# Patient Record
Sex: FEMALE | Race: OTHER | Employment: UNEMPLOYED | URBAN - METROPOLITAN AREA
[De-identification: names, ages, dates, MRNs, and addresses within clinical notes are randomized per-mention and may not be internally consistent; named-entity substitution may affect disease eponyms.]

---

## 2017-12-08 ENCOUNTER — APPOINTMENT (OUTPATIENT)
Dept: GENERAL RADIOLOGY | Age: 62
DRG: 872 | End: 2017-12-08
Attending: PHYSICIAN ASSISTANT
Payer: COMMERCIAL

## 2017-12-08 ENCOUNTER — HOSPITAL ENCOUNTER (INPATIENT)
Age: 62
LOS: 4 days | Discharge: NURSING, REHAB OR LONG-TERM CARE | DRG: 872 | End: 2017-12-12
Attending: EMERGENCY MEDICINE | Admitting: INTERNAL MEDICINE
Payer: COMMERCIAL

## 2017-12-08 ENCOUNTER — APPOINTMENT (OUTPATIENT)
Dept: CT IMAGING | Age: 62
DRG: 872 | End: 2017-12-08
Attending: EMERGENCY MEDICINE
Payer: COMMERCIAL

## 2017-12-08 DIAGNOSIS — A41.9 SEVERE SEPSIS (HCC): Primary | ICD-10-CM

## 2017-12-08 DIAGNOSIS — N39.0 URINARY TRACT INFECTION WITHOUT HEMATURIA, SITE UNSPECIFIED: ICD-10-CM

## 2017-12-08 DIAGNOSIS — R65.20 SEVERE SEPSIS (HCC): Primary | ICD-10-CM

## 2017-12-08 DIAGNOSIS — R50.9 FEVER, UNSPECIFIED FEVER CAUSE: ICD-10-CM

## 2017-12-08 LAB
ALBUMIN SERPL-MCNC: 3.6 G/DL (ref 3.4–5)
ALBUMIN/GLOB SERPL: 0.9 {RATIO} (ref 0.8–1.7)
ALP SERPL-CCNC: 149 U/L (ref 45–117)
ALT SERPL-CCNC: 54 U/L (ref 13–56)
ANION GAP SERPL CALC-SCNC: 10 MMOL/L (ref 3–18)
APPEARANCE UR: ABNORMAL
AST SERPL-CCNC: 46 U/L (ref 15–37)
BACTERIA URNS QL MICRO: ABNORMAL /HPF
BASOPHILS # BLD: 0 K/UL (ref 0–0.1)
BASOPHILS NFR BLD: 0 % (ref 0–2)
BILIRUB SERPL-MCNC: 0.3 MG/DL (ref 0.2–1)
BILIRUB UR QL: NEGATIVE
BUN SERPL-MCNC: 26 MG/DL (ref 7–18)
BUN/CREAT SERPL: 23 (ref 12–20)
CALCIUM SERPL-MCNC: 8.6 MG/DL (ref 8.5–10.1)
CHLORIDE SERPL-SCNC: 100 MMOL/L (ref 100–108)
CO2 SERPL-SCNC: 25 MMOL/L (ref 21–32)
COLOR UR: YELLOW
CREAT SERPL-MCNC: 1.13 MG/DL (ref 0.6–1.3)
DIFFERENTIAL METHOD BLD: ABNORMAL
EOSINOPHIL # BLD: 0 K/UL (ref 0–0.4)
EOSINOPHIL NFR BLD: 0 % (ref 0–5)
EPITH CASTS URNS QL MICRO: ABNORMAL /LPF (ref 0–5)
ERYTHROCYTE [DISTWIDTH] IN BLOOD BY AUTOMATED COUNT: 14.3 % (ref 11.6–14.5)
FLUAV AG NPH QL IA: NEGATIVE
FLUBV AG NOSE QL IA: NEGATIVE
GLOBULIN SER CALC-MCNC: 4.2 G/DL (ref 2–4)
GLUCOSE BLD STRIP.AUTO-MCNC: 261 MG/DL (ref 70–110)
GLUCOSE SERPL-MCNC: 318 MG/DL (ref 74–99)
GLUCOSE UR STRIP.AUTO-MCNC: >1000 MG/DL
HCT VFR BLD AUTO: 34.8 % (ref 35–45)
HGB BLD-MCNC: 11.8 G/DL (ref 12–16)
HGB UR QL STRIP: ABNORMAL
KETONES UR QL STRIP.AUTO: NEGATIVE MG/DL
LACTATE BLD-SCNC: 2.1 MMOL/L (ref 0.4–2)
LACTATE SERPL-SCNC: 2.3 MMOL/L (ref 0.4–2)
LEUKOCYTE ESTERASE UR QL STRIP.AUTO: ABNORMAL
LIPASE SERPL-CCNC: 134 U/L (ref 73–393)
LYMPHOCYTES # BLD: 0.6 K/UL (ref 0.9–3.6)
LYMPHOCYTES NFR BLD: 8 % (ref 21–52)
MCH RBC QN AUTO: 29.7 PG (ref 24–34)
MCHC RBC AUTO-ENTMCNC: 33.9 G/DL (ref 31–37)
MCV RBC AUTO: 87.7 FL (ref 74–97)
MONOCYTES # BLD: 0.5 K/UL (ref 0.05–1.2)
MONOCYTES NFR BLD: 8 % (ref 3–10)
NEUTS SEG # BLD: 5.7 K/UL (ref 1.8–8)
NEUTS SEG NFR BLD: 84 % (ref 40–73)
NITRITE UR QL STRIP.AUTO: NEGATIVE
PH UR STRIP: 5.5 [PH] (ref 5–8)
PLATELET # BLD AUTO: 158 K/UL (ref 135–420)
PMV BLD AUTO: 11.2 FL (ref 9.2–11.8)
POTASSIUM SERPL-SCNC: 4.1 MMOL/L (ref 3.5–5.5)
PROT SERPL-MCNC: 7.8 G/DL (ref 6.4–8.2)
PROT UR STRIP-MCNC: NEGATIVE MG/DL
RBC # BLD AUTO: 3.97 M/UL (ref 4.2–5.3)
RBC #/AREA URNS HPF: ABNORMAL /HPF (ref 0–5)
SODIUM SERPL-SCNC: 135 MMOL/L (ref 136–145)
SP GR UR REFRACTOMETRY: 1.02 (ref 1–1.03)
UROBILINOGEN UR QL STRIP.AUTO: 0.2 EU/DL (ref 0.2–1)
WBC # BLD AUTO: 6.8 K/UL (ref 4.6–13.2)
WBC URNS QL MICRO: ABNORMAL /HPF (ref 0–4)

## 2017-12-08 PROCEDURE — 83605 ASSAY OF LACTIC ACID: CPT

## 2017-12-08 PROCEDURE — 74011250636 HC RX REV CODE- 250/636: Performed by: PHYSICIAN ASSISTANT

## 2017-12-08 PROCEDURE — 93005 ELECTROCARDIOGRAM TRACING: CPT

## 2017-12-08 PROCEDURE — 51701 INSERT BLADDER CATHETER: CPT

## 2017-12-08 PROCEDURE — 80053 COMPREHEN METABOLIC PANEL: CPT | Performed by: PHYSICIAN ASSISTANT

## 2017-12-08 PROCEDURE — 99285 EMERGENCY DEPT VISIT HI MDM: CPT

## 2017-12-08 PROCEDURE — 83690 ASSAY OF LIPASE: CPT | Performed by: PHYSICIAN ASSISTANT

## 2017-12-08 PROCEDURE — 96365 THER/PROPH/DIAG IV INF INIT: CPT

## 2017-12-08 PROCEDURE — 81001 URINALYSIS AUTO W/SCOPE: CPT | Performed by: PHYSICIAN ASSISTANT

## 2017-12-08 PROCEDURE — 83605 ASSAY OF LACTIC ACID: CPT | Performed by: PHYSICIAN ASSISTANT

## 2017-12-08 PROCEDURE — 74011250636 HC RX REV CODE- 250/636: Performed by: INTERNAL MEDICINE

## 2017-12-08 PROCEDURE — 74011250636 HC RX REV CODE- 250/636: Performed by: EMERGENCY MEDICINE

## 2017-12-08 PROCEDURE — 74011250637 HC RX REV CODE- 250/637: Performed by: INTERNAL MEDICINE

## 2017-12-08 PROCEDURE — 74011000250 HC RX REV CODE- 250: Performed by: EMERGENCY MEDICINE

## 2017-12-08 PROCEDURE — 85025 COMPLETE CBC W/AUTO DIFF WBC: CPT | Performed by: PHYSICIAN ASSISTANT

## 2017-12-08 PROCEDURE — 71010 XR CHEST PORT: CPT

## 2017-12-08 PROCEDURE — 87077 CULTURE AEROBIC IDENTIFY: CPT | Performed by: PHYSICIAN ASSISTANT

## 2017-12-08 PROCEDURE — 94761 N-INVAS EAR/PLS OXIMETRY MLT: CPT

## 2017-12-08 PROCEDURE — 65270000029 HC RM PRIVATE

## 2017-12-08 PROCEDURE — 82962 GLUCOSE BLOOD TEST: CPT

## 2017-12-08 PROCEDURE — 74011000250 HC RX REV CODE- 250: Performed by: INTERNAL MEDICINE

## 2017-12-08 PROCEDURE — 74177 CT ABD & PELVIS W/CONTRAST: CPT

## 2017-12-08 PROCEDURE — 87186 SC STD MICRODIL/AGAR DIL: CPT | Performed by: PHYSICIAN ASSISTANT

## 2017-12-08 PROCEDURE — 87040 BLOOD CULTURE FOR BACTERIA: CPT | Performed by: PHYSICIAN ASSISTANT

## 2017-12-08 PROCEDURE — 87804 INFLUENZA ASSAY W/OPTIC: CPT

## 2017-12-08 PROCEDURE — 36415 COLL VENOUS BLD VENIPUNCTURE: CPT | Performed by: PHYSICIAN ASSISTANT

## 2017-12-08 PROCEDURE — 77030005563 HC CATH URETH INT MMGH -A

## 2017-12-08 PROCEDURE — 87086 URINE CULTURE/COLONY COUNT: CPT | Performed by: PHYSICIAN ASSISTANT

## 2017-12-08 PROCEDURE — 73502 X-RAY EXAM HIP UNI 2-3 VIEWS: CPT

## 2017-12-08 PROCEDURE — 74011636320 HC RX REV CODE- 636/320: Performed by: EMERGENCY MEDICINE

## 2017-12-08 RX ORDER — HEPARIN SODIUM 5000 [USP'U]/ML
5000 INJECTION, SOLUTION INTRAVENOUS; SUBCUTANEOUS EVERY 8 HOURS
Status: DISCONTINUED | OUTPATIENT
Start: 2017-12-08 | End: 2017-12-12 | Stop reason: HOSPADM

## 2017-12-08 RX ORDER — SODIUM CHLORIDE 9 MG/ML
75 INJECTION, SOLUTION INTRAVENOUS CONTINUOUS
Status: DISCONTINUED | OUTPATIENT
Start: 2017-12-08 | End: 2017-12-12 | Stop reason: HOSPADM

## 2017-12-08 RX ORDER — LEVOFLOXACIN 5 MG/ML
750 INJECTION, SOLUTION INTRAVENOUS EVERY 24 HOURS
Status: DISCONTINUED | OUTPATIENT
Start: 2017-12-08 | End: 2017-12-09

## 2017-12-08 RX ORDER — SODIUM CHLORIDE 0.9 % (FLUSH) 0.9 %
5-10 SYRINGE (ML) INJECTION AS NEEDED
Status: DISCONTINUED | OUTPATIENT
Start: 2017-12-08 | End: 2017-12-12 | Stop reason: HOSPADM

## 2017-12-08 RX ORDER — ACETAMINOPHEN 325 MG/1
650 TABLET ORAL
Status: DISCONTINUED | OUTPATIENT
Start: 2017-12-08 | End: 2017-12-08

## 2017-12-08 RX ORDER — ACETAMINOPHEN 325 MG/1
650 TABLET ORAL
Status: DISCONTINUED | OUTPATIENT
Start: 2017-12-08 | End: 2017-12-12 | Stop reason: HOSPADM

## 2017-12-08 RX ADMIN — CEFTRIAXONE SODIUM 2 G: 2 INJECTION, POWDER, FOR SOLUTION INTRAMUSCULAR; INTRAVENOUS at 19:45

## 2017-12-08 RX ADMIN — SODIUM CHLORIDE 75 ML/HR: 900 INJECTION, SOLUTION INTRAVENOUS at 21:40

## 2017-12-08 RX ADMIN — WATER 2 G: 1 INJECTION INTRAMUSCULAR; INTRAVENOUS; SUBCUTANEOUS at 21:51

## 2017-12-08 RX ADMIN — LEVOFLOXACIN 750 MG: 5 INJECTION, SOLUTION INTRAVENOUS at 22:55

## 2017-12-08 RX ADMIN — HEPARIN SODIUM 5000 UNITS: 5000 INJECTION, SOLUTION INTRAVENOUS; SUBCUTANEOUS at 22:58

## 2017-12-08 RX ADMIN — ACETAMINOPHEN 650 MG: 325 TABLET ORAL at 23:02

## 2017-12-08 RX ADMIN — SODIUM CHLORIDE 2313 ML: 900 INJECTION, SOLUTION INTRAVENOUS at 17:50

## 2017-12-08 RX ADMIN — IOPAMIDOL 75 ML: 612 INJECTION, SOLUTION INTRAVENOUS at 21:07

## 2017-12-08 NOTE — IP AVS SNAPSHOT
Jailyn Adamson 
 
 
 [address] [place] [address] Patient: Anastasiia Serrato MRN: [id number] MICHAEL:[id number] My Medications TAKE these medications as instructed Instructions Each Dose to Equal  
 Morning Noon Evening Bedtime  
 acetaminophen 325 mg tablet Commonly known as:  TYLENOL Your last dose was: Your next dose is: Take 2 Tabs by mouth every four (4) hours as needed. Indications: Fever, Pain 650 mg  
    
   
   
   
  
 insulin glargine 100 unit/mL injection Commonly known as:  LANTUS Your last dose was: Your next dose is:    
   
   
 23 Units by SubCUTAneous route daily. 23 Units  
    
   
   
   
  
 levoFLOXacin 750 mg tablet Commonly known as:  Carlos Modi Your last dose was: Your next dose is: Take 1 Tab by mouth daily. 750 mg  
    
   
   
   
  
 metoprolol tartrate 50 mg tablet Commonly known as:  LOPRESSOR Your last dose was: Your next dose is: Take 1 Tab by mouth every twelve (12) hours. 50 mg Where to Get Your Medications Information on where to get these meds will be given to you by the nurse or doctor. ! Ask your nurse or doctor about these medications  
  acetaminophen 325 mg tablet  
 insulin glargine 100 unit/mL injection  
 levoFLOXacin 750 mg tablet  
 metoprolol tartrate 50 mg tablet

## 2017-12-08 NOTE — ED PROVIDER NOTES
EMERGENCY DEPARTMENT HISTORY AND PHYSICAL EXAM    6:22 PM      Date: 12/8/2017  Patient Name: Chase Tidwell    History of Presenting Illness     Chief Complaint   Patient presents with   Orma Damme Fall    Abdominal Pain    Leg Pain         History Provided By: Patient's Niece    Chief Complaint: Fall, Left hip/abd pain  Duration: 2 Hours  Timing:  Constant  Location: Left hip and abd   Quality: Not obtained  Severity: N/A  Modifying Factors: N/A  Associated Symptoms: denies any other associated signs or symptoms      Additional History (Context): 6:41 PM Chase Tidwell is a 63 y.o. female with h/o CVA who presents to ED for evaluation after a fall onset 2 hours ago. Patients niece states that she fell with her daughter earlier today when being lifted out of bed. She states that she has been rubbing her left side and leg. She is weak on her right side at baseline. Patient takes metoprolol, aspirin and insulin daily. She was recently in the nursing home in [facility] but was brought her to live with her daughter 1 month ago. Patient's niece states that she is in the process of finding her a nursing home here. Patient febrile in ED. Hx limited due to patient being nonverbal.     PCP: None    Current Facility-Administered Medications   Medication Dose Route Frequency Provider Last Rate Last Dose    sodium chloride (NS) flush 5-10 mL  5-10 mL IntraVENous PRN Lizette Waddell PA-C        sodium chloride 0.9 % bolus infusion 2,313 mL  30 mL/kg IntraVENous ONCE Lizette Waddell PA-C           Past History     Past Medical History:  No past medical history on file. Past Surgical History:  No past surgical history on file. Family History:  No family history on file.     Social History:  Social History   Substance Use Topics    Smoking status: Not on file    Smokeless tobacco: Not on file    Alcohol use Not on file       Allergies:  No Known Allergies      Review of Systems       Review of Systems   Unable to perform ROS: Patient nonverbal         Physical Exam     Visit Vitals    /80 (BP 1 Location: Left arm, BP Patient Position: At rest)    Pulse (!) 120    Temp (!) 102.5 °F (39.2 °C)    Resp 16    Ht 4' 11\" (1.499 m)    Wt 77.1 kg (170 lb)    SpO2 96%    BMI 34.34 kg/m2         Physical Exam   Constitutional: She is oriented to person, place, and time. She appears well-developed and well-nourished. No distress. HENT:   Head: Normocephalic and atraumatic. Mouth/Throat: Oropharynx is clear and moist and mucous membranes are normal. No oropharyngeal exudate. Eyes: Conjunctivae and EOM are normal. No scleral icterus. Neck: Normal range of motion. Neck supple. No JVD present. No thyromegaly present. Cardiovascular: Regular rhythm, S1 normal, S2 normal, normal heart sounds and intact distal pulses. Tachycardia present. Exam reveals no gallop and no friction rub. No murmur heard. Pulmonary/Chest: Effort normal and breath sounds normal. No accessory muscle usage. No respiratory distress. She has no wheezes. She has no rhonchi. She has no rales. She exhibits no tenderness. No obvious deformity on the chest wall. Abdominal: Soft. Normal appearance and bowel sounds are normal. She exhibits no distension and no pulsatile midline mass. There is no tenderness. There is no rebound and no guarding. Seems to be discomfort with palpation on the left side of the Abd  Skin intact. Musculoskeletal: Normal range of motion. Lymphadenopathy:        Head (right side): No submandibular adenopathy present. She has no cervical adenopathy. Neurological: She is alert and oriented to person, place, and time. Moving all extremities. No obvious deficits or facial asymmetry. Contractors and paralysis of the RUE and RLE. Skin: Skin is warm, dry and intact. No rash noted. No erythema. Mild blanching erythema on sacrum. No skin breakdown    Psychiatric: She has a normal mood and affect.  Her speech is normal and behavior is normal.   Nursing note and vitals reviewed. Diagnostic Study Results     Labs -  Recent Results (from the past 12 hour(s))   CBC WITH AUTOMATED DIFF    Collection Time: 12/08/17  6:03 PM   Result Value Ref Range    WBC 6.8 4.6 - 13.2 K/uL    RBC 3.97 (L) 4.20 - 5.30 M/uL    HGB 11.8 (L) 12.0 - 16.0 g/dL    HCT 34.8 (L) 35.0 - 45.0 %    MCV 87.7 74.0 - 97.0 FL    MCH 29.7 24.0 - 34.0 PG    MCHC 33.9 31.0 - 37.0 g/dL    RDW 14.3 11.6 - 14.5 %    PLATELET 644 464 - 660 K/uL    MPV 11.2 9.2 - 11.8 FL    NEUTROPHILS 84 (H) 40 - 73 %    LYMPHOCYTES 8 (L) 21 - 52 %    MONOCYTES 8 3 - 10 %    EOSINOPHILS 0 0 - 5 %    BASOPHILS 0 0 - 2 %    ABS. NEUTROPHILS 5.7 1.8 - 8.0 K/UL    ABS. LYMPHOCYTES 0.6 (L) 0.9 - 3.6 K/UL    ABS. MONOCYTES 0.5 0.05 - 1.2 K/UL    ABS. EOSINOPHILS 0.0 0.0 - 0.4 K/UL    ABS. BASOPHILS 0.0 0.0 - 0.1 K/UL    DF AUTOMATED     POC LACTIC ACID    Collection Time: 12/08/17  6:06 PM   Result Value Ref Range    Lactic Acid (POC) 2.1 (HH) 0.4 - 2.0 mmol/L   EKG, 12 LEAD, INITIAL    Collection Time: 12/08/17  6:13 PM   Result Value Ref Range    Ventricular Rate 115 BPM    Atrial Rate 115 BPM    P-R Interval 152 ms    QRS Duration 64 ms    Q-T Interval 332 ms    QTC Calculation (Bezet) 459 ms    Calculated P Axis 46 degrees    Calculated R Axis 2 degrees    Calculated T Axis 35 degrees    Diagnosis       Sinus tachycardia  Otherwise normal ECG  No previous ECGs available         Radiologic Studies -   Xr Hip Lt W Or Wo Pelv 2-3 Vws    Result Date: 12/8/2017  Left Hip Two Views HISTORY: Left-sided hip and thigh pain status post fall day of arrival. COMPARISON: None. FINDINGS: AP pelvis and frog view of the left hip obtained. The bony pelvic ring is intact. The left hip joint space is unremarkable. There is no evidence of fracture or dislocation. Mineralization is normal.     IMPRESSION: Negative hip.     Xr Chest Port    Result Date: 12/8/2017  Chest One View portable 1738 hours CPT CODE: 87343 HISTORY: Left-sided abdomen, chest pain, hip pain, and by pain status post fall day of arrival. COMPARISON: None. FINDINGS: One view obtained. The cardiac and mediastinal silhouette is normal. The lungs are mildly hypoinflated. Pulmonary vascularity is normal. The left costophrenic angle is mildly blunted. The right costophrenic angle is sharp. . No bony abnormalities are seen. IMPRESSION: Mild blunting of the left costophrenic angle with differential diagnosis of small left pleural effusion vs. pleural reaction. .        Medical Decision Making   I am the first provider for this patient. I reviewed the vital signs, available nursing notes, past medical history, past surgical history, family history and social history. Vital Signs-Reviewed the patient's vital signs. Pulse Oximetry Analysis -  96% on room air , Normal    Cardiac Monitor:  Rate: 120  Rhythm:  Sinus Tachycardia     EKG: Interpreted by the EP. Time Interpreted: 18:15   Rate: 115 bpm   Rhythm: Sinus Tachycardia    Interpretation: Artifacts at baseline. nml axis and intervals. No obvious ischemic changes. Comparison: No previous EKG    Records Reviewed: Nursing Notes, Old Medical Records and Previous electrocardiograms (Time of Review: 6:22 PM)    ED Course: Progress Notes, Reevaluation, and Consults:  UPDATE 7:44 PM  I spoke with Dr. Jeffrey Williamson (hospitalist) who accepts for admission    Bran MD Ryan  EM-IM Physician         Provider Notes (Medical Decision Making):  Ms Helena Rudd is a 59y/o [place] woman with PMhx of CVA (R.sided weakness, wheelchair bound, aphasic), DM (insulin), Seizure disorder who presents for eval of ground level fall. Family does all speaking for her, can't give hx due to aphasia and cva in past. Family reports she was in nursing home in [facility], family sent her here to live ~30dys ago. Family is trying to set up PCP visit and get her into nursing home.   Today, famly was transferring her to bedside commode and fell onto left side. No LOC, but they wanted her 'checked out'. Seems to have some left abd pain. No meds given. On arrival here, Temp 102.5, 's, RR 16, /80, 96%ra. Obese  woman, laying in bed, moaning from time to time. HEENT benign, no trauma. Lungs CTAB, nonlabored, chest wall nontender. Abd soft, no lesions, normal bowel sounds, ttp on Left side (pt moans). Pelvis stable. No obvious deformities or breaks in skin on UE/LE. Sacrum with some blanchable erythema but no breakdown. R.LE and RUE are contracted and flacid paralysis. Concerned a bit about internal injuries, abd, left chest. More concerned about her Sepsis. She has 2/4 SIRS now. No signs of end organ damage at this point. Potential sources lung, blood, urine, viral (flu), interabd (apendix, adam, etc). Hard to get good history due to clinical state.      -Sepsis alert called  -30cc/kg IV F Bolus  -CBC, CMP, Lactate  -UA (cath)  -Uculture, BC x 2  -Flu swab  -CXR  -CT Abd/pelvis w/IV contrast  -anticipate admission  -Will await sources prior to abx. Johnnie Ocampo MD  EM-IM Physician       Recent Results (from the past 12 hour(s))   CBC WITH AUTOMATED DIFF    Collection Time: 12/08/17  6:03 PM   Result Value Ref Range    WBC 6.8 4.6 - 13.2 K/uL    RBC 3.97 (L) 4.20 - 5.30 M/uL    HGB 11.8 (L) 12.0 - 16.0 g/dL    HCT 34.8 (L) 35.0 - 45.0 %    MCV 87.7 74.0 - 97.0 FL    MCH 29.7 24.0 - 34.0 PG    MCHC 33.9 31.0 - 37.0 g/dL    RDW 14.3 11.6 - 14.5 %    PLATELET 511 779 - 693 K/uL    MPV 11.2 9.2 - 11.8 FL    NEUTROPHILS 84 (H) 40 - 73 %    LYMPHOCYTES 8 (L) 21 - 52 %    MONOCYTES 8 3 - 10 %    EOSINOPHILS 0 0 - 5 %    BASOPHILS 0 0 - 2 %    ABS. NEUTROPHILS 5.7 1.8 - 8.0 K/UL    ABS. LYMPHOCYTES 0.6 (L) 0.9 - 3.6 K/UL    ABS. MONOCYTES 0.5 0.05 - 1.2 K/UL    ABS. EOSINOPHILS 0.0 0.0 - 0.4 K/UL    ABS.  BASOPHILS 0.0 0.0 - 0.1 K/UL    DF AUTOMATED     METABOLIC PANEL, COMPREHENSIVE    Collection Time: 12/08/17 6:03 PM   Result Value Ref Range    Sodium 135 (L) 136 - 145 mmol/L    Potassium 4.1 3.5 - 5.5 mmol/L    Chloride 100 100 - 108 mmol/L    CO2 25 21 - 32 mmol/L    Anion gap 10 3.0 - 18 mmol/L    Glucose 318 (H) 74 - 99 mg/dL    BUN 26 (H) 7.0 - 18 MG/DL    Creatinine 1.13 0.6 - 1.3 MG/DL    BUN/Creatinine ratio 23 (H) 12 - 20      GFR est AA 60 (L) >60 ml/min/1.73m2    GFR est non-AA 49 (L) >60 ml/min/1.73m2    Calcium 8.6 8.5 - 10.1 MG/DL    Bilirubin, total 0.3 0.2 - 1.0 MG/DL    ALT (SGPT) 54 13 - 56 U/L    AST (SGOT) 46 (H) 15 - 37 U/L    Alk.  phosphatase 149 (H) 45 - 117 U/L    Protein, total 7.8 6.4 - 8.2 g/dL    Albumin 3.6 3.4 - 5.0 g/dL    Globulin 4.2 (H) 2.0 - 4.0 g/dL    A-G Ratio 0.9 0.8 - 1.7     LIPASE    Collection Time: 12/08/17  6:03 PM   Result Value Ref Range    Lipase 134 73 - 393 U/L   POC LACTIC ACID    Collection Time: 12/08/17  6:06 PM   Result Value Ref Range    Lactic Acid (POC) 2.1 (HH) 0.4 - 2.0 mmol/L   EKG, 12 LEAD, INITIAL    Collection Time: 12/08/17  6:13 PM   Result Value Ref Range    Ventricular Rate 115 BPM    Atrial Rate 115 BPM    P-R Interval 152 ms    QRS Duration 64 ms    Q-T Interval 332 ms    QTC Calculation (Bezet) 459 ms    Calculated P Axis 46 degrees    Calculated R Axis 2 degrees    Calculated T Axis 35 degrees    Diagnosis       Sinus tachycardia  Otherwise normal ECG  No previous ECGs available     URINALYSIS W/ RFLX MICROSCOPIC    Collection Time: 12/08/17  6:46 PM   Result Value Ref Range    Color YELLOW      Appearance CLOUDY      Specific gravity 1.020 1.005 - 1.030      pH (UA) 5.5 5.0 - 8.0      Protein NEGATIVE  NEG mg/dL    Glucose >1000 (A) NEG mg/dL    Ketone NEGATIVE  NEG mg/dL    Bilirubin NEGATIVE  NEG      Blood TRACE (A) NEG      Urobilinogen 0.2 0.2 - 1.0 EU/dL    Nitrites NEGATIVE  NEG      Leukocyte Esterase LARGE (A) NEG     URINE MICROSCOPIC ONLY    Collection Time: 12/08/17  6:46 PM   Result Value Ref Range    WBC TOO NUMEROUS TO COUNT 0 - 4 /hpf    RBC 4 to 10 0 - 5 /hpf    Epithelial cells 1+ 0 - 5 /lpf    Bacteria 4+ (A) NEG /hpf         UPDATE 7:16 PM  -Urine infected w/TNTC WBC  -WBC 6.8  -CMP with Glucose of ~350 but no acidosis or anion gap elevation  -CXR w/small ? l.pleural effusion  -Pelvis/hip films neg for fx  -Lact 2.1    -Will give IV Ceftriaxone and repeat Lactate  -Will speak to hospitalist about admission for Severe Sepsis      Carey Michael MD  EM-IM Physician         Procedures: None    Core Measures: None    Critical Care Time: None    For Hospitalized Patients:    1. Hospitalization Decision Time:  The decision to hospitalize the patient was made by Dr. Ngozi Vickers  at    1900   on 12/8/2017    2. Aspirin: Aspirin was not given because the patient did not present with a stroke at the time of their Emergency Department evaluation    Diagnosis     Clinical Impression: No diagnosis found. Disposition: Admit    Follow-up Information     None           Patient's Medications    No medications on file     _______________________________    Attestations:  Bridgette Perez acting as a scribe for and in the presence of Carey Michael MD      December 08, 2017 at 6:22 PM       Provider Attestation:      I personally performed the services described in the documentation, reviewed the documentation, as recorded by the scribe in my presence, and it accurately and completely records my words and actions.  December 08, 2017 at 6:22 PM - Carey Michael MD    _______________________________

## 2017-12-08 NOTE — ED TRIAGE NOTES
Pt is non verbal to a certain extent,pt will answer yes or no to questions,  pt fell today now c/o left sided abdominal, hip, and thigh pain, pt was being lifted from her bed and her and her family member had a mechanical fall, pt had a previous stroke and has deficit on right UE and LE, pt doesn't ambulate, no s/s of cardiac or respiratory distress

## 2017-12-08 NOTE — IP AVS SNAPSHOT
Christina Acevedo 
 
 
 [address] Patient: Thuy Macias MRN: [id number] NXD:[id number] About your hospitalization You were admitted on:  December 8, 2017 You last received care in the:  [facility] [address] You were discharged on:  December 12, 2017 Why you were hospitalized Your primary diagnosis was:  Not on File Your diagnoses also included:  Uti (Urinary Tract Infection) Things You Need To Do (next 8 weeks) Follow up with None Where:  None (395) Patient stated that they have no PCP Discharge Orders None A check denzel indicates which time of day the medication should be taken. My Medications TAKE these medications as instructed Instructions Each Dose to Equal  
 Morning Noon Evening Bedtime  
 acetaminophen 325 mg tablet Commonly known as:  TYLENOL Your last dose was: Your next dose is: Take 2 Tabs by mouth every four (4) hours as needed. Indications: Fever, Pain 650 mg  
    
   
   
   
  
 insulin glargine 100 unit/mL injection Commonly known as:  LANTUS Your last dose was: Your next dose is:    
   
   
 23 Units by SubCUTAneous route daily. 23 Units  
    
   
   
   
  
 levoFLOXacin 750 mg tablet Commonly known as:  Arlyss Alves Your last dose was: Your next dose is: Take 1 Tab by mouth daily. 750 mg  
    
   
   
   
  
 metoprolol tartrate 50 mg tablet Commonly known as:  LOPRESSOR Your last dose was: Your next dose is: Take 1 Tab by mouth every twelve (12) hours. 50 mg Where to Get Your Medications Information on where to get these meds will be given to you by the nurse or doctor. ! Ask your nurse or doctor about these medications  
  acetaminophen 325 mg tablet  
 insulin glargine 100 unit/mL injection levoFLOXacin 750 mg tablet  
 metoprolol tartrate 50 mg tablet Discharge Instructions Sepsis: Instrucciones de cuidado - [ Sepsis: Care Instructions ] Instrucciones de cuidado La sepsis es jenny infección que se ha extendido por todo el cuerpo. Es jenny afección que pone en peligro la andriy y a menudo provoca jenny presión arterial extremadamente baja. [place] puede provocar problemas en muchos diferentes órganos. La causa de la sepsis no está siempre anita, loretta puede ocurrir john parte de jenny enfermedad crónica o repentina. A veces hasta jenny enfermedad leve puede producirla. La atención de seguimiento es jenny parte clave de pierson tratamiento y seguridad. Asegúrese de hacer y acudir a todas las citas, y llame a pierson médico si está teniendo problemas. También es jenny buena idea saber los resultados de los exámenes y mantener jenny lista de los medicamentos que reji. Cómo puede cuidarse en el hogar? · Si pierson médico le recetó antibióticos, tómelos según las indicaciones. No deje de tomarlos por el hecho de sentirse mejor. Debe sixto todos los antibióticos hasta terminarlos. · Polina abundantes líquidos, suficientes para que pierson orina sea de color amarillo yaa o transparente john el agua. Elija beber agua u otros líquidos grupo sin cafeína hasta que se sienta mejor. Si tiene jenny enfermedad del riñón, del corazón o del hígado y tiene que Wale's líquidos, hable con pierson médico antes de aumentar pierson consumo. Puede probar bebidas rehidratantes, john Gatorade o Powerade. · No polina alcohol. · Siga jenny dieta saludable. Incluya en pierson dieta diaria frutas, vegetales y granos integrales. · Caminar es jenny manera sencilla de hacer ejercicio. Poco a poco, aumente la distancia que camine cada día. Asegúrese de que pierson médico sepa que usted va a comenzar un programa de ejercicios.  
· No fume ni use otros productos derivados del tabaco. Si necesita ayuda para dejar de fumar, hable con pierson médico [employer] y medicamentos para dejar de fumar. Estos pueden aumentar priya probabilidades de dejar el hábito para siempre. Cuándo debe pedir ayuda? Llame al 911 en cualquier momento que considere que necesita atención de emergencia. Por ejemplo, llame si: 
? · Se desmayó (perdió el conocimiento). ?Llame a pierson médico ahora mismo o busque atención médica inmediata si: 
? · Tiene fiebre o escalofríos. ? · Tiene la piel fría, pálida o pegajosa. ? · [employer] o aturdimiento, o que está a punto de Proctor. ? · Tiene algún síntoma nuevo, john tos, dolor en jenny parte del cuerpo o problemas urinarios. ?Preste especial atención a los cambios en pierson eduardo y asegúrese de comunicarse con pierson médico si: 
? · No mejora john se esperaba. Dónde puede encontrar más información en inglés? Toby Merck a http://terri-yen.info/. Kayla Brennan I218 en la búsqueda para aprender más acerca de \"Sepsis: Instrucciones de cuidado - [ Sepsis: Care Instructions ]. \" 
Revisado: 20 marzo, 2017 Versión del contenido: 11.4 © 2996-8814 Healthwise, Incorporated. Las instrucciones de cuidado fueron adaptadas bajo licencia por [employer] (which disclaims liability or warranty for this information). Si usted tiene [place] afección médica o sobre estas instrucciones, siempre pregunte a pierson profesional de eduardo. Healthwise, Incorporated niega toda garantía o responsabilidad por pierson uso de esta información. Infección urinaria en las mujeres: Instrucciones de cuidado - [ Urinary Tract Infection in Women: Care Instructions ] Instrucciones de cuidado Jenny infección urinaria (UTI, por priya siglas en inglés) es un término general que hace referencia a jenny infección que se produce en cualquier parte entre los riñones y la uretra (conducto por el cual se expulsa la orina). La mayoría de las UTI son infecciones de la vejiga. Con frecuencia, causan dolor o ardor al [place]. Las UTI son causadas por bacterias y pueden curarse con antibióticos. Asegúrese de completar el tratamiento para que la infección desaparezca. La atención de seguimiento es jenny parte clave de pierson tratamiento y seguridad. Asegúrese de hacer y acudir a todas las citas, y llame a pierson médico si está teniendo problemas. También es jenny buena idea saber los resultados de los exámenes y mantener jenny lista de los medicamentos que reji. Cómo puede cuidarse en el hogar? · [address] No deje de tomarlos por el hecho de sentirse mejor. Debe sixto todos los antibióticos hasta terminarlos. · Neymar los próximos nam o [address], polina mayor cantidad de [place] y otros líquidos. [place] puede ayudar a eliminar las bacterias que provocan la infección. (Si tiene jenny enfermedad de los riñones, el corazón o el hígado y tiene que [place]'s líquidos, hable con pierson médico antes de aumentar pierson consumo). · Evite las bebidas gaseosas o con cafeína. Pueden irritar la vejiga. · Orine con frecuencia. Trate de vaciar la vejiga cada vez que orine. · Para aliviar el dolor, tome un baño caliente o colóquese jenny almohadilla térmica a baja temperatura sobre la parte baja del abdomen o la jj genital. Nunca se duerma mientras [place] jenny almohadilla térmica. [address] infecciones urinarias · Polina abundante agua todos los días. [place] la ayuda a orinar con frecuencia, lo que elimina las bacterias de pierson organismo. (Si tiene jenny enfermedad de los riñones, el corazón o el hígado y tiene que [place]'s líquidos, hable con pierson médico antes de aumentar pierson consumo). · Orine cuando necesite hacerlo. · Orine inmediatamente después de stephanie tenido [employer]. · Cámbiese las toallas sanitarias con frecuencia. · Evite el uso de lavados vaginales, los deacon de burbujas, los [place] de higiene femenina y otros productos para la higiene femenina que contengan desodorantes. · Después de ir al baño, límpiese de adelante hacia atrás. Cuándo debes pedir ayuda? Llama a tu médico ahora mismo o busca atención médica inmediata si: 
? · Aparecen síntomas john fiebre, escalofríos, náuseas o vómitos por Shermon Factor, o empeoran. ? · Te empieza a doler la espalda, erinn debajo de la caja torácica. A esto se le llama dolor en el flanco.  
? · Aparece vinh o pus en la orina. ? · Tienes problemas con los antibióticos. ?Presta especial atención a los cambios en tu eduardo y asegúrate de comunicarte con tu médico si: 
? · No mejoras después de stephanie tomado un antibiótico luiza 2 días. ? · Los síntomas desaparecen y [place]. Dónde puede encontrar más información en inglés? Dinesh Imam a http://terri-yen.info/. Shira Tom F857 en la búsqueda para aprender más acerca de \"Infección urinaria en las mujeres: Instrucciones de cuidado - [ Urinary Tract Infection in Women: Care Instructions ]. \" 
Revisado: 12 mayo, 2017 Versión del contenido: 11.4 © 3511-5663 Healthwise, Incorporated. Las instrucciones de cuidado fueron adaptadas bajo licencia por [employer] Connections (which disclaims liability or warranty for this information). Si usted tiene [place] afección médica o sobre estas instrucciones, siempre pregunte a pierson profesional de eduardo. Healthwise, Incorporated niega toda garantía o responsabilidad por pierson uso de esta información. Prevención de caídas: Instrucciones de cuidado - [ Preventing Falls: Care Instructions ] Instrucciones de cuidado Caminar por pierson casa de manera yang puede convertirse en un desafío, sobre todo si tiene lesiones o problemas de eduardo que puedan hacer que se caiga con [place]. Las alfombras sueltas y los muebles en los pasillos se encuentran entre los peligros que enfrentan muchas personas mayores que tienen problemas para caminar o de la visión.  Las [employer] tienen afecciones john artritis, osteoporosis o demencia, también deben tener cuidado de no caerse. Usted puede hacer que pierson hogar sea más seguro si reji algunas medidas sencillas. La atención de seguimiento es jenny parte clave de pierson tratamiento y seguridad. Asegúrese de hacer y acudir a todas las citas, y llame a pierson médico si está teniendo problemas. También es jenny buena idea saber los resultados de los exámenes y mantener jenny lista de los medicamentos que reji. Cómo puede cuidarse en el hogar? Cómo cuidarse · Podría marearse si no laura suficiente agua. Para prevenir la deshidratación, marlo abundantes líquidos, los suficientes para que pierson orina sea de color amarillo yaa o transparente john el agua. Elija sixto agua y otros líquidos grupo sin cafeína. Si tiene jenny enfermedad del riñón, del corazón o del hígado y tiene que Wale's líquidos, hable con pierson médico antes de aumentar pierson consumo. · Shannon ejercicio con regularidad para mejorar pierson fortaleza, pierson zan muscular y pierson estabilidad. Cam[place], si puede. Nadar podría ser Charo Hane buena alternativa si le fadia trabajo caminar. · Hágase un examen de la visión y la audición cada año o cada vez que note un cambio. Si tiene dificultades para rosa maria y oír, es posible que no pueda evitar objetos y podría perder pierson equilibrio. · Conozca los efectos secundarios de los medicamentos que reji. Pregúntele a pierson médico o pierson farmacéutico si los medicamentos que reji pueden afectar pierson equilibrio. Las pastillas para dormir o los sedantes pueden afectar pierson equilibrio. · Limite la cantidad de alcohol que laura. El alcohol puede alterar pierson equilibrio y otros sentidos. · Pregúntele a pierson médico si los callos o las callosidades deben ser eliminados de priya pies. Si [place] un calzado holgado a causa de los callos o las callosidades, podría perder el equilibrio y caerse. · Hable con pierson médico si tiene entumecimiento en los pies. Cómo prevenir las caídas en el hogar · Quite escalones en la elana de entrada, alfombrillas y obstáculos. Fije las alfombras sueltas o repare las áreas levantadas del piso. · Mueva los muebles y los cables eléctricos para que no estén en los pasillos. · Utilice cera antideslizante para pisos, y seque de inmediato cualquier derrame que se produzca, sobre todo si el piso es de baldosas de cerámica. · Si [place] jenny andadera o un bastón, colóquele un revestimiento de goma en las puntas. Si utiliza muletas, limpie la base regularmente con un paño abrasivo, john un estropajo de juan jose. · Mantenga la casa naya ellie, sobre todo las [place], los porches y los pasillos exteriores. Utilice lamparitas nocturnas en áreas john vestíbulos y deacon. Ponga interruptores de radha adicionales o utilice interruptores a distancia (john los que se encienden o apagan al aplaudir) para que sea más fácil encender las luces si tiene que levantarse por las noches. · Instale pasamanos o barandillas sólidos en las escaleras. · Ponga los artículos que más utiliza en los estantes bajos de los gabinetes (aproximadamente a la altura de pierson cintura). · Tenga un tel[facility]fono [facility] y [facility] linterna con baterías nuevas cerca de pierson cama. Si es posible, coloque un teléfono en cada jenny de las habitaciones de pierson casa, o lleve siempre un celular en jassi de que se caiga y no pueda llegar al teléfono. O puede usar un dispositivo en el hussain o la neri en el que presione un botón para enviar jenny señal pidiendo [place]. · Use zapatos de tacón bajo que le queden naya y le den buen apoyo a priya pies. Use calzado con suelas antideslizantes. Revise los tacones y las suelas de priya zapatos antes de usarlos. Repare o reemplace los tacones o las suelas desgastados. · No camine en medias sobre pisos de [place]. · Camine por el [facility] aceras estén resbalosas. Si vive en jenny localidad donde hay elsa y hielo en invierno, coloque sal sobre los escalones y las aceras resbaladizos. [address] en el baño · Instale agarraderas y tapetes antideslizantes dentro y fuera de [facility] [address], así john cerca del inodoro y el lavabo. · Utilice jenny silla para la ducha y un banco para la bañera. · Use jenny alice de ducha portátil que le permite sentarse mientras se ducha. · Cuando vaya a entrar en la tadeo o la ducha, coloque elham la pierna más débil. Cuando vaya a salir de la ducha o la tadeo, hágalo elham con el lado más riley. · Repare los asientos de inodoro sueltos y considere instalar un asiento de inodoro elevado para que sea más fácil sentarse y levantarse del inodoro. · No cierre con llave la elana del baño mientras se ducha. Dónde puede encontrar más información en inglés? Luis Felipene Corey a http://terri-yen.info/. David O416 en la búsqueda para aprender más acerca de \"Prevención de caídas: Instrucciones de cuidado - [ Preventing Falls: Care Instructions ]. \" 
Revisado: 12 pimentel, 2017 Versión del contenido: 11.4 © 9987-2210 Healthwise, Incorporated. Las instrucciones de cuidado fueron adaptadas bajo licencia por Good Help Connections (which disclaims liability or warranty for this information). Si usted tiene [place] afección médica o sobre estas instrucciones, siempre pregunte a pierson profesional de eduardo. Healthwise, Incorporated niega toda garantía o responsabilidad por pierson uso de esta información. Patient armband removed and shredded DISCHARGE SUMMARY from Nurse PATIENT INSTRUCTIONS: 
 
 
F-face looks uneven A-arms unable to move or move unevenly S-speech slurred or non-existent T-time-call 911 as soon as signs and symptoms begin-DO NOT go Back to bed or wait to see if you get better-TIME IS BRAIN. Warning Signs of HEART ATTACK Call 911 if you have these symptoms: 
? Chest discomfort. Most heart attacks involve discomfort in the center of the chest that lasts more than a few minutes, or that goes away and comes back. It can feel like uncomfortable pressure, squeezing, fullness, or pain. ? Discomfort in other areas of the upper body. Symptoms can include pain or discomfort in one or both arms, the back, neck, jaw, or stomach. ? Shortness of breath with or without chest discomfort. ? Other signs may include breaking out in a cold sweat, nausea, or lightheadedness. Don't wait more than five minutes to call [address]! Fast action can save your life. Calling 911 is almost always the fastest way to get lifesaving treatment. Emergency Medical Services staff can begin treatment when they arrive  up to an hour sooner than if someone gets to the hospital by car. The discharge information has been reviewed with the patient and caregiver. The patient and caregiver verbalized understanding. Discharge medications reviewed with the patient and caregiver and appropriate educational materials and side effects teaching were provided. ___________________________________________________________________________________________________________________________________ [facility] Announcement We are excited to announce that we are making your provider's discharge notes available to you in Qmercet. You will see these notes when they are completed and signed by the physician that discharged you from your recent hospital stay. If you have any questions or concerns about any information you see in [facility], please call the Health Information Department where you were seen or reach out to your Primary Care Provider for more information about your plan of care. Introducing [facility]! Femi Metcalf introduce portal paciente MyChart . Ahora se puede acceder a partes de pierson expediente médico, enviar por correo electrónico la oficina de pierson médico y solicitar renovaciones de medicamentos en línea. En pierson navegador de Internet , Hilda Loron a [email or web address] Charlie clkelley en el usuario por Margot Goddard? Maximino maurer aquí en la sesión Elease Omar. Verá la página de registro [place]. Ingrese pierson código de [employer] rayne y john aparece a continuación. Usted no tendrá que [employer] código después de stephanie completado el proceso de registro . Si usted no se inscribe antes de la fecha de caducidad , debe solicitar un nuevo código. · MyChart Código de acceso : [id number] Expires: 3/12/2018  4:35 PM 
 
Ingresa los últimos cuatro dígitos de pierson Número de Seguro Social ( xxxx ) y fecha de nacimiento ( dd / mm / aaaa ) john se indica y charlie clic en Enviar. Usted será llevado a la siguiente página de registro . Crear un ID MyChart . Esta será pierson ID de inicio de sesión de MyChart y no puede ser [place] , por lo que pensar en jenny que es [place] y fácil de recordar . Crear jenny contraseña MyChart . Usted puede cambiar pierson contraseña en cualquier momento . Ingrese pierson Password Reset de preguntas y [place] . [place] se puede utilizar en un momento posterior si usted olvida pierson contraseña. Introduzca pierson dirección de correo electrónico . Colton Zavala recibirá jenny notificación por correo electrónico cuando la nueva información está disponible en MyChart . Joaquin maurer en Registrarse. Darmarianyn Sitter rosa maria y descargar porciones de pierson expediente médico. 
Charlie erasto en el enlace de descarga del menú Resumen para descargar jenny copia portátil de pierson información médica . Si tiene Jase Gonzalez , por favor visite la sección de preguntas frecuentes del sitio web MyChart . Recuerde, MyChart NO es que se utilizará para las necesidades urgentes.  Para emergencias médicas , hamzah al 911 . Ahora disponible en pierson iPhone y Android ! Unresulted Labs-Please follow up with your PCP about these lab tests Order Current Status CULTURE, BLOOD Preliminary result CULTURE, BLOOD Preliminary result CULTURE, BLOOD Preliminary result CULTURE, BLOOD Preliminary result Providers Seen During Your Hospitalization Provider Specialty Primary office phone Phoenix Lamar MD Emergency Medicine [phone] Deisy Figueroa MD Internal Medicine [phone] Immunizations Administered for This Admission Name Date Influenza Vaccine (Quad) PF 12/12/2017 Your Primary Care Physician (PCP) Primary Care Physician Office Phone Office Fax NONE ** None ** ** None ** You are allergic to the following No active allergies Recent Documentation Height Weight Breastfeeding? BMI  
  
 1.499 m 72.8 kg No 32.42 kg/m2 Emergency Contacts Name Discharge Info Relation Home Work Mobile Burgos,Grisela DISCHARGE CAREGIVER [3] Other Relative [6] [phone] Patient Belongings The following personal items are in your possession at time of discharge: 
  Dental Appliances: None  Visual Aid: None      Home Medications: None   Jewelry: None  Clothing: None    Other Valuables: Eyeglasses, With patient  Personal Items Sent to Safe: none Discharge Instructions Attachments/References ACETAMINOPHEN (ORAL) (Russian) INSULIN GLARGINE (INJECTION) (Russian) LEVOFLOXACIN (ORAL) (Russian) METOPROLOL (ORAL) (Russian) FLU VACCINE (LIVE - INTRANASAL): VIS (Russian) Patient Handouts Acetaminophen (By mouth) Acetaminophen (m-xfrx-d-MIN-oh-fen) Treats minor aches and pain and reduces fever.   
Brand Name(s): 8 Hour Pain Relief, Acetaminophen Children's, Acetaminophen Infant's, Arthritis Pain Formula, Arthritis Pain Relief, Cetafen, Cetafen Extra, Children's Acetaminophen, Children's Dye Free Pain and Fever, Children's Mapap, Children's Pain & Fever, Children's Pain Relief, Children's Pain Reliever, Children's Tylenol, Comtrex Sore Throat Relief There may be other brand names for this medicine. When This Medicine Should Not Be Used: This medicine is not right for everyone. Do not use it if you had an allergic reaction to acetaminophen. How to Use This Medicine:  
Capsule, Liquid Filled Capsule, Liquid, Powder, Tablet, Chewable Tablet, Dissolving Tablet, Fizzy Tablet, Long Acting Tablet · Your doctor will tell you how much medicine to use. Do not use more than directed. · If you are taking this medicine without the advice of your doctor, carefully read and follow the Drug Facts label and dosing instructions on the medicine package. Ask your doctor or pharmacist if you are not sure how to use this medicine. · Do not take this medicine for more than 10 days in a row, unless directed by your doctor. · The chewable tablet should be chewed or crushed before you swallow it. · Oral liquids: Measure the oral liquid medicine with a marked measuring spoon, oral syringe, or medicine cup. Do not use a spoon, syringe, or cup that came with a different medicine. · Oral liquid (with syringe): ¨ Shake the bottle well before each use. ¨ Remove the cap. Attach the syringe to the flow restrictor. Turn the bottle upside down. ¨ Pull back the syringe plunger until it is filled with the correct dose. Ask your doctor or pharmacist if you are not sure how much medicine to use. ¨ Slowly give the medicine into your child's mouth. Point the syringe so the medicine goes toward the inner cheek. · Oral liquid (with dropper): ¨ Shake the bottle well before each use. ¨ Remove the cap. Insert the dropper into the bottle. Withdraw the correct dose. Ask your doctor or pharmacist if you are not sure how much medicine to use. ¨ Slowly give the medicine into your child's mouth.  Point the dropper so the medicine goes toward the inner cheek. · Extended-release tablet: Swallow the extended-release tablet whole. Do not crush, break, or chew it. Take this medicine with a full glass of water. · Use only the brand of medicine your doctor prescribed. Other brands may not work the same way. · Missed dose: Take a dose as soon as you remember. If it is almost time for your next dose, wait until then and take a regular dose. Do not take extra medicine to make up for a missed dose. · Store the medicine in a closed container at room temperature, away from heat, moisture, and direct light. Drugs and Foods to Avoid: Ask your doctor or pharmacist before using any other medicine, including over-the-counter medicines, vitamins, and herbal products. · Some medicines and foods can affect how acetaminophen works. Tell your doctor if you are taking a blood thinner, such as warfarin. · Do not drink alcohol while you are using this medicine. Acetaminophen can damage your liver, and alcohol can increase this risk. Do not take acetaminophen without asking your doctor if you have 3 or more drinks of alcohol every day. Warnings While Using This Medicine: · Tell your doctor if you are pregnant or breastfeeding, or if you have kidney or liver disease. Tell your doctor if you have phenylketonuria (PKU). Some brands of acetaminophen contain aspartame, which can make PKU worse. · This medicine contains acetaminophen. Read the labels of all other medicines you are using to see if they also contain acetaminophen, or ask your doctor or pharmacist. Joselin Cools not use more than 4 grams (4,000 milligrams) total of acetaminophen in one day. For Tylenol® Extra Strength, it is not safe to take more than 3 grams (3,000 milligrams) in 1 day. · Call your doctor if your symptoms do not improve or if they get worse. · Tell any doctor or dentist who treats you that you are using this medicine. This medicine may affect certain medical test results. · Keep all medicine out of the reach of children. Never share your medicine with anyone. Possible Side Effects While Using This Medicine:  
Call your doctor right away if you notice any of these side effects: · Allergic reaction: Itching or hives, swelling in your face or hands, swelling or tingling in your mouth or throat, chest tightness, trouble breathing · Bloody or black, tarry stools · Dark urine or pale stools, nausea, vomiting, loss of appetite, severe stomach pain, yellow skin or eyes · Fever or a sore throat that lasts longer than 3 days, or pain that lasts longer than 5 days · Lightheadedness, fainting, sweating, or weakness · Unusual bleeding or bruising · Vomiting blood or material that looks like coffee grounds If you notice other side effects that you think are caused by this medicine, tell your doctor. Call your doctor for medical advice about side effects. You may report side effects to FDA at 3-757-FDA-6415 © 2017 [address] Information is for End User's use only and may not be sold, redistributed or otherwise used for commercial purposes. The above information is an  only. It is not intended as medical advice for individual conditions or treatments. Talk to your doctor, nurse or pharmacist before following any medical regimen to see if it is safe and effective for you. Acetaminophen (By mouth) Acetaminophen (o-khdx-k-MIN-oh-fen) Treats minor aches and pain and reduces fever. Brand Name(s): 8 Hour Pain Relief, Acetaminophen Children's, Acetaminophen Infant's, Arthritis Pain Formula, Arthritis Pain Relief, Cetafen, Cetafen Extra, Children's Acetaminophen, Children's Dye Free Pain and Fever, Children's Mapap, Children's Pain & Fever, Children's Pain Relief, Children's Pain Reliever, Children's Tylenol, Comtrex Sore Throat Relief There may be other brand names for this medicine. When This Medicine Should Not Be Used: This medicine is not right for everyone. Do not use it if you had an allergic reaction to acetaminophen. How to Use This Medicine:  
Capsule, Liquid Filled Capsule, Liquid, Powder, Tablet, Chewable Tablet, Dissolving Tablet, Fizzy Tablet, Long Acting Tablet · Your doctor will tell you how much medicine to use. Do not use more than directed. · If you are taking this medicine without the advice of your doctor, carefully read and follow the Drug Facts label and dosing instructions on the medicine package. Ask your doctor or pharmacist if you are not sure how to use this medicine. · Do not take this medicine for more than 10 days in a row, unless directed by your doctor. · The chewable tablet should be chewed or crushed before you swallow it. · Oral liquids: Measure the oral liquid medicine with a marked measuring spoon, oral syringe, or medicine cup. Do not use a spoon, syringe, or cup that came with a different medicine. · Oral liquid (with syringe): ¨ Shake the bottle well before each use. ¨ Remove the cap. Attach the syringe to the flow restrictor. Turn the bottle upside down. ¨ Pull back the syringe plunger until it is filled with the correct dose. Ask your doctor or pharmacist if you are not sure how much medicine to use. ¨ Slowly give the medicine into your child's mouth. Point the syringe so the medicine goes toward the inner cheek. · Oral liquid (with dropper): ¨ Shake the bottle well before each use. ¨ Remove the cap. Insert the dropper into the bottle. Withdraw the correct dose. Ask your doctor or pharmacist if you are not sure how much medicine to use. ¨ Slowly give the medicine into your child's mouth. Point the dropper so the medicine goes toward the inner cheek. · Extended-release tablet: Swallow the extended-release tablet whole. Do not crush, break, or chew it. Take this medicine with a full glass of water. · Use only the brand of medicine your doctor prescribed.  Other brands may not work the same way. · Missed dose: Take a dose as soon as you remember. If it is almost time for your next dose, wait until then and take a regular dose. Do not take extra medicine to make up for a missed dose. · Store the medicine in a closed container at room temperature, away from heat, moisture, and direct light. Drugs and Foods to Avoid: Ask your doctor or pharmacist before using any other medicine, including over-the-counter medicines, vitamins, and herbal products. · Some medicines and foods can affect how acetaminophen works. Tell your doctor if you are taking a blood thinner, such as warfarin. · Do not drink alcohol while you are using this medicine. Acetaminophen can damage your liver, and alcohol can increase this risk. Do not take acetaminophen without asking your doctor if you have 3 or more drinks of alcohol every day. Warnings While Using This Medicine: · Tell your doctor if you are pregnant or breastfeeding, or if you have kidney or liver disease. Tell your doctor if you have phenylketonuria (PKU). Some brands of acetaminophen contain aspartame, which can make PKU worse. · This medicine contains acetaminophen. Read the labels of all other medicines you are using to see if they also contain acetaminophen, or ask your doctor or pharmacist. Rocky Cook not use more than 4 grams (4,000 milligrams) total of acetaminophen in one day. For Tylenol® Extra Strength, it is not safe to take more than 3 grams (3,000 milligrams) in 1 day. · Call your doctor if your symptoms do not improve or if they get worse. · Tell any doctor or dentist who treats you that you are using this medicine. This medicine may affect certain medical test results. · Keep all medicine out of the reach of children. Never share your medicine with anyone. Possible Side Effects While Using This Medicine:  
Call your doctor right away if you notice any of these side effects: · Allergic reaction: Itching or hives, swelling in your face or hands, swelling or tingling in your mouth or throat, chest tightness, trouble breathing · Bloody or black, tarry stools · Dark urine or pale stools, nausea, vomiting, loss of appetite, severe stomach pain, yellow skin or eyes · Fever or a sore throat that lasts longer than 3 days, or pain that lasts longer than 5 days · Lightheadedness, fainting, sweating, or weakness · Unusual bleeding or bruising · Vomiting blood or material that looks like coffee grounds If you notice other side effects that you think are caused by this medicine, tell your doctor. Call your doctor for medical advice about side effects. You may report side effects to FDA at 6-579-FDA-7872 © 2017 [address]  Information is for End User's use only and may not be sold, redistributed or otherwise used for commercial purposes. The above information is an  only. It is not intended as medical advice for individual conditions or treatments. Talk to your doctor, nurse or pharmacist before following any medical regimen to see if it is safe and effective for you. Insulin Glargine (By injection) Insulin Glargine, Recombinant (IN-pierson-abelardo GLAДМИТРИЙ-joy, trisha-KOM-bi-nant) Treats diabetes. Brand Name(s): Basaglar KwikPen, Lantus, Lantus Novaplus, Lantus SoloStar, Lantus SoloStar Novaplus, Toujeo There may be other brand names for this medicine. When This Medicine Should Not Be Used: This medicine is not right for everyone. Do not use it if you had an allergic reaction to insulin glargine. How to Use This Medicine:  
Injectable · Your healthcare provider will work with you to personalize your dose and treatment based on your insulin needs and lifestyle. You will be taught how to give yourself the injections. Make sure you understand all instructions. Ask the doctor, nurse, or pharmacist if you have questions. · If you use insulin once a day, it is best to use it at about the same time every day. · Always double-check both the concentration (strength) of your insulin and your dose. Concentration and dose are not the same. The dose is how many units of insulin you will use. The concentration tells how many units of insulin are in each milliliter (mL), such as 100 units/mL (U-100), but this does not mean you will use 100 units at a time. · Read and follow the patient instructions that come with this medicine. Talk to your doctor or pharmacist if you have any questions. · This medicine should look clear before you use it. Do not shake the vial. Do not mix this medicine with any other insulin or with water. · You will be shown the body areas where this shot can be given. Use a different body area each time you give yourself a shot. Keep track of where you give each shot to make sure you rotate body areas. · Use a new needle and syringe each time you inject your medicine. If you use a syringe, use only the kind that is made for insulin injections. Some insulin must be given with a specific type of syringe or needle. Ask your pharmacist if you are not sure which one to use. · Always check the label before use, to make sure you have the correct type of insulin. Do not change the brand, type, or concentration unless your doctor tells you to. If you use a pump or other device, make sure the insulin is made for that device. · Unopened medicine: Store the vials, cartridges, and SoloStar® pens in the refrigerator. Protect from light. Do not freeze. · Opened medicine: ¨ Vials: Store in the refrigerator or at room temperature in a cool place, away from sunlight and heat. Use within 28 days. ¨ Cartridge or Pulte Homes: Store at room temperature, away from direct heat and light. Do not refrigerate. Throw away any opened cartridge or Lantus® SoloStar® pen after 28 days.  Throw away any opened Toujeo® SoloStar® pen after 42 days. · Throw away used needles in a hard, closed container that the needles cannot poke through. Keep this container away from children and pets. Drugs and Foods to Avoid: Ask your doctor or pharmacist before using any other medicine, including over-the-counter medicines, vitamins, and herbal products. · Some medicines can change the amount of insulin you need to use and make it harder for you to control your diabetes. Tell your doctor about all other medicines that you are using. · Do not drink alcohol while you are using this medicine. Warnings While Using This Medicine: · Tell your doctor if you are pregnant or breastfeeding, or if you have kidney disease, liver disease, heart disease, or heart failure. · This medicine may cause the following problems: 
¨ Low blood sugar or low potassium levels in the blood ¨ Fluid retention or heart failure (when used with thiazolidinedione [TZD] medicine) · Never share insulin pens, needles, or cartridges with anyone. Sharing these can pass hepatitis viruses, HIV, or other illnesses from one person to another. · Keep all medicine out of the reach of children. Never share your medicine with anyone. Possible Side Effects While Using This Medicine:  
Call your doctor right away if you notice any of these side effects: · Allergic reaction: Itching or hives, swelling in your face or hands, swelling or tingling in your mouth or throat, chest tightness, trouble breathing · Dry mouth, increased thirst, muscle cramps, nausea or vomiting, uneven heartbeat · Rapid weight gain, swelling in your hands, ankles, or feet, trouble breathing, tiredness · Shaking, trembling, sweating, fast or pounding heartbeat, lightheadedness, hunger, confusion If you notice these less serious side effects, talk with your doctor: · Redness, pain, itching, swelling, or any skin changes where the shot was given If you notice other side effects that you think are caused by this medicine, tell your doctor. Call your doctor for medical advice about side effects. You may report side effects to FDA at 1-175-VUB-9679 © 2017 [facility] Information is for End User's use only and may not be sold, redistributed or otherwise used for commercial purposes. The above information is an  only. It is not intended as medical advice for individual conditions or treatments. Talk to your doctor, nurse or pharmacist before following any medical regimen to see if it is safe and effective for you. Insulin Glargine (By injection) Insulin Glargine, Recombinant (IN-pierson-abelardo GLAR-joy, trisha-KOM-bi-lauriet) Treats diabetes. Brand Name(s): Basaglar KwikPen, Lantus, Lantus Novaplus, Lantus SoloStar, Lantus SoloStar Novaplus, Toujeo There may be other brand names for this medicine. When This Medicine Should Not Be Used: This medicine is not right for everyone. Do not use it if you had an allergic reaction to insulin glargine. How to Use This Medicine:  
Injectable · Your healthcare provider will work with you to personalize your dose and treatment based on your insulin needs and lifestyle. You will be taught how to give yourself the injections. Make sure you understand all instructions. Ask the doctor, nurse, or pharmacist if you have questions. · If you use insulin once a day, it is best to use it at about the same time every day. · Always double-check both the concentration (strength) of your insulin and your dose. Concentration and dose are not the same. The dose is how many units of insulin you will use. The concentration tells how many units of insulin are in each milliliter (mL), such as 100 units/mL (U-100), but this does not mean you will use 100 units at a time. · Read and follow the patient instructions that come with this medicine. Talk to your doctor or pharmacist if you have any questions. · This medicine should look clear before you use it. Do not shake the vial. Do not mix this medicine with any other insulin or with water. · You will be shown the body areas where this shot can be given. Use a different body area each time you give yourself a shot. Keep track of where you give each shot to make sure you rotate body areas. · Use a new needle and syringe each time you inject your medicine. If you use a syringe, use only the kind that is made for insulin injections. Some insulin must be given with a specific type of syringe or needle. Ask your pharmacist if you are not sure which one to use. · Always check the label before use, to make sure you have the correct type of insulin. Do not change the brand, type, or concentration unless your doctor tells you to. If you use a pump or other device, make sure the insulin is made for that device. · Unopened medicine: Store the vials, cartridges, and SoloStar® pens in the refrigerator. Protect from light. Do not freeze. · Opened medicine: ¨ Vials: Store in the refrigerator or at room temperature in a cool place, away from sunlight and heat. Use within 28 days. ¨ Cartridge or Pulte Homes: Store at room temperature, away from direct heat and light. Do not refrigerate. Throw away any opened cartridge or Lantus® SoloStar® pen after 28 days. Throw away any opened [employer] after 42 days. · Throw away used needles in a hard, closed container that the needles cannot poke through. Keep this container away from children and pets. Drugs and Foods to Avoid: Ask your doctor or pharmacist before using any other medicine, including over-the-counter medicines, vitamins, and herbal products. · Some medicines can change the amount of insulin you need to use and make it harder for you to control your diabetes. Tell your doctor about all other medicines that you are using. · Do not drink alcohol while you are using this medicine. Warnings While Using This Medicine: · Tell your doctor if you are pregnant or breastfeeding, or if you have kidney disease, liver disease, heart disease, or heart failure. · This medicine may cause the following problems: 
¨ Low blood sugar or low potassium levels in the blood ¨ Fluid retention or heart failure (when used with thiazolidinedione [TZD] medicine) · Never share insulin pens, needles, or cartridges with anyone. Sharing these can pass hepatitis viruses, HIV, or other illnesses from one person to another. · Keep all medicine out of the reach of children. Never share your medicine with anyone. Possible Side Effects While Using This Medicine:  
Call your doctor right away if you notice any of these side effects: · Allergic reaction: Itching or hives, swelling in your face or hands, swelling or tingling in your mouth or throat, chest tightness, trouble breathing · Dry mouth, increased thirst, muscle cramps, nausea or vomiting, uneven heartbeat · Rapid weight gain, swelling in your hands, ankles, or feet, trouble breathing, tiredness · Shaking, trembling, sweating, fast or pounding heartbeat, lightheadedness, hunger, confusion If you notice these less serious side effects, talk with your doctor: · Redness, pain, itching, swelling, or any skin changes where the shot was given If you notice other side effects that you think are caused by this medicine, tell your doctor. Call your doctor for medical advice about side effects. You may report side effects to FDA at 4-087-FDA-9704 © 2017 [address] Information is for End User's use only and may not be sold, redistributed or otherwise used for commercial purposes. The above information is an  only. It is not intended as medical advice for individual conditions or treatments.  Talk to your doctor, nurse or pharmacist before following any medical regimen to see if it is safe and effective for you. Levofloxacin (By mouth) Levofloxacin (wov-klp-QKQR-a-sin) Treats infections. This medicine is a quinolone antibiotic. Brand Name(s): Levaquin There may be other brand names for this medicine. When This Medicine Should Not Be Used: This medicine is not right for everyone. Do not use it if you had an allergic reaction to levofloxacin or to similar medicines. How to Use This Medicine:  
Liquid, Tablet · Your doctor will tell you how much medicine to use. Do not use more than directed. Take your medicine at the same time each day. · Tablet: Take it with or without food. · Liquid: Take it 1 hour before or 2 hours after you eat. Measure the oral liquid medicine with a marked measuring spoon, oral syringe, or medicine cup. · Take all of the medicine in your prescription to clear up your infection, even if you feel better after the first few doses. · Drink extra fluids so you will urinate more often and help prevent kidney problems. · This medicine should come with a Medication Guide. Ask your pharmacist for a copy if you do not have one. · Missed dose: Take a dose as soon as you remember. If it is almost time for your next dose, wait until then and take a regular dose. Do not take extra medicine to make up for a missed dose. · Store the medicine in a closed container at room temperature, away from heat, moisture, and direct light. Drugs and Foods to Avoid: Ask your doctor or pharmacist before using any other medicine, including over-the-counter medicines, vitamins, and herbal products. · Some foods and medicines can affect how levofloxacin works. Tell your doctor if you are using any of the following: ¨ Theophylline ¨ Blood thinner (including warfarin) ¨ Diabetes medicine ¨ Medicine for heart rhythm problems (including amiodarone, procainamide, quinidine, sotalol) ¨ NSAID pain or arthritis medicine (including aspirin, celecoxib, diclofenac, ibuprofen, naproxen) ¨ Steroid medicine (including hydrocortisone, methylprednisolone, prednisone) · Take levofloxacin at least 2 hours before or 2 hours after you take antacids that contain magnesium or aluminum, zinc, or iron supplements, sucralfate, or didanosine. Warnings While Using This Medicine: · Tell your doctor if you are pregnant or breastfeeding, or if you have kidney disease, liver disease, diabetes, heart disease, myasthenia gravis, or a history of heart rhythm problems (such as QT prolongation) or seizures. Tell your doctor if you have ever had tendon or joint problems, including rheumatoid arthritis, or if you have received a transplant. · This medicine may cause the following problems: 
¨ Tendinitis and tendon rupture (may happen after treatment ends) ¨ Liver damage ¨ Nerve damage in the arms or legs ¨ Heart rhythm changes ¨ Changes in blood sugar levels · This medicine may make you feel dizzy or lightheaded. Do not drive or do anything else that could be dangerous until you know how this medicine affects you. · This medicine can cause diarrhea. Call your doctor if the diarrhea becomes severe, does not stop, or is bloody. Do not take any medicine to stop diarrhea until you have talked to your doctor. Diarrhea can occur 2 months or more after you stop taking this medicine. · Tell any doctor or dentist who treats you that you are using this medicine. This medicine may affect certain medical test results. · This medicine may make your skin more sensitive to sunlight. Wear sunscreen. Do not use sunlamps or tanning beds. · Call your doctor if your symptoms do not improve or if they get worse. · Keep all medicine out of the reach of children. Never share your medicine with anyone. Possible Side Effects While Using This Medicine:  
Call your doctor right away if you notice any of these side effects: · Allergic reaction: Itching or hives, swelling in your face or hands, swelling or tingling in your mouth or throat, chest tightness, trouble breathing · Blistering, peeling, red skin rash · Change in how much or how often you urinate · Dark urine or pale stools, nausea, vomiting, loss of appetite, stomach pain, yellow skin or eyes · Diarrhea that may contain blood · Fainting, dizziness, or lightheadedness · Fast, slow, or uneven heartbeat, chest pain · Numbness, tingling, or burning pain in your hands, arms, legs, or feet · Pain, stiffness, swelling, or bruises around your ankle, leg, shoulder, or other joint · Seizures, severe headache, unusual thoughts or behaviors, trouble sleeping, feeling anxious, confused, or depressed, seeing, hearing, or feeling things that are not there · Unusual bleeding, bruising, or weakness If you notice these less serious side effects, talk with your doctor: · Mild headache or nausea If you notice other side effects that you think are caused by this medicine, tell your doctor. Call your doctor for medical advice about side effects. You may report side effects to FDA at 5-779-FDA-7724 © 2017 [address] Information is for End User's use only and may not be sold, redistributed or otherwise used for commercial purposes. The above information is an  only. It is not intended as medical advice for individual conditions or treatments. Talk to your doctor, nurse or pharmacist before following any medical regimen to see if it is safe and effective for you. Levofloxacin (By mouth) Levofloxacin ([id number]-[id number]-a-sin) Treats infections. This medicine is a quinolone antibiotic. Brand Name(s): Levaquin There may be other brand names for this medicine. When This Medicine Should Not Be Used: This medicine is not right for everyone. Do not use it if you had an allergic reaction to levofloxacin or to similar medicines. How to Use This Medicine:  
Liquid, Tablet · Your doctor will tell you how much medicine to use. Do not use more than directed. Take your medicine at the same time each day. · Tablet: Take it with or without food. · Liquid: Take it 1 hour before or 2 hours after you eat. Measure the oral liquid medicine with a marked measuring spoon, oral syringe, or medicine cup. · Take all of the medicine in your prescription to clear up your infection, even if you feel better after the first few doses. · Drink extra fluids so you will urinate more often and help prevent kidney problems. · This medicine should come with a Medication Guide. Ask your pharmacist for a copy if you do not have one. · Missed dose: Take a dose as soon as you remember. If it is almost time for your next dose, wait until then and take a regular dose. Do not take extra medicine to make up for a missed dose. · Store the medicine in a closed container at room temperature, away from heat, moisture, and direct light. Drugs and Foods to Avoid: Ask your doctor or pharmacist before using any other medicine, including over-the-counter medicines, vitamins, and herbal products. · Some foods and medicines can affect how levofloxacin works. Tell your doctor if you are using any of the following: ¨ Theophylline ¨ Blood thinner (including warfarin) ¨ Diabetes medicine ¨ Medicine for heart rhythm problems (including amiodarone, procainamide, quinidine, sotalol) ¨ NSAID pain or arthritis medicine (including aspirin, celecoxib, diclofenac, ibuprofen, naproxen) ¨ Steroid medicine (including hydrocortisone, methylprednisolone, prednisone) · Take levofloxacin at least 2 hours before or 2 hours after you take antacids that contain magnesium or aluminum, zinc, or iron supplements, sucralfate, or didanosine. Warnings While Using This Medicine: · Tell your doctor if you are pregnant or breastfeeding, or if you have kidney disease, liver disease, diabetes, heart disease, myasthenia gravis, or a history of heart rhythm problems (such as QT prolongation) or seizures. Tell your doctor if you have ever had tendon or joint problems, including rheumatoid arthritis, or if you have received a transplant. · This medicine may cause the following problems: 
¨ Tendinitis and tendon rupture (may happen after treatment ends) ¨ Liver damage ¨ Nerve damage in the arms or legs ¨ Heart rhythm changes ¨ Changes in blood sugar levels · This medicine may make you feel dizzy or lightheaded. Do not drive or do anything else that could be dangerous until you know how this medicine affects you. · This medicine can cause diarrhea. Call your doctor if the diarrhea becomes severe, does not stop, or is bloody. Do not take any medicine to stop diarrhea until you have talked to your doctor. Diarrhea can occur 2 months or more after you stop taking this medicine. · Tell any doctor or dentist who treats you that you are using this medicine. This medicine may affect certain medical test results. · This medicine may make your skin more sensitive to sunlight. Wear sunscreen. Do not use sunlamps or tanning beds. · Call your doctor if your symptoms do not improve or if they get worse. · Keep all medicine out of the reach of children. Never share your medicine with anyone. Possible Side Effects While Using This Medicine:  
Call your doctor right away if you notice any of these side effects: · Allergic reaction: Itching or hives, swelling in your face or hands, swelling or tingling in your mouth or throat, chest tightness, trouble breathing · Blistering, peeling, red skin rash · Change in how much or how often you urinate · Dark urine or pale stools, nausea, vomiting, loss of appetite, stomach pain, yellow skin or eyes · Diarrhea that may contain blood · Fainting, dizziness, or lightheadedness · Fast, slow, or uneven heartbeat, chest pain · Numbness, tingling, or burning pain in your hands, arms, legs, or feet · Pain, stiffness, swelling, or bruises around your ankle, leg, shoulder, or other joint · Seizures, severe headache, unusual thoughts or behaviors, trouble sleeping, feeling anxious, confused, or depressed, seeing, hearing, or feeling things that are not there · Unusual bleeding, bruising, or weakness If you notice these less serious side effects, talk with your doctor: · Mild headache or nausea If you notice other side effects that you think are caused by this medicine, tell your doctor. Call your doctor for medical advice about side effects. You may report side effects to FDA at 1-622-FDA-1409 © 2017 [address] Information is for End User's use only and may not be sold, redistributed or otherwise used for commercial purposes. The above information is an  only. It is not intended as medical advice for individual conditions or treatments. Talk to your doctor, nurse or pharmacist before following any medical regimen to see if it is safe and effective for you. Metoprolol (By mouth) Metoprolol (met-oh-PROE-lol) Treats high blood pressure, angina (chest pain), and heart failure. May lower the risk of death after a heart attack. This medicine is a beta-blocker. Brand Name(s): Lopressor, Toprol XL There may be other brand names for this medicine. When This Medicine Should Not Be Used: This medicine is not right for everyone. Do not use if you had an allergic reaction to metoprolol or similar medicines. Do not use this medicine if you have certain blood circulation or heart problems. Ask your doctor about these problems. How to Use This Medicine:  
Tablet, Long Acting Tablet · Take your medicine as directed. Your dose may need to be changed several times to find what works best for you.  
· Take this medicine with a meal or right after a meal. Take this medicine the same way every day, at the same time. · Swallow the tablet whole with a glass of water. You may break the extended-release tablet in half, but do not chew or crush it. · Missed dose: Take a dose as soon as you remember. If it is almost time for your next dose, wait until then and take a regular dose. Do not take extra medicine to make up for a missed dose. · Store the medicine in a closed container at room temperature, away from heat, moisture, and direct light. Drugs and Foods to Avoid: Ask your doctor or pharmacist before using any other medicine, including over-the-counter medicines, vitamins, and herbal products. · Some medicines can affect how metoprolol works. Tell your doctor if you are taking any of the following: ¨ Digoxin, dipyridamole, hydralazine, hydroxychloroquine, methyldopa, quinidine ¨ Medicine to treat depression (such as bupropion, clomipramine, desipramine, fluoxetine, fluvoxamine, paroxetine, sertraline), medicine to treat mental illness (such as chlorpromazine, fluphenazine, haloperidol, thioridazine), medicine for heart rhythm problems (such as propafenone), HIV/AIDS medicine (such as ritonavir), medicine to treat a fungus infection (such as terbinafine), a monoamine oxidase inhibitor (MAOI), an ergot medicine for headaches, a calcium channel blocker (such as amlodipine, diltiazem, verapamil), or an alpha blocker (such as clonidine, prazosin, reserpine, guanethidine) Warnings While Using This Medicine: · Tell your doctor if you are pregnant or breastfeeding, or if you have blood vessel, heart, or circulation problems (such as heart failure, rhythm problems, or slow heartbeat). Tell your doctor if you have kidney disease, liver disease, diabetes, lung disease (such as asthma), an overactive thyroid, or a history of allergies. · This medicine may cause worse symptoms of heart failure while the dose is being adjusted. · Do not stop using this medicine suddenly. Your doctor will need to slowly decrease your dose before you stop it completely. · Tell any doctor or dentist who treats you that you are using this medicine. You may need to stop using this medicine several days before you have surgery or medical tests. · This medicine could lower your blood pressure too much, especially when you first use it or if you are dehydrated. Stand or sit up slowly if you feel lightheaded or dizzy. · This medicine may make you dizzy or drowsy. Do not drive or do anything else that could be dangerous until you know how this medicine affects you. · Your doctor will check your progress and the effects of this medicine at regular visits. Keep all appointments. · Keep all medicine out of the reach of children. Never share your medicine with anyone. Possible Side Effects While Using This Medicine:  
Call your doctor right away if you notice any of these side effects: · Allergic reaction: Itching or hives, swelling in your face or hands, swelling or tingling in your mouth or throat, chest tightness, trouble breathing · Lightheadedness, dizziness, or fainting · Slow heartbeat · Swelling in your hands, ankles, or feet, trouble breathing, tiredness · Worsening chest pain If you notice these less serious side effects, talk with your doctor: · Diarrhea · Mild dizziness or tiredness If you notice other side effects that you think are caused by this medicine, tell your doctor. Call your doctor for medical advice about side effects. You may report side effects to FDA at 1-357-FDA-5436 © 2017 [address] Information is for End User's use only and may not be sold, redistributed or otherwise used for commercial purposes. The above information is an  only. It is not intended as medical advice for individual conditions or treatments.  Talk to your doctor, nurse or pharmacist before following any medical regimen to see if it is safe and effective for you. Metoprolol (By mouth) Metoprolol (met-oh-PROE-lol) Treats high blood pressure, angina (chest pain), and heart failure. May lower the risk of death after a heart attack. This medicine is a beta-blocker. Brand Name(s): Lopressor, Toprol XL There may be other brand names for this medicine. When This Medicine Should Not Be Used: This medicine is not right for everyone. Do not use if you had an allergic reaction to metoprolol or similar medicines. Do not use this medicine if you have certain blood circulation or heart problems. Ask your doctor about these problems. How to Use This Medicine:  
Tablet, Long Acting Tablet · Take your medicine as directed. Your dose may need to be changed several times to find what works best for you. · Take this medicine with a meal or right after a meal. Take this medicine the same way every day, at the same time. · Swallow the tablet whole with a glass of water. You may break the extended-release tablet in half, but do not chew or crush it. · Missed dose: Take a dose as soon as you remember. If it is almost time for your next dose, wait until then and take a regular dose. Do not take extra medicine to make up for a missed dose. · Store the medicine in a closed container at room temperature, away from heat, moisture, and direct light. Drugs and Foods to Avoid: Ask your doctor or pharmacist before using any other medicine, including over-the-counter medicines, vitamins, and herbal products. · Some medicines can affect how metoprolol works. Tell your doctor if you are taking any of the following: ¨ Digoxin, dipyridamole, hydralazine, hydroxychloroquine, methyldopa, quinidine ¨ Medicine to treat depression (such as bupropion, clomipramine, desipramine, fluoxetine, fluvoxamine, paroxetine, sertraline), medicine to treat mental illness (such as chlorpromazine, fluphenazine, haloperidol, thioridazine), medicine for heart rhythm problems (such as propafenone), HIV/AIDS medicine (such as ritonavir), medicine to treat a fungus infection (such as terbinafine), a monoamine oxidase inhibitor (MAOI), an ergot medicine for headaches, a calcium channel blocker (such as amlodipine, diltiazem, verapamil), or an alpha blocker (such as clonidine, prazosin, reserpine, guanethidine) Warnings While Using This Medicine: · Tell your doctor if you are pregnant or breastfeeding, or if you have blood vessel, heart, or circulation problems (such as heart failure, rhythm problems, or slow heartbeat). Tell your doctor if you have kidney disease, liver disease, diabetes, lung disease (such as asthma), an overactive thyroid, or a history of allergies. · This medicine may cause worse symptoms of heart failure while the dose is being adjusted. · Do not stop using this medicine suddenly. Your doctor will need to slowly decrease your dose before you stop it completely. · Tell any doctor or dentist who treats you that you are using this medicine. You may need to stop using this medicine several days before you have surgery or medical tests. · This medicine could lower your blood pressure too much, especially when you first use it or if you are dehydrated. Stand or sit up slowly if you feel lightheaded or dizzy. · This medicine may make you dizzy or drowsy. Do not drive or do anything else that could be dangerous until you know how this medicine affects you. · Your doctor will check your progress and the effects of this medicine at regular visits. Keep all appointments. · Keep all medicine out of the reach of children. Never share your medicine with anyone. Possible Side Effects While Using This Medicine:  
Call your doctor right away if you notice any of these side effects: · Allergic reaction: Itching or hives, swelling in your face or hands, swelling or tingling in your mouth or throat, chest tightness, trouble breathing · Lightheadedness, dizziness, or fainting · Slow heartbeat · Swelling in your hands, ankles, or feet, trouble breathing, tiredness · Worsening chest pain If you notice these less serious side effects, talk with your doctor: · Diarrhea · Mild dizziness or tiredness If you notice other side effects that you think are caused by this medicine, tell your doctor. Call your doctor for medical advice about side effects. You may report side effects to FDA at 8-885-OAE-2776 © 2017 [address] Information is for End User's use only and may not be sold, redistributed or otherwise used for commercial purposes. The above information is an  only. It is not intended as medical advice for individual conditions or treatments. Talk to your doctor, nurse or pharmacist before following any medical regimen to see if it is safe and effective for you. Influenza (Flu) Vaccine (Live, Intranasal): What You Need to Know Why get vaccinated? Influenza (\"flu\") is a contagious disease that spreads around the [place] every winter, usually between October and May. Flu is caused by influenza viruses, and is spread mainly by coughing, sneezing, and close contact. Anyone can get the flu. Flu strikes suddenly and can last several days. Symptoms vary by age, but can include: · Fever/chills. · Sore throat. · Muscle aches. · Fatigue. · Cough. · Headache. · Runny or stuffy nose. Flu can also lead to pneumonia and blood infections, and cause diarrhea and seizures in children. If you have a medical condition, such as heart or lung disease, flu can make it worse. Flu can also lead to pneumonia and blood infections, and cause diarrhea and seizures in children. If you have a medical condition, such as heart or lung disease, flu can make it worse. Each year thousands of people in the [facility] die from flu, and many more are hospitalized. Flu vaccine can: · Keep you from getting flu. · Make flu less severe if you do get it, and 
· Keep you from spreading flu to your family and other people. Live, attenuated flu vaccineLAIV, nasal spray A dose of flu vaccine is recommended every flu season. Children younger than 5years of age may need two doses during the same flu season. Everyone else needs only one dose each flu season. The live, attenuated influenza vaccine (called LAIV) may be given to healthy, non-pregnant people 2 through 52years of age. It may safely be given at the same time as other vaccines. LAIV is sprayed into the nose. LAIV does not contain thimerosal or other preservatives. It is made from weakened flu virus and does not cause flu. There are many flu viruses, and they are always changing. Each year LAIV is made to protect against four viruses that are likely to cause disease in the upcoming flu season. But even when the vaccine doesn't exactly match these viruses, it may still provide some protection. Flu vaccine cannot prevent: · Flu that is caused by a virus not covered by the vaccine, or 
· Illnesses that look like flu but are not. It takes about 2 weeks for protection to develop after vaccination, and protection lasts through the flu season. Some people should not get this vaccine Some people should not get LAIV because of age, health conditions, or other reasons. Most of these people should get an injected flu vaccine instead. Your healthcare provider can help you decide. Tell the provider if you or the person being vaccinated: 
· Have any allergies, including an allergy to eggs, or have ever had an allergic reaction to an influenza vaccine. · Have ever had Guillain-Barré Syndrome (also called GBS). · Have any long-term heart, breathing, kidney, liver, or nervous system problems. · Have asthma or breathing problems, or are a child who has had wheezing episodes. · Are pregnant. · Are a child or adolescent who is receiving aspirin or aspirin-containing products. · Have a weakened immune system. · Will be visiting or taking care of someone, within the next 7 days, who requires a protected environment (for example, following a bone marrow transplant) Sometimes LAIV should be delayed. Tell the provider if you or the person being vaccinated: · Are not feeling well. The vaccine could be delayed until you feel better. · Have gotten any other vaccines in the past 4 weeks. Live vaccines given too close together might not work as well. · Have taken influenza antiviral medication in the past 48 hours. · Have a very stuffy nose. Risks of a vaccine reaction With any medicine, including vaccines, there is a chance of reactions. These are usually mild and go away on their own, but serious reactions are also possible. Most people who get LAIV do not have any problems with it. Reactions to LAIV may resemble a very mild case of flu. Problems that have been reported following LAIV: 
Children and adolescents 317 years of age: · Runny nose/nasal congestion · Cough · Fever · Headache and muscle aches · Wheezing abdominal pain, vomiting, or diarrhea Adults 2549 years of age: · Runny nose/nasal congestion · Sore throat · Cough · Chills · Tiredness/weakness · Headache Problems that could happen after any vaccine: · Any medication can cause a severe allergic reaction. Such reactions from a vaccine are very rare, estimated at about 1 in a million doses, and would happen within a few minutes to a few hours after the vaccination. As with any medicine, there is a very remote chance of a vaccine causing a serious injury or death. The safety of vaccines is always being monitored. For more information, visit: www.cdc.gov/vaccinesafety/ What if there is a serious reaction? What should I look for? · Look for anything that concerns you, such as signs of a severe allergic reaction, very high fever, or unusual behavior. Signs of a severe allergic reaction can include hives, swelling of the face and throat, difficulty breathing, a fast heartbeat, dizziness, and weakness. These would start a few minutes to a few hours after the vaccination. What should I do? · If you think it is a severe allergic reaction or other emergency that can't wait, call 9-1-1 or get the person to the nearest hospital. Otherwise, call your doctor. · Reactions should be reported to the \"Vaccine Adverse Event Reporting System\" (VAERS). Your doctor should file this report, or you can do it yourself through the VAERS web site at www.vaers. [employer].gov, or by calling 6-670.565.7538. [facility] does not give medical advice. The National Vaccine Injury Compensation Program 
The National Vaccine Injury Compensation Program (VICP) is a federal program that was created to compensate people who may have been injured by certain vaccines. Persons who believe they may have been injured by a vaccine can learn about the program and about filing a claim by calling 8-355.673.8011 or visiting the [employer] website at www.[facility].gov/vaccinecompensation. There is a time limit to file a claim for compensation. How can I learn more? · Ask your doctor. He or she can give you the vaccine package insert or suggest other sources of information. · Call your local or state health department. · Contact the Centers for Disease Control and Prevention (CDC): 
¨ Call 5-978.148.1776 (1-800-CDC-INFO) or ¨ Visit CDC's website at www.cdc.gov/flu Vaccine Information Statement Live Attenuated Influenza Vaccine 8/7/2015 
[address] [id number] Department of [facility] and [employer] Centers for Disease Control and Prevention Many Vaccine Information Statements are available in [place] and other languages. See www.immunize.org/vis. Muchas hojas de información sobre vacunas están disponibles en español y en otros idiomas. Visite www.immunize.org/vis. Content Version: 43.6.340350 Influenza (Flu) Vaccine (Live, Intranasal): What You Need to Know Why get vaccinated? Influenza (\"flu\") is a contagious disease that spreads around the [place] [facility] every winter, usually between October and May. Flu is caused by influenza viruses, and is spread mainly by coughing, sneezing, and close contact. Anyone can get the flu. Flu strikes suddenly and can last several days. Symptoms vary by age, but can include: · Fever/chills. · Sore throat. · Muscle aches. · Fatigue. · Cough. · Headache. · Runny or stuffy nose. Flu can also lead to pneumonia and blood infections, and cause diarrhea and seizures in children. If you have a medical condition, such as heart or lung disease, flu can make it worse. Flu can also lead to pneumonia and blood infections, and cause diarrhea and seizures in children. If you have a medical condition, such as heart or lung disease, flu can make it worse. Each year thousands of people in the [facility] die from flu, and many more are hospitalized. Flu vaccine can: · Keep you from getting flu. · Make flu less severe if you do get it, and 
· Keep you from spreading flu to your family and other people. Live, attenuated flu vaccineLAIV, nasal spray A dose of flu vaccine is recommended every flu season. Children younger than 5years of age may need two doses during the same flu season. Everyone else needs only one dose each flu season. The live, attenuated influenza vaccine (called LAIV) may be given to healthy, non-pregnant people 2 through 52years of age. It may safely be given at the same time as other vaccines. LAIV is sprayed into the nose. LAIV does not contain thimerosal or other preservatives. It is made from weakened flu virus and does not cause flu. There are many flu viruses, and they are always changing. Each year LAIV is made to protect against four viruses that are likely to cause disease in the upcoming flu season. But even when the vaccine doesn't exactly match these viruses, it may still provide some protection. Flu vaccine cannot prevent: · Flu that is caused by a virus not covered by the vaccine, or 
· Illnesses that look like flu but are not. It takes about 2 weeks for protection to develop after vaccination, and protection lasts through the flu season. Some people should not get this vaccine Some people should not get LAIV because of age, health conditions, or other reasons. Most of these people should get an injected flu vaccine instead. Your healthcare provider can help you decide. Tell the provider if you or the person being vaccinated: 
· Have any allergies, including an allergy to eggs, or have ever had an allergic reaction to an influenza vaccine. · Have ever had Guillain-Barré Syndrome (also called GBS). · Have any long-term heart, breathing, kidney, liver, or nervous system problems. · Have asthma or breathing problems, or are a child who has had wheezing episodes. · Are pregnant. · Are a child or adolescent who is receiving aspirin or aspirin-containing products. · Have a weakened immune system. · Will be visiting or taking care of someone, within the next 7 days, who requires a protected environment (for example, following a bone marrow transplant) Sometimes LAIV should be delayed. Tell the provider if you or the person being vaccinated: · Are not feeling well. The vaccine could be delayed until you feel better. · Have gotten any other vaccines in the past 4 weeks. Live vaccines given too close together might not work as well. · Have taken influenza antiviral medication in the past 48 hours. · Have a very stuffy nose. Risks of a vaccine reaction With any medicine, including vaccines, there is a chance of reactions. These are usually mild and go away on their own, but serious reactions are also possible. Most people who get LAIV do not have any problems with it. Reactions to LAIV may resemble a very mild case of flu. Problems that have been reported following LAIV: 
Children and adolescents 317 years of age: · Runny nose/nasal congestion · Cough · Fever · Headache and muscle aches · Wheezing abdominal pain, vomiting, or diarrhea Adults 2549 years of age: · Runny nose/nasal congestion · Sore throat · Cough · Chills · Tiredness/weakness · Headache Problems that could happen after any vaccine: · Any medication can cause a severe allergic reaction. Such reactions from a vaccine are very rare, estimated at about 1 in a million doses, and would happen within a few minutes to a few hours after the vaccination. As with any medicine, there is a very remote chance of a vaccine causing a serious injury or death. The safety of vaccines is always being monitored. For more information, visit: www.cdc.gov/vaccinesafety/ What if there is a serious reaction? What should I look for? · Look for anything that concerns you, such as signs of a severe allergic reaction, very high fever, or unusual behavior. Signs of a severe allergic reaction can include hives, swelling of the face and throat, difficulty breathing, a fast heartbeat, dizziness, and weakness. These would start a few minutes to a few hours after the vaccination. What should I do? · If you think it is a severe allergic reaction or other emergency that can't wait, call 9-1-1 or get the person to the nearest hospital. Otherwise, call your doctor. · Reactions should be reported to the \"Vaccine Adverse Event Reporting System\" (VAERS). Your doctor should file this report, or you can do it yourself through the VAERS web site at www.vaers. [facility].gov, or by calling 7-706.631.6317. 4 the stars does not give medical advice.  
The [employer] Vaccine Injury W[employer] 
 The Check Injury Compensation Program (VICP) is a federal program that was created to compensate people who may have been injured by certain vaccines. Persons who believe they may have been injured by a vaccine can learn about the program and about filing a claim by calling 1-952.667.7366 or visiting the [facility] website at www.[facility].gov/vaccinecompensation. There is a time limit to file a claim for compensation. How can I learn more? · Ask your doctor. He or she can give you the vaccine package insert or suggest other sources of information. · Call your local or state health department. · Contact the Centers for Disease Control and Prevention (CDC): 
¨ Call 5-436.546.5430 (1-800-CDC-INFO) or ¨ Visit CDC's website at www.cdc.gov/flu Vaccine Information Statement Live Attenuated Influenza Vaccine 8/7/2015 
42 LEORA Dumont 995UR-32 [facility] and [place] Centers for Disease Control and Prevention Many Vaccine Information Statements are available in Ukrainian and other languages. See www.immunize.org/vis. Muchas hojas de información sobre vacunas están disponibles en español y en otros idiomas. Visite www.immunize.org/vis. Content Version: [id number] Please provide this summary of care documentation to your next provider. Signatures-by signing, you are acknowledging that this After Visit Summary has been reviewed with you and you have received a copy. Patient Signature:  ____________________________________________________________ Date:  ____________________________________________________________  
  
Porsche June Provider Signature:  ____________________________________________________________ Date:  ____________________________________________________________

## 2017-12-08 NOTE — ED NOTES
I performed a brief evaluation, including history and physical, of the patient here in triage and I have determined that pt will need further treatment and evaluation from the fast track provider. I have placed initial orders to help in expediting patients care.      December 08, 2017 at 5:29 PM - April ANGELITA [place]add[place]

## 2017-12-09 LAB
ALBUMIN SERPL-MCNC: 3.1 G/DL (ref 3.4–5)
ALBUMIN/GLOB SERPL: 0.8 {RATIO} (ref 0.8–1.7)
ALP SERPL-CCNC: 121 U/L (ref 45–117)
ALT SERPL-CCNC: 53 U/L (ref 13–56)
ANION GAP SERPL CALC-SCNC: 11 MMOL/L (ref 3–18)
AST SERPL-CCNC: 35 U/L (ref 15–37)
ATRIAL RATE: 115 BPM
BILIRUB SERPL-MCNC: 0.3 MG/DL (ref 0.2–1)
BUN SERPL-MCNC: 23 MG/DL (ref 7–18)
BUN/CREAT SERPL: 21 (ref 12–20)
CALCIUM SERPL-MCNC: 8 MG/DL (ref 8.5–10.1)
CALCULATED P AXIS, ECG09: 46 DEGREES
CALCULATED R AXIS, ECG10: 2 DEGREES
CALCULATED T AXIS, ECG11: 35 DEGREES
CHLORIDE SERPL-SCNC: 100 MMOL/L (ref 100–108)
CO2 SERPL-SCNC: 23 MMOL/L (ref 21–32)
CREAT SERPL-MCNC: 1.1 MG/DL (ref 0.6–1.3)
DIAGNOSIS, 93000: NORMAL
ERYTHROCYTE [DISTWIDTH] IN BLOOD BY AUTOMATED COUNT: 14.4 % (ref 11.6–14.5)
GLOBULIN SER CALC-MCNC: 3.8 G/DL (ref 2–4)
GLUCOSE BLD STRIP.AUTO-MCNC: 230 MG/DL (ref 70–110)
GLUCOSE BLD STRIP.AUTO-MCNC: 297 MG/DL (ref 70–110)
GLUCOSE BLD STRIP.AUTO-MCNC: 356 MG/DL (ref 70–110)
GLUCOSE BLD STRIP.AUTO-MCNC: 398 MG/DL (ref 70–110)
GLUCOSE SERPL-MCNC: 254 MG/DL (ref 74–99)
HCT VFR BLD AUTO: 31.4 % (ref 35–45)
HGB BLD-MCNC: 10.6 G/DL (ref 12–16)
LACTATE SERPL-SCNC: 2.5 MMOL/L (ref 0.4–2)
LACTATE SERPL-SCNC: 2.6 MMOL/L (ref 0.4–2)
MCH RBC QN AUTO: 29.4 PG (ref 24–34)
MCHC RBC AUTO-ENTMCNC: 33.8 G/DL (ref 31–37)
MCV RBC AUTO: 87 FL (ref 74–97)
P-R INTERVAL, ECG05: 152 MS
PLATELET # BLD AUTO: 151 K/UL (ref 135–420)
PMV BLD AUTO: 11.3 FL (ref 9.2–11.8)
POTASSIUM SERPL-SCNC: 3.7 MMOL/L (ref 3.5–5.5)
PROT SERPL-MCNC: 6.9 G/DL (ref 6.4–8.2)
Q-T INTERVAL, ECG07: 332 MS
QRS DURATION, ECG06: 64 MS
QTC CALCULATION (BEZET), ECG08: 459 MS
RBC # BLD AUTO: 3.61 M/UL (ref 4.2–5.3)
SODIUM SERPL-SCNC: 134 MMOL/L (ref 136–145)
VENTRICULAR RATE, ECG03: 115 BPM
WBC # BLD AUTO: 7.5 K/UL (ref 4.6–13.2)

## 2017-12-09 PROCEDURE — 87086 URINE CULTURE/COLONY COUNT: CPT | Performed by: INTERNAL MEDICINE

## 2017-12-09 PROCEDURE — 74011250637 HC RX REV CODE- 250/637: Performed by: INTERNAL MEDICINE

## 2017-12-09 PROCEDURE — 83605 ASSAY OF LACTIC ACID: CPT | Performed by: INTERNAL MEDICINE

## 2017-12-09 PROCEDURE — 87040 BLOOD CULTURE FOR BACTERIA: CPT | Performed by: INTERNAL MEDICINE

## 2017-12-09 PROCEDURE — 74011250636 HC RX REV CODE- 250/636: Performed by: INTERNAL MEDICINE

## 2017-12-09 PROCEDURE — 85027 COMPLETE CBC AUTOMATED: CPT | Performed by: INTERNAL MEDICINE

## 2017-12-09 PROCEDURE — 65270000029 HC RM PRIVATE

## 2017-12-09 PROCEDURE — 80053 COMPREHEN METABOLIC PANEL: CPT | Performed by: INTERNAL MEDICINE

## 2017-12-09 PROCEDURE — 74011636637 HC RX REV CODE- 636/637: Performed by: INTERNAL MEDICINE

## 2017-12-09 PROCEDURE — 74011000250 HC RX REV CODE- 250: Performed by: INTERNAL MEDICINE

## 2017-12-09 PROCEDURE — 36415 COLL VENOUS BLD VENIPUNCTURE: CPT | Performed by: INTERNAL MEDICINE

## 2017-12-09 PROCEDURE — 82962 GLUCOSE BLOOD TEST: CPT

## 2017-12-09 RX ORDER — INSULIN LISPRO 100 [IU]/ML
INJECTION, SOLUTION INTRAVENOUS; SUBCUTANEOUS
Status: DISCONTINUED | OUTPATIENT
Start: 2017-12-09 | End: 2017-12-09

## 2017-12-09 RX ORDER — HYDRALAZINE HYDROCHLORIDE 20 MG/ML
20 INJECTION INTRAMUSCULAR; INTRAVENOUS
Status: DISCONTINUED | OUTPATIENT
Start: 2017-12-09 | End: 2017-12-12 | Stop reason: HOSPADM

## 2017-12-09 RX ORDER — INSULIN LISPRO 100 [IU]/ML
INJECTION, SOLUTION INTRAVENOUS; SUBCUTANEOUS
Status: DISCONTINUED | OUTPATIENT
Start: 2017-12-09 | End: 2017-12-12 | Stop reason: HOSPADM

## 2017-12-09 RX ADMIN — INSULIN LISPRO 4 UNITS: 100 INJECTION, SOLUTION INTRAVENOUS; SUBCUTANEOUS at 08:39

## 2017-12-09 RX ADMIN — ACETAMINOPHEN 650 MG: 325 TABLET ORAL at 07:49

## 2017-12-09 RX ADMIN — ACETAMINOPHEN 650 MG: 325 TABLET ORAL at 21:12

## 2017-12-09 RX ADMIN — ACETAMINOPHEN 650 MG: 325 TABLET ORAL at 16:19

## 2017-12-09 RX ADMIN — INSULIN LISPRO 10 UNITS: 100 INJECTION, SOLUTION INTRAVENOUS; SUBCUTANEOUS at 11:59

## 2017-12-09 RX ADMIN — HYDRALAZINE HYDROCHLORIDE 20 MG: 20 INJECTION INTRAMUSCULAR; INTRAVENOUS at 17:41

## 2017-12-09 RX ADMIN — HEPARIN SODIUM 5000 UNITS: 5000 INJECTION, SOLUTION INTRAVENOUS; SUBCUTANEOUS at 22:23

## 2017-12-09 RX ADMIN — HEPARIN SODIUM 5000 UNITS: 5000 INJECTION, SOLUTION INTRAVENOUS; SUBCUTANEOUS at 13:27

## 2017-12-09 RX ADMIN — HEPARIN SODIUM 5000 UNITS: 5000 INJECTION, SOLUTION INTRAVENOUS; SUBCUTANEOUS at 06:00

## 2017-12-09 RX ADMIN — WATER 2 G: 1 INJECTION INTRAMUSCULAR; INTRAVENOUS; SUBCUTANEOUS at 10:11

## 2017-12-09 RX ADMIN — INSULIN LISPRO 6 UNITS: 100 INJECTION, SOLUTION INTRAVENOUS; SUBCUTANEOUS at 17:02

## 2017-12-09 RX ADMIN — INSULIN LISPRO 15 UNITS: 100 INJECTION, SOLUTION INTRAVENOUS; SUBCUTANEOUS at 22:21

## 2017-12-09 RX ADMIN — WATER 2 G: 1 INJECTION INTRAMUSCULAR; INTRAVENOUS; SUBCUTANEOUS at 22:10

## 2017-12-09 RX ADMIN — SODIUM CHLORIDE 75 ML/HR: 900 INJECTION, SOLUTION INTRAVENOUS at 17:00
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
## 2017-12-09 NOTE — ROUTINE PROCESS
TRANSFER - OUT REPORT:    Verbal report given to August Rincon RN(name) on Alondra Burgos  being transferred to [address] [place] [address](unit) for routine progression of care       Report consisted of patients Situation, Background, Assessment and   Recommendations(SBAR). Information from the following report(s) SBAR, ED Summary, Intake/Output and MAR was reviewed with the receiving nurse. Lines:   Peripheral IV 12/08/17 Left Antecubital (Active)   Site Assessment Clean, dry, & intact 12/8/2017  6:09 PM   Phlebitis Assessment 0 12/8/2017  6:09 PM   Infiltration Assessment 0 12/8/2017  6:09 PM   Dressing Status Clean, dry, & intact 12/8/2017  6:09 PM   Dressing Type Transparent 12/8/2017  6:09 PM   Hub Color/Line Status Patent;Pink 12/8/2017  6:09 PM        Opportunity for questions and clarification was provided.       Patient transported with:   Monitor  Tech

## 2017-12-09 NOTE — INTERDISCIPLINARY ROUNDS
Interdisciplinary Round Note   Patient Information:   Alondra Burgos   [birth year]   Reason for Admission: UTI (urinary tract infection)   Attending Provider:   Michael Finch MD  Primary Care Physician:       None       None   Estimated discharge date:  12/11     Hospital day: 1  [unfilled]  - - -  RRAT Score: Low Risk            4       Total Score        4 Pt. Coverage (Medicare=5 , Medicaid, or Self-Pay=4)        Criteria that do not apply:    Has Seen PCP in Last 6 Months (Yes=3, No=0)    . Living with Significant Other. Assisted Living. LTAC. SNF. or   Rehab    Patient Length of Stay (>5 days = 3)    IP Visits Last 12 Months (1-3=4, 4=9, >4=11)    Charlson Comorbidity Score (Age + Comorbid Conditions)       na     No         Chemical      Lines, Drains, & Airways  None       IV Antibiotics:    Current Antimicrobial Therapy (168h ago through future)    Ordered     Start Stop    12/08/17 2117  levoFLOXacin (LEVAQUIN) 750 mg in D5W IVPB  750 mg,   IntraVENous,   EVERY 24 HOURS      12/08/17 2200 --    12/08/17 2128  cefepime (MAXIPIME) 2 g in sterile water (preservative free) 10 mL IV syringe  2 g,   IntraVENous,   EVERY 12 HOURS      12/08/17 2200 --        GI Prophylaxis: GI Prophylaxis: no   Type: na      Recent Glucose Results:   Lab Results   Component Value Date/Time     (H) 12/09/2017 02:30 AM     (H) 12/08/2017 06:03 PM    GLUCPOC 356 (H) 12/09/2017 10:35 AM    GLUCPOC 230 (H) 12/09/2017 07:20 AM    GLUCPOC 261 (H) 12/08/2017 10:24 PM      Activity Level:   Activity Level: Bed Rest    Needs assistance with ADLs: no       Goals for Today: antibiotics, hydration, monitor gustavo fx   Recommendations:   Discharge Disposition: SNF  P.T, O.T. and CM  Care Management involvement for home health follow up for:  mobility and assistance with ADL's    Needs for Discharge: TBD IDR Team: na  Recommendations from IDR team: tbd    Other Notes: pt cannot speak english, family at bedside as

## 2017-12-09 NOTE — ROUTINE PROCESS
Unable to complete admission questions due to language barrier. Language line was used but the patient had a previous stroke that made communication difficult. The  of the language line was not able to understand what was being said.

## 2017-12-09 NOTE — PROGRESS NOTES
Bedside and Verbal shift change report given to Mark Mason (oncoming nurse) by Shari Wu (offgoing nurse). Report included the following information SBAR, Kardex, Intake/Output, MAR, Recent Results, Med Rec Status and Cardiac Rhythm ***. All questions answered. Patient laying in bed. Family at bedside.

## 2017-12-09 NOTE — PROGRESS NOTES
Patient's Lactic acid 2.3 MD notified. Orders received to repeat lactic acid lab in 4 hours. Patient has limited IV access. ED IV is still working but has migrated out a little bit. ICU nurse came to try and get access but was unsuccessful. 0028: Midnight MEWS score 5 related to HR and Temp. MD made aware, patient being treated. 0400:  MEWS score 2 related to HR and Temp.    0520:  Repeat Lactic Acid 2.6. MD notified, orders received for repeat Lactic in another 4 hours. Scheduled for 0930.

## 2017-12-09 NOTE — ROUTINE PROCESS
Bedside shift change report given to Talib Mercado (oncoming nurse) by Carlos Mendez (offgoing nurse). Report included the following information SBAR, Intake/Output, Recent Results and Cardiac Rhythm . Claudia Graves

## 2017-12-09 NOTE — H&P
Hospitalist Admission Note    NAME: Festus Byrd   :  1955   MRN:  [id number]     Date/Time of admission:  2017 7:52 PM    Patient PCP: None  ________________________________________________________________________    My assessment of this patient's clinical condition and my plan of care is as follows. Assessment / Plan:  1. Acute cystitis with concern for UTI  2. Sepsis d/t above  3. Low distance fall from [facility] - no injury per imaging  4. H/o CVA with right sided paralysis  5. Chronic aphasia d/t #2  6. Seizure d/t d/t #2  7. Type 2 DM, uncontrolled with hyperglycemia  8. Small LLL opacity with concern for pl effusion vs atelectasis    1. Admit to med/surg for IV abx and hydration  2. Urine and blood cultures pending  3. Monitor renal function  4. Agree with rapid flu test ordered in ED. Results pending  5. Continue home medications for now  6. Care Management for possible placement needs    Code Status: full  Surrogate Decision Maker: to be determined    DVT Prophylaxis: sc hep  GI Prophylaxis: not indicated          Subjective:   CHIEF COMPLAINT: fall from bedside commode, worsened weakness    HISTORY OF PRESENT ILLNESS:     Festus Byrd is a 63 y.o.  female who presents with fall from [facility] while being assisted, d/t worsened weakness. Pt has h/o dense cva with resulting bed-bound status, right sided paralysis and dense aphasia. She was recently moved to the area. Pt was being helped from the [facility] and fell to the floor. C/o mild abd pain and hip pain. Presented to the ED and had normal imaging, but was noted to be febrile and have evidence for possible UTI. We were asked to admit for work up and evaluation of the above problems. No past medical history on file. No past surgical history on file. Social History   Substance Use Topics    Smoking status: Not on file    Smokeless tobacco: Not on file    Alcohol use Not on file        No family history on file.   No Known Allergies Prior to Admission medications    Not on File       REVIEW OF SYSTEMS:     I am not able to complete the review of systems because: The patient is intubated and sedated    The patient has altered mental status due to his acute medical problems    The patient has baseline aphasia from prior stroke(s)    The patient has baseline dementia and is not reliable historian    The patient is in acute medical distress and unable to provide information   x Language barrier       Total of 12 systems reviewed as follows:       POSITIVE= bolded text  Negative = text not underlined  General:  fever, chills, sweats, generalized weakness, weight loss/gain,      loss of appetite   Eyes:    blurred vision, eye pain, loss of vision, double vision  ENT:    rhinorrhea, pharyngitis   Respiratory:   cough, sputum production, SOB, CERVANTES, wheezing, pleuritic pain   Cardiology:   chest pain, palpitations, orthopnea, PND, edema, syncope   Gastrointestinal:  abdominal pain , N/V, diarrhea, dysphagia, constipation, bleeding   Genitourinary:  frequency, urgency, dysuria, hematuria, incontinence   Muskuloskeletal :  arthralgia, myalgia, back pain  Hematology:  easy bruising, nose or gum bleeding, lymphadenopathy   Dermatological: rash, ulceration, pruritis, color change / jaundice  Endocrine:   hot flashes or polydipsia   Neurological:  headache, dizziness, confusion, focal weakness, paresthesia,     Speech difficulties, memory loss, gait difficulty  Psychological: Feelings of anxiety, depression, agitation    Objective:   VITALS:    Visit Vitals    /63 (BP 1 Location: Left arm, BP Patient Position: At rest)    Pulse (!) 112    Temp (!) 102.5 °F (39.2 °C)    Resp 28    Ht 4' 11\" (1.499 m)    Wt 77.1 kg (170 lb)    SpO2 100%    BMI 34.34 kg/m2       PHYSICAL EXAM:    General:    Alert, cooperative, mild distress, appears stated age.      HEENT: Atraumatic, anicteric sclerae, pink conjunctivae     No oral ulcers, mucosa moist, throat clear, dentition fair  Neck:  Supple, symmetrical,  thyroid: non tender  Lungs:   Clear to auscultation bilaterally. No Wheezing or Rhonchi. No rales. Chest wall:  No tenderness  No Accessory muscle use. Heart:   Regular  rhythm,  No  murmur   No edema  Abdomen:   Soft, non-tender. Not distended. Bowel sounds normal  Extremities: No cyanosis. No clubbing,      Skin turgor normal, Capillary refill normal, Radial dial pulse 2+  Skin:     Not pale. Not Jaundiced  No rashes   Psych:  Good insight. Not depressed. Not anxious or agitated. Neurologic: EOMs intact. No facial asymmetry. No aphasia or slurred speech. Right sided paralysis, Sensation grossly intact. Alert and oriented X 4.     _______________________________________________________________________  Care Plan discussed with:    Comments   Patient x    Family  x    RN x    Care Manager                    Consultant:      _______________________________________________________________________  Expected  Disposition:   Home with Family    HH/PT/OT/RN    SNF/LTC x   [facility]    ________________________________________________________________________  TOTAL TIME:  39 Minutes    Critical Care Provided     Minutes non procedure based      Comments    x Reviewed previous records   >50% of visit spent in counseling and coordination of care x Discussion with patient and/or family and questions answered       ________________________________________________________________________      Procedures: see electronic medical records for all procedures/Xrays and details which were not copied into this note but were reviewed prior to creation of Plan.     LAB DATA REVIEWED:    Recent Results (from the past 24 hour(s))   CBC WITH AUTOMATED DIFF    Collection Time: 12/08/17  6:03 PM   Result Value Ref Range    WBC 6.8 4.6 - 13.2 K/uL    RBC 3.97 (L) 4.20 - 5.30 M/uL    HGB 11.8 (L) 12.0 - 16.0 g/dL    HCT 34.8 (L) 35.0 - 45.0 %    MCV 87.7 74.0 - 97.0 FL    MCH 29.7 24.0 - 34.0 PG    MCHC 33.9 31.0 - 37.0 g/dL    RDW 14.3 11.6 - 14.5 %    PLATELET 412 850 - 178 K/uL    MPV 11.2 9.2 - 11.8 FL    NEUTROPHILS 84 (H) 40 - 73 %    LYMPHOCYTES 8 (L) 21 - 52 %    MONOCYTES 8 3 - 10 %    EOSINOPHILS 0 0 - 5 %    BASOPHILS 0 0 - 2 %    ABS. NEUTROPHILS 5.7 1.8 - 8.0 K/UL    ABS. LYMPHOCYTES 0.6 (L) 0.9 - 3.6 K/UL    ABS. MONOCYTES 0.5 0.05 - 1.2 K/UL    ABS. EOSINOPHILS 0.0 0.0 - 0.4 K/UL    ABS. BASOPHILS 0.0 0.0 - 0.1 K/UL    DF AUTOMATED     METABOLIC PANEL, COMPREHENSIVE    Collection Time: 12/08/17  6:03 PM   Result Value Ref Range    Sodium 135 (L) 136 - 145 mmol/L    Potassium 4.1 3.5 - 5.5 mmol/L    Chloride 100 100 - 108 mmol/L    CO2 25 21 - 32 mmol/L    Anion gap 10 3.0 - 18 mmol/L    Glucose 318 (H) 74 - 99 mg/dL    BUN 26 (H) 7.0 - 18 MG/DL    Creatinine 1.13 0.6 - 1.3 MG/DL    BUN/Creatinine ratio 23 (H) 12 - 20      GFR est AA 60 (L) >60 ml/min/1.73m2    GFR est non-AA 49 (L) >60 ml/min/1.73m2    Calcium 8.6 8.5 - 10.1 MG/DL    Bilirubin, total 0.3 0.2 - 1.0 MG/DL    ALT (SGPT) 54 13 - 56 U/L    AST (SGOT) 46 (H) 15 - 37 U/L    Alk.  phosphatase 149 (H) 45 - 117 U/L    Protein, total 7.8 6.4 - 8.2 g/dL    Albumin 3.6 3.4 - 5.0 g/dL    Globulin 4.2 (H) 2.0 - 4.0 g/dL    A-G Ratio 0.9 0.8 - 1.7     LIPASE    Collection Time: 12/08/17  6:03 PM   Result Value Ref Range    Lipase 134 73 - 393 U/L   POC LACTIC ACID    Collection Time: 12/08/17  6:06 PM   Result Value Ref Range    Lactic Acid (POC) 2.1 (HH) 0.4 - 2.0 mmol/L   EKG, 12 LEAD, INITIAL    Collection Time: 12/08/17  6:13 PM   Result Value Ref Range    Ventricular Rate 115 BPM    Atrial Rate 115 BPM    P-R Interval 152 ms    QRS Duration 64 ms    Q-T Interval 332 ms    QTC Calculation (Bezet) 459 ms    Calculated P Axis 46 degrees    Calculated R Axis 2 degrees    Calculated T Axis 35 degrees    Diagnosis       Sinus tachycardia  Otherwise normal ECG  No previous ECGs available     URINALYSIS W/ RFLX MICROSCOPIC Collection Time: 12/08/17  6:46 PM   Result Value Ref Range    Color YELLOW      Appearance CLOUDY      Specific gravity 1.020 1.005 - 1.030      pH (UA) 5.5 5.0 - 8.0      Protein NEGATIVE  NEG mg/dL    Glucose >1000 (A) NEG mg/dL    Ketone NEGATIVE  NEG mg/dL    Bilirubin NEGATIVE  NEG      Blood TRACE (A) NEG      Urobilinogen 0.2 0.2 - 1.0 EU/dL    Nitrites NEGATIVE  NEG      Leukocyte Esterase LARGE (A) NEG     URINE MICROSCOPIC ONLY    Collection Time: 12/08/17  6:46 PM   Result Value Ref Range    WBC TOO NUMEROUS TO COUNT 0 - 4 /hpf    RBC 4 to 10 0 - 5 /hpf    Epithelial cells 1+ 0 - 5 /lpf    Bacteria 4+ (A) NEG /hpf       Tejinder Durán MD  Internal Medicine  Hospitalist Division

## 2017-12-09 NOTE — PROGRESS NOTES
Admit Date: 2017  Date of Service: 2017    Reason for follow-up: UTI      Assessment:         UTI with early pyelonephritis on imaging: urine cx with GNR x 2   - blood cx pending  Sepsis:  Fever, tachycardia, leukocytosis- due to UTI/Pyelo  Left hip pain following fall: no evidence of fracture on imaging  LLL opacity: not clear is from atelectasis or CAP- pt with sepsis but no pulmonary symptoms, thus most likely suspect atlectasis  Hx CVA with right hemiparesis  Seizure- new onset: suspect related to sepsis;  Dm type 2: uncontrolled    Plan:     F/u blood and urine cx  Contnue with cefepime for now  D/c levofloxacin and ceftriaxone  Will further adjust when sensitivities completed. Pain management     Current Antibtiocs:   Levofloxacin - present  Cefepime - present  Ceftriaxone  - 1 dose    Lines:   Peripheral    I spent 45 minutes with the patient in face-to-face consultation, of which greater than 50% was spent in counseling and coordination of care as described above. Case discussed with:  [x]Patient  []Family  [x]Nursing  [x]Case Management  DVT Prophylaxis:  []Lovenox  [x]Hep SQ  []SCDs  []Coumadin   []On Heparin gtt  I have independently examined the patient and reviewed all lab studies and imgaing as well as review of nursing notes and physican notes from the past 24 hours. The plan of care has been discussed with the patient and all questions are answered. Katiuska Villeda D.O. Pager [phone]      No Known Allergies        Subjective:      Pt seen and examined.  utilized on rounds. Watching a movie. Doing ok. Becomes anxious. IV site uncomfortable. No abdominal pain. Left hip pain ongoing.          Objective:        Visit Vitals    /81 (BP 1 Location: Left leg, BP Patient Position: At rest)    Pulse 99    Temp 97.6 °F (36.4 °C)    Resp 19    Ht 4' 11\" (1.499 m)    Wt 77.1 kg (170 lb)    SpO2 99%    BMI 34.34 kg/m2     Temp (24hrs), Av.9 °F (38.3 °C), Min:97.6 °F (36.4 °C), Max:102.5 °F (39.2 °C)        General:   awake alert and oriented, non-toxic   Skin:   no rashes or skin lesions noted on limited exam, dry and warm   HEENT:  No scleral icterus or pallor; oral mucosa moist, lips moist   Lymph Nodes:   not assessed today   Lungs:   non, labored; bilaterally clear to aspiration- no crackles wheezes rales or rhonchi   Heart:  RRR, s1 and s2; no murmurs rubs or gallops; no edema, + pedal pulses   Abdomen:  soft, obese, non-distended, active bowel sounds, non-tender, no flank tenderness   Genitourinary:  deferred   Extremities:   pain with movement and palpation of lateral hip; average muscle tone; no contractures, no joint effusions   Neurologic:  No gross focal motor or sensory abnormalities; CN 2-12 intact; Follows commands. Psychiatric:   appropriate and interactive. Labs: Results:   Chemistry Recent Labs      17   0230  17   1803   GLU  254*  318*   NA  134*  135*   K  3.7  4.1   CL  100  100   CO2  23  25   BUN  23*  26*   CREA  1.10  1.13   CA  8.0*  8.6   AGAP  11  10   BUCR  21*  23*   AP  121*  149*   TP  6.9  7.8   ALB  3.1*  3.6   GLOB  3.8  4.2*   AGRAT  0.8  0.9      CBC w/Diff Recent Labs      17   0230  17   1803   WBC  7.5  6.8   RBC  3.61*  3.97*   HGB  10.6*  11.8*   HCT  31.4*  34.8*   PLT  151  158   GRANS   --   84*   LYMPH   --   8*   EOS   --   0        No results found for: SDES Lab Results   Component Value Date/Time    Culture result: [id number] COLONIES/mL GRAM NEGATIVE RODS 2017 06:46 PM    Culture result: [id number] COLONIES/mL 2ND GRAM NEGATIVE HIRO 2017 06:46 PM    Culture result: NO GROWTH AFTER 11 HOURS 2017 06:18 PM    Culture result: NO GROWTH AFTER 11 HOURS 2017 06:03 PM          Imagin/8 left hip x-ray: Negative hip.     CT abd/pelvis: 1. Very subtle patchy low-attenuation in the left kidney, without a definable laceration.  Consider contusion or infection in the appropriate setting. Recommend correlation with urinalysis.     2. Small left pleural effusion with pleural radiodensity as may be seen with  pleurodesis or asbestos exposure. Recommend correlation with history.     3. Soft tissue density in the gallbladder, possibly cholelithiasis. Recommend  sonogram at some point to confirm.  -Post hysterectomy.  See additional details above

## 2017-12-09 NOTE — ED NOTES
Pt stable with no distress noted, on cardiac monitor. Hospitalist paged at this time, to receive orders for tylenol due to pts temp of 101.8.  Pts family remains at bedside

## 2017-12-09 NOTE — ED NOTES
Assumed care of patient from [facility] AND [facility]

## 2017-12-10 LAB
ANION GAP SERPL CALC-SCNC: 10 MMOL/L (ref 3–18)
BUN SERPL-MCNC: 16 MG/DL (ref 7–18)
BUN/CREAT SERPL: 23 (ref 12–20)
CALCIUM SERPL-MCNC: 8 MG/DL (ref 8.5–10.1)
CHLORIDE SERPL-SCNC: 108 MMOL/L (ref 100–108)
CO2 SERPL-SCNC: 20 MMOL/L (ref 21–32)
CREAT SERPL-MCNC: 0.69 MG/DL (ref 0.6–1.3)
ERYTHROCYTE [DISTWIDTH] IN BLOOD BY AUTOMATED COUNT: 14.6 % (ref 11.6–14.5)
GLUCOSE BLD STRIP.AUTO-MCNC: 271 MG/DL (ref 70–110)
GLUCOSE BLD STRIP.AUTO-MCNC: 314 MG/DL (ref 70–110)
GLUCOSE BLD STRIP.AUTO-MCNC: 354 MG/DL (ref 70–110)
GLUCOSE BLD STRIP.AUTO-MCNC: 359 MG/DL (ref 70–110)
GLUCOSE SERPL-MCNC: 236 MG/DL (ref 74–99)
HCT VFR BLD AUTO: 31.5 % (ref 35–45)
HGB BLD-MCNC: 10.6 G/DL (ref 12–16)
MCH RBC QN AUTO: 29.3 PG (ref 24–34)
MCHC RBC AUTO-ENTMCNC: 33.7 G/DL (ref 31–37)
MCV RBC AUTO: 87 FL (ref 74–97)
PLATELET # BLD AUTO: 130 K/UL (ref 135–420)
PMV BLD AUTO: 11.2 FL (ref 9.2–11.8)
POTASSIUM SERPL-SCNC: 4 MMOL/L (ref 3.5–5.5)
RBC # BLD AUTO: 3.62 M/UL (ref 4.2–5.3)
SODIUM SERPL-SCNC: 138 MMOL/L (ref 136–145)
WBC # BLD AUTO: 5.4 K/UL (ref 4.6–13.2)

## 2017-12-10 PROCEDURE — 74011250637 HC RX REV CODE- 250/637: Performed by: INTERNAL MEDICINE

## 2017-12-10 PROCEDURE — 85027 COMPLETE CBC AUTOMATED: CPT | Performed by: INTERNAL MEDICINE

## 2017-12-10 PROCEDURE — 74011250636 HC RX REV CODE- 250/636: Performed by: INTERNAL MEDICINE

## 2017-12-10 PROCEDURE — 82962 GLUCOSE BLOOD TEST: CPT

## 2017-12-10 PROCEDURE — 74011000250 HC RX REV CODE- 250: Performed by: INTERNAL MEDICINE

## 2017-12-10 PROCEDURE — 65270000029 HC RM PRIVATE

## 2017-12-10 PROCEDURE — 74011636637 HC RX REV CODE- 636/637: Performed by: INTERNAL MEDICINE

## 2017-12-10 PROCEDURE — 36415 COLL VENOUS BLD VENIPUNCTURE: CPT | Performed by: INTERNAL MEDICINE

## 2017-12-10 PROCEDURE — 80048 BASIC METABOLIC PNL TOTAL CA: CPT | Performed by: INTERNAL MEDICINE

## 2017-12-10 RX ORDER — METOPROLOL TARTRATE 50 MG/1
50 TABLET ORAL EVERY 12 HOURS
Status: DISCONTINUED | OUTPATIENT
Start: 2017-12-10 | End: 2017-12-12 | Stop reason: HOSPADM

## 2017-12-10 RX ADMIN — INSULIN LISPRO 15 UNITS: 100 INJECTION, SOLUTION INTRAVENOUS; SUBCUTANEOUS at 11:18

## 2017-12-10 RX ADMIN — HEPARIN SODIUM 5000 UNITS: 5000 INJECTION, SOLUTION INTRAVENOUS; SUBCUTANEOUS at 06:15

## 2017-12-10 RX ADMIN — INSULIN LISPRO 12 UNITS: 100 INJECTION, SOLUTION INTRAVENOUS; SUBCUTANEOUS at 16:31

## 2017-12-10 RX ADMIN — HYDRALAZINE HYDROCHLORIDE 20 MG: 20 INJECTION INTRAMUSCULAR; INTRAVENOUS at 19:43

## 2017-12-10 RX ADMIN — ACETAMINOPHEN 650 MG: 325 TABLET ORAL at 19:44

## 2017-12-10 RX ADMIN — METOPROLOL TARTRATE 50 MG: 50 TABLET ORAL at 14:05

## 2017-12-10 RX ADMIN — INSULIN LISPRO 15 UNITS: 100 INJECTION, SOLUTION INTRAVENOUS; SUBCUTANEOUS at 23:20

## 2017-12-10 RX ADMIN — HYDRALAZINE HYDROCHLORIDE 20 MG: 20 INJECTION INTRAMUSCULAR; INTRAVENOUS at 06:15

## 2017-12-10 RX ADMIN — CEFEPIME HYDROCHLORIDE 2 G: 2 INJECTION, POWDER, FOR SOLUTION INTRAVENOUS at 21:28

## 2017-12-10 RX ADMIN — INSULIN LISPRO 9 UNITS: 100 INJECTION, SOLUTION INTRAVENOUS; SUBCUTANEOUS at 08:28

## 2017-12-10 RX ADMIN — SODIUM CHLORIDE 75 ML/HR: 900 INJECTION, SOLUTION INTRAVENOUS at 19:49

## 2017-12-10 RX ADMIN — WATER 2 G: 1 INJECTION INTRAMUSCULAR; INTRAVENOUS; SUBCUTANEOUS at 10:27

## 2017-12-10 RX ADMIN — METOPROLOL TARTRATE 50 MG: 50 TABLET ORAL at 21:35

## 2017-12-10 RX ADMIN — HEPARIN SODIUM 5000 UNITS: 5000 INJECTION, SOLUTION INTRAVENOUS; SUBCUTANEOUS at 23:19

## 2017-12-10 RX ADMIN — HEPARIN SODIUM 5000 UNITS: 5000 INJECTION, SOLUTION INTRAVENOUS; SUBCUTANEOUS at 14:05

## 2017-12-10 RX ADMIN — SODIUM CHLORIDE 75 ML/HR: 900 INJECTION, SOLUTION INTRAVENOUS at 05:08

## 2017-12-10 NOTE — PROGRESS NOTES
Nutrition:    Nutrition risk referral received from RN screen. Patient denies unintentional weight loss prior to admission and denies any change in po intake due to decreased appetite. Discussed food preferences, will update diet order. Pt does not speak [place], nurse translated. Will request diabetes educators to visit pt. Full nutrition assessment is not indicated at this time. Will re-screen as appropriate.         Mary Ramon RD, CNSC

## 2017-12-10 NOTE — PROGRESS NOTES
Pt had fever of 100.6 oral at 2000, Tylenol given as ordered and recheck showed temp 101.5 axillary. Dr. Fer Licona paged and ordered 2 sets of blood cultures at 2 different sites and urine culture.

## 2017-12-10 NOTE — PROGRESS NOTES
Admit Date: 12/8/2017  Date of Service: 12/10/2017    Reason for follow-up: UTI      Assessment:         UTI with early pyelonephritis on imaging: urine cx with GNR x 3   - blood cx pending  Sepsis:  Fever, tachycardia, leukocytosis- due to UTI/Pyelo  Left hip pain following fall: no evidence of fracture on imaging  LLL opacity: not clear is from atelectasis or CAP- pt with sepsis but no pulmonary symptoms, thus most likely suspect atlectasis  HTN: remains elevated today  Hx CVA with right hemiparesis  Seizure- new onset: suspect related to sepsis;  Dm type 2: uncontrolled    Plan:   F/u blood and urine cx  Contnue with cefepime for now   - goal is to transition to po antibiotic, preferably levofloxacin 500 mg daily to complete 14 days of therapy for sepsis and pyelonephritis if organisms are sensitive. Will further adjust when sensitivities completed. Pain management   Add lopressor for HTN and tachycardia    Current Antibtiocs:   Levofloxacin 12/8- present  Cefepime 12/8- present  Ceftriaxone 12/8 - 1 dose    Lines:   Peripheral    I spent 45 minutes with the patient in face-to-face consultation, of which greater than 50% was spent in counseling and coordination of care as described above. Case discussed with:  [x]Patient  []Family  [x]Nursing  [x]Case Management  DVT Prophylaxis:  []Lovenox  [x]Hep SQ  []SCDs  []Coumadin   []On Heparin gtt  I have independently examined the patient and reviewed all lab studies and imgaing as well as review of nursing notes and physican notes from the past 24 hours. The plan of care has been discussed with the patient and all questions are answered. Justyn Lee D.O. Pager [phone]      No Known Allergies        Subjective:      Pt seen and examined. Family in room. Doing ok. Becomes anxious. No abdominal pain. Left hip pain ongoing. No fevers reported.      Objective:        Visit Vitals    /71 (BP 1 Location: Left arm, BP Patient Position: At rest)    Pulse (!) 118  Comment: nurse notified    Temp 98.2 °F (36.8 °C)    Resp 18    Ht 4' 11\" (1.499 m)    Wt 77.1 kg (170 lb)    SpO2 99%    BMI 34.34 kg/m2     Temp (24hrs), Av.7 °F (37.6 °C), Min:97.4 °F (36.3 °C), Max:101.5 °F (38.6 °C)        General:   awake alert and oriented, non-toxic   Skin:   no rashes or skin lesions noted on limited exam, dry and warm   HEENT:  No scleral icterus or pallor; oral mucosa moist, lips moist   Lymph Nodes:   not assessed today   Lungs:   non, labored; bilaterally clear to aspiration- no crackles wheezes rales or rhonchi   Heart:  RRR, s1 and s2; no murmurs rubs or gallops; no edema, + pedal pulses   Abdomen:  soft, obese, non-distended, active bowel sounds, non-tender, no flank tenderness   Genitourinary:  deferred   Extremities:   pain with movement and palpation of lateral hip; average muscle tone; no contractures, no joint effusions   Neurologic:  No gross focal motor or sensory abnormalities; CN 2-12 intact; Follows commands. Psychiatric:   appropriate and interactive.        Labs: Results:   Chemistry Recent Labs      12/10/17   0515  12/09/17   0230  12/08/17   1803   GLU  236*  254*  318*   NA  138  134*  135*   K  4.0  3.7  4.1   CL  108  100  100   CO2  20*  23  25   BUN  16  23*  26*   CREA  0.69  1.10  1.13   CA  8.0*  8.0*  8.6   AGAP  10  11  10   BUCR  23*  21*  23*   AP   --   121*  149*   TP   --   6.9  7.8   ALB   --   3.1*  3.6   GLOB   --   3.8  4.2*   AGRAT   --   0.8  0.9      CBC w/Diff Recent Labs      12/10/17   0515  12/09/17   0230  12/08/17   1803   WBC  5.4  7.5  6.8   RBC  3.62*  3.61*  3.97*   HGB  10.6*  10.6*  11.8*   HCT  31.5*  31.4*  34.8*   PLT  130*  151  158   GRANS   --    --   84*   LYMPH   --    --   8*   EOS   --    --   0        No results found for: SDES Lab Results   Component Value Date/Time    Culture result: NO GROWTH AFTER 6 HOURS 2017 10:40 PM    Culture result: NO GROWTH AFTER 6 HOURS 2017 10:35 PM Culture result: [id number] COLONIES/mL GRAM NEGATIVE RODS 2017 06:46 PM    Culture result: 14001 COLONIES/mL 2ND GRAM NEGATIVE HIRO 2017 06:46 PM    Culture result:  2017 06:46 PM     70055  COLONIES/mL  POSSIBLE  3RD GRAM NEGATIVE HIRO            Imagin/8 left hip x-ray: Negative hip.     CT abd/pelvis: 1. Very subtle patchy low-attenuation in the left kidney, without a definable laceration. Consider contusion or infection in the appropriate setting. Recommend correlation with urinalysis.     2. Small left pleural effusion with pleural radiodensity as may be seen with  pleurodesis or asbestos exposure. Recommend correlation with history.     3. Soft tissue density in the gallbladder, possibly cholelithiasis. Recommend  sonogram at some point to confirm.  -Post hysterectomy.  See additional details above

## 2017-12-10 NOTE — ROUTINE PROCESS
Bedside and Verbal shift change report given to Ulysses Milroy (oncoming nurse) by Antoine Byrd RN (offgoing nurse). Report included the following information SBAR, Kardex, MAR and Recent Results. SITUATION:  Code Status: Full Code  Reason for Admission: UTI (urinary tract infection)  Hospital day: 2  Problem List:       Hospital Problems  Never Reviewed          Codes Class Noted POA    UTI (urinary tract infection) ICD-10-CM: N39.0  ICD-9-CM: 599.0  12/8/2017 Unknown              BACKGROUND:   Past Medical History: No past medical history on file.    Patient taking anticoagulants yes    Patient has a defibrillator: no    History of shots YES for example, flu, pneumonia, tetanus   Isolation History NO for example, MRSA, CDiff    ASSESSMENT:  Changes in Assessment Throughout Shift: None  Significant Changes in 24 hours (for example, RR/code, fall)  Patient has Central Line: no Reasons if yes: N/A  Patient has Fallon Cath: no Reasons if yes: N/A   Mobility Issues  PT  IV Patency  OR Checklist  Pending Tests    Last Vitals:  Vitals w/ MEWS Score (last day)     Date/Time MEWS Score Pulse Resp Temp BP Level of Consciousness SpO2    12/09/17 2328 6 (!)  113 22 (!)  101.3 °F (38.5 °C) 163/77 Alert 98 %    12/09/17 2208 -- -- -- (!)  101.5 °F (38.6 °C) -- -- --    12/09/17 2015 4 (!)  125 22 (!)  100.6 °F (38.1 °C) 155/77 Alert 100 %    12/09/17 1809 -- (!)  116 -- -- 165/77 -- --    12/09/17 1721 -- -- -- -- 191/79 -- --    12/09/17 1600 4 (!)  119 22 98.6 °F (37 °C) (!)  187/93 Alert 100 %    12/09/17 1100 1 99 19 97.6 °F (36.4 °C) 175/81 Alert 99 %    12/09/17 0801 -- (!)  107 -- -- 145/79 -- --    12/09/17 0727 3 (!)  112 20 99.7 °F (37.6 °C) 181/88 Alert 100 %    12/09/17 0411 2 (!)  104 20 (!)  100.8 °F (38.2 °C) 170/88 Alert 100 %    12/09/17 0002 5 (!)  110 22 (!)  101.6 °F (38.7 °C) 103/78 Alert 100 %            PAIN    Pain Assessment    Pain Intensity 1: 0 (12/09/17 1453)    Pain Location 1: Arm    Pain Intervention(s) 1: Medication (see MAR)    Patient Stated Pain Goal: 0  Intervention effective: N/A  Time of last intervention: N/A Reassessment Completed: N/A  Other actions taken for pain: N/A    Last 3 Weights:  Last 3 Recorded Weights in this Encounter    12/08/17 1733 12/08/17 1735   Weight: 77.1 kg (170 lb) 77.1 kg (170 lb)   Weight change:     INTAKE/OUPUT    Current Shift: 12/09 1901 - 12/10 0700  In: -   Out: 1100 [Urine:1100]    Last three shifts: 12/08 0701 - 12/09 1900  In: 1913 [P.O.:1913]  Out: 2300 [Urine:2300]    RECOMMENDATIONS AND DISCHARGE PLANNING  Patient needs and requests: None    Pending tests/procedures: None     Discharge plan for patient: SNF    Discharge planning Needs or Barriers: TBD    Estimated Discharge Date: TBD Posted on Whiteboard in Patients Room: no       \"HEALS\" SAFETY CHECK  A safety check occurred in the patient's room between off going nurse and oncoming nurse listed above. The safety check included the below items:    H  High Alert Medications Verify all high alert medication drips (heparin, PCA, etc.)  E  Equipment Suction is set up for ALL patients (with taylor)  Red plugs utilized for all equipment (IV pumps, etc.)  WOWs wiped down at end of shift. Room stocked with oxygen, suction, and other unit-specific supplies  A  Alarms Bed alarm is set for fall risk patients  Ensure chair alarm is in place and activated if patient is up in a chair  L  Lines Check IV for any infiltration  Fallon bag is empty if patient has a Fallon   Tubing and IV bags are labeled  S  Safety  Room is clean, patient is clean, and equipment is clean. Hallways are clear from equipment besides carts. Fall bracelet on for fall risk patients  Ensure room is clear and free of clutter  Suction is set up for ALL patients (with taylor)  Hallways are clear from equipment besides carts.    Isolation precautions followed, supplies available outside room, sign posted    Leo Keith RN

## 2017-12-11 LAB
BACTERIA SPEC CULT: NORMAL
EST. AVERAGE GLUCOSE BLD GHB EST-MCNC: 203 MG/DL
GLUCOSE BLD STRIP.AUTO-MCNC: 244 MG/DL (ref 70–110)
GLUCOSE BLD STRIP.AUTO-MCNC: 257 MG/DL (ref 70–110)
GLUCOSE BLD STRIP.AUTO-MCNC: 361 MG/DL (ref 70–110)
GLUCOSE BLD STRIP.AUTO-MCNC: 434 MG/DL (ref 70–110)
GLUCOSE SERPL-MCNC: 414 MG/DL (ref 74–99)
HBA1C MFR BLD: 8.7 % (ref 4.2–5.6)
SERVICE CMNT-IMP: NORMAL

## 2017-12-11 PROCEDURE — 65270000029 HC RM PRIVATE

## 2017-12-11 PROCEDURE — 36415 COLL VENOUS BLD VENIPUNCTURE: CPT | Performed by: INTERNAL MEDICINE

## 2017-12-11 PROCEDURE — 74011250637 HC RX REV CODE- 250/637: Performed by: INTERNAL MEDICINE

## 2017-12-11 PROCEDURE — 82962 GLUCOSE BLOOD TEST: CPT

## 2017-12-11 PROCEDURE — 82947 ASSAY GLUCOSE BLOOD QUANT: CPT

## 2017-12-11 PROCEDURE — 83036 HEMOGLOBIN GLYCOSYLATED A1C: CPT | Performed by: FAMILY MEDICINE

## 2017-12-11 PROCEDURE — 74011636637 HC RX REV CODE- 636/637: Performed by: INTERNAL MEDICINE

## 2017-12-11 PROCEDURE — 74011250637 HC RX REV CODE- 250/637: Performed by: FAMILY MEDICINE

## 2017-12-11 PROCEDURE — 74011636637 HC RX REV CODE- 636/637: Performed by: FAMILY MEDICINE

## 2017-12-11 PROCEDURE — 74011250636 HC RX REV CODE- 250/636: Performed by: INTERNAL MEDICINE

## 2017-12-11 PROCEDURE — 74011000250 HC RX REV CODE- 250: Performed by: INTERNAL MEDICINE

## 2017-12-11 PROCEDURE — 74011250636 HC RX REV CODE- 250/636: Performed by: FAMILY MEDICINE

## 2017-12-11 RX ORDER — LEVOFLOXACIN 5 MG/ML
750 INJECTION, SOLUTION INTRAVENOUS
Status: DISCONTINUED | OUTPATIENT
Start: 2017-12-11 | End: 2017-12-11

## 2017-12-11 RX ORDER — LEVOFLOXACIN 5 MG/ML
750 INJECTION, SOLUTION INTRAVENOUS EVERY 24 HOURS
Status: DISCONTINUED | OUTPATIENT
Start: 2017-12-11 | End: 2017-12-12 | Stop reason: HOSPADM

## 2017-12-11 RX ORDER — LEVOFLOXACIN 750 MG/1
750 TABLET ORAL EVERY 24 HOURS
Status: DISCONTINUED | OUTPATIENT
Start: 2017-12-11 | End: 2017-12-11

## 2017-12-11 RX ORDER — INSULIN GLARGINE 100 [IU]/ML
15 INJECTION, SOLUTION SUBCUTANEOUS DAILY
Status: DISCONTINUED | OUTPATIENT
Start: 2017-12-11 | End: 2017-12-12 | Stop reason: HOSPADM

## 2017-12-11 RX ADMIN — INSULIN LISPRO 15 UNITS: 100 INJECTION, SOLUTION INTRAVENOUS; SUBCUTANEOUS at 12:31

## 2017-12-11 RX ADMIN — ACETAMINOPHEN 650 MG: 325 TABLET ORAL at 00:44

## 2017-12-11 RX ADMIN — SODIUM CHLORIDE 75 ML/HR: 900 INJECTION, SOLUTION INTRAVENOUS at 09:51

## 2017-12-11 RX ADMIN — METOPROLOL TARTRATE 50 MG: 50 TABLET ORAL at 09:52

## 2017-12-11 RX ADMIN — LEVOFLOXACIN 750 MG: 5 INJECTION, SOLUTION INTRAVENOUS at 12:31

## 2017-12-11 RX ADMIN — HEPARIN SODIUM 5000 UNITS: 5000 INJECTION, SOLUTION INTRAVENOUS; SUBCUTANEOUS at 05:41

## 2017-12-11 RX ADMIN — HYDRALAZINE HYDROCHLORIDE 20 MG: 20 INJECTION INTRAMUSCULAR; INTRAVENOUS at 10:03

## 2017-12-11 RX ADMIN — INSULIN LISPRO 6 UNITS: 100 INJECTION, SOLUTION INTRAVENOUS; SUBCUTANEOUS at 22:54

## 2017-12-11 RX ADMIN — HYDRALAZINE HYDROCHLORIDE 20 MG: 20 INJECTION INTRAMUSCULAR; INTRAVENOUS at 01:40

## 2017-12-11 RX ADMIN — INSULIN GLARGINE 15 UNITS: 100 INJECTION, SOLUTION SUBCUTANEOUS at 17:34

## 2017-12-11 RX ADMIN — METOPROLOL TARTRATE 50 MG: 50 TABLET ORAL at 22:53

## 2017-12-11 RX ADMIN — INSULIN LISPRO 9 UNITS: 100 INJECTION, SOLUTION INTRAVENOUS; SUBCUTANEOUS at 17:34

## 2017-12-11 RX ADMIN — INSULIN LISPRO 15 UNITS: 100 INJECTION, SOLUTION INTRAVENOUS; SUBCUTANEOUS at 10:04

## 2017-12-11 RX ADMIN — CEFEPIME HYDROCHLORIDE 2 G: 2 INJECTION, POWDER, FOR SOLUTION INTRAVENOUS at 10:12

## 2017-12-11 RX ADMIN — HEPARIN SODIUM 5000 UNITS: 5000 INJECTION, SOLUTION INTRAVENOUS; SUBCUTANEOUS at 13:55

## 2017-12-11 RX ADMIN — ACETAMINOPHEN 650 MG: 325 TABLET ORAL at 10:03

## 2017-12-11 RX ADMIN — DIPHENHYDRAMINE HYDROCHLORIDE AND LIDOCAINE HYDROCHLORIDE AND ALUMINUM HYDROXIDE AND MAGNESIUM HYDRO 5 ML: KIT at 17:32

## 2017-12-11 RX ADMIN — SODIUM CHLORIDE 75 ML/HR: 900 INJECTION, SOLUTION INTRAVENOUS at 23:51

## 2017-12-11 RX ADMIN — ACETAMINOPHEN 650 MG: 325 TABLET ORAL at 22:52

## 2017-12-11 NOTE — INTERDISCIPLINARY ROUNDS
Interdisciplinary Round Note   Patient Information:   Alondra Burgos   [birth year]   Reason for Admission: UTI (urinary tract infection)   Attending Provider:   Dr. Ty Bingham  Primary Care Physician:       None       None   Estimated discharge date:  12/12     Hospital day: 3  [unfilled]  - - -  RRAT Score: Low Risk            4       Total Score        4 Pt. Coverage (Medicare=5 , Medicaid, or Self-Pay=4)        Criteria that do not apply:    Has Seen PCP in Last 6 Months (Yes=3, No=0)    . Living with Significant Other. Assisted Living. LTAC. SNF. or   Rehab    Patient Length of Stay (>5 days = 3)    IP Visits Last 12 Months (1-3=4, 4=9, >4=11)    Charlson Comorbidity Score (Age + Comorbid Conditions)       na     No         Chemical      Lines, Drains, & Airways  None       IV Antibiotics:    Current Antimicrobial Therapy (168h ago through future)    Ordered     Start Stop    12/08/17 2117  levoFLOXacin (LEVAQUIN) 750 mg in D5W IVPB  750 mg,   IntraVENous,   EVERY 24 HOURS      12/08/17 2200 --    12/08/17 2128  cefepime (MAXIPIME) 2 g in sterile water (preservative free) 10 mL IV syringe  2 g,   IntraVENous,   EVERY 12 HOURS      12/08/17 2200 --        GI Prophylaxis: GI Prophylaxis: no   Type: na      Recent Glucose Results:   Lab Results   Component Value Date/Time     (HH) 12/11/2017 11:19 AM    GLUCPOC 361 (H) 12/11/2017 12:15 PM    GLUCPOC 434 (HH) 12/11/2017 09:14 AM    GLUCPOC 354 (H) 12/10/2017 11:06 PM      Activity Level: Activity Level: Bed Rest    Needs assistance with ADLs: yes. PT/OT consult       Goals for Today: antibiotics, hydration, Diabetes Educator consult. Recommendations:   Discharge Disposition: SNF  P.T, O.T. and CM  Care Management involvement for home health follow up for:  mobility and assistance with ADL's    Needs for Discharge: TBD IDR Team: na  Recommendations from IDR team: DM Educator consult.  PT/OT    Other Notes: pt cannot speak english, family at bedside as

## 2017-12-11 NOTE — PROGRESS NOTES
[facility] Hospitalist Group  Progress Note    Patient: Thuy Macias Age: 63 y.o. : 1955 MR#: [id number] SSN: [id number]  Date/Time: 2017 1:05 PM    Subjective/24-hour events:     Still with fever overnight, though tachycardia improved. No pain or other complaints acutely. Assessment:   UTI with early pyelonephritis  Sepsis  HTN  Atelectatic changes of LLL, doubt pneumonia  Seizure, new onset  Uncontrolled DM 2  Obesity    Plan:  D/C cefipime and transition to IV levaquin. Plan to transition to PO and discharge once afebrile x 24 hours. Continue lopressor as ordered for now and monitor HRs/BPs. Add lantus, continue SSI. Monitor sugars and adjust further as necessary. Long-term placement at discharge - d/w [facility]. Discussed with family at bedside for purposes of translation. Case discussed with:  [x]Patient  []Family  []Nursing  []Case Management  DVT Prophylaxis:  []Lovenox  []Hep SQ  []SCDs  []Coumadin   []On Heparin gtt    Objective:   VS:   Visit Vitals    /79 (BP 1 Location: Left arm, BP Patient Position: Head of bed elevated (Comment degrees))    Pulse 93    Temp 98.3 °F (36.8 °C)    Resp 19    Ht 4' 11\" (1.499 m)    Wt 77.1 kg (170 lb)    SpO2 95%    Breastfeeding No    BMI 34.34 kg/m2      Tmax/24hrs: Temp (24hrs), Av.6 °F (38.1 °C), Min:98.2 °F (36.8 °C), Max:102.5 °F (39.2 °C)    Intake/Output Summary (Last 24 hours) at 17 1305  Last data filed at 17 1231   Gross per 24 hour   Intake              150 ml   Output             1200 ml   Net            -1050 ml       General:  In NAD. Cardiovascular: RRR. Pulmonary:  Clear, no wheezes. Effort nonlabored. GI:  Abdomen, soft, NTTP. Extremities:  Warm, no ischemia. Neuro:  Awake and alert, moves extremities spontaneously.      Labs:    Recent Results (from the past 24 hour(s))   GLUCOSE, POC    Collection Time: 12/10/17  3:45 PM   Result Value Ref Range    Glucose (POC) 314 (H) 70 - 110 mg/dL   GLUCOSE, POC    Collection Time: 12/10/17 11:06 PM   Result Value Ref Range    Glucose (POC) 354 (H) 70 - 110 mg/dL   GLUCOSE, POC    Collection Time: 12/11/17  9:14 AM   Result Value Ref Range    Glucose (POC) 434 (HH) 70 - 110 mg/dL   GLUCOSE, RANDOM    Collection Time: 12/11/17 11:19 AM   Result Value Ref Range    Glucose 414 (HH) 74 - 99 mg/dL   GLUCOSE, POC    Collection Time: 12/11/17 12:15 PM   Result Value Ref Range    Glucose (POC) 361 (H) 70 - 110 mg/dL       Signed By: Chelsea Thomas MD     December 11, 2017 1:05 PM

## 2017-12-11 NOTE — PROGRESS NOTES
I spoke briefly with Ms. Burgos and prayed for her. I encouraged her to call us if she needed anything else.  conducted an initial consultation and Spiritual Assessment for [facility], who is a 63 y.o.,female. Patients Primary Language is: Greenlandic. According to the patients EMR Sikh Affiliation is: No Hinduism. The reason the Patient came to the hospital is:   Patient Active Problem List    Diagnosis Date Noted    UTI (urinary tract infection) 12/08/2017        The  provided the following Interventions:  Initiated a relationship of care and support. Explored issues of balaji, belief, spirituality and Hindu/ritual needs while hospitalized. Listened empathically. Provided chaplaincy education. Provided information about Spiritual Care Services. Offered prayer and assurance of continued prayers on patient's behalf. Chart reviewed. The following outcomes where achieved:  Patient shared limited information about both their medical narrative and spiritual journey/beliefs.  confirmed Patient's Sikh Affiliation. Patient processed feeling about current hospitalization. Patient expressed gratitude for 's visit. Assessment:  Patient does not have any Hindu/cultural needs that will affect patients preferences in health care. There are no spiritual or Hindu issues which require intervention at this time. Plan:  Chaplains will continue to follow and will provide pastoral care on an as needed/requested basis.  recommends bedside caregivers page  on duty if patient shows signs of acute spiritual or emotional distress.     [address], M.Div, CPE Resident   Pager: [phone]  Phone: [phone]

## 2017-12-11 NOTE — PROGRESS NOTES
Met with this pt's POA, who is this pt's niece, who signed [facility] for Deborahjonatan James after this writer and niece was guaranteed for long term admission. This writer received a call from [employer] reporting that they will not accept this pt. This writer called [facility] to check their availability for space, who report that they are still willing to take this pt. This writer will speak with this pt's POA to discuss alternatives.

## 2017-12-11 NOTE — DIABETES MGMT
Diabetes Patient/Family Education Record    Factors That  May Influence Patients Ability  to Learn or  Comply with Recommendations   [x]   Language barrier    []   Cultural needs   []   Motivation    []   Cognitive limitation    []   Physical   [x]   Education    []   Physiological factors   []   Hearing/vision/speaking impairment   []   Mormonism beliefs    []   Financial factors   []  Other:   []  No factors identified at this time. Person Instructed:   []   Patient: Patient does not speak [place]. Noted that she came from a nursing home in [facility]. She lives with family members in the area. [x]   Family: Called patient's nieceJose Luis, at [phone]. []  Other     Preference for Learning:   [x]   Verbal   [x]   Written: educational materials left at bedside for patient's niece (primary caregiver)   []  Demonstration     Level of Comprehension & Competence:    [x]  Good                                      [] Fair                                     []  Poor                             []  Needs Reinforcement   [x]  Teachback completed    Education Component: via patient's nieceJose Luis. [x]  Medication management, including how to administer insulin (if appropriate) and potential medication interactions:  Patient is on the the following diabetes meds at home prior to admission:  Lantus insulin: 10-15 units depending on patient's pattern of blood sugar. Humalog sliding scale insulin 3x daily before meals. [x]  Nutritional management: Patient eat 3 meals daily at home.    []  Exercise   []  Signs, symptoms, and treatment of hyperglycemia and hypoglycemia   [] Prevention, recognition and treatment of hyperglycemia and hypoglycemia   [x]  Importance of blood glucose monitoring and how to obtain a blood glucose meter: Patient's blood sugar is checked 3x daily before meals at home because she is on sliding scale insulin.    []  Instruction on use of the blood glucose meter   [x]  Discuss the importance of HbA1C monitoring: No A1c report available for discussion at time of review. Ordered A1c 12/11/2017 and pending result.     []  Sick day guidelines   []  Proper use and disposal of lancets, needles, syringes or insulin pens (if appropriate)   []  Potential long-term complications (retinopathy, kidney disease, neuropathy, foot care)   [x] Information about whom to contact in case of emergency or for more information    [x]  Goal:  Patient/family will demonstrate understanding of Diabetes Self Management Skills by: 12/18/2017  Plan for post-discharge education or self-management support:    [x] Outpatient class schedule provided            [] Patient Declined    [] Scheduled for outpatient classes (date) _______

## 2017-12-11 NOTE — DIABETES MGMT
Glycemic Control Plan of Care    Pending A1c result. POC BG range on 12/10/2017: 271-359 mg/dL. POC BG report on 12/11/2017 at time of review: 434, 361 mg/dL. Patient on very resistant dose of correctional lispro insulin at time of review and received a total of 51 units on 12/10/2017. I called the family: niece (primary caregiver at home, ph: [phone]) and received information that patient is on lantus insulin 10-15 units of insulin daily depending on her blood sugar pattern. Patient is awake and alert but non [place] speaking. She was displaced from a nursing home in [facility].    Recommendation(s):  1.) Called Dr. Harris Sabattus and obtained order for lantus insulin 15 units daily, first dose 12/11/2017. Notified Kailash Sheikh RN.  2.) Continue monitoring and titrate basal lantus insulin dose as needed if BG still above target range. 2.) Modify diabetic diet to include no concentrated sweets. Done. Assessment:  Patient is 63year old displaced from a nursing home in [facility] with medical history including CVA with right sided paralysis, bedbound, chronic aphasia, obesity, and type 2 diabetes mellitus - was admitted on 12/08/2017 after fall while being assisted from bedside commode. Noted:  UTI with early pyeloonephritis. Sepsis. Seizure disorder, new onset. Type 2 diabetes mellitus. Pending result of A1c. Most recent blood glucose values:    Results for KOWALSKI, IRIS (MRN [id number]) as of 12/11/2017 14:24   Ref. Range 12/10/2017 07:21 12/10/2017 10:31 12/10/2017 15:45 12/10/2017 23:06   GLUCOSE,FAST - POC Latest Ref Range: 70 - 110 mg/dL 271 (H) 359 (H) 314 (H) 354 (H)     Results for KOWALSKI, IRIS (MRN [id number]) as of 12/11/2017 14:24   Ref. Range 12/11/2017 09:14 12/11/2017 12:15   GLUCOSE,FAST - POC Latest Ref Range: 70 - 110 mg/dL 434 (HH) 361 (H)     Current A1C: No A1c report available at time of review. Ordered A1c on 12/11/2017 and pending result.     Current hospital diabetes medications:  Basal lantus insulin 15 units daily, first dose ordered 12/11/2017  Correctional lispro insulin ACHS. Very resistant dose. Total daily dose insulin requirement previous day: 12/10/2017  Lispro: 51 units    Home diabetes medications: As reported by patient's niece, Hal Lundberg, ph: [phone]:  Lantus insulin 10-15 units depending on blood sugar. Humalog sliding scale insulin 3x daily before meals. Diet: Diabetic consistent carb; no concentrated sweets. Goals:  Blood glucose will be within target range of  mg/dL by 12/14/2017. Education:  _X__  Refer to Diabetes Education Record: via patient's niece, Blayne Simmons, ph: [phone] because patient is non [place] speaking.  Patient was displaced from nursing home in [facility].             ___  Education not indicated at this time    Melisa Pereyra RN CCM

## 2017-12-11 NOTE — ROUTINE PROCESS
Bedside and Verbal shift change report given to Kortney Hernandez RN (oncoming nurse) by Jn Diehl RN (offgoing nurse). Report included the following information SBAR, Kardex, MAR and Recent Results. SITUATION:  Code Status: Full Code  Reason for Admission: UTI (urinary tract infection)  Hospital day: 3  Problem List:       Hospital Problems  Never Reviewed          Codes Class Noted POA    UTI (urinary tract infection) ICD-10-CM: N39.0  ICD-9-CM: 599.0  12/8/2017 Unknown              BACKGROUND:   Past Medical History: No past medical history on file.    Patient taking anticoagulants yes    Patient has a defibrillator: no    History of shots YES for example, flu, pneumonia, tetanus   Isolation History NO for example, MRSA, CDiff    ASSESSMENT:  Changes in Assessment Throughout Shift: None  Significant Changes in 24 hours (for example, RR/code, fall)  Patient has Central Line: no Reasons if yes: N/A  Patient has Fallon Cath: no Reasons if yes: N/A   Mobility Issues  PT  IV Patency  OR Checklist  Pending Tests    Last Vitals:  Vitals w/ MEWS Score (last day)     Date/Time MEWS Score Pulse Resp Temp BP Level of Consciousness SpO2    12/11/17 0025 5 (!)  106 22 (!)  102.5 °F (39.2 °C) 187/89 Alert 93 %    12/10/17 2135 3 (!)  121 20 (!)  101 °F (38.3 °C) 134/65 Alert --    12/10/17 1943 7 (!)  107 22 (!)  101.3 °F (38.5 °C) (!)  203/75 Alert 99 %    12/10/17 1600 1 93 16 98.2 °F (36.8 °C) 175/82 Alert 100 %    12/10/17 1200 3 (!)  118 18 98.2 °F (36.8 °C) 154/71 Alert 99 %    12/10/17 0800 3 (!)  124 20 97.4 °F (36.3 °C) 155/75 Alert 98 %    12/10/17 0411 2 (!)  105 20 100.4 °F (38 °C) (!)  185/91 Alert 99 %            PAIN    Pain Assessment    Pain Intensity 1: 0 (12/11/17 0025)    Pain Location 1: Arm, Hip    Pain Intervention(s) 1: Medication (see MAR)    Patient Stated Pain Goal: 0  Intervention effective: N/A  Time of last intervention: N/A Reassessment Completed: N/A   Other actions taken for pain: N/A    Last 3 Weights:  Last 3 Recorded Weights in this Encounter    12/08/17 1733 12/08/17 1735   Weight: 77.1 kg (170 lb) 77.1 kg (170 lb)   Weight change:     INTAKE/OUPUT    Current Shift:      Last three shifts: 12/09 0701 - 12/10 1900  In: 1336 [P.O.:1093]  Out: 3600 [Urine:3600]    RECOMMENDATIONS AND DISCHARGE PLANNING  Patient needs and requests: None     Pending tests/procedures: blood cultures, urine culture pending     Discharge plan for patient: SNF    Discharge planning Needs or Barriers: TBD    Estimated Discharge Date: TBD Posted on Whiteboard in Patients Room: no       \"HEALS\" SAFETY CHECK  A safety check occurred in the patient's room between off going nurse and oncoming nurse listed above. The safety check included the below items:    H  High Alert Medications Verify all high alert medication drips (heparin, PCA, etc.)  E  Equipment Suction is set up for ALL patients (with taylor)  Red plugs utilized for all equipment (IV pumps, etc.)  WOWs wiped down at end of shift. Room stocked with oxygen, suction, and other unit-specific supplies  A  Alarms Bed alarm is set for fall risk patients  Ensure chair alarm is in place and activated if patient is up in a chair  L  Lines Check IV for any infiltration  Fallon bag is empty if patient has a Fallon   Tubing and IV bags are labeled  S  Safety  Room is clean, patient is clean, and equipment is clean. Hallways are clear from equipment besides carts. Fall bracelet on for fall risk patients  Ensure room is clear and free of clutter  Suction is set up for ALL patients (with taylor)  Hallways are clear from equipment besides carts.    Isolation precautions followed, supplies available outside room, sign posted    Lucita Walker RN

## 2017-12-11 NOTE — PROGRESS NOTES
Pt had temp of 101.3 oral and /75. Tylenol and hydralazine given as ordered. BP down but temp still 101 axillary. Pt had blood cultures x 2 and urine culture yesterday, results are pending. Dr. Gricel Maddox paged and notified.

## 2017-12-12 VITALS
WEIGHT: 160.5 LBS | RESPIRATION RATE: 20 BRPM | OXYGEN SATURATION: 94 % | HEART RATE: 123 BPM | SYSTOLIC BLOOD PRESSURE: 139 MMHG | TEMPERATURE: 98 F | BODY MASS INDEX: 32.36 KG/M2 | HEIGHT: 59 IN | DIASTOLIC BLOOD PRESSURE: 89 MMHG

## 2017-12-12 LAB
BACTERIA SPEC CULT: ABNORMAL
GLUCOSE BLD STRIP.AUTO-MCNC: 208 MG/DL (ref 70–110)
GLUCOSE BLD STRIP.AUTO-MCNC: 211 MG/DL (ref 70–110)
GLUCOSE BLD STRIP.AUTO-MCNC: 321 MG/DL (ref 70–110)
SERVICE CMNT-IMP: ABNORMAL

## 2017-12-12 PROCEDURE — 74011250636 HC RX REV CODE- 250/636: Performed by: INTERNAL MEDICINE

## 2017-12-12 PROCEDURE — 90471 IMMUNIZATION ADMIN: CPT

## 2017-12-12 PROCEDURE — 74011636637 HC RX REV CODE- 636/637: Performed by: FAMILY MEDICINE

## 2017-12-12 PROCEDURE — 74011636637 HC RX REV CODE- 636/637: Performed by: INTERNAL MEDICINE

## 2017-12-12 PROCEDURE — 97161 PT EVAL LOW COMPLEX 20 MIN: CPT

## 2017-12-12 PROCEDURE — 82962 GLUCOSE BLOOD TEST: CPT

## 2017-12-12 PROCEDURE — 74011250637 HC RX REV CODE- 250/637: Performed by: INTERNAL MEDICINE

## 2017-12-12 PROCEDURE — 97530 THERAPEUTIC ACTIVITIES: CPT

## 2017-12-12 PROCEDURE — 74011250636 HC RX REV CODE- 250/636: Performed by: FAMILY MEDICINE

## 2017-12-12 PROCEDURE — 90686 IIV4 VACC NO PRSV 0.5 ML IM: CPT | Performed by: INTERNAL MEDICINE

## 2017-12-12 RX ORDER — ACETAMINOPHEN 325 MG/1
650 TABLET ORAL
Qty: 20 TAB | Refills: 0
Start: 2017-12-12 | End: 2020-12-26

## 2017-12-12 RX ORDER — METOPROLOL TARTRATE 50 MG/1
50 TABLET ORAL EVERY 12 HOURS
Qty: 60 TAB | Refills: 0 | Status: SHIPPED
Start: 2017-12-12 | End: 2021-01-01

## 2017-12-12 RX ORDER — LEVOFLOXACIN 750 MG/1
750 TABLET ORAL DAILY
Qty: 6 TAB | Refills: 0 | Status: SHIPPED
Start: 2017-12-12 | End: 2021-01-01

## 2017-12-12 RX ORDER — INSULIN GLARGINE 100 [IU]/ML
23 INJECTION, SOLUTION SUBCUTANEOUS DAILY
Qty: 1 VIAL | Refills: 0 | Status: SHIPPED
Start: 2017-12-12 | End: 2021-01-01

## 2017-12-12 RX ADMIN — DIPHENHYDRAMINE HYDROCHLORIDE AND LIDOCAINE HYDROCHLORIDE AND ALUMINUM HYDROXIDE AND MAGNESIUM HYDRO 5 ML: KIT at 08:22

## 2017-12-12 RX ADMIN — ACETAMINOPHEN 650 MG: 325 TABLET ORAL at 04:01

## 2017-12-12 RX ADMIN — LEVOFLOXACIN 750 MG: 5 INJECTION, SOLUTION INTRAVENOUS at 11:06

## 2017-12-12 RX ADMIN — INSULIN GLARGINE 15 UNITS: 100 INJECTION, SOLUTION SUBCUTANEOUS at 08:21

## 2017-12-12 RX ADMIN — INSULIN LISPRO 12 UNITS: 100 INJECTION, SOLUTION INTRAVENOUS; SUBCUTANEOUS at 12:40

## 2017-12-12 RX ADMIN — HEPARIN SODIUM 5000 UNITS: 5000 INJECTION, SOLUTION INTRAVENOUS; SUBCUTANEOUS at 14:24

## 2017-12-12 RX ADMIN — INSULIN LISPRO 6 UNITS: 100 INJECTION, SOLUTION INTRAVENOUS; SUBCUTANEOUS at 08:20

## 2017-12-12 RX ADMIN — ACETAMINOPHEN 650 MG: 325 TABLET ORAL at 16:47

## 2017-12-12 RX ADMIN — DIPHENHYDRAMINE HYDROCHLORIDE AND LIDOCAINE HYDROCHLORIDE AND ALUMINUM HYDROXIDE AND MAGNESIUM HYDRO 5 ML: KIT at 12:40

## 2017-12-12 RX ADMIN — METOPROLOL TARTRATE 50 MG: 50 TABLET ORAL at 08:22

## 2017-12-12 RX ADMIN — DIPHENHYDRAMINE HYDROCHLORIDE AND LIDOCAINE HYDROCHLORIDE AND ALUMINUM HYDROXIDE AND MAGNESIUM HYDRO 5 ML: KIT at 17:13

## 2017-12-12 RX ADMIN — INSULIN LISPRO 6 UNITS: 100 INJECTION, SOLUTION INTRAVENOUS; SUBCUTANEOUS at 17:10

## 2017-12-12 RX ADMIN — INFLUENZA VIRUS VACCINE 0.5 ML: 15; 15; 15; 15 SUSPENSION INTRAMUSCULAR at 16:48

## 2017-12-12 NOTE — DISCHARGE INSTRUCTIONS
Sepsis: Instrucciones de cuidado - [ Sepsis: Care Instructions ]  Instrucciones de cuidado    La sepsis es jenny infección que se ha extendido por todo el cuerpo. Es jenny afección que pone en peligro la andriy y a menudo provoca jenny presión arterial extremadamente baja. [place] puede provocar problemas en muchos diferentes órganos. La causa de la sepsis no está siempre anita, loretta puede ocurrir john parte de jenny enfermedad crónica o repentina. A veces hasta jenny enfermedad leve puede producirla. La atención de seguimiento es jenny parte clave de pierson tratamiento y seguridad. Asegúrese de hacer y acudir a todas las citas, y llame a pierson médico si está teniendo problemas. También es jenny buena idea saber los resultados de los exámenes y mantener jenny lista de los medicamentos que reji. ¿Cómo puede cuidarse en el hogar? · Si pierson médico le recetó antibióticos, tómelos según las indicaciones. No deje de tomarlos por el hecho de sentirse mejor. Debe sixto todos los antibióticos hasta terminarlos. · Polina abundantes líquidos, suficientes para que pierson orina sea de color amarillo yaa o transparente john el agua. Elija beber agua u otros líquidos grupo sin cafeína hasta que se sienta mejor. Si tiene jenny enfermedad del riñón, del corazón o del hígado y tiene que [employer] líquidos, hable con pierson médico antes de aumentar pierson consumo. Puede probar bebidas rehidratantes, john Gatorade o Powerade. · No polina alcohol. · Siga jenny dieta saludable. Incluya en pierson dieta diaria frutas, vegetales y granos integrales. · Caminar es jenny manera sencilla de hacer ejercicio. Poco a poco, aumente la distancia que camine cada día. Asegúrese de que pierson médico sepa que usted va a comenzar un programa de ejercicios. · No fume ni use otros productos derivados del tabaco. Si necesita ayuda para dejar de fumar, hable con pierson médico sobre los programas y medicamentos para dejar de fumar.  Estos pueden aumentar priya probabilidades de dejar el hábito para siempre. ¿Cuándo debe pedir ayuda? Llame al 911 en cualquier momento que considere que necesita atención de emergencia. Por ejemplo, llame si:  ? · Se desmayó (perdió el conocimiento). ?Llame a pierson médico ahora mismo o busque atención médica inmediata si:  ? · Tiene fiebre o escalofríos. ? · Tiene la piel fría, pálida o pegajosa. ? · [employer] o aturdimiento, o que está a punto de [place]. ? · Tiene algún síntoma nuevo, john tos, dolor en jenny parte del cuerpo o problemas urinarios. ?Preste especial atención a los cambios en pierson eduardo y asegúrese de comunicarse con pierson médico si:  ? · No mejora john se esperaba. ¿Dónde puede encontrar más información [facility]? eJ Caor a http://terri-yen.info/. Pat Last [id number] en la búsqueda para aprender más acerca de \"Sepsis: Instrucciones de cuidado - [ Sepsis: Care Instructions ]. \"  Revisado: 20 [address] 2017  Versión del contenido: 11.4  © 0083-2679 Healthwise, Incorporated. Las instrucciones de cuidado fueron adaptadas bajo licencia por [employer] (which disclaims liability or warranty for this information). Si usted tiene [place] afección médica o sobre estas instrucciones, siempre pregunte a pierson profesional de eduardo. Healthwise, Incorporated niega toda garantía o responsabilidad por pierson uso de esta información. Infección urinaria en las mujeres: Instrucciones de cuidado - [ Urinary Tract Infection in Women: Care Instructions ]  Instrucciones de cuidado    Jenny infección urinaria (UTI, por priya siglas en inglés) es un término general que hace referencia a jenny infección que se produce en cualquier parte entre los riñones y la uretra (conducto por el cual se expulsa la orina). La mayoría de las UTI son infecciones de la vejiga. Con frecuencia, causan dolor o ardor al [place]. Las UTI son causadas por bacterias y pueden curarse con antibióticos.  Asegúrese de completar el tratamiento para que la infección desaparezca. La atención de seguimiento es jenny parte clave de pierson tratamiento y seguridad. Asegúrese de hacer y acudir a todas las citas, y llame a pierson médico si está teniendo problemas. También es jenny buena idea saber los resultados de los exámenes y mantener jenny lista de los medicamentos que reji. ¿Cómo puede cuidarse en el hogar? · [address] No deje de tomarlos por el hecho de sentirse mejor. Debe sixto todos los antibióticos hasta terminarlos. · Neymar los próximos nam o [address], polina mayor cantidad de [place] y otros líquidos. [place] puede ayudar a eliminar las bacterias que provocan la infección. (Si tiene jenny enfermedad de los riñones, el corazón o el hígado y tiene que W[employer] líquidos, hable con pierson médico antes de aumentar pierson consumo). · Evite las bebidas gaseosas o con cafeína. Pueden irritar la vejiga. · Orine con frecuencia. Trate de vaciar la vejiga cada vez que orine. · Para aliviar el dolor, tome un baño caliente o colóquese jenny almohadilla térmica a baja temperatura sobre la parte baja del abdomen o la jj genital. Nunca se duerma mientras [place] jenny almohadilla térmica. Para prevenir las infecciones urinarias  · Polina abundante agua todos los días. [place] la ayuda a orinar con frecuencia, lo que elimina las bacterias de pierson organismo. (Si tiene jenny enfermedad de los riñones, el corazón o el hígado y tiene que [place] líquidos, hable con pierson médico antes de aumentar pierson consumo). · Orine cuando necesite hacerlo. · Orine inmediatamente después de stephanie tenido [employer]. · Cámbiese las toallas sanitarias con frecuencia. · Evite el uso de lavados vaginales, los deacon de burbujas, los [place] de higiene femenina y otros productos para la higiene femenina que contengan desodorantes. · Después de ir al baño, límpiese de adelante hacia atrás. ¿Cuándo debes pedir ayuda? Llama a tu médico ahora mismo o busca atención médica inmediata si:  ?  · Aparecen síntomas john fiebre, escalofríos, náuseas o vómitos por [place], o empeoran. ? · Te empieza a doler la espalda, erinn debajo de la caja torácica. A esto se le llama dolor en el flanco.   ? · Aparece vinh o pus en la orina. ? · Tienes problemas con los antibióticos. ?Presta especial atención a los cambios en tu eduardo y asegúrate de comunicarte con tu médico si:  ? · No mejoras después de stephanie tomado un antibiótico luiza 2 días. ? · Los síntomas desaparecen y [place]. ¿Dónde puede encontrar más información en inglés? Chelsey Odom a http://terri-yen.info/. David U103 en la búsqueda para aprender más acerca de \"Infección urinaria en las mujeres: Instrucciones de cuidado - [ Urinary Tract Infection in Women: Care Instructions ]. \"  Revisado: 12 pimentel, 2017  Versión del contenido: 11.4  © 9058-9440 Healthwise, [place]. Las instrucciones de cuidado fueron adaptadas bajo licencia por [employer] (which disclaims liability or warranty for this information). Si usted tiene [place] afección médica o sobre estas instrucciones, siempre pregunte a pierson profesional de eduardo. [place], [place] niega toda garantía o responsabilidad por pierson uso de esta información. Prevención de caídas: Instrucciones de cuidado - [ Preventing Falls: Care Instructions ]  Instrucciones de cuidado    Caminar por pierson casa de manera yang puede convertirse en un desafío, sobre todo si tiene lesiones o problemas de eduardo que puedan hacer que se caiga con [place] Muro. Las alfombras sueltas y los muebles en los pasillos se encuentran entre los peligros que enfrentan muchas personas mayores que tienen problemas para caminar o de la visión. Las [employer] tienen afecciones john artritis, osteoporosis o demencia, también deben tener cuidado de no caerse. Usted puede hacer que pierson hogar sea más seguro si reji algunas medidas sencillas.   La atención de seguimiento es jenny parte clave de pierson tratamiento y seguridad. Asegúrese de hacer y acudir a todas las citas, y llame a pierson médico si está teniendo problemas. También es jenny buena idea saber los resultados de los exámenes y mantener jenny lista de los medicamentos que reji. ¿Cómo puede cuidarse en el hogar? Cómo cuidarse  · Podría marearse si no laura suficiente agua. Para prevenir la deshidratación, marlo abundantes líquidos, los suficientes para que pierson orina sea de color amarillo yaa o transparente john el agua. Elija sixto agua y otros líquidos grupo sin cafeína. Si tiene jenny enfermedad del riñón, del corazón o del hígado y tiene que W[employer]'s líquidos, hable con pierson médico antes de aumentar pierson consumo. · Shannon ejercicio con regularidad para mejorar pierson fortaleza, pierson zan muscular y pierson estabilidad. [place], si puede. Nadar podría ser Rylee Race buena alternativa si le fadia trabajo caminar. · Hágase un examen de la visión y la audición cada año o cada vez que note un cambio. Si tiene dificultades para rosa maria y oír, es posible que no pueda evitar objetos y podría perder pierson equilibrio. · Conozca los efectos secundarios de los medicamentos que reji. Pregúntele a pierson médico o pierson farmacéutico si los medicamentos que reji pueden afectar pierson equilibrio. Las pastillas para dormir o los sedantes pueden afectar pierson equilibrio. · Limite la cantidad de alcohol que laura. El alcohol puede alterar pierson equilibrio y otros sentidos. · Pregúntele a pierson médico si los callos o las callosidades deben ser eliminados de priya pies. Si [place] un calzado holgado a causa de los callos o las callosidades, podría perder el equilibrio y caerse. · Hable con pierson médico si tiene entumecimiento en los pies. [address] en el hogar  · Quite escalones en la elana de entrada, alfombrillas y obstáculos. Fije las alfombras sueltas o repare las áreas levantadas del piso. · Mueva los muebles y los cables eléctricos para que no estén en los pasillos.   · Utilice cera antideslizante para pisos, y seque de inmediato cualquier derrame que se produzca, sobre todo si el piso es de baldosas de cerámica. · Si [place] jenny andadera o un bastón, colóquele un revestimiento de goma en las puntas. Si utiliza muletas, limpie la base regularmente con un paño abrasivo, john un estropajo de juan jose. · Mantenga la casa naya ellie, sobre todo las [place], los porches y los pasillos exteriores. Utilice lamparitas nocturnas en áreas john vestíbulos y deacon. Ponga interruptores de radha adicionales o utilice interruptores a distancia (john los que se encienden o apagan al aplaudir) para que sea más fácil encender las luces si tiene que levantarse por las noches. · Instale pasamanos o barandillas sólidos en las escaleras. · Ponga los artículos que más utiliza en los estantes bajos de los gabinetes (aproximadamente a la altura de pierson cintura). · Tenga un teléfono inalámbrico y Jose Silverith linterna con baterías nuevas cerca de pierson cama. Si es posible, coloque un teléfono en cada jenny de las habitaciones de pierson casa, o lleve siempre un celular en jassi de que se caiga y no pueda llegar al teléfono. O puede usar un dispositivo en el hussain o la neri en el que presione un botón para enviar jenny señal pidiendo [place]. · Use zapatos de tacón bajo que le queden naya y le den buen apoyo a priya pies. Use calzado con suelas antideslizantes. Revise los tacones y las suelas de priya zapatos antes de usarlos. Repare o reemplace los tacones o las suelas desgastados. · No camine en medias sobre pisos de [place]. · Camine por el [facility] aceras estén resbalosas. Si vive en jenny localidad donde hay elsa y hielo en invierno, coloque sal sobre los escalones y las aceras resbaladizos. Cómo prevenir las caídas en el baño  · Instale agarraderas y tapetes antideslizantes dentro y fuera de la ducha o la tadeo, así john cerca del inodoro y el lavabo. · Utilice jenny silla para la ducha y un banco para la bañera.   · Use Josue Josh de ducha portátil que le permite sentarse mientras se ducha. · Cuando vaya a entrar en la tadeo o la ducha, coloque elham la pierna más débil. Cuando vaya a salir de la ducha o la tadeo, hágalo elham con el lado más riley. · Repare los asientos de inodoro sueltos y considere instalar un asiento de inodoro elevado para que sea más fácil sentarse y levantarse del inodoro. · No cierre con llave la elana del baño mientras se ducha. ¿Dónde puede encontrar más información en inglés? Brandyn Collado a http://terri-yen.info/. Glorya Sacks F046 en la búsqueda para aprender más acerca de \"Prevención de caídas: Instrucciones de cuidado - [ Preventing Falls: Care Instructions ]. \"  Revisado: [address] [place], 2017  Versión del contenido: 11.4  © 8292-0042 Healthwise, Incorporated. Las instrucciones de cuidado fueron adaptadas bajo licencia por [employer] (which disclaims liability or warranty for this information). Si usted tiene [place] afección médica o sobre estas instrucciones, siempre pregunte a pierson profesional de eduardo. Healthwise, Incorporated niega toda garantía o responsabilidad por pierson uso de esta información. Patient armband removed and shredded      DISCHARGE SUMMARY from Nurse    PATIENT INSTRUCTIONS:    After general anesthesia or intravenous sedation, for 24 hours or while taking prescription Narcotics:  · Limit your activities  · Do not drive and operate hazardous machinery  · Do not make important personal or business decisions  · Do  not drink alcoholic beverages  · If you have not urinated within 8 hours after discharge, please contact your surgeon on call.     Report the following to your surgeon:  · Excessive pain, swelling, redness or odor of or around the surgical area  · Temperature over 100.5  · Nausea and vomiting lasting longer than 4 hours or if unable to take medications  · Any signs of decreased circulation or nerve impairment to extremity: change in color, persistent numbness, tingling, coldness or increase pain  · Any questions    What to do at Home:  Recommended activity: Activity as tolerated    If you experience any of the following symptoms Nausea, vomiting, diarrhea, fever greater than 100.5, dizziness, severe headache, shortness of breath, chest pain, increased pain, please follow up with PCP. *  Please give a list of your current medications to your Primary Care Provider. *  Please update this list whenever your medications are discontinued, doses are      changed, or new medications (including over-the-counter products) are added. *  Please carry medication information at all times in case of emergency situations. These are general instructions for a healthy lifestyle:    No smoking/ No tobacco products/ Avoid exposure to second hand smoke  Surgeon General's Warning:  Quitting smoking now greatly reduces serious risk to your health. Obesity, smoking, and sedentary lifestyle greatly increases your risk for illness    A healthy diet, regular physical exercise & weight monitoring are important for maintaining a healthy lifestyle    You may be retaining fluid if you have a history of heart failure or if you experience any of the following symptoms:  Weight gain of 3 pounds or more overnight or 5 pounds in a week, increased swelling in our hands or feet or shortness of breath while lying flat in bed. Please call your doctor as soon as you notice any of these symptoms; do not wait until your next office visit. Recognize signs and symptoms of STROKE:    F-face looks uneven    A-arms unable to move or move unevenly    S-speech slurred or non-existent    T-time-call 911 as soon as signs and symptoms begin-DO NOT go       Back to bed or wait to see if you get better-TIME IS BRAIN. Warning Signs of HEART ATTACK     Call 911 if you have these symptoms:   Chest discomfort.  Most heart attacks involve discomfort in the center of the chest that lasts more than a few minutes, or that goes away and comes back. It can feel like uncomfortable pressure, squeezing, fullness, or pain.  Discomfort in other areas of the upper body. Symptoms can include pain or discomfort in one or both arms, the back, neck, jaw, or stomach.  Shortness of breath with or without chest discomfort.  Other signs may include breaking out in a cold sweat, nausea, or lightheadedness. Don't wait more than five minutes to call 911 - MINUTES MATTER! Fast action can save your life. Calling 911 is almost always the fastest way to get lifesaving treatment. Emergency Medical Services staff can begin treatment when they arrive -- up to an hour sooner than if someone gets to the hospital by car. The discharge information has been reviewed with the patient and caregiver. The patient and caregiver verbalized understanding. Discharge medications reviewed with the patient and caregiver and appropriate educational materials and side effects teaching were provided.   ___________________________________________________________________________________________________________________________________

## 2017-12-12 NOTE — DISCHARGE SUMMARY
Discharge Summary    Patient: Hilda Giles MRN: [id number]  CSN: [id number]    YOB: 1955  Age: 63 y.o. Sex: female    DOA: 12/8/2017 LOS:  LOS: 4 days   Discharge Date: 12/12/2017     Admission Diagnoses: UTI (urinary tract infection)    Discharge Diagnoses:    UTI with early pyelonephritis  Sepsis  HTN  Atelectatic changes of LLL, doubt pneumonia  Seizure, new onset  Uncontrolled DM 2  Obesity      Discharge Condition: Stable    PHYSICAL EXAM  Visit Vitals    /77 (BP 1 Location: Right arm, BP Patient Position: At rest)    Pulse (!) 103    Temp 97 °F (36.1 °C)    Resp 20    Ht 4' 11\" (1.499 m)    Wt 72.8 kg (160 lb 7.9 oz)    SpO2 93%    Breastfeeding No    BMI 32.42 kg/m2       General: In NAD. Nontoxic-appearing. HEENT: NCAT. Sclerae anicteric. Lungs:  Clear, no wheezes. Effort nonlabored. Heart:  Regular, mildly tachy. Abdomen: Soft, NTTP. Extremities: No edema or ischemia. Psych:   Mood normal.  Neurologic:  Awake and alert, motor nonfocal.      Hospital Course:   See admission H&P for full details of HPI. Patient admitted to medical bed and started on broad-spectrum IV ABX and fluids. Urine cultures grew K. Pneumoniae as well as S. Marcesens - sensitivities are as documented below. Broad-spectrum therapy was transitioned to IV levaquin on 12/11 and patient has remained stable and without fever since this change. She will be given on additional IV dose of medication prior to discharge and is recommended to complete a 10 day PO course of treatment at discharge. Patient is medically stable for discharge to the nursing facility today and plan has been discussed with family, who are agreeable. Consults:   None     Significant Diagnostic Studies:   IMPRESSION:     1. Very subtle patchy low-attenuation in the left kidney, without a definable  laceration. Consider contusion or infection in the appropriate setting.   Recommend correlation with urinalysis.     2. Small left pleural effusion with pleural radiodensity as may be seen with  pleurodesis or asbestos exposure. Recommend correlation with history.     3. Soft tissue density in the gallbladder, possibly cholelithiasis. Recommend  sonogram at some point to confirm.  -Post hysterectomy. See additional details above. Discharge Medications:     Current Discharge Medication List      START taking these medications    Details   insulin glargine (LANTUS) 100 unit/mL injection 23 Units by SubCUTAneous route daily. Qty: 1 Vial, Refills: 0      metoprolol tartrate (LOPRESSOR) 50 mg tablet Take 1 Tab by mouth every twelve (12) hours. Qty: 60 Tab, Refills: 0      acetaminophen (TYLENOL) 325 mg tablet Take 2 Tabs by mouth every four (4) hours as needed. Indications: Fever, Pain  Qty: 20 Tab, Refills: 0      levoFLOXacin (LEVAQUIN) 750 mg tablet Take 1 Tab by mouth daily.   Qty: 6 Tab, Refills: 0             Labs:  12/12/2017  7:46 AM - [facility] [place]       Component Results      Component Value Flag Ref Range Units Status     Special Requests: NO SPECIAL REQUESTS     Final     Culture result:  (A)    Final     90206 COLONIES/mL KLEBSIELLA PNEUMONIAE     Culture result:  (A)    Final     [id number] COLONIES/mL KLEBSIELLA PNEUMONIAE     Culture result:  (A)    Final     [id number] COLONIES/mL SERRATIA MARCESCENS       Culture & Susceptibility      KLEBSIELLA PNEUMONIAE      Antibiotic Sensitivity ESTRELLA Unit Status     Ampicillin ($) Resistant >=32 ug/mL Final     Method: ESTRELLA     Ampicillin/sulbactam ($) Resistant >=32 ug/mL Final     Method: ESTRELLA     Cefazolin ($) Susceptible <=4 ug/mL Final     Method: ESTRELLA     Cefepime ($$) Susceptible <=1 ug/mL Final     Method: ESTRELLA     Ceftazidime ($) Susceptible <=1 ug/mL Final     Method: ESTRELLA     Ceftriaxone ($) Susceptible <=1 ug/mL Final     Method: ESTRELLA     Ciprofloxacin ($) Susceptible <=0.25 ug/mL Final     Method: ESTRELLA     Gentamicin ($) Susceptible <=1 ug/mL Final     Method: ESTRELLA     Imipenem Susceptible <=0.25 ug/mL Final     Method: ESTRELLA     Levofloxacin ($) Susceptible <=0.12 ug/mL Final     Method: ESTRELLA     Nitrofurantoin Susceptible 32 ug/mL Final     Method: ESTRELLA     Piperacillin/Tazobac ($) Intermediate 64 ug/mL Final     Method: ESTRELLA     Tobramycin ($) Susceptible <=1 ug/mL Final     Method: ESTRELLA     Trimeth-Sulfamethoxa Resistant >=320 ug/mL Final     Method: ESTRELLA                 KLEBSIELLA PNEUMONIAE      Antibiotic Sensitivity ESTRELLA Unit Status     Ampicillin ($) Resistant 16 ug/mL Final     Method: ESTRELLA     Ampicillin/sulbactam ($) Susceptible 8 ug/mL Final     Method: ESTRELLA     Cefazolin ($) Susceptible <=4 ug/mL Final     Method: ESTRELLA     Cefepime ($$) Susceptible <=1 ug/mL Final     Method: ESTRELLA     Ceftazidime ($) Susceptible <=1 ug/mL Final     Method: ESTRELLA     Ceftriaxone ($) Susceptible <=1 ug/mL Final     Method: ESTRELLA     Ciprofloxacin ($) Susceptible <=0.25 ug/mL Final     Method: ESTRELLA     Gentamicin ($) Susceptible <=1 ug/mL Final     Method: ESTRELLA     Imipenem Susceptible <=0.25 ug/mL Final     Method: ESTRELLA     Levofloxacin ($) Susceptible <=0.12 ug/mL Final     Method: ESTRELLA     Nitrofurantoin Intermediate 64 ug/mL Final     Method: ESTRELLA     Piperacillin/Tazobac ($) Susceptible <=4 ug/mL Final     Method: ESTRELLA     Tobramycin ($) Susceptible <=1 ug/mL Final     Method: ESTRELLA     Trimeth-Sulfamethoxa Resistant >=320 ug/mL Final     Method: ESTRELLA                 SERRATIA MARCESCENS      Antibiotic Sensitivity ESTRELLA Unit Status     Cefazolin ($) Resistant >=64 ug/mL Final     Method: ESTRELLA     Cefepime ($$) Susceptible <=1 ug/mL Final     Method: ESTRELLA     Ceftazidime ($) Susceptible <=1 ug/mL Final     Method: ESTRELLA     Ceftriaxone ($) Susceptible <=1 ug/mL Final     Method: ESTRELLA     Ciprofloxacin ($) Susceptible <=0.25 ug/mL Final     Method: ESTRELLA     Gentamicin ($) Susceptible <=1 ug/mL Final     Method: ESTRELLA     Levofloxacin ($) Susceptible <=0.12 ug/mL Final     Method: ESTRELLA     Nitrofurantoin Resistant 256 ug/mL Final     Method: ESTRELLA     Tobramycin ($) Susceptible <=1 ug/mL Final     Method: ESTRELLA     Trimeth-Sulfamethoxa Susceptible <=20 ug/mL Final     Method: ESTRELLA                     Activity: As tolerated    Diet: Cardiac Diet and Diabetic Diet      Bharat Brunner MD  [facility]

## 2017-12-12 NOTE — INTERDISCIPLINARY ROUNDS
Interdisciplinary Round Note   Patient Information:   Alondra Burgos   [birth year]   Reason for Admission: UTI (urinary tract infection)   Attending Provider:   Dr. Grey Underwood  Primary Care Physician:       None       None   Estimated discharge date:  12/12     Hospital day: 4  [unfilled]  3d 16h  RRAT Score: Low Risk            4       Total Score        4 Pt. Coverage (Medicare=5 , Medicaid, or Self-Pay=4)        Criteria that do not apply:    Has Seen PCP in Last 6 Months (Yes=3, No=0)    . Living with Significant Other. Assisted Living. LTAC. SNF. or   Rehab    Patient Length of Stay (>5 days = 3)    IP Visits Last 12 Months (1-3=4, 4=9, >4=11)    Charlson Comorbidity Score (Age + Comorbid Conditions)       na     No         Chemical      Lines, Drains, & Airways  None       IV Antibiotics:    Current Antimicrobial Therapy (168h ago through future)    Ordered     Start Stop    12/08/17 2117  levoFLOXacin (LEVAQUIN) 750 mg in D5W IVPB  750 mg,   IntraVENous,   EVERY 24 HOURS      12/08/17 2200 --    12/08/17 2128  cefepime (MAXIPIME) 2 g in sterile water (preservative free) 10 mL IV syringe  2 g,   IntraVENous,   EVERY 12 HOURS      12/08/17 2200 --        GI Prophylaxis: GI Prophylaxis: no   Type: na      Recent Glucose Results:   Lab Results   Component Value Date/Time     (HH) 12/11/2017 11:19 AM    GLUCPOC 211 (H) 12/12/2017 07:22 AM    GLUCPOC 244 (H) 12/11/2017 10:14 PM    GLUCPOC 257 (H) 12/11/2017 04:23 PM      Activity Level; Up in w/c    Needs assistance with ADLs: yes. PT/OT consult       Goals for Today: antibiotics,  Discharge to SNF   Recommendations:   Discharge Disposition: SNF  P.T, O.T. and CM  Care Management involvement for home health follow up for:  mobility and assistance with ADL's    Needs for Discharge: TBD IDR Team: na  Recommendations from IDR team: DM Educator consult.  PT/OT    Other Notes: pt cannot speak english, family at bedside as

## 2017-12-12 NOTE — ROUTINE PROCESS
Bedside and Verbal shift change report given to Phil Bose (oncoming nurse) by Jj Barboza RN (offgoing nurse). Report included the following information SBAR, Kardex, MAR and Recent Results. SITUATION:  Code Status: Full Code  Reason for Admission: UTI (urinary tract infection)  Hospital day: 3  Problem List:       Hospital Problems  Never Reviewed          Codes Class Noted POA    UTI (urinary tract infection) ICD-10-CM: N39.0  ICD-9-CM: 599.0  12/8/2017 Unknown              BACKGROUND:   Past Medical History: No past medical history on file.    Patient taking anticoagulants yes    Patient has a defibrillator: no    History of shots YES for example, flu, pneumonia, tetanus   Isolation History NO for example, MRSA, CDiff    ASSESSMENT:  Changes in Assessment Throughout Shift: None  Significant Changes in 24 hours (for example, RR/code, fall)  Patient has Central Line: no Reasons if yes: N/A  Patient has Fallon Cath: no Reasons if yes: N/A   Mobility Issues  PT  IV Patency  OR Checklist  Pending Tests    Last Vitals:  Vitals w/ MEWS Score (last day)     Date/Time MEWS Score Pulse Resp Temp BP Level of Consciousness SpO2    12/11/17 1637 2 (!)  108 18 99 °F (37.2 °C) 146/84 Alert 95 %    12/11/17 1200 1 93 19 98.3 °F (36.8 °C) 158/79 Alert 95 %    12/11/17 0916 3 (!)  117 19 99.5 °F (37.5 °C) 174/81 Alert 95 %    12/11/17 0400 2 (!)  104 20 (!)  100.8 °F (38.2 °C) 148/80 Alert 95 %    12/11/17 0025 5 (!)  106 22 (!)  102.5 °F (39.2 °C) 187/89 Alert 93 %    12/10/17 2135 3 (!)  121 20 (!)  101 °F (38.3 °C) 134/65 Alert --    12/10/17 1943 7 (!)  107 22 (!)  101.3 °F (38.5 °C) (!)  203/75 Alert 99 %    12/10/17 1600 1 93 16 98.2 °F (36.8 °C) 175/82 Alert 100 %    12/10/17 1200 3 (!)  118 18 98.2 °F (36.8 °C) 154/71 Alert 99 %    12/10/17 0800 3 (!)  124 20 97.4 °F (36.3 °C) 155/75 Alert 98 %    12/10/17 0411 2 (!)  105 20 100.4 °F (38 °C) (!)  185/91 Alert 99 %            PAIN    Pain Assessment    Pain Intensity 1: 0 (12/11/17 1231)    Pain Location 1: Hip    Pain Intervention(s) 1: Medication (see MAR)    Patient Stated Pain Goal: 0  Intervention effective: yes  Time of last intervention: 10:03   Reassessment Completed: yes  Other actions taken for pain: N/A    Last 3 Weights:  Last 3 Recorded Weights in this Encounter    12/08/17 1733 12/08/17 1735   Weight: 77.1 kg (170 lb) 77.1 kg (170 lb)   Weight change:     INTAKE/OUPUT    Current Shift:      Last three shifts: 12/10 0701 - 12/11 1900  In: 770 [P.O.:620; I.V.:150]  Out: 3150 [Urine:3150]    RECOMMENDATIONS AND DISCHARGE PLANNING  Patient needs and requests: None     Pending tests/procedures: blood cultures, urine culture pending     Discharge plan for patient: SNF    Discharge planning Needs or Barriers: PT/OT    Estimated Discharge Date: 12/12/17 Posted on Whiteboard in Patients Room: yes     \"HEALS\" SAFETY CHECK  A safety check occurred in the patient's room between off going nurse and oncoming nurse listed above. The safety check included the below items:    H  High Alert Medications Verify all high alert medication drips (heparin, PCA, etc.)  E  Equipment Suction is set up for ALL patients (with taylor)  Red plugs utilized for all equipment (IV pumps, etc.)  WOWs wiped down at end of shift. Room stocked with oxygen, suction, and other unit-specific supplies  A  Alarms Bed alarm is set for fall risk patients  Ensure chair alarm is in place and activated if patient is up in a chair  L  Lines Check IV for any infiltration  Fallon bag is empty if patient has a Fallon   Tubing and IV bags are labeled  S  Safety  Room is clean, patient is clean, and equipment is clean. Hallways are clear from equipment besides carts. Fall bracelet on for fall risk patients  Ensure room is clear and free of clutter  Suction is set up for ALL patients (with taylor)  Hallways are clear from equipment besides carts.    Isolation precautions followed, supplies available outside room, sign posted    Shabnam Luke RN

## 2017-12-12 NOTE — PROGRESS NOTES
Pt discharged with family, To [facility] [address] [facility] Report called to Chantal Wilson.

## 2017-12-12 NOTE — PROGRESS NOTES
Problem: Falls - Risk of  Goal: *Absence of Falls  Document Diana Fall Risk and appropriate interventions in the flowsheet.    Outcome: Progressing Towards Goal  Fall Risk Interventions:  Mobility Interventions: Bed/chair exit alarm    Mentation Interventions: Bed/chair exit alarm    Medication Interventions: Bed/chair exit alarm    Elimination Interventions: Bed/chair exit alarm    History of Falls Interventions: Bed/chair exit alarm

## 2017-12-12 NOTE — PROGRESS NOTES
Patient's family members informed that she cannot be accepted to [facility]. Patient placed on waiting list.      Patient's family expressed concerns regarding the space at [facility] and the use of patient's wheelchair. They have 3-bed room open and patient has a wheelchair. Patient's family would like to see if [facility] is accepting patients for LTC as it is closest to their home. 1310: There are no LTC beds available at [facility]. Family orginially stated they would take [facility] if an arrangement could be made for patient to have her wheelchair comfortably in the room (per [facility] patient can be placed in an end-bed). Will confirm that is ok with family. VML for patient's family member, Sal Chavira [phone], who is handling care. Patient's other family member is at bedside, but does not speak [place] very well and requested SW contact [place].

## 2017-12-12 NOTE — ROUTINE PROCESS
Bedside and Verbal shift change report given to Lucy Painting 35 (oncoming nurse) by Khanh Alegria RN (offgoing nurse). Report included the following information SBAR, Kardex, MAR and Recent Results. SITUATION:  Code Status: Full Code  Reason for Admission: UTI (urinary tract infection)  Hospital day: 4  Problem List:       Hospital Problems  Never Reviewed          Codes Class Noted POA    UTI (urinary tract infection) ICD-10-CM: N39.0  ICD-9-CM: 599.0  12/8/2017 Unknown              BACKGROUND:   Past Medical History: No past medical history on file.    Patient taking anticoagulants yes    Patient has a defibrillator: no    History of shots YES for example, flu, pneumonia, tetanus   Isolation History NO for example, MRSA, CDiff    ASSESSMENT:  Changes in Assessment Throughout Shift: None  Significant Changes in 24 hours (for example, RR/code, fall)  Patient has Central Line: no Reasons if yes: N/A  Patient has Fallon Cath: no Reasons if yes: N/A   Mobility Issues  PT  IV Patency  OR Checklist  Pending Tests    Last Vitals:  Vitals w/ MEWS Score (last day)     Date/Time MEWS Score Pulse Resp Temp BP Level of Consciousness SpO2    12/12/17 0725 -- (!)  103 20 97 °F (36.1 °C) 155/77 -- 93 %    12/12/17 0321 1 97 20 98 °F (36.7 °C) 126/77 Alert 94 %    12/11/17 2304 4 (!)  121 24 99.4 °F (37.4 °C) 157/86 Alert 92 %    12/11/17 2015 4 (!)  118 26 100.1 °F (37.8 °C) 158/88 Alert 93 %    12/11/17 1637 2 (!)  108 18 99 °F (37.2 °C) 146/84 Alert 95 %    12/11/17 1200 1 93 19 98.3 °F (36.8 °C) 158/79 Alert 95 %    12/11/17 0916 3 (!)  117 19 99.5 °F (37.5 °C) 174/81 Alert 95 %    12/11/17 0400 2 (!)  104 20 (!)  100.8 °F (38.2 °C) 148/80 Alert 95 %    12/11/17 0025 5 (!)  106 22 (!)  102.5 °F (39.2 °C) 187/89 Alert 93 %            PAIN    Pain Assessment    Pain Intensity 1: 0 (12/12/17 0715)    Pain Location 1: Hip    Pain Intervention(s) 1: Medication (see MAR)    Patient Stated Pain Goal: 0  Intervention effective: yes  Time of last intervention: 10:03   Reassessment Completed: yes  Other actions taken for pain: N/A    Last 3 Weights:  Last 3 Recorded Weights in this Encounter    12/08/17 1733 12/08/17 1735 12/11/17 2118   Weight: 77.1 kg (170 lb) 77.1 kg (170 lb) 72.8 kg (160 lb 7.9 oz)   Weight change:     INTAKE/OUPUT    Current Shift:      Last three shifts: 12/10 1901 - 12/12 0700  In: 1188 [P.O.:250; I.V.:938]  Out: 2950 [Urine:2950]    RECOMMENDATIONS AND DISCHARGE PLANNING  Patient needs and requests: None     Pending tests/procedures: blood cultures, urine culture pending     Discharge plan for patient: SNF    Discharge planning Needs or Barriers: PT/OT    Estimated Discharge Date: 12/12/17 Posted on Whiteboard in Patients Room: yes     \"HEALS\" SAFETY CHECK  A safety check occurred in the patient's room between off going nurse and oncoming nurse listed above. The safety check included the below items:    H  High Alert Medications Verify all high alert medication drips (heparin, PCA, etc.)  E  Equipment Suction is set up for ALL patients (with taylor)  Red plugs utilized for all equipment (IV pumps, etc.)  WOWs wiped down at end of shift. Room stocked with oxygen, suction, and other unit-specific supplies  A  Alarms Bed alarm is set for fall risk patients  Ensure chair alarm is in place and activated if patient is up in a chair  L  Lines Check IV for any infiltration  Fallon bag is empty if patient has a Fallon   Tubing and IV bags are labeled  S  Safety  Room is clean, patient is clean, and equipment is clean. Hallways are clear from equipment besides carts. Fall bracelet on for fall risk patients  Ensure room is clear and free of clutter  Suction is set up for ALL patients (with taylor)  Hallways are clear from equipment besides carts.    Isolation precautions followed, supplies available outside room, sign posted    Iglesia Sanz RN

## 2017-12-12 NOTE — PROGRESS NOTES
Problem: Mobility Impaired (Adult and Pediatric)  Goal: *Acute Goals and Plan of Care (Insert Text)  Functional goals to be addressed within 7 days:  1. Bed mobility: Rolling L to R to L min/CGAA with use of HR for positioning. 2. Activity Tolerance: Tolerate EOB sitting 5-10 minutes for change of position. 3. Transfer: SB-->wc max/mod A for transfers. Outcome: Progressing Towards Goal  physical Therapy EVALUATION    Patient: Adam Acuna (59 y.o. female)  Date: 12/12/2017  Primary Diagnosis: UTI (urinary tract infection)   Precautions:   Fall, Skin, Other (comment) (R hemiplegia, patient is Swiss speaking only)     ASSESSMENT :  Based on the objective data described below, the patient presents to PT with decreased functional mobility with regard to bed mobility, transfers and overall tolerance for activity. Patient is Swiss speaking only, attempted use of blue phone for translation, family at bedside, report patient responds better with family interpreting for the patient. Family reports that patient requires assist with ADLs due to residual R hemiplegia from CVA ~ 12 yrs ago. Patient is non-ambulatory, can complete SPT to wc only with family assist.  Today, patient continues to require assist with bed mobility, verbal/tactile cues provided for hand placement with transition to sit EoB. Patient able to SPT to wc with family assist, min A, verbal/tactile cues given via translation from family. Patient's family educated with use of wc and removal of armrest during transfer for safety. Patient remained sitting up in wc at conclusion of session. Instructed patient's family to call nursing when they are ready to leave. Patient would benefit from PT to address above impairments and assist with discharge planning. Patient will benefit from skilled intervention to address the above impairments.   Patients rehabilitation potential is considered to be Fair  Factors which may influence rehabilitation potential include:   []         None noted  []         Mental ability/status  [x]         Medical condition  []         Home/family situation and support systems  []         Safety awareness  []         Pain tolerance/management  []         Other:      PLAN :  Recommendations and Planned Interventions:  [x]           Bed Mobility Training             [x]    Neuromuscular Re-Education  [x]           Transfer Training                   []    Orthotic/Prosthetic Training  [x]           Gait Training                          []    Modalities  [x]           Therapeutic Exercises          []    Edema Management/Control  [x]           Therapeutic Activities            [x]    Patient and Family Training/Education  []           Other (comment):    Frequency/Duration: Patient will be followed by physical therapy daily x 4-7 x week to address goals. Discharge Recommendations: Jaren Suárez  Further Equipment Recommendations for Discharge: wheelchair     G-CODES     Mobility  Current  CL= 60-79%   Goal  CJ= 20-39%. The severity rating is based on the Level of Assistance required for Functional Mobility and ADLs.        G-CODES     Eval Complexity: History: HIGH Complexity :3+ comorbidities / personal factors will impact the outcome/ POC Exam:HIGH Complexity : 4+ Standardized tests and measures addressing body structure, function, activity limitation and / or participation in recreation  Presentation: LOW Complexity : Stable, uncomplicated  Overall Complexity:LOW     SUBJECTIVE:   Patient stated Satira Arnaldo.     OBJECTIVE DATA SUMMARY:   No past medical history on file. No past surgical history on file.   Barriers to Learning/Limitations: yes;  language and physical  Compensate with: Verbal Cues  Prior Level of Function/Home Situation:   Home Situation  Home Environment: Private residence  One/Two Story Residence: One story  Living Alone: No  Support Systems: Child(pastor), Family member(s)  Patient Expects to be Discharged to[de-identified] Skilled nursing facility  Current DME Used/Available at Home: Wheelchair  Critical Behavior:  Neurologic State: Alert  Orientation Level: Oriented to person;Oriented to place  Cognition: Follows commands; Impulsive  Safety/Judgement: Fall prevention  Psychosocial  Patient Behaviors: Cooperative  Family  Behaviors: Appropriate for situation;Calm; Cooperative;Supportive  Skin Condition/Temp: Dry;Warm  Family  Behaviors: Appropriate for situation;Calm; Cooperative;Supportive  Skin Integrity: Intact  Skin Integumentary  Skin Color: Appropriate for ethnicity  Skin Condition/Temp: Dry;Warm  Skin Integrity: Intact  Strength:    Strength: Grossly decreased, non-functional (R hip flexion 2/5, R knee 1/5, all else 0/5)  Tone & Sensation:   Tone: Abnormal (R LE hypertonicity)  Sensation: Intact (B LE intact to LT)  Range Of Motion:  AROM: Grossly decreased, non-functional (R LE)  PROM: Grossly decreased, non-functional (R LE)  Functional Mobility:  Bed Mobility:  Rolling: Moderate assistance; Additional time  Supine to Sit: Moderate assistance; Additional time  Scooting: Moderate assistance; Additional time  Transfers:  Stand Pivot Transfers: Minimum assistance     Balance:   Sitting: Impaired; With support  Sitting - Static: Fair (occasional)  Sitting - Dynamic: Poor (constant support)  Standing: Impaired; With support  Standing - Static: Poor;Constant support (SPT-->chair)  Standing - Dynamic :  (N/A)  Pain:  Pt reports 0/10 pain or discomfort prior to treatment.    Pt reports 0/10 pain or discomfort post treatment. Activity Tolerance:   Fair/good  Please refer to the flowsheet for vital signs taken during this treatment.   After treatment:   [x]         Patient left in no apparent distress sitting up in wc  []         Patient left in no apparent distress in bed  [x]         Call bell left within reach  [x]         Nursing notified (Glennie Litten)  [x]         Caregiver present  []         Bed alarm activated  [] SCDs in place to B LE     COMMUNICATION/EDUCATION:   [x]         Fall prevention education was provided and the patient/caregiver indicated understanding. [x]         Patient/family have participated as able in goal setting and plan of care. [x]         Patient/family agree to work toward stated goals and plan of care. []         Patient understands intent and goals of therapy, but is neutral about his/her participation. []         Patient is unable to participate in goal setting and plan of care.     Thank you for this referral.  Mark Louise, PT   Time Calculation: 27 mins

## 2017-12-12 NOTE — PROGRESS NOTES
I took Ms. Burgos some spiritual materials [place] Her family was there today, too, and they let me know that she is due to be discharged today. I encouraged them to call me or the on-call  if we could be of further help.      [address] [facility] HospitalRom[facility], CPE Resident   Pager: [phone]  Phone: [phone]

## 2017-12-14 LAB
BACTERIA SPEC CULT: NORMAL
BACTERIA SPEC CULT: NORMAL
SERVICE CMNT-IMP: NORMAL
SERVICE CMNT-IMP: NORMAL

## 2019-09-26 ENCOUNTER — OFFICE VISIT (OUTPATIENT)
Dept: VASCULAR SURGERY | Age: 64
End: 2019-09-26

## 2019-09-26 VITALS
BODY MASS INDEX: 32.25 KG/M2 | HEIGHT: 59 IN | RESPIRATION RATE: 17 BRPM | WEIGHT: 160 LBS | SYSTOLIC BLOOD PRESSURE: 122 MMHG | DIASTOLIC BLOOD PRESSURE: 84 MMHG

## 2019-09-26 DIAGNOSIS — R60.9 DEPENDENT EDEMA: ICD-10-CM

## 2019-09-26 DIAGNOSIS — I69.951 HEMIPARESIS OF RIGHT DOMINANT SIDE AS LATE EFFECT OF CEREBROVASCULAR DISEASE, UNSPECIFIED CEREBROVASCULAR DISEASE TYPE (HCC): ICD-10-CM

## 2019-09-26 DIAGNOSIS — I73.9 PAD (PERIPHERAL ARTERY DISEASE) (HCC): Primary | ICD-10-CM
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
## 2020-03-10 ENCOUNTER — HOSPITAL ENCOUNTER (OUTPATIENT)
Dept: LAB | Age: 65
Discharge: HOME | End: 2020-03-10

## 2020-03-10 LAB
ANION GAP SERPL CALC-SCNC: 9 MMOL/L (ref 3–18)
BASOPHILS # BLD: 0 K/UL (ref 0–0.1)
BASOPHILS NFR BLD: 0 % (ref 0–2)
BUN SERPL-MCNC: 20 MG/DL (ref 7–18)
BUN/CREAT SERPL: 24 (ref 12–20)
CALCIUM SERPL-MCNC: 7.9 MG/DL (ref 8.5–10.1)
CHLORIDE SERPL-SCNC: 105 MMOL/L (ref 100–111)
CO2 SERPL-SCNC: 28 MMOL/L (ref 21–32)
CREAT SERPL-MCNC: 0.85 MG/DL (ref 0.6–1.3)
DIFFERENTIAL METHOD BLD: ABNORMAL
EOSINOPHIL # BLD: 0.2 K/UL (ref 0–0.4)
EOSINOPHIL NFR BLD: 1 % (ref 0–5)
ERYTHROCYTE [DISTWIDTH] IN BLOOD BY AUTOMATED COUNT: 15.9 % (ref 11.6–14.5)
GLUCOSE SERPL-MCNC: 332 MG/DL (ref 74–99)
HCT VFR BLD AUTO: 41 % (ref 35–45)
HGB BLD-MCNC: 13.4 G/DL (ref 12–16)
LYMPHOCYTES # BLD: 3.8 K/UL (ref 0.9–3.6)
LYMPHOCYTES NFR BLD: 22 % (ref 21–52)
MCH RBC QN AUTO: 29.3 PG (ref 24–34)
MCHC RBC AUTO-ENTMCNC: 32.7 G/DL (ref 31–37)
MCV RBC AUTO: 89.7 FL (ref 74–97)
MONOCYTES # BLD: 1.3 K/UL (ref 0.05–1.2)
MONOCYTES NFR BLD: 8 % (ref 3–10)
NEUTS SEG # BLD: 12.4 K/UL (ref 1.8–8)
NEUTS SEG NFR BLD: 69 % (ref 40–73)
PLATELET # BLD AUTO: 218 K/UL (ref 135–420)
PMV BLD AUTO: 12.3 FL (ref 9.2–11.8)
POTASSIUM SERPL-SCNC: 3.7 MMOL/L (ref 3.5–5.5)
RBC # BLD AUTO: 4.57 M/UL (ref 4.2–5.3)
SODIUM SERPL-SCNC: 142 MMOL/L (ref 136–145)
WBC # BLD AUTO: 17.7 K/UL (ref 4.6–13.2)

## 2020-03-10 PROCEDURE — 85025 COMPLETE CBC W/AUTO DIFF WBC: CPT

## 2020-03-10 PROCEDURE — 80048 BASIC METABOLIC PNL TOTAL CA: CPT

## 2020-12-26 ENCOUNTER — APPOINTMENT (OUTPATIENT)
Dept: GENERAL RADIOLOGY | Age: 65
End: 2020-12-26
Attending: EMERGENCY MEDICINE
Payer: MEDICARE

## 2020-12-26 ENCOUNTER — APPOINTMENT (OUTPATIENT)
Dept: CT IMAGING | Age: 65
End: 2020-12-26
Attending: PHYSICIAN ASSISTANT
Payer: MEDICARE

## 2020-12-26 ENCOUNTER — HOSPITAL ENCOUNTER (EMERGENCY)
Age: 65
Discharge: HOME | End: 2020-12-26
Attending: EMERGENCY MEDICINE
Payer: MEDICARE

## 2020-12-26 VITALS
RESPIRATION RATE: 16 BRPM | OXYGEN SATURATION: 100 % | HEART RATE: 88 BPM | TEMPERATURE: 98.9 F | SYSTOLIC BLOOD PRESSURE: 141 MMHG | DIASTOLIC BLOOD PRESSURE: 88 MMHG

## 2020-12-26 DIAGNOSIS — R22.0 RIGHT FACIAL SWELLING: Primary | ICD-10-CM

## 2020-12-26 DIAGNOSIS — K04.7 ACUTE PERIAPICAL ABSCESS: ICD-10-CM

## 2020-12-26 LAB
ANION GAP SERPL CALC-SCNC: 7 MMOL/L (ref 3–18)
BASOPHILS # BLD: 0 K/UL (ref 0–0.1)
BASOPHILS NFR BLD: 0 % (ref 0–2)
BUN SERPL-MCNC: 22 MG/DL (ref 7–18)
BUN/CREAT SERPL: 28 (ref 12–20)
CALCIUM SERPL-MCNC: 8.7 MG/DL (ref 8.5–10.1)
CHLORIDE SERPL-SCNC: 112 MMOL/L (ref 100–111)
CO2 SERPL-SCNC: 26 MMOL/L (ref 21–32)
CREAT SERPL-MCNC: 0.79 MG/DL (ref 0.6–1.3)
DIFFERENTIAL METHOD BLD: ABNORMAL
EOSINOPHIL # BLD: 0.2 K/UL (ref 0–0.4)
EOSINOPHIL NFR BLD: 2 % (ref 0–5)
ERYTHROCYTE [DISTWIDTH] IN BLOOD BY AUTOMATED COUNT: 15.3 % (ref 11.6–14.5)
GLUCOSE SERPL-MCNC: 115 MG/DL (ref 74–99)
HCT VFR BLD AUTO: 39.2 % (ref 35–45)
HGB BLD-MCNC: 13.2 G/DL (ref 12–16)
LYMPHOCYTES # BLD: 2 K/UL (ref 0.9–3.6)
LYMPHOCYTES NFR BLD: 20 % (ref 21–52)
MCH RBC QN AUTO: 30.1 PG (ref 24–34)
MCHC RBC AUTO-ENTMCNC: 33.7 G/DL (ref 31–37)
MCV RBC AUTO: 89.5 FL (ref 74–97)
MONOCYTES # BLD: 0.8 K/UL (ref 0.05–1.2)
MONOCYTES NFR BLD: 9 % (ref 3–10)
NEUTS SEG # BLD: 6.9 K/UL (ref 1.8–8)
NEUTS SEG NFR BLD: 69 % (ref 40–73)
PLATELET # BLD AUTO: 190 K/UL (ref 135–420)
PMV BLD AUTO: 10.9 FL (ref 9.2–11.8)
POTASSIUM SERPL-SCNC: 4.3 MMOL/L (ref 3.5–5.5)
RBC # BLD AUTO: 4.38 M/UL (ref 4.2–5.3)
SODIUM SERPL-SCNC: 145 MMOL/L (ref 136–145)
WBC # BLD AUTO: 9.9 K/UL (ref 4.6–13.2)

## 2020-12-26 PROCEDURE — 85025 COMPLETE CBC W/AUTO DIFF WBC: CPT

## 2020-12-26 PROCEDURE — 99283 EMERGENCY DEPT VISIT LOW MDM: CPT

## 2020-12-26 PROCEDURE — 70450 CT HEAD/BRAIN W/O DYE: CPT

## 2020-12-26 PROCEDURE — 80048 BASIC METABOLIC PNL TOTAL CA: CPT

## 2020-12-26 PROCEDURE — 74011250637 HC RX REV CODE- 250/637: Performed by: EMERGENCY MEDICINE

## 2020-12-26 PROCEDURE — 71045 X-RAY EXAM CHEST 1 VIEW: CPT

## 2020-12-26 PROCEDURE — 74011000636 HC RX REV CODE- 636: Performed by: EMERGENCY MEDICINE

## 2020-12-26 PROCEDURE — 70487 CT MAXILLOFACIAL W/DYE: CPT

## 2020-12-26 RX ORDER — ACETAMINOPHEN 500 MG
1000 TABLET ORAL
Qty: 20 TAB | Refills: 0 | Status: ON HOLD | OUTPATIENT
Start: 2020-12-26 | End: 2022-01-01 | Stop reason: SDUPTHER

## 2020-12-26 RX ORDER — ACETAMINOPHEN 500 MG
1000 TABLET ORAL
Status: COMPLETED | OUTPATIENT
Start: 2020-12-26 | End: 2020-12-26

## 2020-12-26 RX ORDER — AMOXICILLIN AND CLAVULANATE POTASSIUM 875; 125 MG/1; MG/1
1 TABLET, FILM COATED ORAL 2 TIMES DAILY
Qty: 20 TAB | Refills: 0 | Status: SHIPPED | OUTPATIENT
Start: 2020-12-26 | End: 2021-01-05

## 2020-12-26 RX ORDER — AMOXICILLIN AND CLAVULANATE POTASSIUM 875; 125 MG/1; MG/1
1 TABLET, FILM COATED ORAL
Status: COMPLETED | OUTPATIENT
Start: 2020-12-26 | End: 2020-12-26

## 2020-12-26 RX ADMIN — AMOXICILLIN AND CLAVULANATE POTASSIUM 1 TABLET: 875; 125 TABLET, FILM COATED ORAL at 19:06

## 2020-12-26 RX ADMIN — ACETAMINOPHEN 1000 MG: 500 TABLET ORAL at 19:06

## 2020-12-26 RX ADMIN — IOPAMIDOL 100 ML: 612 INJECTION, SOLUTION INTRAVENOUS at 15:20

## 2020-12-26 NOTE — ED NOTES
4:06 PM  Pt has very, very tender right upper first premolar gingiva; she is combative w/slight touch. Will Rx her Tylenol ES and  ABX for her dental abscess. D/c home.  - azalea acharya

## 2020-12-26 NOTE — ED TRIAGE NOTES
Per medic: \"nursing home states patient face was noted to be swollen on the right side. Patient is flaccid on the right side from a previous stroke. \"

## 2020-12-26 NOTE — ED PROVIDER NOTES
EMERGENCY DEPARTMENT HISTORY AND PHYSICAL EXAM    Date: 12/26/2020  Patient Name: Bri Simon    History of Presenting Illness     Chief Complaint:   Chief Complaint   Patient presents with    Facial Swelling         History Provided By: patient's niece Hilton Kussmaul (see HPI for contacts)    Additional History (Context): Bri Simon is a 63 y.o. female with hx of stroke affecting RT side of body, non-verbal, to ER from NH with c/o right-sided facial swelling that developed since family saw her 5 days ago, it is unclear if there was an injury but per niece, injuries occurred in NH before and family was not notified. Pt points at RT cheek and nods to answer questions. Pt's niece provided hx. Pt is Burmese speaking, understands English but is non-verbal, pt and niece declined an interpretor at this time (niece agreed to have an interpretor for discussion of results if absolutely necessary, prefers communication without an interpretor)    Prior notes show hx of sepsis in 2017 and UTI in 2019. PCP: None   Xochitl Santamaria Poster [phone]    Current Facility-Administered Medications   Medication Dose Route Frequency Provider Last Rate Last Admin    acetaminophen (TYLENOL) tablet 1,000 mg  1,000 mg Oral NOW Hafsa Andrade PA        amoxicillin-clavulanate (AUGMENTIN) 875-125 mg per tablet 1 Tab  1 Tab Oral NOW Hafsa Andrade PA         Current Outpatient Medications   Medication Sig Dispense Refill    amoxicillin-clavulanate (Augmentin) 875-125 mg per tablet Take 1 Tab by mouth two (2) times a day for 10 days. 20 Tab 0    acetaminophen (Tylenol Extra Strength) 500 mg tablet Take 2 Tabs by mouth every six (6) hours as needed for Pain. 20 Tab 0    insulin glargine (LANTUS) 100 unit/mL injection 23 Units by SubCUTAneous route daily. 1 Vial 0    metoprolol tartrate (LOPRESSOR) 50 mg tablet Take 1 Tab by mouth every twelve (12) hours. 60 Tab 0    levoFLOXacin (LEVAQUIN) 750 mg tablet Take 1 Tab by mouth daily.  6 Tab 0 Past History     Past Medical History:  No past medical history on file. Past Surgical History:  No past surgical history on file. Family History:  No family history on file. -    Social History:  Social History     Tobacco Use    Smoking status: Former Smoker    Smokeless tobacco: Never Used   Substance Use Topics    Alcohol use: Not on file    Drug use: Not on file       Allergies:  No Known Allergies      Review of Systems   Review of Systems   Unable to perform ROS: Patient nonverbal (hx and ROS obtained through niece and pt nodding to answer qustions)   Constitutional: Negative for activity change and fever. HENT: Positive for sinus pain (swollen RT side of face). Negative for congestion and rhinorrhea. Eyes: Negative for visual disturbance (niece states pt has glasses but they are missing now). Neurological: Negative for headaches. All other systems reviewed and are negative. All Other Systems Negative  Physical Exam     Vitals:    12/26/20 1414   BP: (!) 141/88   Pulse: 88   Resp: 16   Temp: 98.9 °F (37.2 °C)   SpO2: 100%     Physical Exam  Vitals signs and nursing note reviewed. Constitutional:       General: She is not in acute distress. Appearance: She is well-developed. She is not toxic-appearing or diaphoretic. Comments: Frail at baseline, RT-sided weakness/paraplegia at baseline   HENT:      Head: Normocephalic and atraumatic. No raccoon eyes, Alexander's sign, abrasion or contusion. Salivary Glands: Right salivary gland is not diffusely enlarged or tender. Left salivary gland is not diffusely enlarged or tender. Right Ear: Hearing and ear canal normal. No drainage. No hemotympanum. Left Ear: Hearing and ear canal normal. No drainage. No hemotympanum. Nose:      Right Nostril: No epistaxis or septal hematoma. Left Nostril: No epistaxis or septal hematoma. Right Sinus: Maxillary sinus tenderness (over swollen check) present.  No frontal sinus tenderness. Left Sinus: No maxillary sinus tenderness or frontal sinus tenderness. Mouth/Throat:      Dentition: Abnormal dentition. Dental caries (extensive, affecting most teeth) present. No dental tenderness, gingival swelling or dental abscesses. Pharynx: Uvula midline. Comments: Right cheek swelling but no focal area of fluctuance, no fluid on alluminaiton  Eyes:      Comments: No periorbital tenderness, swelling, erythema, EOMi wo crepitus or obvious discomfort   Neck:      Musculoskeletal: Normal range of motion and neck supple. Cardiovascular:      Rate and Rhythm: Normal rate and regular rhythm. Pulses: Normal pulses. Heart sounds: Normal heart sounds. Comments: No edema, 2+ pedal pulses  Pulmonary:      Effort: Pulmonary effort is normal.      Breath sounds: Normal breath sounds. Abdominal:      General: Bowel sounds are normal.      Palpations: Abdomen is soft. Musculoskeletal:      Right lower leg: No edema. Left lower leg: No edema. Lymphadenopathy:      Cervical: No cervical adenopathy. Skin:     General: Skin is warm and dry. Capillary Refill: Capillary refill takes less than 2 seconds. Neurological:      Mental Status: She is alert. Comments: RT paraplegia at baseline   Psychiatric:         Behavior: Behavior is cooperative. Diagnostic Study Results     Labs -     Recent Results (from the past 12 hour(s))   CBC WITH AUTOMATED DIFF    Collection Time: 12/26/20  1:15 PM   Result Value Ref Range    WBC 9.9 4.6 - 13.2 K/uL    RBC 4.38 4.20 - 5.30 M/uL    HGB 13.2 12.0 - 16.0 g/dL    HCT 39.2 35.0 - 45.0 %    MCV 89.5 74.0 - 97.0 FL    MCH 30.1 24.0 - 34.0 PG    MCHC 33.7 31.0 - 37.0 g/dL    RDW 15.3 (H) 11.6 - 14.5 %    PLATELET 386 523 - 602 K/uL    MPV 10.9 9.2 - 11.8 FL    NEUTROPHILS 69 40 - 73 %    LYMPHOCYTES 20 (L) 21 - 52 %    MONOCYTES 9 3 - 10 %    EOSINOPHILS 2 0 - 5 %    BASOPHILS 0 0 - 2 %    ABS.  NEUTROPHILS 6.9 1.8 - 8.0 K/UL    ABS. LYMPHOCYTES 2.0 0.9 - 3.6 K/UL    ABS. MONOCYTES 0.8 0.05 - 1.2 K/UL    ABS. EOSINOPHILS 0.2 0.0 - 0.4 K/UL    ABS. BASOPHILS 0.0 0.0 - 0.1 K/UL    DF AUTOMATED     METABOLIC PANEL, BASIC    Collection Time: 12/26/20  1:15 PM   Result Value Ref Range    Sodium 145 136 - 145 mmol/L    Potassium 4.3 3.5 - 5.5 mmol/L    Chloride 112 (H) 100 - 111 mmol/L    CO2 26 21 - 32 mmol/L    Anion gap 7 3.0 - 18 mmol/L    Glucose 115 (H) 74 - 99 mg/dL    BUN 22 (H) 7.0 - 18 MG/DL    Creatinine 0.79 0.6 - 1.3 MG/DL    BUN/Creatinine ratio 28 (H) 12 - 20      GFR est AA >60 >60 ml/min/1.73m2    GFR est non-AA >60 >60 ml/min/1.73m2    Calcium 8.7 8.5 - 10.1 MG/DL       Radiologic Studies -   CT MAXILLOFACIAL W WO CONT    (Results Pending)   CT HEAD WO CONT    (Results Pending)   XR CHEST PORT    (Results Pending)     CT Results  (Last 48 hours)               12/26/20 1538  CT HEAD WO CONT Final result    Impression:  IMPRESSION:        No acute intracranial findings. Chronic atrophy and left MCA territory encephalomalacia/old infarct. Narrative:  CT OF THE HEAD WITHOUT CONTRAST. CLINICAL HISTORY: Right facial swelling with possible injury. Altered mental   status. TECHNIQUE: Helical scan obtained of the head were obtained from the skull vertex   through the base of the skull without intravenous contrast.    All CT scans are   performed using dose optimization techniques as appropriate to the performed   exam including the following: Automated exposure control, adjustment of mA   and/or kV according to patient size, and use of iterative reconstructive   technique. COMPARISON: None. FINDINGS:        Chronic encephalomalacia in the left lateral cerebrum MCA territory. Moderate   cortical atrophy. No acute intracranial hemorrhage. No mass effect. Enlarged   lateral and third ventricles, likely compensatory to regional volume loss. The   sinuses are clear.  The visualized bony structures are unremarkable. Refer to CT   face report. 12/26/20 1538  CT MAXILLOFACIAL W CONT Final result    Impression:  IMPRESSION:       Periodontal disease with right maxillary first premolar tooth periapical abscess   with adjacent periodontal fluid collection/abscess. Narrative:  CT FACIAL WITH CONTRAST       HISTORY: Right-sided facial swelling with possible injury and altered mental   status. COMPARISON: None. TECHNIQUE: Axial enhanced images through the maxillofacial structures followed   by coronal and sagittal reformation. 100 cc Isovue-300. All CT scans are   performed using dose optimization techniques as appropriate to the performed   exam including the following: Automated exposure control, adjustment of mA   and/or kV according to patient size, and use of iterative reconstructive   technique. FINDINGS:       No acute fracture or dislocation. Orbit structures are unremarkable. Minimal   mucosal thickening in the right maxillary sinus. The sinuses are otherwise   clear. The frontal sinuses are not pneumatized. Periapical erosions around the root of right upper/maxillary first premolar   tooth. Its crown is not visualized and may be carious. There is an overlying   periapical rim enhanced fluid collection along the buccal surface of the   affected right maxilla measuring 5 x 11 x 10 mm. Mild soft tissue swelling along   the right cheek. Vital signs check ordered. Pending    Medical Decision Making   I am the first provider for this patient. I reviewed the vital signs, available nursing notes, past medical history, past surgical history, family history and social history. Vital Signs-Reviewed the patient's vital signs.     Records Reviewed: Nursing Notes and Old Medical Records    Procedures:  Procedures    Provider Notes (Medical Decision Making):     DDx: tooth abscess, facial cellulitis, sinusitis, facial injury, less likely but still possible sepsis/UTI that may cause dizziness and accidental injury    TURN OVER NOTE:   1:27 PM   Consulted with Demetris PAGAN concerning patient Alonrda Burgos, standard discussion of reason for visit, HPI, ROS, PE, and current results available. Report was given at this time. Provider above will assume care at his time  PENDING VITAL SIGNS check, basic labs incl CBC, BMP, UA, and MAX-Fac CT  LATASHA Merlos        4:07 PM  Pt has very tender right upper first premolar gingiva; she is combative w/slight touch and will not be able to perform I&D. Start oral pain meds and ABX. Refer to dental. Nothing acute on CXR. MED RECONCILIATION:  Current Facility-Administered Medications   Medication Dose Route Frequency    acetaminophen (TYLENOL) tablet 1,000 mg  1,000 mg Oral NOW    amoxicillin-clavulanate (AUGMENTIN) 875-125 mg per tablet 1 Tab  1 Tab Oral NOW     Current Outpatient Medications   Medication Sig    amoxicillin-clavulanate (Augmentin) 875-125 mg per tablet Take 1 Tab by mouth two (2) times a day for 10 days.  acetaminophen (Tylenol Extra Strength) 500 mg tablet Take 2 Tabs by mouth every six (6) hours as needed for Pain.  insulin glargine (LANTUS) 100 unit/mL injection 23 Units by SubCUTAneous route daily.  metoprolol tartrate (LOPRESSOR) 50 mg tablet Take 1 Tab by mouth every twelve (12) hours.  levoFLOXacin (LEVAQUIN) 750 mg tablet Take 1 Tab by mouth daily. Disposition: To be determined by upcoming provider after barba is complete    Diagnosis     Clinical Impression:   1. Right facial swelling    2. Acute periapical abscess          Dictation disclaimer:  Please note that this dictation was completed with [employer], the [employer] [facility] voice recognition software. Quite often unanticipated grammatical, syntax, homophones, and other interpretive errors are inadvertently transcribed by the computer software. Please disregard these errors.   Please excuse any errors that have escaped final proofreading.

## 2021-01-01 ENCOUNTER — APPOINTMENT (OUTPATIENT)
Dept: NON INVASIVE DIAGNOSTICS | Age: 66
DRG: 871 | End: 2021-01-01
Attending: FAMILY MEDICINE
Payer: MEDICARE

## 2021-01-01 ENCOUNTER — APPOINTMENT (OUTPATIENT)
Dept: GENERAL RADIOLOGY | Age: 66
DRG: 871 | End: 2021-01-01
Attending: FAMILY MEDICINE
Payer: MEDICARE

## 2021-01-01 ENCOUNTER — APPOINTMENT (OUTPATIENT)
Dept: GENERAL RADIOLOGY | Age: 66
DRG: 871 | End: 2021-01-01
Attending: STUDENT IN AN ORGANIZED HEALTH CARE EDUCATION/TRAINING PROGRAM
Payer: MEDICARE

## 2021-01-01 ENCOUNTER — HOSPITAL ENCOUNTER (INPATIENT)
Age: 66
LOS: 7 days | Discharge: NURSING, REHAB OR LONG-TERM CARE | DRG: 871 | End: 2021-10-24
Attending: STUDENT IN AN ORGANIZED HEALTH CARE EDUCATION/TRAINING PROGRAM | Admitting: FAMILY MEDICINE
Payer: MEDICARE

## 2021-01-01 ENCOUNTER — APPOINTMENT (OUTPATIENT)
Dept: INTERVENTIONAL RADIOLOGY/VASCULAR | Age: 66
DRG: 871 | End: 2021-01-01
Attending: FAMILY MEDICINE
Payer: MEDICARE

## 2021-01-01 ENCOUNTER — APPOINTMENT (OUTPATIENT)
Dept: CT IMAGING | Age: 66
DRG: 871 | End: 2021-01-01
Attending: INTERNAL MEDICINE
Payer: MEDICARE

## 2021-01-01 VITALS
DIASTOLIC BLOOD PRESSURE: 76 MMHG | WEIGHT: 155 LBS | RESPIRATION RATE: 20 BRPM | HEIGHT: 59 IN | BODY MASS INDEX: 31.25 KG/M2 | OXYGEN SATURATION: 99 % | SYSTOLIC BLOOD PRESSURE: 139 MMHG | HEART RATE: 80 BPM | TEMPERATURE: 97.1 F

## 2021-01-01 DIAGNOSIS — J18.9 PNEUMONIA OF BOTH LUNGS DUE TO INFECTIOUS ORGANISM, UNSPECIFIED PART OF LUNG: Primary | ICD-10-CM

## 2021-01-01 DIAGNOSIS — I50.9 ACUTE ON CHRONIC CONGESTIVE HEART FAILURE, UNSPECIFIED HEART FAILURE TYPE (HCC): ICD-10-CM

## 2021-01-01 DIAGNOSIS — I16.0 HYPERTENSIVE URGENCY: ICD-10-CM

## 2021-01-01 DIAGNOSIS — J18.9 PNEUMONIA DUE TO INFECTIOUS ORGANISM, UNSPECIFIED LATERALITY, UNSPECIFIED PART OF LUNG: ICD-10-CM

## 2021-01-01 DIAGNOSIS — J96.01 ACUTE RESPIRATORY FAILURE WITH HYPOXIA (HCC): ICD-10-CM

## 2021-01-01 LAB
ALBUMIN SERPL-MCNC: 3.3 G/DL (ref 3.4–5)
ALBUMIN/GLOB SERPL: 0.8 {RATIO} (ref 0.8–1.7)
ALP SERPL-CCNC: 88 U/L (ref 45–117)
ALT SERPL-CCNC: 23 U/L (ref 13–56)
ANION GAP SERPL CALC-SCNC: 10 MMOL/L (ref 3–18)
ANION GAP SERPL CALC-SCNC: 3 MMOL/L (ref 3–18)
ANION GAP SERPL CALC-SCNC: 3 MMOL/L (ref 3–18)
ANION GAP SERPL CALC-SCNC: 6 MMOL/L (ref 3–18)
ANION GAP SERPL CALC-SCNC: 7 MMOL/L (ref 3–18)
ANION GAP SERPL CALC-SCNC: 7 MMOL/L (ref 3–18)
ANION GAP SERPL CALC-SCNC: 8 MMOL/L (ref 3–18)
ANION GAP SERPL CALC-SCNC: 9 MMOL/L (ref 3–18)
AST SERPL-CCNC: 13 U/L (ref 10–38)
ATRIAL RATE: 100 BPM
ATRIAL RATE: 154 BPM
ATRIAL RATE: 89 BPM
B PERT DNA SPEC QL NAA+PROBE: NOT DETECTED
BACTERIA SPEC CULT: NORMAL
BACTERIA SPEC CULT: NORMAL
BASOPHILS # BLD: 0 K/UL (ref 0–0.1)
BASOPHILS # BLD: 0.1 K/UL (ref 0–0.1)
BASOPHILS NFR BLD: 0 % (ref 0–2)
BASOPHILS NFR BLD: 1 % (ref 0–2)
BILIRUB SERPL-MCNC: 0.4 MG/DL (ref 0.2–1)
BNP SERPL-MCNC: 6733 PG/ML (ref 0–900)
BORDETELLA PARAPERTUSSIS PCR, BORPAR: NOT DETECTED
BUN SERPL-MCNC: 14 MG/DL (ref 7–18)
BUN SERPL-MCNC: 16 MG/DL (ref 7–18)
BUN SERPL-MCNC: 18 MG/DL (ref 7–18)
BUN SERPL-MCNC: 19 MG/DL (ref 7–18)
BUN SERPL-MCNC: 21 MG/DL (ref 7–18)
BUN SERPL-MCNC: 23 MG/DL (ref 7–18)
BUN SERPL-MCNC: 24 MG/DL (ref 7–18)
BUN SERPL-MCNC: 28 MG/DL (ref 7–18)
BUN/CREAT SERPL: 14 (ref 12–20)
BUN/CREAT SERPL: 14 (ref 12–20)
BUN/CREAT SERPL: 20 (ref 12–20)
BUN/CREAT SERPL: 21 (ref 12–20)
BUN/CREAT SERPL: 26 (ref 12–20)
BUN/CREAT SERPL: 31 (ref 12–20)
BUN/CREAT SERPL: 33 (ref 12–20)
BUN/CREAT SERPL: 35 (ref 12–20)
C PNEUM DNA SPEC QL NAA+PROBE: NOT DETECTED
CALCIUM SERPL-MCNC: 7.9 MG/DL (ref 8.5–10.1)
CALCIUM SERPL-MCNC: 8.2 MG/DL (ref 8.5–10.1)
CALCIUM SERPL-MCNC: 8.2 MG/DL (ref 8.5–10.1)
CALCIUM SERPL-MCNC: 8.4 MG/DL (ref 8.5–10.1)
CALCIUM SERPL-MCNC: 8.4 MG/DL (ref 8.5–10.1)
CALCIUM SERPL-MCNC: 8.6 MG/DL (ref 8.5–10.1)
CALCIUM SERPL-MCNC: 8.9 MG/DL (ref 8.5–10.1)
CALCIUM SERPL-MCNC: 8.9 MG/DL (ref 8.5–10.1)
CALCULATED P AXIS, ECG09: 27 DEGREES
CALCULATED P AXIS, ECG09: 76 DEGREES
CALCULATED R AXIS, ECG10: -15 DEGREES
CALCULATED R AXIS, ECG10: -15 DEGREES
CALCULATED R AXIS, ECG10: -19 DEGREES
CALCULATED T AXIS, ECG11: 43 DEGREES
CALCULATED T AXIS, ECG11: 65 DEGREES
CALCULATED T AXIS, ECG11: 96 DEGREES
CHLORIDE SERPL-SCNC: 101 MMOL/L (ref 100–111)
CHLORIDE SERPL-SCNC: 101 MMOL/L (ref 100–111)
CHLORIDE SERPL-SCNC: 106 MMOL/L (ref 100–111)
CHLORIDE SERPL-SCNC: 106 MMOL/L (ref 100–111)
CHLORIDE SERPL-SCNC: 108 MMOL/L (ref 100–111)
CHLORIDE SERPL-SCNC: 109 MMOL/L (ref 100–111)
CHLORIDE SERPL-SCNC: 109 MMOL/L (ref 100–111)
CHLORIDE SERPL-SCNC: 110 MMOL/L (ref 100–111)
CO2 SERPL-SCNC: 25 MMOL/L (ref 21–32)
CO2 SERPL-SCNC: 25 MMOL/L (ref 21–32)
CO2 SERPL-SCNC: 26 MMOL/L (ref 21–32)
CO2 SERPL-SCNC: 26 MMOL/L (ref 21–32)
CO2 SERPL-SCNC: 27 MMOL/L (ref 21–32)
CO2 SERPL-SCNC: 28 MMOL/L (ref 21–32)
COVID-19 RAPID TEST, COVR: NOT DETECTED
CREAT SERPL-MCNC: 0.7 MG/DL (ref 0.6–1.3)
CREAT SERPL-MCNC: 0.76 MG/DL (ref 0.6–1.3)
CREAT SERPL-MCNC: 0.78 MG/DL (ref 0.6–1.3)
CREAT SERPL-MCNC: 0.8 MG/DL (ref 0.6–1.3)
CREAT SERPL-MCNC: 0.81 MG/DL (ref 0.6–1.3)
CREAT SERPL-MCNC: 0.94 MG/DL (ref 0.6–1.3)
CREAT SERPL-MCNC: 0.98 MG/DL (ref 0.6–1.3)
CREAT SERPL-MCNC: 1.32 MG/DL (ref 0.6–1.3)
DIAGNOSIS, 93000: NORMAL
DIFFERENTIAL METHOD BLD: ABNORMAL
DIFFERENTIAL METHOD BLD: ABNORMAL
ECHO AO ROOT DIAM: 2.85 CM
ECHO LV E' LATERAL VELOCITY: 2.62 CM/S
ECHO LV E' SEPTAL VELOCITY: 5.06 CM/S
ECHO LV INTERNAL DIMENSION DIASTOLIC: 4.01 CM (ref 3.9–5.3)
ECHO LV INTERNAL DIMENSION SYSTOLIC: 3.05 CM
ECHO LV IVSD: 1.39 CM (ref 0.6–0.9)
ECHO LV MASS 2D: 192.7 G (ref 67–162)
ECHO LV MASS INDEX 2D: 116.1 G/M2 (ref 43–95)
ECHO LV POSTERIOR WALL DIASTOLIC: 1.26 CM (ref 0.6–0.9)
ECHO LVOT DIAM: 1.68 CM
ECHO MV A VELOCITY: 47.39 CM/S
ECHO MV E DECELERATION TIME (DT): 122.47 MS
ECHO MV E VELOCITY: 94.03 CM/S
ECHO MV E/A RATIO: 1.98
ECHO MV E/E' LATERAL: 35.89
ECHO MV E/E' RATIO (AVERAGED): 27.24
ECHO MV E/E' SEPTAL: 18.58
ECHO TV REGURGITANT MAX VELOCITY: 347.78 CM/S
ECHO TV REGURGITANT PEAK GRADIENT: 48.38 MMHG
EOSINOPHIL # BLD: 0 K/UL (ref 0–0.4)
EOSINOPHIL # BLD: 0.1 K/UL (ref 0–0.4)
EOSINOPHIL NFR BLD: 0 % (ref 0–5)
EOSINOPHIL NFR BLD: 1 % (ref 0–5)
ERYTHROCYTE [DISTWIDTH] IN BLOOD BY AUTOMATED COUNT: 14.5 % (ref 11.6–14.5)
ERYTHROCYTE [DISTWIDTH] IN BLOOD BY AUTOMATED COUNT: 14.6 % (ref 11.6–14.5)
ERYTHROCYTE [DISTWIDTH] IN BLOOD BY AUTOMATED COUNT: 14.6 % (ref 11.6–14.5)
ERYTHROCYTE [DISTWIDTH] IN BLOOD BY AUTOMATED COUNT: 14.9 % (ref 11.6–14.5)
EST. AVERAGE GLUCOSE BLD GHB EST-MCNC: 108 MG/DL
FLUAV SUBTYP SPEC NAA+PROBE: NOT DETECTED
FLUBV RNA SPEC QL NAA+PROBE: NOT DETECTED
GLOBULIN SER CALC-MCNC: 4.4 G/DL (ref 2–4)
GLUCOSE BLD STRIP.AUTO-MCNC: 144 MG/DL (ref 70–110)
GLUCOSE BLD STRIP.AUTO-MCNC: 147 MG/DL (ref 70–110)
GLUCOSE BLD STRIP.AUTO-MCNC: 151 MG/DL (ref 70–110)
GLUCOSE BLD STRIP.AUTO-MCNC: 153 MG/DL (ref 70–110)
GLUCOSE BLD STRIP.AUTO-MCNC: 164 MG/DL (ref 70–110)
GLUCOSE BLD STRIP.AUTO-MCNC: 168 MG/DL (ref 70–110)
GLUCOSE BLD STRIP.AUTO-MCNC: 168 MG/DL (ref 70–110)
GLUCOSE BLD STRIP.AUTO-MCNC: 170 MG/DL (ref 70–110)
GLUCOSE BLD STRIP.AUTO-MCNC: 173 MG/DL (ref 70–110)
GLUCOSE BLD STRIP.AUTO-MCNC: 179 MG/DL (ref 70–110)
GLUCOSE BLD STRIP.AUTO-MCNC: 180 MG/DL (ref 70–110)
GLUCOSE BLD STRIP.AUTO-MCNC: 180 MG/DL (ref 70–110)
GLUCOSE BLD STRIP.AUTO-MCNC: 183 MG/DL (ref 70–110)
GLUCOSE BLD STRIP.AUTO-MCNC: 186 MG/DL (ref 70–110)
GLUCOSE BLD STRIP.AUTO-MCNC: 198 MG/DL (ref 70–110)
GLUCOSE BLD STRIP.AUTO-MCNC: 202 MG/DL (ref 70–110)
GLUCOSE BLD STRIP.AUTO-MCNC: 205 MG/DL (ref 70–110)
GLUCOSE BLD STRIP.AUTO-MCNC: 213 MG/DL (ref 70–110)
GLUCOSE BLD STRIP.AUTO-MCNC: 214 MG/DL (ref 70–110)
GLUCOSE BLD STRIP.AUTO-MCNC: 214 MG/DL (ref 70–110)
GLUCOSE BLD STRIP.AUTO-MCNC: 215 MG/DL (ref 70–110)
GLUCOSE BLD STRIP.AUTO-MCNC: 227 MG/DL (ref 70–110)
GLUCOSE BLD STRIP.AUTO-MCNC: 229 MG/DL (ref 70–110)
GLUCOSE BLD STRIP.AUTO-MCNC: 234 MG/DL (ref 70–110)
GLUCOSE BLD STRIP.AUTO-MCNC: 240 MG/DL (ref 70–110)
GLUCOSE BLD STRIP.AUTO-MCNC: 247 MG/DL (ref 70–110)
GLUCOSE BLD STRIP.AUTO-MCNC: 271 MG/DL (ref 70–110)
GLUCOSE BLD STRIP.AUTO-MCNC: 277 MG/DL (ref 70–110)
GLUCOSE BLD STRIP.AUTO-MCNC: 324 MG/DL (ref 70–110)
GLUCOSE BLD STRIP.AUTO-MCNC: 421 MG/DL (ref 70–110)
GLUCOSE BLD STRIP.AUTO-MCNC: 445 MG/DL (ref 70–110)
GLUCOSE BLD STRIP.AUTO-MCNC: 71 MG/DL (ref 70–110)
GLUCOSE BLD STRIP.AUTO-MCNC: 86 MG/DL (ref 70–110)
GLUCOSE SERPL-MCNC: 165 MG/DL (ref 74–99)
GLUCOSE SERPL-MCNC: 167 MG/DL (ref 74–99)
GLUCOSE SERPL-MCNC: 180 MG/DL (ref 74–99)
GLUCOSE SERPL-MCNC: 209 MG/DL (ref 74–99)
GLUCOSE SERPL-MCNC: 210 MG/DL (ref 74–99)
GLUCOSE SERPL-MCNC: 219 MG/DL (ref 74–99)
GLUCOSE SERPL-MCNC: 249 MG/DL (ref 74–99)
GLUCOSE SERPL-MCNC: 289 MG/DL (ref 74–99)
HADV DNA SPEC QL NAA+PROBE: NOT DETECTED
HBA1C MFR BLD: 5.4 % (ref 4.2–5.6)
HCOV 229E RNA SPEC QL NAA+PROBE: NOT DETECTED
HCOV HKU1 RNA SPEC QL NAA+PROBE: NOT DETECTED
HCOV NL63 RNA SPEC QL NAA+PROBE: NOT DETECTED
HCOV OC43 RNA SPEC QL NAA+PROBE: NOT DETECTED
HCT VFR BLD AUTO: 38.3 % (ref 35–45)
HCT VFR BLD AUTO: 40.7 % (ref 35–45)
HCT VFR BLD AUTO: 41 % (ref 35–45)
HCT VFR BLD AUTO: 47 % (ref 35–45)
HGB BLD-MCNC: 12 G/DL (ref 12–16)
HGB BLD-MCNC: 12.9 G/DL (ref 12–16)
HGB BLD-MCNC: 13.1 G/DL (ref 12–16)
HGB BLD-MCNC: 15.4 G/DL (ref 12–16)
HMPV RNA SPEC QL NAA+PROBE: NOT DETECTED
HPIV1 RNA SPEC QL NAA+PROBE: NOT DETECTED
HPIV2 RNA SPEC QL NAA+PROBE: NOT DETECTED
HPIV3 RNA SPEC QL NAA+PROBE: NOT DETECTED
HPIV4 RNA SPEC QL NAA+PROBE: NOT DETECTED
LACTATE SERPL-SCNC: 1.5 MMOL/L (ref 0.4–2)
LYMPHOCYTES # BLD: 0.9 K/UL (ref 0.9–3.6)
LYMPHOCYTES # BLD: 1 K/UL (ref 0.9–3.6)
LYMPHOCYTES NFR BLD: 11 % (ref 21–52)
LYMPHOCYTES NFR BLD: 6 % (ref 21–52)
M PNEUMO DNA SPEC QL NAA+PROBE: NOT DETECTED
MAGNESIUM SERPL-MCNC: 1.7 MG/DL (ref 1.6–2.6)
MCH RBC QN AUTO: 29.4 PG (ref 24–34)
MCH RBC QN AUTO: 29.8 PG (ref 24–34)
MCH RBC QN AUTO: 30 PG (ref 24–34)
MCH RBC QN AUTO: 30.1 PG (ref 24–34)
MCHC RBC AUTO-ENTMCNC: 31.3 G/DL (ref 31–37)
MCHC RBC AUTO-ENTMCNC: 31.7 G/DL (ref 31–37)
MCHC RBC AUTO-ENTMCNC: 32 G/DL (ref 31–37)
MCHC RBC AUTO-ENTMCNC: 32.8 G/DL (ref 31–37)
MCV RBC AUTO: 91.4 FL (ref 78–100)
MCV RBC AUTO: 93.2 FL (ref 78–100)
MCV RBC AUTO: 93.9 FL (ref 78–100)
MCV RBC AUTO: 94.9 FL (ref 78–100)
MONOCYTES # BLD: 0.7 K/UL (ref 0.05–1.2)
MONOCYTES # BLD: 0.8 K/UL (ref 0.05–1.2)
MONOCYTES NFR BLD: 5 % (ref 3–10)
MONOCYTES NFR BLD: 9 % (ref 3–10)
NEUTS SEG # BLD: 12.6 K/UL (ref 1.8–8)
NEUTS SEG # BLD: 6.5 K/UL (ref 1.8–8)
NEUTS SEG NFR BLD: 77 % (ref 40–73)
NEUTS SEG NFR BLD: 88 % (ref 40–73)
P-R INTERVAL, ECG05: 156 MS
P-R INTERVAL, ECG05: 168 MS
PLATELET # BLD AUTO: 162 K/UL (ref 135–420)
PLATELET # BLD AUTO: 162 K/UL (ref 135–420)
PLATELET # BLD AUTO: 184 K/UL (ref 135–420)
PLATELET # BLD AUTO: 204 K/UL (ref 135–420)
PMV BLD AUTO: 10.9 FL (ref 9.2–11.8)
PMV BLD AUTO: 11.6 FL (ref 9.2–11.8)
PMV BLD AUTO: 11.6 FL (ref 9.2–11.8)
PMV BLD AUTO: 12.2 FL (ref 9.2–11.8)
POTASSIUM SERPL-SCNC: 3.3 MMOL/L (ref 3.5–5.5)
POTASSIUM SERPL-SCNC: 3.6 MMOL/L (ref 3.5–5.5)
POTASSIUM SERPL-SCNC: 3.8 MMOL/L (ref 3.5–5.5)
POTASSIUM SERPL-SCNC: 3.8 MMOL/L (ref 3.5–5.5)
POTASSIUM SERPL-SCNC: 3.9 MMOL/L (ref 3.5–5.5)
POTASSIUM SERPL-SCNC: 4.2 MMOL/L (ref 3.5–5.5)
POTASSIUM SERPL-SCNC: 4.2 MMOL/L (ref 3.5–5.5)
POTASSIUM SERPL-SCNC: 4.3 MMOL/L (ref 3.5–5.5)
PROCALCITONIN SERPL-MCNC: 0.08 NG/ML
PROT SERPL-MCNC: 7.7 G/DL (ref 6.4–8.2)
Q-T INTERVAL, ECG07: 314 MS
Q-T INTERVAL, ECG07: 384 MS
Q-T INTERVAL, ECG07: 416 MS
QRS DURATION, ECG06: 74 MS
QRS DURATION, ECG06: 76 MS
QRS DURATION, ECG06: 94 MS
QTC CALCULATION (BEZET), ECG08: 495 MS
QTC CALCULATION (BEZET), ECG08: 502 MS
QTC CALCULATION (BEZET), ECG08: 506 MS
RBC # BLD AUTO: 4.08 M/UL (ref 4.2–5.3)
RBC # BLD AUTO: 4.29 M/UL (ref 4.2–5.3)
RBC # BLD AUTO: 4.4 M/UL (ref 4.2–5.3)
RBC # BLD AUTO: 5.14 M/UL (ref 4.2–5.3)
RSV RNA SPEC QL NAA+PROBE: NOT DETECTED
RV+EV RNA SPEC QL NAA+PROBE: DETECTED
SARS-COV-2 PCR, COVPCR: NOT DETECTED
SERVICE CMNT-IMP: NORMAL
SERVICE CMNT-IMP: NORMAL
SODIUM SERPL-SCNC: 135 MMOL/L (ref 136–145)
SODIUM SERPL-SCNC: 136 MMOL/L (ref 136–145)
SODIUM SERPL-SCNC: 136 MMOL/L (ref 136–145)
SODIUM SERPL-SCNC: 139 MMOL/L (ref 136–145)
SODIUM SERPL-SCNC: 140 MMOL/L (ref 136–145)
SODIUM SERPL-SCNC: 141 MMOL/L (ref 136–145)
SODIUM SERPL-SCNC: 144 MMOL/L (ref 136–145)
SODIUM SERPL-SCNC: 145 MMOL/L (ref 136–145)
SOURCE, COVRS: NORMAL
TROPONIN I SERPL-MCNC: <0.02 NG/ML (ref 0–0.04)
TSH SERPL DL<=0.05 MIU/L-ACNC: 2.97 UIU/ML (ref 0.36–3.74)
VENTRICULAR RATE, ECG03: 100 BPM
VENTRICULAR RATE, ECG03: 154 BPM
VENTRICULAR RATE, ECG03: 89 BPM
WBC # BLD AUTO: 10.4 K/UL (ref 4.6–13.2)
WBC # BLD AUTO: 11.2 K/UL (ref 4.6–13.2)
WBC # BLD AUTO: 14.4 K/UL (ref 4.6–13.2)
WBC # BLD AUTO: 8.3 K/UL (ref 4.6–13.2)

## 2021-01-01 PROCEDURE — 97166 OT EVAL MOD COMPLEX 45 MIN: CPT

## 2021-01-01 PROCEDURE — 74011636637 HC RX REV CODE- 636/637: Performed by: FAMILY MEDICINE

## 2021-01-01 PROCEDURE — 99233 SBSQ HOSP IP/OBS HIGH 50: CPT | Performed by: FAMILY MEDICINE

## 2021-01-01 PROCEDURE — 74011250636 HC RX REV CODE- 250/636: Performed by: FAMILY MEDICINE

## 2021-01-01 PROCEDURE — 36415 COLL VENOUS BLD VENIPUNCTURE: CPT

## 2021-01-01 PROCEDURE — 83880 ASSAY OF NATRIURETIC PEPTIDE: CPT

## 2021-01-01 PROCEDURE — 05HY33Z INSERTION OF INFUSION DEVICE INTO UPPER VEIN, PERCUTANEOUS APPROACH: ICD-10-PCS | Performed by: RADIOLOGY

## 2021-01-01 PROCEDURE — 87040 BLOOD CULTURE FOR BACTERIA: CPT

## 2021-01-01 PROCEDURE — 2709999900 HC NON-CHARGEABLE SUPPLY

## 2021-01-01 PROCEDURE — 65270000029 HC RM PRIVATE

## 2021-01-01 PROCEDURE — 85027 COMPLETE CBC AUTOMATED: CPT

## 2021-01-01 PROCEDURE — 97530 THERAPEUTIC ACTIVITIES: CPT

## 2021-01-01 PROCEDURE — 82962 GLUCOSE BLOOD TEST: CPT

## 2021-01-01 PROCEDURE — 74011250637 HC RX REV CODE- 250/637: Performed by: INTERNAL MEDICINE

## 2021-01-01 PROCEDURE — 74011250637 HC RX REV CODE- 250/637: Performed by: NURSE PRACTITIONER

## 2021-01-01 PROCEDURE — 99232 SBSQ HOSP IP/OBS MODERATE 35: CPT | Performed by: INTERNAL MEDICINE

## 2021-01-01 PROCEDURE — 74011250637 HC RX REV CODE- 250/637: Performed by: FAMILY MEDICINE

## 2021-01-01 PROCEDURE — 80053 COMPREHEN METABOLIC PANEL: CPT

## 2021-01-01 PROCEDURE — 85025 COMPLETE CBC W/AUTO DIFF WBC: CPT

## 2021-01-01 PROCEDURE — 80048 BASIC METABOLIC PNL TOTAL CA: CPT

## 2021-01-01 PROCEDURE — 99233 SBSQ HOSP IP/OBS HIGH 50: CPT | Performed by: INTERNAL MEDICINE

## 2021-01-01 PROCEDURE — 77010033678 HC OXYGEN DAILY

## 2021-01-01 PROCEDURE — 93005 ELECTROCARDIOGRAM TRACING: CPT

## 2021-01-01 PROCEDURE — 84145 PROCALCITONIN (PCT): CPT

## 2021-01-01 PROCEDURE — 74011000258 HC RX REV CODE- 258: Performed by: FAMILY MEDICINE

## 2021-01-01 PROCEDURE — 92610 EVALUATE SWALLOWING FUNCTION: CPT

## 2021-01-01 PROCEDURE — 99232 SBSQ HOSP IP/OBS MODERATE 35: CPT | Performed by: FAMILY MEDICINE

## 2021-01-01 PROCEDURE — 74011636637 HC RX REV CODE- 636/637: Performed by: INTERNAL MEDICINE

## 2021-01-01 PROCEDURE — 99239 HOSP IP/OBS DSCHRG MGMT >30: CPT | Performed by: INTERNAL MEDICINE

## 2021-01-01 PROCEDURE — 71250 CT THORAX DX C-: CPT

## 2021-01-01 PROCEDURE — 99223 1ST HOSP IP/OBS HIGH 75: CPT | Performed by: FAMILY MEDICINE

## 2021-01-01 PROCEDURE — 94761 N-INVAS EAR/PLS OXIMETRY MLT: CPT

## 2021-01-01 PROCEDURE — 74011250637 HC RX REV CODE- 250/637: Performed by: PHYSICIAN ASSISTANT

## 2021-01-01 PROCEDURE — C1751 CATH, INF, PER/CENT/MIDLINE: HCPCS

## 2021-01-01 PROCEDURE — 83605 ASSAY OF LACTIC ACID: CPT

## 2021-01-01 PROCEDURE — 87635 SARS-COV-2 COVID-19 AMP PRB: CPT

## 2021-01-01 PROCEDURE — 99284 EMERGENCY DEPT VISIT MOD MDM: CPT

## 2021-01-01 PROCEDURE — 74011000250 HC RX REV CODE- 250: Performed by: FAMILY MEDICINE

## 2021-01-01 PROCEDURE — 74011250636 HC RX REV CODE- 250/636: Performed by: STUDENT IN AN ORGANIZED HEALTH CARE EDUCATION/TRAINING PROGRAM

## 2021-01-01 PROCEDURE — 84484 ASSAY OF TROPONIN QUANT: CPT

## 2021-01-01 PROCEDURE — 84443 ASSAY THYROID STIM HORMONE: CPT

## 2021-01-01 PROCEDURE — 93306 TTE W/DOPPLER COMPLETE: CPT | Performed by: INTERNAL MEDICINE

## 2021-01-01 PROCEDURE — 83036 HEMOGLOBIN GLYCOSYLATED A1C: CPT

## 2021-01-01 PROCEDURE — 74011000250 HC RX REV CODE- 250: Performed by: STUDENT IN AN ORGANIZED HEALTH CARE EDUCATION/TRAINING PROGRAM

## 2021-01-01 PROCEDURE — 0202U NFCT DS 22 TRGT SARS-COV-2: CPT

## 2021-01-01 PROCEDURE — 83735 ASSAY OF MAGNESIUM: CPT

## 2021-01-01 PROCEDURE — 93306 TTE W/DOPPLER COMPLETE: CPT

## 2021-01-01 PROCEDURE — 71045 X-RAY EXAM CHEST 1 VIEW: CPT

## 2021-01-01 PROCEDURE — 97161 PT EVAL LOW COMPLEX 20 MIN: CPT

## 2021-01-01 PROCEDURE — 74011250637 HC RX REV CODE- 250/637: Performed by: STUDENT IN AN ORGANIZED HEALTH CARE EDUCATION/TRAINING PROGRAM

## 2021-01-01 RX ORDER — INSULIN GLARGINE 100 [IU]/ML
34 INJECTION, SOLUTION SUBCUTANEOUS
Qty: 1 ML | Refills: 0 | Status: SHIPPED
Start: 2021-01-01 | End: 2021-01-01

## 2021-01-01 RX ORDER — CARVEDILOL 12.5 MG/1
12.5 TABLET ORAL 2 TIMES DAILY WITH MEALS
Status: DISCONTINUED | OUTPATIENT
Start: 2021-01-01 | End: 2021-01-01

## 2021-01-01 RX ORDER — GUAIFENESIN 100 MG/5ML
81 LIQUID (ML) ORAL DAILY
Status: DISCONTINUED | OUTPATIENT
Start: 2021-01-01 | End: 2021-01-01 | Stop reason: HOSPADM

## 2021-01-01 RX ORDER — HYDRALAZINE HYDROCHLORIDE 25 MG/1
25 TABLET, FILM COATED ORAL 3 TIMES DAILY
Status: DISCONTINUED | OUTPATIENT
Start: 2021-01-01 | End: 2021-01-01

## 2021-01-01 RX ORDER — INSULIN GLARGINE 100 [IU]/ML
30 INJECTION, SOLUTION SUBCUTANEOUS
Status: DISCONTINUED | OUTPATIENT
Start: 2021-01-01 | End: 2021-01-01

## 2021-01-01 RX ORDER — ATORVASTATIN CALCIUM 40 MG/1
40 TABLET, FILM COATED ORAL
Status: DISCONTINUED | OUTPATIENT
Start: 2021-01-01 | End: 2021-01-01 | Stop reason: HOSPADM

## 2021-01-01 RX ORDER — ACETAMINOPHEN 325 MG/1
650 TABLET ORAL
Status: DISCONTINUED | OUTPATIENT
Start: 2021-01-01 | End: 2021-01-01 | Stop reason: HOSPADM

## 2021-01-01 RX ORDER — INSULIN GLARGINE 100 [IU]/ML
10 INJECTION, SOLUTION SUBCUTANEOUS
Status: DISCONTINUED | OUTPATIENT
Start: 2021-01-01 | End: 2021-01-01

## 2021-01-01 RX ORDER — METOPROLOL TARTRATE 5 MG/5ML
5 INJECTION INTRAVENOUS ONCE
Status: DISPENSED | OUTPATIENT
Start: 2021-01-01 | End: 2021-01-01

## 2021-01-01 RX ORDER — VANCOMYCIN 1.75 GRAM/500 ML IN 0.9 % SODIUM CHLORIDE INTRAVENOUS
1750 ONCE
Status: COMPLETED | OUTPATIENT
Start: 2021-01-01 | End: 2021-01-01

## 2021-01-01 RX ORDER — HYDRALAZINE HYDROCHLORIDE 50 MG/1
50 TABLET, FILM COATED ORAL 3 TIMES DAILY
Status: DISCONTINUED | OUTPATIENT
Start: 2021-01-01 | End: 2021-01-01

## 2021-01-01 RX ORDER — HYDRALAZINE HYDROCHLORIDE 20 MG/ML
10 INJECTION INTRAMUSCULAR; INTRAVENOUS
Status: DISCONTINUED | OUTPATIENT
Start: 2021-01-01 | End: 2021-01-01 | Stop reason: HOSPADM

## 2021-01-01 RX ORDER — METOPROLOL TARTRATE 5 MG/5ML
5 INJECTION INTRAVENOUS ONCE
Status: COMPLETED | OUTPATIENT
Start: 2021-01-01 | End: 2021-01-01

## 2021-01-01 RX ORDER — ATORVASTATIN CALCIUM 40 MG/1
40 TABLET, FILM COATED ORAL
Qty: 30 TABLET | Refills: 0 | Status: SHIPPED
Start: 2021-01-01 | End: 2021-01-01

## 2021-01-01 RX ORDER — INSULIN GLARGINE 100 [IU]/ML
20 INJECTION, SOLUTION SUBCUTANEOUS
Status: COMPLETED | OUTPATIENT
Start: 2021-01-01 | End: 2021-01-01

## 2021-01-01 RX ORDER — GUAIFENESIN 100 MG/5ML
100 SOLUTION ORAL
Qty: 1 EACH | Refills: 0 | Status: SHIPPED
Start: 2021-01-01 | End: 2021-01-01

## 2021-01-01 RX ORDER — INSULIN GLARGINE 100 [IU]/ML
23 INJECTION, SOLUTION SUBCUTANEOUS
Status: DISCONTINUED | OUTPATIENT
Start: 2021-01-01 | End: 2021-01-01

## 2021-01-01 RX ORDER — LISINOPRIL 10 MG/1
10 TABLET ORAL DAILY
Qty: 30 TABLET | Refills: 0 | Status: SHIPPED
Start: 2021-01-01 | End: 2021-01-01

## 2021-01-01 RX ORDER — FUROSEMIDE 40 MG/1
40 TABLET ORAL DAILY
Qty: 30 TABLET | Refills: 0 | Status: SHIPPED
Start: 2021-01-01 | End: 2021-01-01

## 2021-01-01 RX ORDER — HYDRALAZINE HYDROCHLORIDE 25 MG/1
25 TABLET, FILM COATED ORAL 3 TIMES DAILY
Qty: 90 TABLET | Refills: 0 | Status: SHIPPED
Start: 2021-01-01 | End: 2021-01-01

## 2021-01-01 RX ORDER — DEXTROSE 50 % IN WATER (D50W) INTRAVENOUS SYRINGE
25-50 AS NEEDED
Status: DISCONTINUED | OUTPATIENT
Start: 2021-01-01 | End: 2021-01-01 | Stop reason: HOSPADM

## 2021-01-01 RX ORDER — METOPROLOL TARTRATE 5 MG/5ML
INJECTION INTRAVENOUS
Status: DISCONTINUED
Start: 2021-01-01 | End: 2021-01-01 | Stop reason: WASHOUT

## 2021-01-01 RX ORDER — ENOXAPARIN SODIUM 100 MG/ML
40 INJECTION SUBCUTANEOUS DAILY
Status: DISCONTINUED | OUTPATIENT
Start: 2021-01-01 | End: 2021-01-01 | Stop reason: HOSPADM

## 2021-01-01 RX ORDER — METOPROLOL TARTRATE 50 MG/1
50 TABLET ORAL EVERY 12 HOURS
Status: DISCONTINUED | OUTPATIENT
Start: 2021-01-01 | End: 2021-01-01

## 2021-01-01 RX ORDER — HYDRALAZINE HYDROCHLORIDE 25 MG/1
25 TABLET, FILM COATED ORAL 3 TIMES DAILY
Status: DISCONTINUED | OUTPATIENT
Start: 2021-01-01 | End: 2021-01-01 | Stop reason: HOSPADM

## 2021-01-01 RX ORDER — ONDANSETRON 4 MG/1
4 TABLET, ORALLY DISINTEGRATING ORAL
Status: DISCONTINUED | OUTPATIENT
Start: 2021-01-01 | End: 2021-01-01 | Stop reason: HOSPADM

## 2021-01-01 RX ORDER — INSULIN GLARGINE 100 [IU]/ML
26 INJECTION, SOLUTION SUBCUTANEOUS
Status: DISCONTINUED | OUTPATIENT
Start: 2021-01-01 | End: 2021-01-01

## 2021-01-01 RX ORDER — POTASSIUM CHLORIDE 20 MEQ/1
40 TABLET, EXTENDED RELEASE ORAL 3 TIMES DAILY
Status: COMPLETED | OUTPATIENT
Start: 2021-01-01 | End: 2021-01-01

## 2021-01-01 RX ORDER — ACETAMINOPHEN 650 MG/1
650 SUPPOSITORY RECTAL
Status: DISCONTINUED | OUTPATIENT
Start: 2021-01-01 | End: 2021-01-01 | Stop reason: HOSPADM

## 2021-01-01 RX ORDER — INSULIN LISPRO 100 [IU]/ML
INJECTION, SOLUTION INTRAVENOUS; SUBCUTANEOUS
Status: DISCONTINUED | OUTPATIENT
Start: 2021-01-01 | End: 2021-01-01 | Stop reason: HOSPADM

## 2021-01-01 RX ORDER — FUROSEMIDE 10 MG/ML
40 INJECTION INTRAMUSCULAR; INTRAVENOUS ONCE
Status: COMPLETED | OUTPATIENT
Start: 2021-01-01 | End: 2021-01-01

## 2021-01-01 RX ORDER — FUROSEMIDE 10 MG/ML
20 INJECTION INTRAMUSCULAR; INTRAVENOUS 2 TIMES DAILY
Status: DISCONTINUED | OUTPATIENT
Start: 2021-01-01 | End: 2021-01-01

## 2021-01-01 RX ORDER — FUROSEMIDE 20 MG/1
20 TABLET ORAL DAILY
Status: DISCONTINUED | OUTPATIENT
Start: 2021-01-01 | End: 2021-01-01

## 2021-01-01 RX ORDER — POLYETHYLENE GLYCOL 3350 17 G/17G
17 POWDER, FOR SOLUTION ORAL DAILY PRN
Status: DISCONTINUED | OUTPATIENT
Start: 2021-01-01 | End: 2021-01-01 | Stop reason: HOSPADM

## 2021-01-01 RX ORDER — SODIUM CHLORIDE 0.9 % (FLUSH) 0.9 %
5-40 SYRINGE (ML) INJECTION AS NEEDED
Status: DISCONTINUED | OUTPATIENT
Start: 2021-01-01 | End: 2021-01-01 | Stop reason: HOSPADM

## 2021-01-01 RX ORDER — MAGNESIUM SULFATE 100 %
4 CRYSTALS MISCELLANEOUS AS NEEDED
Status: DISCONTINUED | OUTPATIENT
Start: 2021-01-01 | End: 2021-01-01 | Stop reason: HOSPADM

## 2021-01-01 RX ORDER — GUAIFENESIN 100 MG/5ML
81 LIQUID (ML) ORAL DAILY
Qty: 30 TABLET | Refills: 0 | Status: SHIPPED
Start: 2021-01-01 | End: 2021-01-01

## 2021-01-01 RX ORDER — SODIUM CHLORIDE 0.9 % (FLUSH) 0.9 %
5-40 SYRINGE (ML) INJECTION EVERY 8 HOURS
Status: DISCONTINUED | OUTPATIENT
Start: 2021-01-01 | End: 2021-01-01 | Stop reason: HOSPADM

## 2021-01-01 RX ORDER — CARVEDILOL 12.5 MG/1
12.5 TABLET ORAL 2 TIMES DAILY WITH MEALS
Status: DISCONTINUED | OUTPATIENT
Start: 2021-01-01 | End: 2021-01-01 | Stop reason: HOSPADM

## 2021-01-01 RX ORDER — INSULIN GLARGINE 100 [IU]/ML
34 INJECTION, SOLUTION SUBCUTANEOUS
Status: DISCONTINUED | OUTPATIENT
Start: 2021-01-01 | End: 2021-01-01 | Stop reason: HOSPADM

## 2021-01-01 RX ORDER — GUAIFENESIN 100 MG/5ML
100 SOLUTION ORAL
Status: DISCONTINUED | OUTPATIENT
Start: 2021-01-01 | End: 2021-01-01 | Stop reason: HOSPADM

## 2021-01-01 RX ORDER — FUROSEMIDE 40 MG/1
40 TABLET ORAL DAILY
Status: DISCONTINUED | OUTPATIENT
Start: 2021-01-01 | End: 2021-01-01 | Stop reason: HOSPADM

## 2021-01-01 RX ORDER — ONDANSETRON 2 MG/ML
4 INJECTION INTRAMUSCULAR; INTRAVENOUS
Status: DISCONTINUED | OUTPATIENT
Start: 2021-01-01 | End: 2021-01-01 | Stop reason: HOSPADM

## 2021-01-01 RX ORDER — CARVEDILOL 6.25 MG/1
6.25 TABLET ORAL 2 TIMES DAILY WITH MEALS
Status: DISCONTINUED | OUTPATIENT
Start: 2021-01-01 | End: 2021-01-01

## 2021-01-01 RX ORDER — CARVEDILOL 12.5 MG/1
12.5 TABLET ORAL 2 TIMES DAILY WITH MEALS
Qty: 60 TABLET | Refills: 0 | Status: SHIPPED
Start: 2021-01-01 | End: 2021-01-01

## 2021-01-01 RX ORDER — LISINOPRIL 5 MG/1
10 TABLET ORAL DAILY
Status: DISCONTINUED | OUTPATIENT
Start: 2021-01-01 | End: 2021-01-01 | Stop reason: HOSPADM

## 2021-01-01 RX ADMIN — HYDRALAZINE HYDROCHLORIDE 50 MG: 50 TABLET, FILM COATED ORAL at 21:48

## 2021-01-01 RX ADMIN — Medication 10 ML: at 06:38

## 2021-01-01 RX ADMIN — POTASSIUM CHLORIDE 40 MEQ: 1500 TABLET, EXTENDED RELEASE ORAL at 12:27

## 2021-01-01 RX ADMIN — INSULIN LISPRO 4 UNITS: 100 INJECTION, SOLUTION INTRAVENOUS; SUBCUTANEOUS at 23:43

## 2021-01-01 RX ADMIN — PIPERACILLIN AND TAZOBACTAM 4.5 G: 4; .5 INJECTION, POWDER, FOR SOLUTION INTRAVENOUS at 17:19

## 2021-01-01 RX ADMIN — PIPERACILLIN AND TAZOBACTAM 4.5 G: 4; .5 INJECTION, POWDER, FOR SOLUTION INTRAVENOUS at 01:00

## 2021-01-01 RX ADMIN — METOPROLOL TARTRATE 50 MG: 50 TABLET, FILM COATED ORAL at 09:17

## 2021-01-01 RX ADMIN — ENOXAPARIN SODIUM 40 MG: 100 INJECTION SUBCUTANEOUS at 09:17

## 2021-01-01 RX ADMIN — METOPROLOL TARTRATE 50 MG: 50 TABLET, FILM COATED ORAL at 08:54

## 2021-01-01 RX ADMIN — INSULIN LISPRO 12 UNITS: 100 INJECTION, SOLUTION INTRAVENOUS; SUBCUTANEOUS at 15:36

## 2021-01-01 RX ADMIN — INSULIN LISPRO 6 UNITS: 100 INJECTION, SOLUTION INTRAVENOUS; SUBCUTANEOUS at 12:54

## 2021-01-01 RX ADMIN — Medication 10 ML: at 22:00

## 2021-01-01 RX ADMIN — PIPERACILLIN AND TAZOBACTAM 4.5 G: 4; .5 INJECTION, POWDER, FOR SOLUTION INTRAVENOUS at 09:55

## 2021-01-01 RX ADMIN — INSULIN GLARGINE 10 UNITS: 100 INJECTION, SOLUTION SUBCUTANEOUS at 22:23

## 2021-01-01 RX ADMIN — CARVEDILOL 6.25 MG: 6.25 TABLET, FILM COATED ORAL at 08:54

## 2021-01-01 RX ADMIN — HYDRALAZINE HYDROCHLORIDE 50 MG: 50 TABLET, FILM COATED ORAL at 21:27

## 2021-01-01 RX ADMIN — CARVEDILOL 12.5 MG: 12.5 TABLET, FILM COATED ORAL at 09:11

## 2021-01-01 RX ADMIN — Medication 10 ML: at 23:15

## 2021-01-01 RX ADMIN — INSULIN LISPRO 3 UNITS: 100 INJECTION, SOLUTION INTRAVENOUS; SUBCUTANEOUS at 09:12

## 2021-01-01 RX ADMIN — ENOXAPARIN SODIUM 40 MG: 100 INJECTION SUBCUTANEOUS at 08:56

## 2021-01-01 RX ADMIN — METOPROLOL TARTRATE 50 MG: 50 TABLET, FILM COATED ORAL at 21:43

## 2021-01-01 RX ADMIN — INSULIN GLARGINE 23 UNITS: 100 INJECTION, SOLUTION SUBCUTANEOUS at 22:00

## 2021-01-01 RX ADMIN — HYDRALAZINE HYDROCHLORIDE 25 MG: 25 TABLET, FILM COATED ORAL at 15:35

## 2021-01-01 RX ADMIN — INSULIN LISPRO 2 UNITS: 100 INJECTION, SOLUTION INTRAVENOUS; SUBCUTANEOUS at 18:55

## 2021-01-01 RX ADMIN — INSULIN LISPRO 3 UNITS: 100 INJECTION, SOLUTION INTRAVENOUS; SUBCUTANEOUS at 08:53

## 2021-01-01 RX ADMIN — HYDRALAZINE HYDROCHLORIDE 50 MG: 50 TABLET, FILM COATED ORAL at 08:54

## 2021-01-01 RX ADMIN — METOPROLOL TARTRATE 50 MG: 50 TABLET, FILM COATED ORAL at 21:28

## 2021-01-01 RX ADMIN — FUROSEMIDE 20 MG: 20 INJECTION, SOLUTION INTRAMUSCULAR; INTRAVENOUS at 17:19

## 2021-01-01 RX ADMIN — HYDRALAZINE HYDROCHLORIDE 25 MG: 25 TABLET, FILM COATED ORAL at 09:17

## 2021-01-01 RX ADMIN — HYDRALAZINE HYDROCHLORIDE 50 MG: 50 TABLET, FILM COATED ORAL at 21:16

## 2021-01-01 RX ADMIN — Medication 10 ML: at 21:50

## 2021-01-01 RX ADMIN — ENOXAPARIN SODIUM 40 MG: 100 INJECTION SUBCUTANEOUS at 11:49

## 2021-01-01 RX ADMIN — INSULIN GLARGINE 34 UNITS: 100 INJECTION, SOLUTION SUBCUTANEOUS at 22:20

## 2021-01-01 RX ADMIN — INSULIN LISPRO 2 UNITS: 100 INJECTION, SOLUTION INTRAVENOUS; SUBCUTANEOUS at 10:55

## 2021-01-01 RX ADMIN — VANCOMYCIN HYDROCHLORIDE 1750 MG: 10 INJECTION, POWDER, LYOPHILIZED, FOR SOLUTION INTRAVENOUS at 01:00

## 2021-01-01 RX ADMIN — METOPROLOL TARTRATE 50 MG: 50 TABLET, FILM COATED ORAL at 08:32

## 2021-01-01 RX ADMIN — LISINOPRIL 10 MG: 5 TABLET ORAL at 08:53

## 2021-01-01 RX ADMIN — HYDRALAZINE HYDROCHLORIDE 25 MG: 25 TABLET, FILM COATED ORAL at 22:20

## 2021-01-01 RX ADMIN — FUROSEMIDE 40 MG: 40 TABLET ORAL at 13:08

## 2021-01-01 RX ADMIN — ATORVASTATIN CALCIUM 40 MG: 40 TABLET, FILM COATED ORAL at 21:17

## 2021-01-01 RX ADMIN — INSULIN LISPRO 3 UNITS: 100 INJECTION, SOLUTION INTRAVENOUS; SUBCUTANEOUS at 18:06

## 2021-01-01 RX ADMIN — ATORVASTATIN CALCIUM 40 MG: 40 TABLET, FILM COATED ORAL at 23:04

## 2021-01-01 RX ADMIN — POTASSIUM CHLORIDE 40 MEQ: 1500 TABLET, EXTENDED RELEASE ORAL at 17:19

## 2021-01-01 RX ADMIN — FUROSEMIDE 20 MG: 20 INJECTION, SOLUTION INTRAMUSCULAR; INTRAVENOUS at 08:53

## 2021-01-01 RX ADMIN — Medication 10 ML: at 01:48

## 2021-01-01 RX ADMIN — INSULIN LISPRO 3 UNITS: 100 INJECTION, SOLUTION INTRAVENOUS; SUBCUTANEOUS at 21:43

## 2021-01-01 RX ADMIN — INSULIN LISPRO 6 UNITS: 100 INJECTION, SOLUTION INTRAVENOUS; SUBCUTANEOUS at 09:42

## 2021-01-01 RX ADMIN — GUAIFENESIN 100 MG: 200 SOLUTION ORAL at 01:52

## 2021-01-01 RX ADMIN — Medication 10 ML: at 05:08

## 2021-01-01 RX ADMIN — INSULIN LISPRO 4 UNITS: 100 INJECTION, SOLUTION INTRAVENOUS; SUBCUTANEOUS at 16:38

## 2021-01-01 RX ADMIN — INSULIN LISPRO 3 UNITS: 100 INJECTION, SOLUTION INTRAVENOUS; SUBCUTANEOUS at 08:58

## 2021-01-01 RX ADMIN — WATER 1 G: 1 INJECTION INTRAMUSCULAR; INTRAVENOUS; SUBCUTANEOUS at 03:37

## 2021-01-01 RX ADMIN — HYDRALAZINE HYDROCHLORIDE 10 MG: 20 INJECTION INTRAMUSCULAR; INTRAVENOUS at 08:30

## 2021-01-01 RX ADMIN — ASPIRIN 81 MG CHEWABLE TABLET 81 MG: 81 TABLET CHEWABLE at 09:10

## 2021-01-01 RX ADMIN — INSULIN GLARGINE 10 UNITS: 100 INJECTION, SOLUTION SUBCUTANEOUS at 22:26

## 2021-01-01 RX ADMIN — FUROSEMIDE 40 MG: 10 INJECTION, SOLUTION INTRAMUSCULAR; INTRAVENOUS at 16:13

## 2021-01-01 RX ADMIN — AZITHROMYCIN 500 MG: 500 INJECTION, POWDER, LYOPHILIZED, FOR SOLUTION INTRAVENOUS at 23:17

## 2021-01-01 RX ADMIN — ASPIRIN 81 MG CHEWABLE TABLET 81 MG: 81 TABLET CHEWABLE at 09:55

## 2021-01-01 RX ADMIN — INSULIN LISPRO 3 UNITS: 100 INJECTION, SOLUTION INTRAVENOUS; SUBCUTANEOUS at 09:56

## 2021-01-01 RX ADMIN — Medication 10 ML: at 05:33

## 2021-01-01 RX ADMIN — INSULIN LISPRO 6 UNITS: 100 INJECTION, SOLUTION INTRAVENOUS; SUBCUTANEOUS at 12:27

## 2021-01-01 RX ADMIN — INSULIN LISPRO 6 UNITS: 100 INJECTION, SOLUTION INTRAVENOUS; SUBCUTANEOUS at 17:20

## 2021-01-01 RX ADMIN — Medication 10 ML: at 23:43

## 2021-01-01 RX ADMIN — INSULIN LISPRO 9 UNITS: 100 INJECTION, SOLUTION INTRAVENOUS; SUBCUTANEOUS at 22:09

## 2021-01-01 RX ADMIN — AZITHROMYCIN 500 MG: 500 INJECTION, POWDER, LYOPHILIZED, FOR SOLUTION INTRAVENOUS at 04:04

## 2021-01-01 RX ADMIN — AZITHROMYCIN 500 MG: 500 INJECTION, POWDER, LYOPHILIZED, FOR SOLUTION INTRAVENOUS at 01:36

## 2021-01-01 RX ADMIN — METOPROLOL TARTRATE 50 MG: 50 TABLET, FILM COATED ORAL at 23:43

## 2021-01-01 RX ADMIN — ASPIRIN 81 MG CHEWABLE TABLET 81 MG: 81 TABLET CHEWABLE at 08:54

## 2021-01-01 RX ADMIN — FUROSEMIDE 20 MG: 20 INJECTION, SOLUTION INTRAMUSCULAR; INTRAVENOUS at 17:17

## 2021-01-01 RX ADMIN — LISINOPRIL 10 MG: 5 TABLET ORAL at 08:54

## 2021-01-01 RX ADMIN — METOPROLOL TARTRATE 50 MG: 50 TABLET, FILM COATED ORAL at 12:18

## 2021-01-01 RX ADMIN — CARVEDILOL 12.5 MG: 12.5 TABLET, FILM COATED ORAL at 18:06

## 2021-01-01 RX ADMIN — LISINOPRIL 10 MG: 5 TABLET ORAL at 12:27

## 2021-01-01 RX ADMIN — ENOXAPARIN SODIUM 40 MG: 100 INJECTION SUBCUTANEOUS at 09:11

## 2021-01-01 RX ADMIN — Medication 10 ML: at 21:20

## 2021-01-01 RX ADMIN — ENOXAPARIN SODIUM 40 MG: 100 INJECTION SUBCUTANEOUS at 09:56

## 2021-01-01 RX ADMIN — INSULIN LISPRO 2 UNITS: 100 INJECTION, SOLUTION INTRAVENOUS; SUBCUTANEOUS at 08:30

## 2021-01-01 RX ADMIN — ENOXAPARIN SODIUM 40 MG: 100 INJECTION SUBCUTANEOUS at 08:54

## 2021-01-01 RX ADMIN — Medication 10 ML: at 21:46

## 2021-01-01 RX ADMIN — FUROSEMIDE 20 MG: 20 INJECTION, SOLUTION INTRAMUSCULAR; INTRAVENOUS at 20:07

## 2021-01-01 RX ADMIN — INSULIN GLARGINE 30 UNITS: 100 INJECTION, SOLUTION SUBCUTANEOUS at 22:09

## 2021-01-01 RX ADMIN — INSULIN LISPRO 3 UNITS: 100 INJECTION, SOLUTION INTRAVENOUS; SUBCUTANEOUS at 23:21

## 2021-01-01 RX ADMIN — GUAIFENESIN 100 MG: 200 SOLUTION ORAL at 09:12

## 2021-01-01 RX ADMIN — METOPROLOL TARTRATE 50 MG: 50 TABLET, FILM COATED ORAL at 21:48

## 2021-01-01 RX ADMIN — Medication 10 ML: at 17:17

## 2021-01-01 RX ADMIN — Medication 10 ML: at 06:15

## 2021-01-01 RX ADMIN — PIPERACILLIN AND TAZOBACTAM 4.5 G: 4; .5 INJECTION, POWDER, FOR SOLUTION INTRAVENOUS at 01:39

## 2021-01-01 RX ADMIN — INSULIN LISPRO 6 UNITS: 100 INJECTION, SOLUTION INTRAVENOUS; SUBCUTANEOUS at 12:11

## 2021-01-01 RX ADMIN — Medication 10 ML: at 13:16

## 2021-01-01 RX ADMIN — LISINOPRIL 10 MG: 5 TABLET ORAL at 09:11

## 2021-01-01 RX ADMIN — HYDRALAZINE HYDROCHLORIDE 10 MG: 20 INJECTION INTRAMUSCULAR; INTRAVENOUS at 12:46

## 2021-01-01 RX ADMIN — HYDRALAZINE HYDROCHLORIDE 25 MG: 25 TABLET, FILM COATED ORAL at 13:07

## 2021-01-01 RX ADMIN — Medication 10 ML: at 10:57

## 2021-01-01 RX ADMIN — CARVEDILOL 6.25 MG: 6.25 TABLET, FILM COATED ORAL at 17:16

## 2021-01-01 RX ADMIN — CARVEDILOL 12.5 MG: 12.5 TABLET, FILM COATED ORAL at 16:19

## 2021-01-01 RX ADMIN — FUROSEMIDE 20 MG: 20 INJECTION, SOLUTION INTRAMUSCULAR; INTRAVENOUS at 08:54

## 2021-01-01 RX ADMIN — INSULIN GLARGINE 20 UNITS: 100 INJECTION, SOLUTION SUBCUTANEOUS at 00:00

## 2021-01-01 RX ADMIN — METOPROLOL TARTRATE 5 MG: 1 INJECTION, SOLUTION INTRAVENOUS at 21:17

## 2021-01-01 RX ADMIN — METOPROLOL TARTRATE 50 MG: 50 TABLET, FILM COATED ORAL at 22:21

## 2021-01-01 RX ADMIN — FUROSEMIDE 20 MG: 20 INJECTION, SOLUTION INTRAMUSCULAR; INTRAVENOUS at 09:55

## 2021-01-01 RX ADMIN — HYDRALAZINE HYDROCHLORIDE 25 MG: 25 TABLET, FILM COATED ORAL at 23:04

## 2021-01-01 RX ADMIN — METOPROLOL TARTRATE 50 MG: 50 TABLET, FILM COATED ORAL at 10:56

## 2021-01-01 RX ADMIN — VANCOMYCIN HYDROCHLORIDE 1000 MG: 1 INJECTION, POWDER, LYOPHILIZED, FOR SOLUTION INTRAVENOUS at 01:41

## 2021-01-01 RX ADMIN — Medication 10 ML: at 15:01

## 2021-01-01 RX ADMIN — INSULIN LISPRO 6 UNITS: 100 INJECTION, SOLUTION INTRAVENOUS; SUBCUTANEOUS at 16:36

## 2021-01-01 RX ADMIN — INSULIN LISPRO 6 UNITS: 100 INJECTION, SOLUTION INTRAVENOUS; SUBCUTANEOUS at 13:09

## 2021-01-01 RX ADMIN — WATER 1 G: 1 INJECTION INTRAMUSCULAR; INTRAVENOUS; SUBCUTANEOUS at 03:53

## 2021-01-01 RX ADMIN — INSULIN LISPRO 2 UNITS: 100 INJECTION, SOLUTION INTRAVENOUS; SUBCUTANEOUS at 12:12

## 2021-01-01 RX ADMIN — ENOXAPARIN SODIUM 40 MG: 100 INJECTION SUBCUTANEOUS at 10:56

## 2021-01-01 RX ADMIN — HYDRALAZINE HYDROCHLORIDE 50 MG: 50 TABLET, FILM COATED ORAL at 09:55

## 2021-01-01 RX ADMIN — INSULIN LISPRO 4 UNITS: 100 INJECTION, SOLUTION INTRAVENOUS; SUBCUTANEOUS at 12:01

## 2021-01-01 RX ADMIN — Medication 10 ML: at 15:16

## 2021-01-01 RX ADMIN — AZITHROMYCIN 500 MG: 500 INJECTION, POWDER, LYOPHILIZED, FOR SOLUTION INTRAVENOUS at 23:39

## 2021-01-01 RX ADMIN — WATER 1 G: 1 INJECTION INTRAMUSCULAR; INTRAVENOUS; SUBCUTANEOUS at 04:04

## 2021-01-01 RX ADMIN — GUAIFENESIN 100 MG: 200 SOLUTION ORAL at 20:12

## 2021-01-01 RX ADMIN — INSULIN LISPRO 10 UNITS: 100 INJECTION, SOLUTION INTRAVENOUS; SUBCUTANEOUS at 22:25

## 2021-01-01 RX ADMIN — VANCOMYCIN HYDROCHLORIDE 1000 MG: 1 INJECTION, POWDER, LYOPHILIZED, FOR SOLUTION INTRAVENOUS at 14:59

## 2021-01-01 RX ADMIN — METOPROLOL TARTRATE 50 MG: 50 TABLET, FILM COATED ORAL at 09:55

## 2021-01-01 RX ADMIN — PIPERACILLIN AND TAZOBACTAM 4.5 G: 4; .5 INJECTION, POWDER, FOR SOLUTION INTRAVENOUS at 20:00

## 2021-01-01 RX ADMIN — ENOXAPARIN SODIUM 40 MG: 100 INJECTION SUBCUTANEOUS at 08:32

## 2021-01-02 ENCOUNTER — HOSPITAL ENCOUNTER (OUTPATIENT)
Dept: LAB | Age: 66
Discharge: HOME | End: 2021-01-02

## 2021-01-02 LAB
ANION GAP SERPL CALC-SCNC: 11 MMOL/L (ref 3–18)
BASOPHILS # BLD: 0 K/UL (ref 0–0.1)
BASOPHILS NFR BLD: 0 % (ref 0–2)
BUN SERPL-MCNC: 23 MG/DL (ref 7–18)
BUN/CREAT SERPL: 33 (ref 12–20)
CALCIUM SERPL-MCNC: 8.5 MG/DL (ref 8.5–10.1)
CHLORIDE SERPL-SCNC: 110 MMOL/L (ref 100–111)
CO2 SERPL-SCNC: 22 MMOL/L (ref 21–32)
CREAT SERPL-MCNC: 0.7 MG/DL (ref 0.6–1.3)
DIFFERENTIAL METHOD BLD: ABNORMAL
EOSINOPHIL # BLD: 0.2 K/UL (ref 0–0.4)
EOSINOPHIL NFR BLD: 2 % (ref 0–5)
ERYTHROCYTE [DISTWIDTH] IN BLOOD BY AUTOMATED COUNT: 15.5 % (ref 11.6–14.5)
GLUCOSE SERPL-MCNC: 287 MG/DL (ref 74–99)
HCT VFR BLD AUTO: 38 % (ref 35–45)
HGB BLD-MCNC: 12.2 G/DL (ref 12–16)
LYMPHOCYTES # BLD: 1.4 K/UL (ref 0.9–3.6)
LYMPHOCYTES NFR BLD: 16 % (ref 21–52)
MCH RBC QN AUTO: 29.4 PG (ref 24–34)
MCHC RBC AUTO-ENTMCNC: 32.1 G/DL (ref 31–37)
MCV RBC AUTO: 91.6 FL (ref 74–97)
MONOCYTES # BLD: 0.6 K/UL (ref 0.05–1.2)
MONOCYTES NFR BLD: 7 % (ref 3–10)
NEUTS SEG # BLD: 6.5 K/UL (ref 1.8–8)
NEUTS SEG NFR BLD: 75 % (ref 40–73)
PLATELET # BLD AUTO: 164 K/UL (ref 135–420)
PMV BLD AUTO: 12.6 FL (ref 9.2–11.8)
POTASSIUM SERPL-SCNC: 4.3 MMOL/L (ref 3.5–5.5)
RBC # BLD AUTO: 4.15 M/UL (ref 4.2–5.3)
SODIUM SERPL-SCNC: 143 MMOL/L (ref 136–145)
WBC # BLD AUTO: 8.7 K/UL (ref 4.6–13.2)

## 2021-01-02 PROCEDURE — 80048 BASIC METABOLIC PNL TOTAL CA: CPT

## 2021-01-02 PROCEDURE — 85025 COMPLETE CBC W/AUTO DIFF WBC: CPT

## 2021-10-17 PROBLEM — J18.9 PNEUMONIA: Status: ACTIVE | Noted: 2021-01-01

## 2021-10-17 NOTE — ED TRIAGE NOTES
Pt arrived via medic from home [address] [facility] with shortness of breath.  According to medics pt o2 went down into the 80's and placed on 10L NC

## 2021-10-17 NOTE — ED NOTES
Purposeful rounding completed:     Side rails up x 2:  YES  Bed in low position and wheels locked: YES  Call bell within reach: YES  Comfort addressed: YES    Toileting needs addressed: YES  Plan of care reviewed/updated with patient and or family members: YES  IV site assessed: YES  Pain assessed and addressed: YES, 0     Patient repositioned. Given 2 cranberry juices. No distress observed.

## 2021-10-17 NOTE — ED NOTES
Purposeful rounding completed:     Side rails up x 2:  YES  Bed in low position and wheels locked: YES  Call bell within reach: YES  Comfort addressed: YES    Toileting needs addressed: YES  Plan of care reviewed/updated with patient and or family members: YES  IV site assessed: YES  Pain assessed and addressed: YES, 0    Patient assisted by staff to eat and drink. No distress observed.

## 2021-10-17 NOTE — ED NOTES
Purposeful rounding completed:    Side rails up x 2:  YES  Bed in low position and wheels locked: YES  Call bell within reach: YES  Comfort addressed: YES    Toileting needs addressed: YES  Plan of care reviewed/updated with patient and or family members: YES  IV site assessed: YES  Pain assessed and addressed: YES, Pain 0    Patient is sitting in bed, humming. No distress observed.

## 2021-10-17 NOTE — ED NOTES
Assume care of patient, patient alert and oriented x4, talking to patient with interpretor computer, patient with no complaints at this time, niece at bedside

## 2021-10-17 NOTE — H&P
Hospitalist Admission Note    NAME: Fidelina Hammer   :  1955   MRN:  [id number]     Date:  10/17/2021     Patient PCP: None  ________________________________________________________________________    My assessment of this patient's clinical condition and my plan of care is as follows. Assessment / Plan:  1. Suspected pneumonia  2. DM2 with hyperglycemia  3. Hypertension  4. COVID-19 negative by rapid testing  5. 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 Admit to medical bed. 2. Continue antibiotic therapy, follow cultures. 3. SSI per protocol, may benefit from initiation of long acting insulin depending on sugars. Check A1c.  4. PT/OT, mobilize as tolerated. 5. Anticipate return to long-term care facility once medically stable. 6. Plan pending  response to current treatment and overall clinical course. Code Status: Full  DVT Prophylaxis: Lovenox          Subjective:   CHIEF COMPLAINT: Shortness of breath and hypoxia    HISTORY OF PRESENT ILLNESS:     Fidelina Hammer is a 61 y.o.  female who presented to the ED from a local long-term care facility for evaluation of shortness of breath and hypoxia. Patient has a history of traumatic brain injury and is a limited historian. Per report, patient was noted to be hypoxic at the facility prior to presentation with oxygen sats in the mid 80% range with associated shortness of breath. Patient was placed on oxygen upon arrival of EMS to the facility and oxygen saturations improved to normal range. On arrival to the ED, CXR was concerning for pneumonia patient has been referred for admission for further evaluation and treatment.         Past medical history:  History of seizures  DM2  Obesity  Hypertension     Past surgical history:  Unable to obtain    Social History     Tobacco Use    Smoking status: Former Smoker    Smokeless tobacco: Never Used   Substance Use Topics    Alcohol use: Not on file        Family history:  Unable to obtain    No Known Allergies     Prior to Admission medications    Medication Sig Start Date End Date Taking? Authorizing Provider   acetaminophen (Tylenol Extra Strength) 500 mg tablet Take 2 Tabs by mouth every six (6) hours as needed for Pain. 12/26/20   LATASHA Gustafson   insulin glargine (LANTUS) 100 unit/mL injection 23 Units by SubCUTAneous route daily. 12/12/17   Isabel Mckee MD   metoprolol tartrate (LOPRESSOR) 50 mg tablet Take 1 Tab by mouth every twelve (12) hours. 12/12/17   Isabel Mckee MD   levoFLOXacin (LEVAQUIN) 750 mg tablet Take 1 Tab by mouth daily. 12/12/17   Isabel Mckee MD       REVIEW OF SYSTEMS:     I am not able to complete the review of systems because: The patient is intubated and sedated    The patient has altered mental status due to her acute medical problems    The patient has baseline aphasia from prior stroke(s)   X The patient has intellectual disability and is not reliable historian    The patient is in acute medical distress and unable to provide information           Objective:   VITALS:    Visit Vitals  BP (!) 165/103   Pulse (!) 103   Resp 28   SpO2 100%       PHYSICAL EXAM:    General:    In NAD. Nontoxic-appearing. HEENT: Head NCAT. Pupils equal round sclerae anicteric. Neck:  Supple, trachea midline. Lungs:   Clear, no wheezes. Effort nonlabored, no accessory muscle use. Chest wall:  No chest wall tenderness. Chest expansion equal and symmetrical bilaterally. Heart:   RRR. Abdomen:   Soft, NT/ND. Bowel sounds normoactive. Extremities: Warm, no edema. No evidence for ischemia. Skin:     Not pale. Not Jaundiced  No rashes   Psych:  Mood normal.  Calm, no agitation. Neurologic: Awake, moves extremities spontaneously.       _______________________________________________________________________    Expected  Disposition:   Home     HH/PT/OT/RN    SNF/LTC X   ARU ________________________________________________________________________      Tests/Procedures:   CXR:  Formal radiology read pending. LAB DATA REVIEWED:    Recent Results (from the past 24 hour(s))   CBC WITH AUTOMATED DIFF    Collection Time: 10/17/21  2:38 AM   Result Value Ref Range    WBC 14.4 (H) 4.6 - 13.2 K/uL    RBC 5.14 4.20 - 5.30 M/uL    HGB 15.4 12.0 - 16.0 g/dL    HCT 47.0 (H) 35.0 - 45.0 %    MCV 91.4 78.0 - 100.0 FL    MCH 30.0 24.0 - 34.0 PG    MCHC 32.8 31.0 - 37.0 g/dL    RDW 14.6 (H) 11.6 - 14.5 %    PLATELET 709 841 - 721 K/uL    MPV 10.9 9.2 - 11.8 FL    NEUTROPHILS 88 (H) 40 - 73 %    LYMPHOCYTES 6 (L) 21 - 52 %    MONOCYTES 5 3 - 10 %    EOSINOPHILS 0 0 - 5 %    BASOPHILS 0 0 - 2 %    ABS. NEUTROPHILS 12.6 (H) 1.8 - 8.0 K/UL    ABS. LYMPHOCYTES 0.9 0.9 - 3.6 K/UL    ABS. MONOCYTES 0.7 0.05 - 1.2 K/UL    ABS. EOSINOPHILS 0.0 0.0 - 0.4 K/UL    ABS. BASOPHILS 0.1 0.0 - 0.1 K/UL    DF AUTOMATED     METABOLIC PANEL, COMPREHENSIVE    Collection Time: 10/17/21  2:38 AM   Result Value Ref Range    Sodium 140 136 - 145 mmol/L    Potassium 4.2 3.5 - 5.5 mmol/L    Chloride 109 100 - 111 mmol/L    CO2 28 21 - 32 mmol/L    Anion gap 3 3.0 - 18 mmol/L    Glucose 210 (H) 74 - 99 mg/dL    BUN 21 (H) 7.0 - 18 MG/DL    Creatinine 0.81 0.6 - 1.3 MG/DL    BUN/Creatinine ratio 26 (H) 12 - 20      GFR est AA >60 >60 ml/min/1.73m2    GFR est non-AA >60 >60 ml/min/1.73m2    Calcium 8.2 (L) 8.5 - 10.1 MG/DL    Bilirubin, total 0.4 0.2 - 1.0 MG/DL    ALT (SGPT) 23 13 - 56 U/L    AST (SGOT) 13 10 - 38 U/L    Alk.  phosphatase 88 45 - 117 U/L    Protein, total 7.7 6.4 - 8.2 g/dL    Albumin 3.3 (L) 3.4 - 5.0 g/dL    Globulin 4.4 (H) 2.0 - 4.0 g/dL    A-G Ratio 0.8 0.8 - 1.7     TROPONIN I    Collection Time: 10/17/21  2:38 AM   Result Value Ref Range    Troponin-I, QT <0.02 0.0 - 0.045 NG/ML   LACTIC ACID    Collection Time: 10/17/21  2:38 AM   Result Value Ref Range    Lactic acid 1.5 0.4 - 2.0 MMOL/L   COVID-19 RAPID TEST    Collection Time: 10/17/21  2:40 AM   Result Value Ref Range    Specimen source Nasopharyngeal      COVID-19 rapid test Not detected NOTD         Prieto Brower MD  [address] [place] [address]      Disclaimer: Sections of this note are dictated utilizing voice recognition software and minor typographical errors may be present. If questions arise, please do not hesitate to contact me or call our department.

## 2021-10-17 NOTE — ED PROVIDER NOTES
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 Patient presents today with primary complaint of sudden onset of difficulty breathing, patient is a resident at a local nursing facility and then noted approximately 1 hour prior to presentation to the emergency department patient developed sudden onset of difficulty breathing with initial oxygen saturations of approximately 80% on room air patient was placed on 15 L via nonrebreather by EMS and his sats immediately improved to 100%. No reported fever/chills, abdominal pain, nausea/vomiting. Due to patient's intellectual disability and previous brain injury communication is somewhat limited history was obtained primarily via chart review and from EMS. No past medical history on file. No past surgical history on file. No family history on file. Social History     Socioeconomic History    Marital status: SINGLE     Spouse name: Not on file    Number of children: Not on file    Years of education: Not on file    Highest education level: Not on file   Occupational History    Not on file   Tobacco Use    Smoking status: Former Smoker    Smokeless tobacco: Never Used   Substance and Sexual Activity    Alcohol use: Not on file    Drug use: Not on file    Sexual activity: Not on file   Other Topics Concern    Not on file   Social History Narrative    Not on file     Social Determinants of Health     Financial Resource Strain:     Difficulty of Paying Living Expenses:    Food Insecurity:     Worried About [address] in the Last Year:     [address] in the Last Year:    Transportation Needs:     Lack of Transportation (Medical):      Lack of Transportation (Non-Medical):    Physical Activity:     Days of Exercise per Week:     Minutes of Exercise per Session:    Stress:     Feeling of Stress :    Social Connections:     Frequency of Communication with Friends and Family:     Frequency of Social Gatherings with Friends and Family:     Attends Hoahaoism Services:     Active Member of Clubs or Organizations:     Attends Club or Organization Meetings:     Marital Status:    Intimate Partner Violence:     Fear of Current or Ex-Partner:     Emotionally Abused:     Physically Abused:     Sexually Abused: ALLERGIES: Patient has no known allergies. Review of Systems   Unable to perform ROS: Mental status change       Vitals:    10/17/21 0215 10/17/21 0430   BP: (!) 229/93 (!) 165/103   Pulse: (!) 105 (!) 103   Resp: 22 28   SpO2: 96% 100%            Physical Exam  Constitutional:       General: She is not in acute distress. Appearance: She is not ill-appearing, toxic-appearing or diaphoretic. HENT:      Head: Normocephalic and atraumatic. Right Ear: External ear normal.      Left Ear: External ear normal.      Nose: No congestion or rhinorrhea. Mouth/Throat:      Mouth: Mucous membranes are moist.      Pharynx: No oropharyngeal exudate or posterior oropharyngeal erythema. Eyes:      General:         Right eye: No discharge. Left eye: No discharge. Pupils: Pupils are equal, round, and reactive to light. Neck:      Vascular: No carotid bruit. Cardiovascular:      Rate and Rhythm: Normal rate and regular rhythm. Heart sounds: No murmur heard. No friction rub. No gallop. Pulmonary:      Effort: Pulmonary effort is normal. Tachypnea present. No accessory muscle usage or respiratory distress. Breath sounds: No stridor. Examination of the right-upper field reveals rhonchi. Examination of the left-upper field reveals rhonchi. Examination of the right-middle field reveals rhonchi. Examination of the left-middle field reveals rhonchi. Examination of the right-lower field reveals rhonchi. Examination of the left-lower field reveals rhonchi. Rhonchi present.  No decreased breath sounds, wheezing or rales. Chest:      Chest wall: No tenderness. Abdominal:      General: Abdomen is flat. There is no distension. Tenderness: There is no right CVA tenderness, left CVA tenderness, guarding or rebound. Musculoskeletal:         General: No swelling, tenderness, deformity or signs of injury. Cervical back: No rigidity or tenderness. Right lower leg: No edema. Left lower leg: No edema. Lymphadenopathy:      Cervical: No cervical adenopathy. Skin:     General: Skin is warm. Capillary Refill: Capillary refill takes less than 2 seconds. Coloration: Skin is not jaundiced or pale. Findings: No bruising, erythema, lesion or rash. Neurological:      General: No focal deficit present. Mental Status: She is alert and oriented to person, place, and time. Sensory: No sensory deficit. Motor: No weakness. Psychiatric:         Mood and Affect: Mood normal.          [facility]  ED Course as of Oct 17 0542   Paula Bernabe Oct 17, 2021   0208 New bilateral infiltrate appreciated on chest x-ray have ordered Covid testing. XR CHEST PORT [JK]   3316 COVID-19 rapid test: Not detected [JK]   0541 Treatment initiated for community-acquired pneumonia, case was discussed with hospitalist team given patient's hypoxia will be admitted to their service for IV antibiotics and close monitoring.     [JK]      ED Course User Index  [JK] Susan Padron MD       Procedures Quality 130: Documentation Of Current Medications In The Medical Record: Current Medications Documented Quality 110: Preventive Care And Screening: Influenza Immunization: Influenza immunization was not ordered or administered, reason not given Detail Level: Detailed Quality 431: Preventive Care And Screening: Unhealthy Alcohol Use - Screening: Patient screened for unhealthy alcohol use using a single question and scores less than 2 times per year Quality 226: Preventive Care And Screening: Tobacco Use: Screening And Cessation Intervention: Patient screened for tobacco and is a smoker AND received Cessation Counseling

## 2021-10-17 NOTE — PROGRESS NOTES
conducted an initial consultation and Spiritual Assessment for [facility], who is a 61 y.o.,female. Patients Primary Language is: Chinese. According to the patients EMR Scientologist Affiliation is: No Restorationism. The reason the Patient came to the hospital is:   Patient Active Problem List    Diagnosis Date Noted    Pneumonia 10/17/2021    UTI (urinary tract infection) 12/08/2017        The  provided the following Interventions:  Initiated a relationship of care and support. Offered prayer and assurance of continued prayers on patient's behalf. Chart reviewed. Assessment:  Patient appeared to relax more after praying with her. No family at bedside. Patient does not have any Pentecostalism/cultural needs that will affect patients preferences in health care. Plan:  Chaplains will continue to follow and will provide pastoral care on an as needed/requested basis.  recommends bedside caregivers page  on duty if patient shows signs of acute spiritual or emotional distress.     [address]  [phone])

## 2021-10-17 NOTE — ED NOTES
Purposeful rounding completed:     Side rails up x 2:  YES  Bed in low position and wheels locked: YES  Call bell within reach: YES  Comfort addressed: YES    Toileting needs addressed: YES  Plan of care reviewed/updated with patient and or family members: YES  IV site assessed: YES  Pain assessed and addressed: YES, 0      Patient lying in bed quietly. No distress observed.

## 2021-10-17 NOTE — PROGRESS NOTES
Progress Note         Patient: Dirk Israel MRN: [id number]  CSN: [id number]    YOB: 1955  Age: 61 y.o. Sex: female    DOA: 10/17/2021 LOS:  LOS: 0 days                    Subjective:     Dirk Israel is a 61 y.o. Uzbek-speaking female with a PMHx of stroke with residual R sided hemiparesis and aphasia, seizures, HTN, type II DM and obesity who is admitted for hypertensive urgency, acute hypoxic respiratory failure, and sepsis secondary to bilateral community-acquired/atypical pneumonia vs HCAP. Seen in ED today  Lying in bed, NAD  Speaks only Uzbek, reportedly speaks very little   Appears alert, is able to say yes and nod head yes or no in response to some questions  Still with significant tachypnea  SPO2 in the upper 90s on 4 L/min    Discussed with pt's guardian and niece over the phone   Patient has no history of TBI, she had a major stroke approximately 15 years ago resulting in residual right-sided hemiparesis and aphasia, dysarthria  She lives at [facility], not in a group home      Objective:     Physical Exam:  Visit Vitals  BP (!) 151/64   Pulse 83   Resp 27   Ht 4' 11\" (1.499 m)   Wt 73 kg (161 lb)   SpO2 98%   BMI 32.52 kg/m²        General:         Alert, cooperative, no acute distress, able to follow commands    HEENT: NC, Atraumatic. Anicteric sclerae. Lungs: CTA Bilaterally. No Wheezing/Rhonchi/Rales. Heart:  Regular  rhythm,  No murmur, No Rubs, No Gallops  Abdomen: Soft, Non distended, Non tender. +Bowel sounds, no HSM  Extremities: No c/c/e  Psych:   Not anxious or agitated. Neurologic:  Alert and oriented X 3.   Right-sided hemiparesis    Intake and Output:  Current Shift:  10/17 0701 - 10/17 1900  In: 120 [P.O.:120]  Out: -   Last three shifts:  10/15 1901 - 10/17 0700  In: 250 [I.V.:250]  Out: -     Labs: Results:       Chemistry Recent Labs     10/17/21  0238   *      K 4.2      CO2 28   BUN 21*   CREA 0.81   CA 8.2*   AGAP 3   BUCR 26* AP 88   TP 7.7   ALB 3.3*   GLOB 4.4*   AGRAT 0.8      CBC w/Diff Recent Labs     10/17/21  0238   WBC 14.4*   RBC 5.14   HGB 15.4   HCT 47.0*      GRANS 88*   LYMPH 6*   EOS 0      Cardiac Enzymes No results for input(s): CPK, CKND1, DOMINGO in the last 72 hours. No lab exists for component: CKRMB, TROIP   Coagulation No results for input(s): PTP, INR, APTT, INREXT in the last 72 hours. Lipid Panel No results found for: CHOL, CHOLPOCT, CHOLX, CHLST, CHOLV, 302241, HDL, HDLP, LDL, LDLC, DLDLP, 455350, VLDLC, VLDL, TGLX, TRIGL, TRIGP, TGLPOCT, CHHD, CHHDX   BNP No results for input(s): BNPP in the last 72 hours. Liver Enzymes Recent Labs     10/17/21  0238   TP 7.7   ALB 3.3*   AP 88      Thyroid Studies No results found for: T4, T3U, TSH, TSHEXT             Assessment and Plan:     Salbador Gaspar is a 61 y.o. Kazakh-speaking female with a PMHx of stroke with residual R sided hemiparesis and aphasia and dysarthria, seizures, HTN, type II DM and obesity who is admitted for hypertensive urgency, acute hypoxic respiratory failure, and sepsis secondary to bilateral community-acquired/atypical pneumonia. 1. Hypertensive urgencyresolved  2. Acute hypoxic respiratory failure  3. SepsisPOA, with tachycardia, tachypnea and leukocytosis with a left shift  4. Bilateral community-acquired/atypical pneumonia vs HCAP   5. Rhinovirus and enterovirus infections  6. Hx stroke with right-sided hemiparesis and aphasia, dysarthria  7. Seizure disorder  8. HTN  9. Type II DMHbA1c 5.4 on 10/17/2021  10. Obesity    Bio fire respiratory virus panel with Covid19 negative for Covid, positive for rhinovirus and enterovirus  Continue IV ABXazithromycin and ceftriaxone  Follow-up blood cultures  Restart home metoprolol, add IV hydralazine as needed  Continue SSI with Accu-Cheks  Hold home lantus until med-rec completed   Follow-up CBC, BMP  SLP eval  PT, OT     Walking/home O2 trial prior to discharge.      Discussed with patient's guardian and niece over the phone. Case discussed with:  [x]Patient  [x]Family  []Nursing  []Case Management  DVT prophylaxis: Lovenox  Diet: Cardiac  Contact: Emmanuel Segura (guardian, niece)             [phone]  Code Status: Full  Disposition: Continue current care, greater than 2 nights      H. John Terry DO  10/17/2021       Dragon medical dictation software was used for portions of this report. Unintended errors may occur.

## 2021-10-17 NOTE — Clinical Note
Status[de-identified] INPATIENT [101]   Type of Bed: Medical [8]   Cardiac Monitoring Required?: No   Inpatient Hospitalization Certified Necessary for the Following Reasons: 3.  Patient receiving treatment that can only be provided in an inpatient setting (further clarification in H&P documentation)   Admitting Diagnosis: Pneumonia [091214]   Admitting Physician: Katey Angelo [[id number]]   Attending Physician: Katey Angelo [[id number]]   Estimated Length of Stay: 3-4 Midnights   Discharge Plan[de-identified] Extended Care Facility (e.g. Adult Home, Nursing Home, etc.)

## 2021-10-17 NOTE — ED NOTES
Purposeful rounding completed:     Side rails up x 2:  YES  Bed in low position and wheels locked: YES  Call bell within reach: YES  Comfort addressed: YES    Toileting needs addressed: YES  Plan of care reviewed/updated with patient and or family members: YES  IV site assessed: YES  Pain assessed and addressed: YES, 0       Patient lying in bed talking and humming. No distress observed.

## 2021-10-18 NOTE — PROGRESS NOTES
Problem: Pressure Injury - Risk of  Goal: *Prevention of pressure injury  Description: Document Ankit Scale and appropriate interventions in the flowsheet. Outcome: Progressing Towards Goal  Note: Pressure Injury Interventions: Activity Interventions: Pressure redistribution bed/mattress(bed type)    Mobility Interventions: Pressure redistribution bed/mattress (bed type), PT/OT evaluation, Turn and reposition approx. every two hours(pillow and wedges)    Nutrition Interventions: Document food/fluid/supplement intake                     Problem: Patient Education: Go to Patient Education Activity  Goal: Patient/Family Education  Outcome: Progressing Towards Goal     Problem: Pain  Goal: *Control of Pain  Outcome: Progressing Towards Goal  Goal: *PALLIATIVE CARE:  Alleviation of Pain  Outcome: Progressing Towards Goal     Problem: Patient Education: Go to Patient Education Activity  Goal: Patient/Family Education  Outcome: Progressing Towards Goal     Problem: Falls - Risk of  Goal: *Absence of Falls  Description: Document Diana Fall Risk and appropriate interventions in the flowsheet.   Outcome: Progressing Towards Goal  Note: Fall Risk Interventions:            Medication Interventions: Bed/chair exit alarm    Elimination Interventions: Call light in reach, Bed/chair exit alarm              Problem: Patient Education: Go to Patient Education Activity  Goal: Patient/Family Education  Outcome: Progressing Towards Goal     Problem: Pneumonia: Day 1  Goal: Off Pathway (Use only if patient is Off Pathway)  Outcome: Progressing Towards Goal  Goal: Activity/Safety  Outcome: Progressing Towards Goal  Goal: Consults, if ordered  Outcome: Progressing Towards Goal  Goal: Diagnostic Test/Procedures  Outcome: Progressing Towards Goal  Goal: Nutrition/Diet  Outcome: Progressing Towards Goal  Goal: Medications  Outcome: Progressing Towards Goal  Goal: Respiratory  Outcome: Progressing Towards Goal  Goal: Treatments/Interventions/Procedures  Outcome: Progressing Towards Goal  Goal: Psychosocial  Outcome: Progressing Towards Goal  Goal: *Oxygen saturation within defined limits  Outcome: Progressing Towards Goal  Goal: *Influenza vaccine administered (October-March)  Outcome: Progressing Towards Goal  Goal: *Pneumoccocal vaccine administered  Outcome: Progressing Towards Goal  Goal: *Hemodynamically stable  Outcome: Progressing Towards Goal  Goal: *Demonstrates progressive activity  Outcome: Progressing Towards Goal  Goal: *Tolerating diet  Outcome: Progressing Towards Goal

## 2021-10-18 NOTE — PROGRESS NOTES
Problem: Mobility Impaired (Adult and Pediatric)  Goal: *Acute Goals and Plan of Care (Insert Text)  Outcome: Resolved/Met   PHYSICAL THERAPY EVALUATION AND DISCHARGE    Patient: Coby Justice (64 y.o. female)  Date: 10/18/2021  Primary Diagnosis: Pneumonia [J18.9]        Precautions:   Fall    PLOF: Unsure of PLOF, pt Sinhala speaking with history of stroke with aphasia, unable to communicate with interpretor. Pt from [address] [place] [address],[address] per EMR. ASSESSMENT :  Pt demonstrating  NO active movement of RLE/RUE 2/2 previous stroke. Using interpretor Petrona Wolfe 139, [id number]. Pt unable to speak with interpretor possibly due to aphasia, interpretor asking if pt spoke another language. Pt able to follow demo cues for LUE/LLE movement at bed level. With demo for supine to sit pt becoming anxious and shaking head no to EOB mobility. Pt no appropriate for skilled PT at this time. Per EMR from LTC, would benefit from return to facility. Pt left with all needs met and call bell in reach. Patient does not require further skilled intervention at this level of care. PLAN :  Recommendations and Planned Interventions:   No formal PT needs identified at this time. Discharge Recommendations: Return to LTC   Further Equipment Recommendations for Discharge: hospital bed, lift, wheelchair      SUBJECTIVE:   Pt able to say \"yes' and \"no\" but unsure of validity of answers    OBJECTIVE DATA SUMMARY:   No past medical history on file. No past surgical history on file.   Barriers to Learning/Limitations: yes;  cultural  and altered mental status (i.e.Sedation, Confusion)  Compensate with: N/A  Home Situation:   Home Situation  Home Environment: [address] [place] [address] Name: [address] [place] [address],[address]  One/Two Story Residence: One story  Support Systems: [place] [address]  Patient Expects to be Discharged to[de-identified] Skilled nursing facility  Current DME Used/Available at Home: Hospital bed  Critical Behavior:  Neurologic State: Alert  Orientation Level: Unable to verbalize (via [place]  )  Cognition: Decreased command following (to demo)  Safety/Judgement: Fall prevention  Psychosocial  Patient Behaviors: Calm  Purposeful Interaction: No (comment)  Skin Condition/Temp: Dry     Skin Integrity: Excoriation  Skin Integumentary  Skin Color: Appropriate for ethnicity  Skin Condition/Temp: Dry  Skin Integrity: Excoriation     Strength:    Strength: Grossly decreased, non-functional (RLE, LLE 3.5 )    Tone & Sensation:   Tone: Abnormal        Range Of Motion:  AROM: Grossly decreased, non-functional (RLE, LLE WNL )    PROM: Grossly decreased, non-functional (RLE, reporting pain, LLE WNL )    Functional Mobility:  Bed Mobility:      Attempted demo of supine to sit and requesting pt sit on EOB and pt declining and shaking head becoming anxious. Deferred mobility. Unsure of true PLOF    Pain:  Pain level pre-treatment: 0/10   Pain level post-treatment: 0/10  FLACC     Activity Tolerance:   Poor   Please refer to the flowsheet for vital signs taken during this treatment. After treatment:   []         Patient left in no apparent distress sitting up in chair  [x]         Patient left in no apparent distress in bed  [x]         Call bell left within reach  [x]         Nursing notified  []         Caregiver present  []         Bed alarm activated  []         SCDs applied    COMMUNICATION/EDUCATION:   [x]         Role of Physical Therapy in the acute care setting. [x]         Fall prevention education was provided and the patient/caregiver indicated understanding. []         Patient/family have participated as able in goal setting and plan of care. []         Patient/family agree to work toward stated goals and plan of care. []         Patient understands intent and goals of therapy, but is neutral about his/her participation. []         Patient is unable to participate in goal setting/plan of care: ongoing with therapy staff.  []         Other:     Thank you for this referral.  Isabela Galeano, PT   Time Calculation: 20 mins      Eval Complexity: History: MEDIUM  Complexity : 1-2 comorbidities / personal factors will impact the outcome/ POC Exam:MEDIUM Complexity : 3 Standardized tests and measures addressing body structure, function, activity limitation and / or participation in recreation  Presentation: MEDIUM Complexity : Evolving with changing characteristics  Clinical Decision Making:Medium Complexity mod  Overall Complexity:MEDIUM

## 2021-10-18 NOTE — PROGRESS NOTES
Problem: Dysphagia (Adult)  Goal: *Acute Goals and Plan of Care (Insert Text)  Description: Patient will:  1. Tolerate PO trials with 0 s/s overt distress in 4/5 trials-met    Recommend:   Regular diet with thin liquids  Meds per pt preference   General safe swallow precautions     Outcome: Resolved/Met    Speech LAnguage Pathology bedside swallow evaluation AND DISCHARGE    Patient: Serge Casiano (64 y.o. female)  Date: 10/18/2021  Primary Diagnosis: Pneumonia [J18.9]  Precautions: Aspiration,  Fall    ASSESSMENT :  Clinical beside swallow eval completed per MD orders. Pt with hx of aphasia and dysarthria from previous stroke per chart review. Pt stating yes several times during evaluation, otherwise, nonverbal.  Attempt at utilizing interpretor unsuccessful as pt appears to have impaired receptive language (baseline). Oral mech examination revealed structures functional for speech and deglutition. Pt observed feeding self thin liquids + straw and regular solid cracker. Demo adequate mastication with positive oral clearance. Timely swallow initiation and adequate laryngeal elevation to palpation noted with thin liquids + straw. No overt s/sx aspiration noted across intake. Safe for continuation of regular/thin liquid diet. Will sign off as no further skilled SLP services indicated at this time. Available for MBS if silent aspiration a concern. Please re-consult as indicated. D/w nursing. PLAN :  Recommendations and Planned Interventions:  No formal ST needs ID'd for dysphagia. Eval only. Discharge Recommendations: None     SUBJECTIVE:   Patient stated yes. OBJECTIVE:   No past medical history on file. No past surgical history on file.   Prior Level of Function/Home Situation:  Home Situation  Home Environment: [address] [place] [address] Name: [address] [place] [address],[address]  One/Two Story Residence: One story  Support Systems: [place] [address]  Patient Expects to be Discharged to[de-identified] Skilled nursing facility  Current DME Used/Available at Home: Hospital bed  Diet prior to admission: unknown   Current Diet:  Regular/thin liquids    Cognitive and Communication Status:  Neurologic State: Alert  Orientation Level: Unable to verbalize (via [place]  )  Cognition: Decreased command following (to demo)  Perception: Appears intact  Perseveration: No perseveration noted  Safety/Judgement: Fall prevention  Oral Assessment:  Oral Assessment  Labial: No impairment  Dentition: Intact  Oral Hygiene: Good  Lingual: No impairment  Velum: No impairment  Mandible: No impairment  P.O. Trials:  Patient Position: [facility] at [facility]  Vocal quality prior to P.O.: No impairment  Consistency Presented: Thin liquid; Solid  How Presented: Self-fed/presented;Cup/sip; Successive swallows;Straw;Spoon  Bolus Acceptance: No impairment  Bolus Formation/Control: No impairment  Propulsion: No impairment  Oral Residue: None  Initiation of Swallow: No impairment  Laryngeal Elevation: Functional  Aspiration Signs/Symptoms: None  Pharyngeal Phase Characteristics: No impairment, issues, or problems   Effective Modifications: None  Oral Phase Severity: No impairment  Pharyngeal Phase Severity : No impairment    The severity rating is based on the following outcomes:    Clinical judgment    Pain:  Unable to report     After treatment:   []            Patient left in no apparent distress sitting up in chair  [x]            Patient left in no apparent distress in bed  [x]            Call bell left within reach  [x]            Nursing notified  []            Caregiver present  []            Bed alarm activated    COMMUNICATION/EDUCATION:   [x]            SLP educated pt with regard to aspiration s/sx, diet recs; unable to verbalize understanding.      Thank you for this referral,   Caroline Rodriguez M.S., [id number] [facility]  Speech-Language Pathologist

## 2021-10-18 NOTE — PROGRESS NOTES
Problem: Patient Education: Go to Patient Education Activity  Goal: Patient/Family Education  Outcome: Resolved/Met   OCCUPATIONAL THERAPY EVALUATION/DISCHARGE    Patient: Ines Bailey (64 y.o. female)  Date: 10/18/2021  Primary Diagnosis: Pneumonia [J18.9]        Precautions:   Fall  PLOF: per medical chart and CM: pt is a LTC resident of [address] [place] [address] and requires assist with ADLs    ASSESSMENT AND RECOMMENDATIONS:  Nursing/RN cleared for pt to participate in OT evaluation and tx session. Greenlandic integrator utilized to increase participation with poor carryover. Pt unable to verbalized pain, orientation of date, place or situation, OTR re-orientated pt. Pt appears confused as evidenced by answering \"yes\" repetitively. Pt with hx of right hemiplegia with noted contracture of wrist and digits, poor tolerance for PROM. Pt able to hold cup of water and bring to mouth to drink w/ SBA following set up. Call bell within reach and bed alarm intact. Skilled occupational therapy is not indicated at this time. Discharge Recommendations: [facility]  Further Equipment Recommendations for Discharge: hospital bed      SUBJECTIVE:   Patient stated Yes.     OBJECTIVE DATA SUMMARY:   No past medical history on file. No past surgical history on file.   Barriers to Learning/Limitations: yes;  cognitive, sensory deficits-vision/hearing/speech, physical, and altered mental status (i.e.Sedation, Confusion)  Compensate with: visual, verbal, tactile, kinesthetic cues/model    Home Situation:   Home Situation  Home Environment: [address] [place] [address] Name: [facility]  One/Two Story Residence: One story  Support Systems: [place]  Patient Expects to be Discharged to[de-identified] Skilled nursing facility  Current DME Used/Available at Home: Hospital bed  []     Right hand dominant   []     Left hand dominant    Cognitive/Behavioral Status:  Neurologic State: Alert  Orientation Level: Unable to verbalize (via  )  Cognition: No command following  Safety/Judgement: Fall prevention    Skin: appears intact  Edema: none noted    Vision/Perceptual:  unable to assess      Coordination: BUE  Coordination:  (RUE impaired, LUE WFL )  Fine Motor Skills-Upper: Right Impaired;Left Intact    Gross Motor Skills-Upper: Right Impaired;Left Intact    Strength: BUE  Strength: Grossly decreased, non-functional     Tone & Sensation: BUE  Tone:  (RUE abnormal - spastic, LUE normal)       Range of Motion: BUE  AROM:  (RUE impaired, LUE WFL )  PROM:  (RUE impaired)       ADL Assessment:  Feeding: Stand-by assistance;Setup    Oral Facial Hygiene/Grooming: Total assistance    Bathing: Total assistance    Upper Body Dressing: Total assistance    Lower Body Dressing: Total assistance    Toileting: Total assistance    ADL Intervention:  Feeding  Drink to Mouth: Stand-by assistance;Set-up    Cognitive Retraining  Safety/Judgement: Fall prevention    Pain:  Pain level pre-treatment: 0/10   Pain level post-treatment: 0/10     Activity Tolerance:   poor  Please refer to the flowsheet for vital signs taken during this treatment. After treatment:   []  Patient left in no apparent distress sitting up in chair  [x]  Patient left in no apparent distress in bed  [x]  Call bell left within reach  [x]  Nursing notified  []  Caregiver present  []  Bed alarm activated    COMMUNICATION/EDUCATION:   [x]      Role of Occupational Therapy in the acute care setting  []      Home safety education was provided and the patient/caregiver indicated understanding. []      Patient/family have participated as able and agree with findings and recommendations. [x]      Patient is unable to participate in plan of care at this time.     Thank you for this referral.  [place] Mullen  Time Calculation: 14 mins      Eval Complexity: History: MEDIUM Complexity : Expanded review of history including physical, cognitive and psychosocial  history ; Examination: MEDIUM Complexity : 3-5 performance deficits relating to physical, cognitive , or psychosocial skils that result in activity limitations and / or participation restrictions; Decision Making:MEDIUM Complexity : Patient may present with comorbidities that affect occupational performnce.  Miniml to moderate modification of tasks or assistance (eg, physical or verbal ) with assesment(s) is necessary to enable patient to complete evaluation

## 2021-10-18 NOTE — PROGRESS NOTES
DEEP spoke with patient's niece Abbey Andrea [phone], and she is agreeable for patient to return to LTC at [address] [place] [address], when medically clear for discharge. DEEP called and spoke with Mayi Oneal with [facility], [address] [place] [address], and she said patient can return to LTC at [address] [place] [address] at time of discharge. DEEP uploaded patient and clinicals to [address] [place] [address] and sent booking request to [address] [place] [address].          Narendra Lawson RN  Case Management [phone]

## 2021-10-18 NOTE — PROGRESS NOTES
Reason for Admission:  Pneumonia [J18.9]                 RUR Score:    11%            Plan for utilizing home health:    No, patient is a [facility] resident of [address] [place] [address]. Likelihood of Readmission:   LOW                         Transition of Care Plan:              Initial assessment completed with relative(s), patient's niece, Magdalena De Dios. Cognitive status of patient: Unable to assess at this time. Face sheet information confirmed:  yes. The patient's niece Magdalena De Dios [phone] agrees to participate in her discharge plan and to receive any needed information. This patient reside at [address] [place] [address] as a [facility] patient. Patient is not able to navigate steps as needed. Prior to hospitalization, patient was considered to be independent with ADLs/IADLS : no . If not independent,  patient needs assist with : dressing, bathing, food preparation, cooking, toileting and grooming    Patient has a current ACP document on file: no.      Healthcare Decision Maker:     Click here to complete [address] including selection of the [address] Relationship (ie \"Primary\")    Medical transport will need to be available to transport patient back to [facility] upon discharge. The patient already has all needed medical equipment available in the LTC facility. Patient is not currently active with home health. Patient has stayed in a skilled nursing facility or rehab. Was  stay within last 60 days : yes. This patient is on dialysis :no.      List of available SNF agencies were provided and reviewed with the patient prior to discharge. [place] of choice verbal consent given: yes, for [address] [place] [address]. Currently, the discharge plan is LTC.         The patient states that she can obtain her medications from the pharmacy, and take her medications as directed. Patient's current insurance is [facility] Medicaid/[employer] Better [employer].       Care Management Interventions  PCP Verified by CM: No (Patient is a long term care resident at [address] [place] [address])  Mode of Transport at Discharge: BLS  Transition of Care Consult (CM Consult): Long Term Care  Discharge Durable Medical Equipment: No  Physical Therapy Consult: Yes  Occupational Therapy Consult: Yes  Speech Therapy Consult: Yes  Support Systems: [place]  Confirm Follow Up Transport: Other (see comment) (Medical transport)  The Plan for Transition of Care is Related to the Following Treatment Goals : [address]   The Patient and/or Patient Representative was Provided with a Choice of Provider and Agrees with the Discharge Plan?: Yes  Name of the Patient Representative Who was Provided with a Choice of Provider and Agrees with the Discharge Plan: Todd Lyferdinand (Patient's Niece)  [place] of Choice List was Provided with Basic Dialogue that Supports the Patient's Individualized Plan of Care/Goals, Treatment Preferences and Shares the Quality Data Associated with the Providers?: Yes  Discharge Location  Discharge Placement: [address] RN  Case Management [phone]

## 2021-10-18 NOTE — ROUTINE PROCESS
Bedside and Verbal shift change report given to [facility] (oncoming nurse) by Yelitza Burt RN   (offgoing nurse). Report included the following information SBAR, Kardex, Intake/Output, MAR and Recent Results.

## 2021-10-19 NOTE — PROGRESS NOTES
Problem: Pressure Injury - Risk of  Goal: *Prevention of pressure injury  Description: Document Ankit Scale and appropriate interventions in the flowsheet.   Outcome: Progressing Towards Goal  Note: Pressure Injury Interventions:  Sensory Interventions: Assess changes in LOC, Avoid rigorous massage over bony prominences, Check visual cues for pain, Keep linens dry and wrinkle-free    Moisture Interventions: Absorbent underpads, Apply protective barrier, creams and emollients    Activity Interventions: Increase time out of bed, Pressure redistribution bed/mattress(bed type)    Mobility Interventions: Pressure redistribution bed/mattress (bed type), PT/OT evaluation    Nutrition Interventions: Document food/fluid/supplement intake    Friction and Shear Interventions: Apply protective barrier, creams and emollients, Foam dressings/transparent film/skin sealants, Minimize layers

## 2021-10-19 NOTE — PROGRESS NOTES
Progress Note         Patient: Barbara Gomez MRN: [id number]  CSN: [id number]    YOB: 1955  Age: 61 y.o. Sex: female    DOA: 10/17/2021 LOS:  LOS: 1 day                    Subjective:     Barbara Gomez is a 61 y.o. Kyrgyz-speaking female with a PMHx of stroke with residual R sided hemiparesis and aphasia, seizures, HTN, type II DM and obesity who is admitted for hypertensive urgency, acute hypoxic respiratory failure, and sepsis secondary to bilateral community-acquired/atypical pneumonia vs HCAP. Seen in room today  Lying in bed, NAD  Speaks only Kyrgyz, reportedly speaks very little   More alert today   Tachypnea improved   SPO2 in the upper 90s on 4 L/min      Objective:     Physical Exam:  Visit Vitals  BP (!) 152/64 (BP 1 Location: Right upper arm, BP Patient Position: At rest)   Pulse 72   Temp 98 °F (36.7 °C)   Resp 20   Ht 4' 11\" (1.499 m)   Wt 73 kg (161 lb)   SpO2 98%   BMI 32.52 kg/m²        General:         Alert, cooperative, no acute distress, able to follow commands    HEENT: NC, Atraumatic. Anicteric sclerae. Lungs: CTA Bilaterally. No Wheezing/Rhonchi/Rales. Heart:  Regular  rhythm,  No murmur, No Rubs, No Gallops  Abdomen: Soft, Non distended, Non tender. +Bowel sounds, no HSM  Extremities: No c/c/e  Psych:   Not anxious or agitated. Neurologic:  Alert. Right-sided hemiparesis    Intake and Output:  Current Shift:  No intake/output data recorded.   Last three shifts:  10/17 0701 - 10/18 1900  In: 840 [P.O.:840]  Out: -     Labs: Results:       Chemistry Recent Labs     10/18/21  0227 10/17/21  0238   * 210*    140   K 3.9 4.2    109   CO2 27 28   BUN 24* 21*   CREA 0.78 0.81   CA 8.2* 8.2*   AGAP 9 3   BUCR 31* 26*   AP  --  88   TP  --  7.7   ALB  --  3.3*   GLOB  --  4.4*   AGRAT  --  0.8      CBC w/Diff Recent Labs     10/18/21  0227 10/17/21  0238   WBC 8.3 14.4*   RBC 4.29 5.14   HGB 12.9 15.4   HCT 40.7 47.0*    162   GRANS 77* 88*   LYMPH 11* 6* EOS 1 0      Cardiac Enzymes No results for input(s): CPK, CKND1, DOMINGO in the last 72 hours. No lab exists for component: CKRMB, TROIP   Coagulation No results for input(s): PTP, INR, APTT, INREXT, INREXT in the last 72 hours. Lipid Panel No results found for: CHOL, CHOLPOCT, CHOLX, CHLST, CHOLV, 252775, HDL, HDLP, LDL, LDLC, DLDLP, 349873, VLDLC, VLDL, TGLX, TRIGL, TRIGP, TGLPOCT, CHHD, CHHDX   BNP No results for input(s): BNPP in the last 72 hours. Liver Enzymes Recent Labs     10/17/21  0238   TP 7.7   ALB 3.3*   AP 88      Thyroid Studies No results found for: T4, T3U, TSH, TSHEXT, TSHEXT             Assessment and Plan:     Ela Ernandez is a 61 y.o. Croatian-speaking female with a PMHx of stroke with residual R sided hemiparesis and aphasia and dysarthria, seizures, HTN, type II DM and obesity who is admitted for hypertensive urgency, acute hypoxic respiratory failure, and sepsis secondary to bilateral community-acquired/atypical pneumonia. 1. Hypertensive urgencyresolved  2. Acute hypoxic respiratory failure  3. SepsisPOA, with tachycardia, tachypnea and leukocytosis with a left shift  4. Bilateral community-acquired/atypical pneumonia vs HCAP   5. Rhinovirus and enterovirus infections  6. Hx stroke with right-sided hemiparesis and aphasia, dysarthria  7. Seizure disorder  8. HTN  9. Type II DMHbA1c 5.4 on 10/17/2021  10. Obesity    Bio fire respiratory virus panel with Covid19 negative for Covid, positive for rhinovirus and enterovirus  Continue O2 as needed and wean as tolerated   Continue IV ABXazithromycin and ceftriaxone  Follow-up blood cultures- NGTD   Continue home metoprolol, IV hydralazine as needed  Continue SSI with Accu-Cheks  Hold home lantus until med-rec completed   Follow-up CBC, BMP  PT, OT     Walking/home O2 trial prior to discharge.         Case discussed with:  [x]Patient  []Family  [x]Nursing  [x]Case Management  DVT prophylaxis: Lovenox  Diet: Cardiac  Contact: Hardeep Guerra (guardian, niece)             [phone]  Code Status: Full  Disposition: Continue current care, greater than 2 nights      H. Jennifer Maloney DO  10/18/2021       Dragon medical dictation software was used for portions of this report. Unintended errors may occur.

## 2021-10-19 NOTE — PROGRESS NOTES
Problem: Pressure Injury - Risk of  Goal: *Prevention of pressure injury  Description: Document Ankit Scale and appropriate interventions in the flowsheet. Outcome: Progressing Towards Goal  Note: Pressure Injury Interventions:  Sensory Interventions: Assess changes in LOC, Minimize linen layers, Turn and reposition approx. every two hours (pillows and wedges if needed)    Moisture Interventions: Apply protective barrier, creams and emollients, Absorbent underpads, Minimize layers, Moisture barrier    Activity Interventions: Pressure redistribution bed/mattress(bed type), PT/OT evaluation    Mobility Interventions: Pressure redistribution bed/mattress (bed type), PT/OT evaluation    Nutrition Interventions: Document food/fluid/supplement intake    Friction and Shear Interventions: Minimize layers, Lift team/patient mobility team                Problem: Patient Education: Go to Patient Education Activity  Goal: Patient/Family Education  Outcome: Progressing Towards Goal     Problem: Pain  Goal: *Control of Pain  Outcome: Progressing Towards Goal  Goal: *PALLIATIVE CARE:  Alleviation of Pain  Outcome: Progressing Towards Goal     Problem: Patient Education: Go to Patient Education Activity  Goal: Patient/Family Education  Outcome: Progressing Towards Goal     Problem: Falls - Risk of  Goal: *Absence of Falls  Description: Document Diana Fall Risk and appropriate interventions in the flowsheet.   Outcome: Progressing Towards Goal  Note: Fall Risk Interventions:       Mentation Interventions: Adequate sleep, hydration, pain control    Medication Interventions: Bed/chair exit alarm    Elimination Interventions: Bed/chair exit alarm              Problem: Patient Education: Go to Patient Education Activity  Goal: Patient/Family Education  Outcome: Progressing Towards Goal     Problem: Pneumonia: Day 1  Goal: Off Pathway (Use only if patient is Off Pathway)  Outcome: Progressing Towards Goal  Goal: Activity/Safety  Outcome: Progressing Towards Goal  Goal: Consults, if ordered  Outcome: Progressing Towards Goal  Goal: Diagnostic Test/Procedures  Outcome: Progressing Towards Goal  Goal: Nutrition/Diet  Outcome: Progressing Towards Goal  Goal: Medications  Outcome: Progressing Towards Goal  Goal: Respiratory  Outcome: Progressing Towards Goal  Goal: Treatments/Interventions/Procedures  Outcome: Progressing Towards Goal  Goal: Psychosocial  Outcome: Progressing Towards Goal  Goal: *Oxygen saturation within defined limits  Outcome: Progressing Towards Goal  Goal: *Influenza vaccine administered (October-March)  Outcome: Progressing Towards Goal  Goal: *Pneumoccocal vaccine administered  Outcome: Progressing Towards Goal  Goal: *Hemodynamically stable  Outcome: Progressing Towards Goal  Goal: *Demonstrates progressive activity  Outcome: Progressing Towards Goal  Goal: *Tolerating diet  Outcome: Progressing Towards Goal     Problem: Patient Education: Go to Patient Education Activity  Goal: Patient/Family Education  Outcome: Progressing Towards Goal     Problem: Patient Education: Go to Patient Education Activity  Goal: Patient/Family Education  Outcome: Progressing Towards Goal     Problem: Pneumonia: Day 2  Goal: Off Pathway (Use only if patient is Off Pathway)  Outcome: Progressing Towards Goal  Goal: Activity/Safety  Outcome: Progressing Towards Goal  Goal: Consults, if ordered  Outcome: Progressing Towards Goal  Goal: Diagnostic Test/Procedures  Outcome: Progressing Towards Goal  Goal: Nutrition/Diet  Outcome: Progressing Towards Goal  Goal: Discharge Planning  Outcome: Progressing Towards Goal  Goal: Medications  Outcome: Progressing Towards Goal  Goal: Respiratory  Outcome: Progressing Towards Goal  Goal: Treatments/Interventions/Procedures  Outcome: Progressing Towards Goal  Goal: Psychosocial  Outcome: Progressing Towards Goal  Goal: *Oxygen saturation within defined limits  Outcome: Progressing Towards Goal  Goal: *Hemodynamically stable  Outcome: Progressing Towards Goal  Goal: *Demonstrates progressive activity  Outcome: Progressing Towards Goal  Goal: *Tolerating diet  Outcome: Progressing Towards Goal  Goal: *Optimal pain control at patient's stated goal  Outcome: Progressing Towards Goal     Problem: Pneumonia: Day 3  Goal: Off Pathway (Use only if patient is Off Pathway)  Outcome: Progressing Towards Goal  Goal: Activity/Safety  Outcome: Progressing Towards Goal  Goal: Consults, if ordered  Outcome: Progressing Towards Goal  Goal: Diagnostic Test/Procedures  Outcome: Progressing Towards Goal  Goal: Nutrition/Diet  Outcome: Progressing Towards Goal  Goal: Discharge Planning  Outcome: Progressing Towards Goal  Goal: Medications  Outcome: Progressing Towards Goal  Goal: Respiratory  Outcome: Progressing Towards Goal  Goal: Treatments/Interventions/Procedures  Outcome: Progressing Towards Goal  Goal: Psychosocial  Outcome: Progressing Towards Goal  Goal: *Oxygen saturation within defined limits  Outcome: Progressing Towards Goal  Goal: *Hemodynamically stable  Outcome: Progressing Towards Goal  Goal: *Demonstrates progressive activity  Outcome: Progressing Towards Goal  Goal: *Tolerating diet  Outcome: Progressing Towards Goal  Goal: *Describes available resources and support systems  Outcome: Progressing Towards Goal  Goal: *Optimal pain control at patient's stated goal  Outcome: Progressing Towards Goal

## 2021-10-19 NOTE — PROGRESS NOTES
Spoke with Tawanda Betancourt of [address] facilities for [address]. She stated pt can return to [facility] when medically ready for discharge. Case Management will continue to follow along.         JOEL PryorN RN  Care Management  Pager: [phone]

## 2021-10-19 NOTE — DIABETES MGMT
GLYCEMIC CONTROL PLAN OF CARE   Recommendations:  · Suggest 10 units Lantus/day for last 4 out of 5 blood glucose readings > 200 mg/dL. Order obtained and entered. Assessment:  Pt admitted with pneumonia. Past medical history includes T2DM (well controlled with A1C - 5.4%; use of basal insulin PTA per chart), traumatic brain injury. Lives at [address] [place] [address],[address] in [place]/ is non-English speaking per chart review. Blood glucose readings elevated. Most recent blood glucose values:      Current A1C of 5.4 % is equivalent to average blood glucose of 102 mg/dl over the past 2-3 months. Lab Results   Component Value Date/Time    Hemoglobin A1c 5.4 10/17/2021 02:38 AM    Hemoglobin A1c 8.7 (H) 12/11/2017 08:45 PM     Current hospital diabetes medications: corrective lispro, normal insulin sensitivity    Previous day's insulin requirements: 8 units (corrective lispro)    Home diabetes medications:  Key Antihyperglycemic Medications             insulin glargine (LANTUS) 100 unit/mL injection 23 Units by SubCUTAneous route daily. Diet:  ADULT DIET Regular; 4 carb choices (60 gm/meal); Low Sodium (2 gm)   Meal Intake:   Patient Vitals for the past 168 hrs:   % Diet Eaten   10/19/21 1312 26 - 50%   10/19/21 0920 51 - 75%   10/18/21 1800 26 - 50%   10/18/21 1326 26 - 50%   10/18/21 1018 1 - 25%   10/17/21 1315 26 - 50%     Supplement Intake:  No data found.     Education:  ____Refer to Diabetes Education Record             __x__Education not indicated at this time      Zachariah Lynch MS, RD, [facility]  Diabetes and Glycemic Control   [employer]

## 2021-10-19 NOTE — PROGRESS NOTES
Notified Dr. Chago Rodas of patient's lung sounds of crackles upper and lower lobes, scant wheezing and her blood pressure of 193/98. Orders written for hydralazine 25 mg TID, chest xray, lasix 40mg IVP and O2 to 2 lpm.  Will continue to monitor her resp. status and BP.

## 2021-10-20 NOTE — DIABETES MGMT
Diabetes Plan of Care    Assessment:  Following for pneumonia - h/o TBI and resides at [facility]  She received total of 30 units lantus last night    mg/dl this am  Per PTA med list, she takes 23 units lantus daily at [facility]   Elevated post prandial - corrective advanced per protocol    Recommendations: recommend increase lantus to 23 units nightly - order obtained     Most recent blood glucose values: Within target range (non-ICU: <140; ICU<180): No    Current A1c  Lab Results   Component Value Date/Time    Hemoglobin A1c 5.4 10/17/2021 02:38 AM   .   Adequate glycemic control PTA:  Yes    Current insulin regime:  Lantus 10 units nightly  Corrective humalog, very insulin resistant, 4 times daily     Diet - Adult regular 4 carb choices, no conc.sweets     TDD previous day = 50 units   30 units lantus  20 units humalog    Home diabetes medications;   Lantus 23 units daily     Goals: Blood glucose will be within target of 70 - 180 mg/dl by: 10/24/2021    Education:  _____ Refer to Diabetes Education Record                       __X___ Education not indicated at this time         Lory Phoenix MPH RN [facility]  Pager [phone]  Office [phone]

## 2021-10-20 NOTE — CONSULTS
Cardiology Initial Patient Referral Note    Cardiology referral request from Dr. Jacki Trent for evaluation and management/treatment of Acute CHF    Date of  Admission: 10/17/2021  1:33 AM   Primary Care Physician:  None    Attending Cardiologist: Margot Rasheed        Assessment:     Hospital Problems  Never Reviewed        Codes Class Noted POA    * (Principal) Pneumonia ICD-10-CM: J18.9  ICD-9-CM: 439  10/17/2021 Unknown              1. Acute congestive heart failure-( HFmrEF) elevated NT pro BNP. Chest x-ray shows suspicious for worsening multifocal pneumonia. Pulmonary edema or pulmonaryhemorrhage are also possible. No significant peripheral edema   2. Acute hypoxic respiratory failure- possible in the setting #3  on admission  resolved   3. Sepsis Pneumonia- on antibiotics managed by medical team  4. Uncontrolled Hypertension-HIgh BP on admission-  on bb at home    5. Hx CVA- with right side hemiparesis with dysarthria. Very limited verbal communication     6. PAD  7. Pulmonary hypertension with PAP 52 mmHg on recent echo           Echo 10/19/21  · LV: Estimated LVEF is 45 - 50%. Normal cavity size. Mild concentric hypertrophy. Wall motion: normal.  · PA: Moderate pulmonary hypertension. Pulmonary arterial systolic pressure is 51 mmHg. · Image quality for this study was technically difficult. · Echo study was limited due to patient's condition. · LA: Dilated left atrium. Initial referral done by Dr. Serge Hyde:     Agree with gently diuresis 20 mg lasix iv. Difficult to assess for SOB or other symptoms as patient with very limited verbal communication. she appears comfortable has non productive cough    On physical exam no significant peripheral edema. Lungs  with basilar rales   Recent echo  shows EF 45%-50% and  PAP 51 mmHg   Continue BP control may increase Hydralazine to 50 mg TID  Added ASA.   Ordered telemetry monitoring     Further recommendations per Dr Margot Rasheed      History of Present Illness: This is a 61 y.o. female admitted for Pneumonia [J18.9]. Patient with PMHx of CVA, right hemiparesis, hypertension. She is a Thai speaking patient that  presented to the ED from a local long-term care facility for evaluation of shortness of breath and hypoxia. Patient has a history of traumatic brain injury and is a limited historian. Per report, patient was noted to be hypoxic at the facility prior to presentation with oxygen sats in the mid 80% range with associated shortness of breath. Patient was placed on oxygen upon arrival of EMS to the facility and oxygen saturations improved to normal range. On arrival to the ED, CXR was concerning for pneumonia patient has been referred for admission for further evaluation and treatment. On exam patient unable to provide any detail information. No distress noted. Has cough. Can no remember her name. Unable to obtain ROS due to patient condition      Past Medical History:   No past medical history on file. Social History:     Social History     Socioeconomic History    Marital status: SINGLE     Spouse name: Not on file    Number of children: Not on file    Years of education: Not on file    Highest education level: Not on file   Tobacco Use    Smoking status: Former Smoker    Smokeless tobacco: Never Used     Social Determinants of Health     Financial Resource Strain:     Difficulty of Paying Living Expenses:    Food Insecurity:     Worried About Running Out of Food in the Last Year:     [address] in the Last Year:    Transportation Needs:     Lack of Transportation (Medical):      Lack of Transportation (Non-Medical):    Physical Activity:     Days of Exercise per Week:     Minutes of Exercise per Session:    Stress:     Feeling of Stress :    Social Connections:     Frequency of Communication with Friends and Family:     Frequency of Social Gatherings with Friends and Family:     Attends [employer] Services:     Active Member of Clubs or Organizations:     Attends Club or Organization Meetings:     Marital Status:         Family History:   No family history on file.      Medications:   No Known Allergies     Current Facility-Administered Medications   Medication Dose Route Frequency    piperacillin-tazobactam (ZOSYN) 4.5 g in 0.9% sodium chloride (MBP/ADV) 100 mL MBP  4.5 g IntraVENous Q6H    furosemide (LASIX) injection 20 mg  20 mg IntraVENous BID    vancomycin (VANCOCIN) 1,000 mg in 0.9% sodium chloride 250 mL (VIAL-MATE)  1,000 mg IntraVENous Q12H    [START ON 10/21/2021] Vancomycin trough due 10/21/21 @ 1230 (before 1300 dose)   Other ONCE    hydrALAZINE (APRESOLINE) tablet 25 mg  25 mg Oral TID    insulin glargine (LANTUS) injection 10 Units  10 Units SubCUTAneous QHS    hydrALAZINE (APRESOLINE) 20 mg/mL injection 10 mg  10 mg IntraVENous Q6H PRN    sodium chloride (NS) flush 5-40 mL  5-40 mL IntraVENous Q8H    sodium chloride (NS) flush 5-40 mL  5-40 mL IntraVENous PRN    acetaminophen (TYLENOL) tablet 650 mg  650 mg Oral Q6H PRN    Or    acetaminophen (TYLENOL) suppository 650 mg  650 mg Rectal Q6H PRN    polyethylene glycol (MIRALAX) packet 17 g  17 g Oral DAILY PRN    ondansetron (ZOFRAN ODT) tablet 4 mg  4 mg Oral Q8H PRN    Or    ondansetron (ZOFRAN) injection 4 mg  4 mg IntraVENous Q6H PRN    enoxaparin (LOVENOX) injection 40 mg  40 mg SubCUTAneous DAILY    insulin lispro (HUMALOG) injection   SubCUTAneous AC&HS    glucose chewable tablet 16 g  4 Tablet Oral PRN    glucagon (GLUCAGEN) injection 1 mg  1 mg IntraMUSCular PRN    dextrose (D50W) injection syrg 12.5-25 g  25-50 mL IntraVENous PRN    metoprolol tartrate (LOPRESSOR) tablet 50 mg  50 mg Oral Q12H         Physical Exam:     Visit Vitals  BP (!) 177/89 (BP 1 Location: Left lower arm, BP Patient Position: Sitting)   Pulse 75   Temp 97.5 °F (36.4 °C)   Resp 19   Ht 4' 11\" (1.499 m)   Wt 71.2 kg (157 lb)   SpO2 92%   BMI 31.71 kg/m² TELE: ordered    BP Readings from Last 3 Encounters:   10/20/21 (!) 177/89   12/26/20 (!) 141/88   09/26/19 122/84     Pulse Readings from Last 3 Encounters:   10/20/21 75   12/26/20 88   12/12/17 (!) 123     Wt Readings from Last 3 Encounters:   10/20/21 71.2 kg (157 lb)   09/26/19 72.6 kg (160 lb)   12/11/17 72.8 kg (160 lb 7.9 oz)       General:  alert, no distress, appears stated age  Neck:  no stridor, no carotid bruit, no JVD  Lungs:  Harsh breath sounds   Heart:  regular rate and rhythm, S1, S2 normal, no murmur, click, rub or gallop  Abdomen:  abdomen is soft without significant tenderness, masses, organomegaly or guarding  Extremities:  extremities normal, atraumatic, no cyanosis. Right LE  Edema +1   Skin: Warm and dry.  no hyperpigmentation, vitiligo, or suspicious lesions  Neuro: alert noted right side hemiparesis        Data Review:     Recent Labs     10/19/21  0227 10/18/21  0227   WBC 10.4 8.3   HGB 13.1 12.9   HCT 41.0 40.7    162     Recent Labs     10/20/21  0221 10/19/21  0227 10/18/21  0227    139 145   K 3.8 3.8 3.9    106 109   CO2 25 26 27   * 165* 167*   BUN 23* 28* 24*   CREA 0.70 0.80 0.78   CA 7.9* 8.4* 8.2*       Results for orders placed or performed during the hospital encounter of 10/17/21   EKG, 12 LEAD, INITIAL   Result Value Ref Range    Ventricular Rate 89 BPM    Atrial Rate 89 BPM    P-R Interval 168 ms    QRS Duration 76 ms    Q-T Interval 416 ms    QTC Calculation (Bezet) 506 ms    Calculated P Axis 27 degrees    Calculated R Axis -15 degrees    Calculated T Axis 43 degrees    Diagnosis       Sinus rhythm with premature atrial complexes  Voltage criteria for left ventricular hypertrophy  Nonspecific ST abnormality  Prolonged QT  Abnormal ECG  No previous ECGs available  Confirmed by Toby Fernandez ([id number]) on 10/18/2021 5:16:36 PM         All Cardiac Markers in the last 24 hours:  No results found for: CPK, CK, CKMMB, CKMB, RCK3, CKMBT, CKNDX, CKND1, DOMINGO, TROPT, TROIQ, JULIETA, TROPT, TNIPOC, BNP, BNPP    Last Lipid:  No results found for: CHOL, CHOLX, CHLST, CHOLV, HDL, HDLP, LDL, LDLC, DLDLP, TGLX, TRIGL, TRIGP, CHHD, CHHDX    Cardiographics:     EKG Results     Procedure 720 Value Units Date/Time    EKG, 12 LEAD, INITIAL [[id number]] Collected: 10/17/21 1600    Order Status: Completed Updated: 10/18/21 1716     Ventricular Rate 89 BPM      Atrial Rate 89 BPM      P-R Interval 168 ms      QRS Duration 76 ms      Q-T Interval 416 ms      QTC Calculation (Bezet) 506 ms      Calculated P Axis 27 degrees      Calculated R Axis -15 degrees      Calculated T Axis 43 degrees      Diagnosis --     Sinus rhythm with premature atrial complexes  Voltage criteria for left ventricular hypertrophy  Nonspecific ST abnormality  Prolonged QT  Abnormal ECG  No previous ECGs available  Confirmed by Gurpreet Frazier ([id number]) on 10/18/2021 5:16:36 PM          10/17/21    ECHO ADULT COMPLETE 10/20/2021 10/20/2021    Interpretation Summary  · LV: Estimated LVEF is 45 - 50%. Normal cavity size. Mild concentric hypertrophy. Wall motion: normal.  · PA: Moderate pulmonary hypertension. Pulmonary arterial systolic pressure is 51 mmHg. · Image quality for this study was technically difficult. · Echo study was limited due to patient's condition. · LA: Dilated left atrium. Signed by: Uma Barraza MD on 10/20/2021 12:28 PM            XR Results (most recent):  Results from Hospital Encounter encounter on 10/17/21    XR CHEST PORT    Narrative  EXAM: XR CHEST PORT    INDICATION: 61 years Female. SOB. ADDITIONAL HISTORY: None. TECHNIQUE: Frontal view of the chest.    COMPARISON: Chest radiograph 10/17/2021 at 0143 hours    FINDINGS:    Hypoinflated lungs. The cardiac silhouette is at the upper limits of normal in size with appearance  likely exaggerated by hypoinflation. Atherosclerotic calcifications are noted in  the aorta.     Interval increased centrally predominant airspace opacities in the bilateral  perihilar regions and left greater than right infrahilar regions. No definite evidence of pleural effusion or pneumothorax. No acute osseous abnormality appreciated. Mild apex rightward thoracolumbar  spinal curvature. Impression  Interval worsening bilateral centrally predominant airspace opacities,  suspicious for worsening multifocal pneumonia. Pulmonary edema or pulmonary  hemorrhage are also possible. Recommend continued imaging follow-up.         Signed By: RONAN Johnson    October 20, 2021

## 2021-10-20 NOTE — PROGRESS NOTES
[facility] Dosing- Initial Progress Note    Pharmacy Consult ordered by Dr. Laine Ferrera DO     Indication: Pneumonia (HAP)    Patient clinical status and labs ordered/reviewed. Pt Weight Weight: 71.2 kg (157 lb)   Serum Creatinine Lab Results   Component Value Date/Time    Creatinine 0.70 10/20/2021 02:21 AM       Creatinine Clearance Estimated Creatinine Clearance: 74.1 mL/min (based on SCr of 0.7 mg/dL). BUN Lab Results   Component Value Date/Time    BUN 23 (H) 10/20/2021 02:21 AM       WBC Lab Results   Component Value Date/Time    WBC 10.4 10/19/2021 02:27 AM      Temperature Temp: 98.3 °F (36.8 °C)   HR Pulse (Heart Rate): 78     BP BP: (!) 166/70           Kinetic Dosing Parameters:   Vd = 53 L     K = 0.0601              t ½ = 11.5    Drug Levels:   Vancomycin    No results for input(s): VANCP, VANCT, VANCR, VANRA in the last 72 hours.      Dose for naïve patient Vancomycin was initiated at: 1750 mg IVPB x 1    Continue to monitor    Sign: Gustavo Villanueva  Date: 10/20/2021  Time: 5:18 AM

## 2021-10-20 NOTE — PROGRESS NOTES
Progress Note         Patient: Quique Kelly MRN: [id number]  CSN: [id number]    YOB: 1955  Age: 61 y.o. Sex: female    DOA: 10/17/2021 LOS:  LOS: 3 days                    Subjective:     Quique Kelly is a 61 y.o. Kyrgyz-speaking female with a PMHx of stroke with residual R sided hemiparesis and aphasia, seizures, HTN, type II DM and obesity who is admitted for hypertensive urgency, acute hypoxic respiratory failure, and sepsis secondary to bilateral community-acquired/atypical pneumonia vs HCAP. Seen in room today   Sitting up in bed, NAD  Speaks only Kyrgyz and speaks very little   Continues to be more alert  O2 weaned to 2 L/min with SPO2 in the low 90s     services used to communicate with patient today  She speaks very little but appears to be alert and oriented to person place  Complains of shortness of breath  Further communication very limited    Having persistent cough    Objective:     Physical Exam:  Visit Vitals  BP 90/60   Pulse 82   Temp 97.6 °F (36.4 °C)   Resp 20   Ht 4' 11\" (1.499 m)   Wt 71.2 kg (157 lb)   SpO2 96%   BMI 31.71 kg/m²        General:         Alert, cooperative, no acute distress, able to follow commands    HEENT: NC, Atraumatic. Anicteric sclerae. Lungs: Bibasilar rales, scattered coarse rhonchi bilaterally  Heart:  Regular  rhythm,  No murmur, No Rubs, No Gallops  Abdomen: Soft, Non distended, Non tender. +Bowel sounds  Extremities: No c/c/e  Psych:   Not anxious or agitated. Neurologic:  Alert. Right-sided hemiparesis    Intake and Output:  Current Shift:  No intake/output data recorded.   Last three shifts:  10/18 1901 - 10/20 0700  In: 1330 [P.O.:1060; I.V.:270]  Out: -     Labs: Results:       Chemistry Recent Labs     10/20/21  0221 10/19/21  0227 10/18/21  0227   * 165* 167*    139 145   K 3.8 3.8 3.9    106 109   CO2 25 26 27   BUN 23* 28* 24*   CREA 0.70 0.80 0.78   CA 7.9* 8.4* 8.2*   AGAP 3 7 9   BUCR 33* 35* 31*      CBC w/Diff Recent Labs     10/19/21  0227 10/18/21  0227   WBC 10.4 8.3   RBC 4.40 4.29   HGB 13.1 12.9   HCT 41.0 40.7    162   GRANS  --  77*   LYMPH  --  11*   EOS  --  1      Cardiac Enzymes No results for input(s): CPK, CKND1, DOMINGO in the last 72 hours. No lab exists for component: CKRMB, TROIP   Coagulation No results for input(s): PTP, INR, APTT, INREXT, INREXT in the last 72 hours. Lipid Panel No results found for: CHOL, CHOLPOCT, CHOLX, CHLST, CHOLV, 756717, HDL, HDLP, LDL, LDLC, DLDLP, 880924, VLDLC, VLDL, TGLX, TRIGL, TRIGP, TGLPOCT, CHHD, CHHDX   BNP No results for input(s): BNPP in the last 72 hours. Liver Enzymes No results for input(s): TP, ALB, TBIL, AP in the last 72 hours. No lab exists for component: SGOT, GPT, DBIL   Thyroid Studies No results found for: T4, T3U, TSH, TSHEXT, TSHEXT             Assessment and Plan:     Coby Justice is a 61 y.o. Danish-speaking female with a PMHx of stroke with residual R sided hemiparesis and aphasia and dysarthria, seizures, HTN, type II DM and obesity who is admitted for hypertensive urgency, acute hypoxic respiratory failure, and sepsis secondary to bilateral community-acquired/atypical pneumonia. 1. Acute hypoxic respiratory failure  2. SepsisPOA, with tachycardia, tachypnea and leukocytosis with a left shift  3. Bilateral community-acquired/atypical pneumonia vs HCAP   4. Hypertensive urgencyresolved  5. Acute systolic CHF  6. Rhinovirus and enterovirus infections  7. Moderate pulmonary hypertension  8. Hx stroke with right-sided hemiparesis and aphasia, dysarthria  9. Seizure disorder  10. HTN  11. Type II DMHbA1c 5.4 on 10/17/2021  12. Obesity  13.  [id number] negative       Bio fire respiratory virus panel with Covid19 negative for Covid, positive for rhinovirus and enterovirus    CXR 10/19 shows worsening bilateral airspace opacities, suspicious for worsening multifocal pneumonia, pulmonary edema or hemorrhage are also possible    Cardiology consulted and following  Echo reviewedEF 45 to 50%  Midline ordered today  Continue Lasix 20 mg IV twice daily  Continue O2 as needed and wean as tolerated   Continue IV ABXZosyn and vancomycin  Follow-up blood cultures- NGTD   Continue home metoprolol  Continue and increase p.o. hydralazine 3 times daily, continue IV hydralazine as needed  DM management following  Continue Lantus and SSI with Accu-Cheks  Follow-up BMP  Incentive spirometry  Strict I's and O's, daily weight  PT, OT     Patient is not ambulatory. Home O2 trial prior to discharge. Case discussed with:  [x]Patient  []Family  [x]Nursing  [x]Case Management  DVT prophylaxis: Lovenox  Diet: Cardiac and diabetic  Contact: Magdalena De Dios (guardian, niece)             [phone]  Code Status: Full  Disposition: Continue current care, greater than 2 nights      H. Benjy Johnson DO  10/20/2021       Dragon medical dictation software was used for portions of this report. Unintended errors may occur.

## 2021-10-20 NOTE — PROGRESS NOTES
Pharmacy Dosing Services: Vancomycin    Indication: Pneumonia (HAP)    Day of therapy: 1    Other Antimicrobials (Include dose, start day & day of therapy):  Piperacillin/Tazobactam (Zosyn)    Loading dose (date given): 1750 mg  Current Maintenance dose: 1000 mg IV every 12 hours    Goal Vancomycin Level: Vancomycin Trough: 15 - 20 mcg/mL (most infections); -600 mg/L.hr    Vancomycin Level (if drawn): No results for input(s): Lendia Cerise in the last 72 hours. Significant Cultures:   Results       Procedure Component Value Units Date/Time    RESPIRATORY VIRUS PANEL W/COVID-19, PCR [[id number]]  (Abnormal) Collected: 10/17/21 1315    Order Status: Completed Specimen: Nasopharyngeal Updated: 10/17/21 1441     Adenovirus Not detected        Coronavirus 229E Not detected        Coronavirus HKU1 Not detected        Coronavirus CVNL63 Not detected        Coronavirus OC43 Not detected        SARS-CoV-2, PCR Not detected        Metapneumovirus Not detected        Rhinovirus and Enterovirus Detected        Influenza A Not detected        Influenza B Not detected        Parainfluenza 1 Not detected        Parainfluenza 2 Not detected        Parainfluenza 3 Not detected        Parainfluenza virus 4 Not detected        RSV by PCR Not detected        B. parapertussis, PCR Not detected        Bordetella pertussis - PCR Not detected        Chlamydophila pneumoniae DNA, QL, PCR Not detected        Mycoplasma pneumoniae DNA, QL, PCR Not detected       COVID-19 RAPID TEST [[id number]] Collected: 10/17/21 0240    Order Status: Completed Specimen: Nasopharyngeal Updated: 10/17/21 0328     Specimen source Nasopharyngeal        COVID-19 rapid test Not detected        Comment: Rapid Abbott ID Now       Rapid NAAT:  The specimen is NEGATIVE for SARS-CoV-2, the novel coronavirus associated with COVID-19.        Negative results should be treated as presumptive and, if inconsistent with clinical signs and symptoms or necessary for patient management, should be tested with an alternative molecular assay. Negative results do not preclude SARS-CoV-2 infection and should not be used as the sole basis for patient management decisions. This test has been authorized by the FDA under an Emergency Use Authorization (EUA) for use by authorized laboratories. Fact sheet for Healthcare Providers: InspireMDdate.co.nz  Fact sheet for Patients: Quik[employer] [place].co.nz       Methodology: Isothermal Nucleic Acid Amplification         CULTURE, BLOOD [[id number]] Collected: 10/17/21 0238    Order Status: Completed Specimen: Blood Updated: 10/20/21 0641     Special Requests: NO SPECIAL REQUESTS        Culture result: NO GROWTH 3 DAYS       CULTURE, BLOOD [[id number]] Collected: 10/17/21 0225    Order Status: Completed Specimen: Blood Updated: 10/20/21 0641     Special Requests: NO SPECIAL REQUESTS        Culture result: NO GROWTH 3 DAYS               Weight 71.2 kg (157 lb)  Recent Labs     10/20/21  0221 10/19/21  0227 10/18/21  0227   CREA 0.70 0.80 0.78   BUN 23* 28* 24*   WBC  --  10.4 8.3     Temp (72hrs), Av.3 °F (36.8 °C), Min:97.5 °F (36.4 °C), Max:98.9 °F (37.2 °C)    Estimated Creatinine Clearance Estimated Creatinine Clearance: 74.1 mL/min (based on SCr of 0.7 mg/dL). Estimated Creatinine Clearance (using IBW): 58.3 mL/min       CAPD, Hemodialysis or Renal Replacement Therapy: N/A    Renal function stable? (unstable defined as SCr increase of 0.5 mg/dL or > 50% increase from baseline, whichever is greater) (Y/N): Y     [employer] RX patient specific PK predictions:  Regimen: 1000 mg IV every 12 hours. Exposure target: AUC24 (range)400-600 mg/L.hr   AUC24,ss: 549 mg/L.hr  Probability of AUC24 > 400: 81 %  Ctrough,ss: 17.3 mg/L  Probability of Ctrough,ss > 20: 38 %  Probability of nephrotoxicity (Lodise FELICIANO ): 13 %    Regimen assessment:   - patient received vancomycin 1750 mg LD on 10/20/21 @ 0100. Will start patient on vancomycin 1000 mg IV q12h. First dose today at 1300. Will get trough tomorrow 10/21/21 before 3rd MD at 1230.   - Patient with suspected HAP. Blood cultures NGTD x 3 days. - renal function stable. Maintenance dose: 1000 mg IV every 12 hours  Next scheduled level: 10/21/21 @ 1230       Pharmacy will follow daily and adjust medications as appropriate for renal function and/or serum levels.     Thank you,  Lobo Izaguirre, PHARMD

## 2021-10-20 NOTE — PROGRESS NOTES
Shift change. Bedside report completed on pt. White board updated. This nurse introduced herself to pt. Pt resting in bed at this time. No s/s distress. Pt family recently left facility for the night, but brought pt a frosty from [facility]. Pt is noted to be drinking/eating frosty at this time. Pt is Macedonian speaking only. Note  tv in pt room. Pt shows no s/s  Pain/discomfort; sitting in bed, laughing at tv. Does not voice c/o pain at this time. Voices no complaints or concerns at this time. Bed low, brakes on, call light in reach. ROSA. Ricky CHOE.  Yandy Cannon

## 2021-10-20 NOTE — PROGRESS NOTES
Problem: Pressure Injury - Risk of  Goal: *Prevention of pressure injury  Description: Document Ankit Scale and appropriate interventions in the flowsheet. Outcome: Progressing Towards Goal  Note: Pressure Injury Interventions:  Sensory Interventions: Minimize linen layers, Pressure redistribution bed/mattress (bed type), Keep linens dry and wrinkle-free    Moisture Interventions: Absorbent underpads, Check for incontinence Q2 hours and as needed    Activity Interventions: Pressure redistribution bed/mattress(bed type)    Mobility Interventions: Pressure redistribution bed/mattress (bed type)    Nutrition Interventions: Document food/fluid/supplement intake    Friction and Shear Interventions: Apply protective barrier, creams and emollients, Lift sheet, Minimize layers                Problem: Patient Education: Go to Patient Education Activity  Goal: Patient/Family Education  Outcome: Progressing Towards Goal     Problem: Pain  Goal: *Control of Pain  Outcome: Progressing Towards Goal  Goal: *PALLIATIVE CARE:  Alleviation of Pain  Outcome: Progressing Towards Goal     Problem: Patient Education: Go to Patient Education Activity  Goal: Patient/Family Education  Outcome: Progressing Towards Goal     Problem: Falls - Risk of  Goal: *Absence of Falls  Description: Document Diana Fall Risk and appropriate interventions in the flowsheet.   Outcome: Progressing Towards Goal  Note: Fall Risk Interventions:       Mentation Interventions: Door open when patient unattended, Room close to nurse's station, Toileting rounds    Medication Interventions: Evaluate medications/consider consulting pharmacy    Elimination Interventions: Call light in reach, Patient to call for help with toileting needs              Problem: Patient Education: Go to Patient Education Activity  Goal: Patient/Family Education  Outcome: Progressing Towards Goal     Problem: Pneumonia: Day 1  Goal: Off Pathway (Use only if patient is Off Pathway)  Outcome: Progressing Towards Goal  Goal: Activity/Safety  Outcome: Progressing Towards Goal  Goal: Consults, if ordered  Outcome: Progressing Towards Goal  Goal: Diagnostic Test/Procedures  Outcome: Progressing Towards Goal  Goal: Nutrition/Diet  Outcome: Progressing Towards Goal  Goal: Medications  Outcome: Progressing Towards Goal  Goal: Respiratory  Outcome: Progressing Towards Goal  Goal: Treatments/Interventions/Procedures  Outcome: Progressing Towards Goal  Goal: Psychosocial  Outcome: Progressing Towards Goal  Goal: *Oxygen saturation within defined limits  Outcome: Progressing Towards Goal  Goal: *Influenza vaccine administered (October-March)  Outcome: Progressing Towards Goal  Goal: *Pneumoccocal vaccine administered  Outcome: Progressing Towards Goal  Goal: *Hemodynamically stable  Outcome: Progressing Towards Goal  Goal: *Demonstrates progressive activity  Outcome: Progressing Towards Goal  Goal: *Tolerating diet  Outcome: Progressing Towards Goal     Problem: Pneumonia: Day 2  Goal: Off Pathway (Use only if patient is Off Pathway)  Outcome: Progressing Towards Goal  Goal: Activity/Safety  Outcome: Progressing Towards Goal  Goal: Consults, if ordered  Outcome: Progressing Towards Goal  Goal: Diagnostic Test/Procedures  Outcome: Progressing Towards Goal  Goal: Nutrition/Diet  Outcome: Progressing Towards Goal  Goal: Discharge Planning  Outcome: Progressing Towards Goal  Goal: Medications  Outcome: Progressing Towards Goal  Goal: Respiratory  Outcome: Progressing Towards Goal  Goal: Treatments/Interventions/Procedures  Outcome: Progressing Towards Goal  Goal: Psychosocial  Outcome: Progressing Towards Goal  Goal: *Oxygen saturation within defined limits  Outcome: Progressing Towards Goal  Goal: *Hemodynamically stable  Outcome: Progressing Towards Goal  Goal: *Demonstrates progressive activity  Outcome: Progressing Towards Goal  Goal: *Tolerating diet  Outcome: Progressing Towards Goal  Goal: *Optimal pain control at patient's stated goal  Outcome: Progressing Towards Goal     Problem: Pneumonia: Day 3  Goal: Off Pathway (Use only if patient is Off Pathway)  Outcome: Progressing Towards Goal  Goal: Activity/Safety  Outcome: Progressing Towards Goal  Goal: Consults, if ordered  Outcome: Progressing Towards Goal  Goal: Diagnostic Test/Procedures  Outcome: Progressing Towards Goal  Goal: Nutrition/Diet  Outcome: Progressing Towards Goal  Goal: Discharge Planning  Outcome: Progressing Towards Goal  Goal: Medications  Outcome: Progressing Towards Goal  Goal: Respiratory  Outcome: Progressing Towards Goal  Goal: Treatments/Interventions/Procedures  Outcome: Progressing Towards Goal  Goal: Psychosocial  Outcome: Progressing Towards Goal  Goal: *Oxygen saturation within defined limits  Outcome: Progressing Towards Goal  Goal: *Hemodynamically stable  Outcome: Progressing Towards Goal  Goal: *Demonstrates progressive activity  Outcome: Progressing Towards Goal  Goal: *Tolerating diet  Outcome: Progressing Towards Goal  Goal: *Describes available resources and support systems  Outcome: Progressing Towards Goal  Goal: *Optimal pain control at patient's stated goal  Outcome: Progressing Towards Goal

## 2021-10-20 NOTE — PROGRESS NOTES
Progress Note         Patient: Gorge Walker MRN: [id number]  CSN: [id number]    YOB: 1955  Age: 61 y.o. Sex: female    DOA: 10/17/2021 LOS:  LOS: 3 days                    Subjective:     Gorge Walker is a 61 y.o. Danish-speaking female with a PMHx of stroke with residual R sided hemiparesis and aphasia, seizures, HTN, type II DM and obesity who is admitted for hypertensive urgency, acute hypoxic respiratory failure, and sepsis secondary to bilateral community-acquired/atypical pneumonia vs HCAP. Late entry for 10/19    Seen in room yesterday  Lying in bed, NAD  Speaks only Danish, reportedly speaks very little   More alert today   O2 weaned to 2 L/min with SPO2 in the low 90s     services used to communicate with patient today  She speaks very little but appears to be alert and oriented to person place  Denies any significant complaints    Was noted to be quite tachypneic with diaphoresis by nursing earlier in the day  CXR today shows worsening bilateral airspace opacities, suspicious for worsening multifocal pneumonia, pulmonary edema or hemorrhage are also possible  NT proBNP 6733 today      Objective:     Physical Exam:  Visit Vitals  BP (!) 154/76   Pulse 78   Temp 98 °F (36.7 °C)   Resp 18   Ht 4' 11\" (1.499 m)   Wt 73 kg (161 lb)   SpO2 92%   BMI 32.52 kg/m²        General:         Alert, cooperative, no acute distress, able to follow commands    HEENT: NC, Atraumatic. Anicteric sclerae. Lungs: Bibasilar rales  Heart:  Regular  rhythm,  No murmur, No Rubs, No Gallops  Abdomen: Soft, Non distended, Non tender. +Bowel sounds  Extremities: No c/c/e  Psych:   Not anxious or agitated. Neurologic:  Alert. Right-sided hemiparesis    Intake and Output:  Current Shift:  No intake/output data recorded. Last three shifts:  10/18 0701 - 10/19 1900  In: 2050 [P.O.:1780;  I.V.:270]  Out: -     Labs: Results:       Chemistry Recent Labs     10/19/21  0227 10/18/21  0227 10/17/21  0238   GLU 165* 167* 210*    145 140   K 3.8 3.9 4.2    109 109   CO2 26 27 28   BUN 28* 24* 21*   CREA 0.80 0.78 0.81   CA 8.4* 8.2* 8.2*   AGAP 7 9 3   BUCR 35* 31* 26*   AP  --   --  88   TP  --   --  7.7   ALB  --   --  3.3*   GLOB  --   --  4.4*   AGRAT  --   --  0.8      CBC w/Diff Recent Labs     10/19/21  0227 10/18/21  0227 10/17/21  0238   WBC 10.4 8.3 14.4*   RBC 4.40 4.29 5.14   HGB 13.1 12.9 15.4   HCT 41.0 40.7 47.0*    162 162   GRANS  --  77* 88*   LYMPH  --  11* 6*   EOS  --  1 0      Cardiac Enzymes No results for input(s): CPK, CKND1, DOMINGO in the last 72 hours. No lab exists for component: CKRMB, TROIP   Coagulation No results for input(s): PTP, INR, APTT, INREXT, INREXT in the last 72 hours. Lipid Panel No results found for: CHOL, CHOLPOCT, CHOLX, CHLST, CHOLV, 487017, HDL, HDLP, LDL, LDLC, DLDLP, 317381, VLDLC, VLDL, TGLX, TRIGL, TRIGP, TGLPOCT, CHHD, CHHDX   BNP No results for input(s): BNPP in the last 72 hours. Liver Enzymes Recent Labs     10/17/21  0238   TP 7.7   ALB 3.3*   AP 88      Thyroid Studies No results found for: T4, T3U, TSH, TSHEXT, TSHEXT             Assessment and Plan:     Juan C Villalpando is a 61 y.o. Danish-speaking female with a PMHx of stroke with residual R sided hemiparesis and aphasia and dysarthria, seizures, HTN, type II DM and obesity who is admitted for hypertensive urgency, acute hypoxic respiratory failure, and sepsis secondary to bilateral community-acquired/atypical pneumonia. 1. Hypertensive urgencyresolved  2. Suspect acute CHF  3. Acute hypoxic respiratory failure  4. SepsisPOA, with tachycardia, tachypnea and leukocytosis with a left shift  5. Bilateral community-acquired/atypical pneumonia vs HCAP   6. Rhinovirus and enterovirus infections  7. Hx stroke with right-sided hemiparesis and aphasia, dysarthria  8. Seizure disorder  9. HTN  10. Type II DMHbA1c 5.4 on 10/17/2021  11.  Obesity      Bio fire respiratory virus panel with [id number] negative for Covid, positive for rhinovirus and enterovirus    CXR today shows worsening bilateral airspace opacities, suspicious for worsening multifocal pneumonia, pulmonary edema or hemorrhage are also possible    We will get echo  Lasix 40 mg IV today, start Lasix 20 mg IV twice daily tomorrow  Continue O2 as needed and wean as tolerated   Continue IV ABXZosyn and vancomycin, discontinue azithromycin and ceftriaxone  Follow-up blood cultures- NGTD   Continue home metoprolol, IV hydralazine as needed start p.o. hydralazine 3 times daily  DM management following  Start Lantus 10 units daily  Continue SSI with Accu-Cheks  Follow-up CBC, BMP  PT, OT     Patient is not ambulatory. Home O2 trial prior to discharge. Case discussed with:  [x]Patient  []Family  [x]Nursing  [x]Case Management  DVT prophylaxis: Lovenox  Diet: Cardiac  Contact: Zoila Disla (guardian, niece)             [phone]  Code Status: Full  Disposition: Continue current care, greater than 2 nights      H. Suraj Andrade DO  10/20/2021       Dragon medical dictation software was used for portions of this report. Unintended errors may occur.

## 2021-10-21 NOTE — PROGRESS NOTES
Bedside shift change report given to CHRIST Mccormick (oncoming nurse) by Beverley Miller RN (offgoing nurse). Report included the following information SBAR, Kardex, MAR and Recent Results.

## 2021-10-21 NOTE — PROGRESS NOTES
Assume care of patient from [address] with patient lying in bed observing staff in room. Alert and oriented . Kazakh speaking and interpret with telecommunication. Denies pain or discomfort at present. Non productive cough at intervals. Call light within reach and frequent items. 2230 Noted frequent coughing nonproductive. Incontinent care with cintia area redden, no excoriation noted . Glucose 71 with hypoglycemic protocol in place. Patient tolerated snack without problem and smiling after seeing snack. 2310 Recheck glucose 144.     10/21/2021    0150 Robitussin 10 ml administered orally for frequent coughing. 0400 Patient continue to cough at intervals, unable to rest. Head of bed elevated. Incontinent care provided with urine only. 7171 Incontinent of urine with cintia care provided. 2820 Bedside and Verbal shift change report given to Odessa Rabago (oncoming nurse) by Orlando Sorensen RN (offgoing nurse). Report given with SBAR, Kardex, Intake/Output, MAR and Recent Results.

## 2021-10-21 NOTE — DIABETES MGMT
GLYCEMIC CONTROL PLAN OF CARE   Recommendations:  · Suggest increasing Lantus to 26 units/day for fasting blood glucose of 186 mg/dL. 1457  Addendum:  Order for 26 units lantus/day obtained and entered. Assessment:  Pt admitted with pneumonia. Past medical history includes T2DM (well controlled with A1C - 5.4%; use of basal insulin PTA per chart), traumatic brain injury. Lives at [facility] in [place]/ is non-English speaking per chart review. Blood glucose readings variable (71 to 247 past 2 days);  po intake also variable per I/O's. Most recent blood glucose values:  10/21:  Lab - 209;  POC - 186,  247  10/20:  Lab - 180;  POC - 214, 240, 213, 71, 144        Current A1C of 5.4 % is equivalent to average blood glucose of 102 mg/dl over the past 2-3 months. Lab Results   Component Value Date/Time    Hemoglobin A1c 5.4 10/17/2021 02:38 AM    Hemoglobin A1c 8.7 (H) 12/11/2017 08:45 PM     Current hospital diabetes medications:   23 units Lantus/d  corrective lispro, very insulin resistant dosing ACHS    Previous day's insulin requirements: 41 units (23 lantus and 18 corrective lispro)    Home diabetes medications:  Key Antihyperglycemic Medications             insulin glargine (LANTUS) 100 unit/mL injection 23 Units by SubCUTAneous route daily. Diet:  ADULT DIET Regular; 4 carb choices (60 gm/meal); Low Sodium (2 gm);  No Concentrated Sweets   Meal Intake:   Patient Vitals for the past 168 hrs:   % Diet Eaten   10/21/21 1005 1 - 25%   10/19/21 1804 51 - 75%   10/19/21 1312 26 - 50%   10/19/21 0920 51 - 75%   10/18/21 1800 26 - 50%   10/18/21 1326 26 - 50%   10/18/21 1018 1 - 25%   10/17/21 1315 26 - 50%     Supplement Intake:  Patient Vitals for the past 168 hrs:   Supplement intake %   10/20/21 2200 76 - 100%       Education:  ____Refer to Diabetes Education Record             __x__Education not indicated at this time      Ulises Diggs  MS, RD, [facility]  Diabetes and Glycemic Control   [employer]

## 2021-10-21 NOTE — PROGRESS NOTES
[address] accepted patient in [facility] for patient to return for LTC.            Chano Hoffman, RN  Case Management [phone]

## 2021-10-21 NOTE — PROGRESS NOTES
[facility]ist [facility]  Progress Note    Patient: Dirk Israel Age: 61 y.o. : 1955 MR#: [id number] SSN: [id number]  Date/Time: 10/21/2021     C/C: Acute hypoxic respiratory failure      Subjective:   HPI : Bulgarian-speaking female with past medical history of congestive heart failure with marginally reduced EF 45 to 50%, concentric hypertrophy, moderate pulmonary hypertension, admitted with a history of sepsis secondary to pneumonia which was seen on x-ray chest evidence however patient has no fever since admission, blood cultures are negative, after initial mild elevation of WBC to 14.4 now normalized. Being treated for exacerbation of systolic heart failure and suspected pneumonia          Review of Systems: Appears comfortable lying in bed supine no respiratory distress no cough no chest pain-Limited ROS due to patient's inability to speak English or understanding/      Assessment/Plan:     1. Acute hypoxic respiratory failure  2. SepsisPOA, with tachycardia, tachypnea and leukocytosis with a left shift  3. Bilateral community-acquired/atypical pneumonia vs HCAP   4. Hypertensive urgencyresolved  5. Acute systolic CHF  6. Rhinovirus and enterovirus infections  7. Moderate pulmonary hypertension  8. Hx stroke with right-sided hemiparesis and aphasia, dysarthria  9. Seizure disorder  10. HTN  11. Type II DMHbA1c 5.4 on 10/17/2021  12. Obesity  13. Covid19 negative   14. Hypokalemia    Plan    -For CHF: Continue IV diuresis, elevated blood pressure noted-started on low-dose ACE inhibitor, continue other management.     -For suspected pneumonia: I will discontinue antibiotic and monitor patient. Follow CT chest without contrast and pro-Tomás this will support diagnosis of pneumonia or rule out pneumonia.     Monitor and replace electrolytes hypokalemia he is being treated    Hyperglycemia-increase the dose of Lantus to 30 units    Continue other management      Time spent on direct patient care >30 mints     Complexity : High complex - due to multiple medical issues outlined above. CODE Status : Full code    Case discussed with:  [x]Patient  [] Family  []Nursing  []Case Management     DVT Prophylaxis:  [x]Lovenox  []Hep SQ  []SCDs  []Coumadin   []On Heparin gtt    [] Eliquis [] Xarelto     Objective:   VS:   Visit Vitals  BP (!) 190/96 (BP 1 Location: Left lower arm, BP Patient Position: At rest) Comment: RN Jose G Albarran was notified   Pulse 83   Temp 98.3 °F (36.8 °C)   Resp 16   Ht 4' 11\" (1.499 m)   Wt 71.2 kg (157 lb)   SpO2 96%   BMI 31.71 kg/m²      Tmax/24hrs: Temp (24hrs), Av.8 °F (36.6 °C), Min:97.5 °F (36.4 °C), Max:98.4 °F (36.9 °C)  IOBRIEF    Intake/Output Summary (Last 24 hours) at 10/21/2021 1056  Last data filed at 10/21/2021 1005  Gross per 24 hour   Intake 480 ml   Output    Net 480 ml       General:  Alert, cooperative, no acute distress   HEENT: No facial asymmetry, CK Augusto, External ears - WNL    Cardiovascular: S1S2 - regular , No Murmur   Pulmonary: Equal expansion , No Use of accessory muscles , No Rales No Rhonchi    GI:  +BS in all four quadrants, soft, non-tender  Extremities:  No edema; 2+ dorsalis pedis pulses bilaterally  Neuro: Alert and oriented X 2.        Medications:   Current Facility-Administered Medications   Medication Dose Route Frequency    [START ON 10/22/2021] Vancomycin Trough Level on Friday, 10/22 @ 1230 <<Prior to 1300 Dose>>   Other ONCE    lisinopriL (PRINIVIL, ZESTRIL) tablet 10 mg  10 mg Oral DAILY    potassium chloride (K-DUR, KLOR-CON) SR tablet 40 mEq  40 mEq Oral TID    piperacillin-tazobactam (ZOSYN) 4.5 g in 0.9% sodium chloride (MBP/ADV) 100 mL MBP  4.5 g IntraVENous Q6H    furosemide (LASIX) injection 20 mg  20 mg IntraVENous BID    vancomycin (VANCOCIN) 1,000 mg in 0.9% sodium chloride 250 mL (VIAL-MATE)  1,000 mg IntraVENous Q12H    insulin glargine (LANTUS) injection 23 Units  23 Units SubCUTAneous QHS    aspirin chewable tablet 81 mg  81 mg Oral DAILY    guaiFENesin (ROBITUSSIN) 100 mg/5 mL oral liquid 100 mg  100 mg Oral Q4H PRN    hydrALAZINE (APRESOLINE) tablet 50 mg  50 mg Oral TID    hydrALAZINE (APRESOLINE) 20 mg/mL injection 10 mg  10 mg IntraVENous Q6H PRN    sodium chloride (NS) flush 5-40 mL  5-40 mL IntraVENous Q8H    sodium chloride (NS) flush 5-40 mL  5-40 mL IntraVENous PRN    acetaminophen (TYLENOL) tablet 650 mg  650 mg Oral Q6H PRN    Or    acetaminophen (TYLENOL) suppository 650 mg  650 mg Rectal Q6H PRN    polyethylene glycol (MIRALAX) packet 17 g  17 g Oral DAILY PRN    ondansetron (ZOFRAN ODT) tablet 4 mg  4 mg Oral Q8H PRN    Or    ondansetron (ZOFRAN) injection 4 mg  4 mg IntraVENous Q6H PRN    enoxaparin (LOVENOX) injection 40 mg  40 mg SubCUTAneous DAILY    insulin lispro (HUMALOG) injection   SubCUTAneous AC&HS    glucose chewable tablet 16 g  4 Tablet Oral PRN    glucagon (GLUCAGEN) injection 1 mg  1 mg IntraMUSCular PRN    dextrose (D50W) injection syrg 12.5-25 g  25-50 mL IntraVENous PRN    metoprolol tartrate (LOPRESSOR) tablet 50 mg  50 mg Oral Q12H       Labs:    Recent Labs     10/19/21  0227   WBC 10.4   HGB 13.1   HCT 41.0        Recent Labs     10/21/21  0224 10/20/21  0221 10/19/21  0227    136 139   K 3.3* 3.8 3.8    108 106   CO2 28 25 26   * 180* 165*   BUN 16 23* 28*   CREA 0.76 0.70 0.80   CA 8.6 7.9* 8.4*         Disclaimer: Sections of this note are dictated utilizing voice recognition software, which may have resulted in some phonetic based errors in grammar and contents. Even though attempts were made to correct all the mistakes, some may have been missed, and remained in the body of the document. If questions arise, please contact our department.     Signed By: Ger Aguilar MD     October 21, 2021

## 2021-10-21 NOTE — PROGRESS NOTES
Problem: Pressure Injury - Risk of  Goal: *Prevention of pressure injury  Description: Document Ankit Scale and appropriate interventions in the flowsheet. Outcome: Progressing Towards Goal  Note: Pressure Injury Interventions:  Sensory Interventions: Minimize linen layers, Pressure redistribution bed/mattress (bed type), Keep linens dry and wrinkle-free    Moisture Interventions: Absorbent underpads, Check for incontinence Q2 hours and as needed    Activity Interventions: Pressure redistribution bed/mattress(bed type)    Mobility Interventions: Pressure redistribution bed/mattress (bed type)    Nutrition Interventions: Document food/fluid/supplement intake    Friction and Shear Interventions: Apply protective barrier, creams and emollients, Lift sheet, Minimize layers                Problem: Falls - Risk of  Goal: *Absence of Falls  Description: Document Diana Fall Risk and appropriate interventions in the flowsheet.   Outcome: Progressing Towards Goal  Note: Fall Risk Interventions:       Mentation Interventions: Door open when patient unattended, Room close to nurse's station, Toileting rounds    Medication Interventions: Evaluate medications/consider consulting pharmacy    Elimination Interventions: Call light in reach, Patient to call for help with toileting needs              Problem: Pneumonia: Day 1  Goal: Activity/Safety  Outcome: Progressing Towards Goal     Problem: Pneumonia: Day 1  Goal: Diagnostic Test/Procedures  Outcome: Progressing Towards Goal     Problem: Pneumonia: Day 2  Goal: Respiratory  Outcome: Progressing Towards Goal     Problem: Pneumonia: Day 2  Goal: Nutrition/Diet  Outcome: Progressing Towards Goal     Problem: Pneumonia: Day 2  Goal: *Oxygen saturation within defined limits  Outcome: Progressing Towards Goal

## 2021-10-21 NOTE — PROGRESS NOTES
Problem: Pressure Injury - Risk of  Goal: *Prevention of pressure injury  Description: Document Ankit Scale and appropriate interventions in the flowsheet. Outcome: Progressing Towards Goal  Note: Pressure Injury Interventions:  Sensory Interventions: Assess changes in LOC, Avoid rigorous massage over bony prominences, Check visual cues for pain, Keep linens dry and wrinkle-free, Monitor skin under medical devices    Moisture Interventions: Absorbent underpads, Apply protective barrier, creams and emollients, Check for incontinence Q2 hours and as needed, Internal/External urinary devices    Activity Interventions: Pressure redistribution bed/mattress(bed type)    Mobility Interventions: Turn and reposition approx. every two hours(pillow and wedges), Pressure redistribution bed/mattress (bed type), Float heels    Nutrition Interventions: Document food/fluid/supplement intake, Offer support with meals,snacks and hydration    Friction and Shear Interventions: Apply protective barrier, creams and emollients, Foam dressings/transparent film/skin sealants, Lift sheet, Transferring/repositioning devices                Problem: Patient Education: Go to Patient Education Activity  Goal: Patient/Family Education  Outcome: Progressing Towards Goal     Problem: Pain  Goal: *Control of Pain  Outcome: Progressing Towards Goal  Goal: *PALLIATIVE CARE:  Alleviation of Pain  Outcome: Progressing Towards Goal     Problem: Patient Education: Go to Patient Education Activity  Goal: Patient/Family Education  Outcome: Progressing Towards Goal     Problem: Falls - Risk of  Goal: *Absence of Falls  Description: Document Diana Fall Risk and appropriate interventions in the flowsheet.   Outcome: Progressing Towards Goal  Note: Fall Risk Interventions:       Mentation Interventions: Adequate sleep, hydration, pain control, Bed/chair exit alarm, Door open when patient unattended    Medication Interventions: Bed/chair exit alarm    Elimination Interventions: Bed/chair exit alarm, Patient to call for help with toileting needs, Toileting schedule/hourly rounds              Problem: Patient Education: Go to Patient Education Activity  Goal: Patient/Family Education  Outcome: Progressing Towards Goal     Problem: Pneumonia: Day 1  Goal: Off Pathway (Use only if patient is Off Pathway)  Outcome: Progressing Towards Goal  Goal: Activity/Safety  Outcome: Progressing Towards Goal  Goal: Consults, if ordered  Outcome: Progressing Towards Goal  Goal: Diagnostic Test/Procedures  Outcome: Progressing Towards Goal  Goal: Nutrition/Diet  Outcome: Progressing Towards Goal  Goal: Medications  Outcome: Progressing Towards Goal  Goal: Respiratory  Outcome: Progressing Towards Goal  Goal: Treatments/Interventions/Procedures  Outcome: Progressing Towards Goal  Goal: Psychosocial  Outcome: Progressing Towards Goal  Goal: *Oxygen saturation within defined limits  Outcome: Progressing Towards Goal  Goal: *Influenza vaccine administered (October-March)  Outcome: Progressing Towards Goal  Goal: *Pneumoccocal vaccine administered  Outcome: Progressing Towards Goal  Goal: *Hemodynamically stable  Outcome: Progressing Towards Goal  Goal: *Demonstrates progressive activity  Outcome: Progressing Towards Goal  Goal: *Tolerating diet  Outcome: Progressing Towards Goal     Problem: Patient Education: Go to Patient Education Activity  Goal: Patient/Family Education  Outcome: Progressing Towards Goal     Problem: Patient Education: Go to Patient Education Activity  Goal: Patient/Family Education  Outcome: Progressing Towards Goal     Problem: Pneumonia: Day 2  Goal: Off Pathway (Use only if patient is Off Pathway)  Outcome: Progressing Towards Goal  Goal: Activity/Safety  Outcome: Progressing Towards Goal  Goal: Consults, if ordered  Outcome: Progressing Towards Goal  Goal: Diagnostic Test/Procedures  Outcome: Progressing Towards Goal  Goal: Nutrition/Diet  Outcome: Progressing Towards Goal  Goal: Discharge Planning  Outcome: Progressing Towards Goal  Goal: Medications  Outcome: Progressing Towards Goal  Goal: Respiratory  Outcome: Progressing Towards Goal  Goal: Treatments/Interventions/Procedures  Outcome: Progressing Towards Goal  Goal: Psychosocial  Outcome: Progressing Towards Goal  Goal: *Oxygen saturation within defined limits  Outcome: Progressing Towards Goal  Goal: *Hemodynamically stable  Outcome: Progressing Towards Goal  Goal: *Demonstrates progressive activity  Outcome: Progressing Towards Goal  Goal: *Tolerating diet  Outcome: Progressing Towards Goal  Goal: *Optimal pain control at patient's stated goal  Outcome: Progressing Towards Goal     Problem: Pneumonia: Day 3  Goal: Off Pathway (Use only if patient is Off Pathway)  Outcome: Progressing Towards Goal  Goal: Activity/Safety  Outcome: Progressing Towards Goal  Goal: Consults, if ordered  Outcome: Progressing Towards Goal  Goal: Diagnostic Test/Procedures  Outcome: Progressing Towards Goal  Goal: Nutrition/Diet  Outcome: Progressing Towards Goal  Goal: Discharge Planning  Outcome: Progressing Towards Goal  Goal: Medications  Outcome: Progressing Towards Goal  Goal: Respiratory  Outcome: Progressing Towards Goal  Goal: Treatments/Interventions/Procedures  Outcome: Progressing Towards Goal  Goal: Psychosocial  Outcome: Progressing Towards Goal  Goal: *Oxygen saturation within defined limits  Outcome: Progressing Towards Goal  Goal: *Hemodynamically stable  Outcome: Progressing Towards Goal  Goal: *Demonstrates progressive activity  Outcome: Progressing Towards Goal  Goal: *Tolerating diet  Outcome: Progressing Towards Goal  Goal: *Describes available resources and support systems  Outcome: Progressing Towards Goal  Goal: *Optimal pain control at patient's stated goal  Outcome: Progressing Towards Goal     Problem: Falls - Risk of  Goal: *Absence of Falls  Description: Document Diana Fall Risk and appropriate interventions in the flowsheet.   Outcome: Progressing Towards Goal  Note: Fall Risk Interventions:       Mentation Interventions: Adequate sleep, hydration, pain control, Bed/chair exit alarm, Door open when patient unattended    Medication Interventions: Bed/chair exit alarm    Elimination Interventions: Bed/chair exit alarm, Patient to call for help with toileting needs, Toileting schedule/hourly rounds              Problem: Patient Education: Go to Patient Education Activity  Goal: Patient/Family Education  Outcome: Progressing Towards Goal

## 2021-10-21 NOTE — PROGRESS NOTES
Cardiology Progress Note    Admit Date: 10/17/2021  Attending Cardiologist: Dr. Kayley Virk  Patient seen and examined independently. She is lying nearly flat in bed without significant respiratory distress. Lung fields clear. Continue diuretics and follow renal indices. Agree with assessment and plan as noted below. Jason Light MD  Assessment:     Hospital Problems  Never Reviewed        Codes Class Noted POA    * (Principal) Pneumonia ICD-10-CM: J18.9  ICD-9-CM: 611  10/17/2021 Unknown            - Acute congestive heart failure- HFmrEF, elevated NT pro BNP. Chest x-ray shows suspicious for worsening multifocal pneumonia. Pulmonary edema or pulmonaryhemorrhage are also possible. No significant peripheral edema . - Acute hypoxic respiratory failure- possible in the setting #3  on admission  resolved  - Sepsis Pneumonia- on antibiotics managed by medical team.  - Uncontrolled Hypertension-HIgh BP on admission-  on bb at home. - Diabetes  - Hx CVA- with right side hemiparesis with dysarthria. Very limited verbal communication.   - Peripheral arterial diease   - Pulmonary hypertension with PAP 52 mmHg on recent echo         Echo 10/19/21  · LV: Estimated LVEF is 45 - 50%. Normal cavity size. Mild concentric hypertrophy. Wall motion: normal.  · PA: Moderate pulmonary hypertension. Pulmonary arterial systolic pressure is 51 mmHg. · Image quality for this study was technically difficult. · Echo study was limited due to patient's condition. · LA: Dilated left atrium.       Plan:     Hemodynamically stable. Blood pressure remains elevated. Labile readings. Patient resting comfortably in bed and does not appear to be in significant fluid overload. Continue with gentle diuresis, IV lasix 20mg twice daily. Continue beta-blocker, ACEi and aspirin. It does not appear patient was on a statin outpatient nor has been started on statin since admission. Consider starting statin given history of stroke.    Patient was not on telemetry at time of encounter. Telemetry orders placed on yesterday. Further recommendations per Dr. Virginia Byrnes. Subjective:     Dysarthria. Limited verbal communication. Objective:      Patient Vitals for the past 8 hrs:   Temp Pulse Resp BP SpO2   10/21/21 1109 97.4 °F (36.3 °C) 69 14 127/73 99 %         Patient Vitals for the past 96 hrs:   Weight   10/20/21 1122 71.2 kg (157 lb)   10/19/21 1938 71.2 kg (157 lb)   10/17/21 1641 73 kg (161 lb)       TELE: Patient was not on telemetry at time of encounter. Telemetry orders placed.        Current Facility-Administered Medications   Medication Dose Route Frequency Last Admin    [START ON 10/22/2021] Vancomycin Trough Level on Friday, 10/22 @ 1230 <<Prior to 1300 Dose>>   Other ONCE      lisinopriL (PRINIVIL, ZESTRIL) tablet 10 mg  10 mg Oral DAILY 10 mg at 10/21/21 1227    potassium chloride (K-DUR, KLOR-CON) SR tablet 40 mEq  40 mEq Oral TID 40 mEq at 10/21/21 1227    insulin glargine (LANTUS) injection 26 Units  26 Units SubCUTAneous QHS      piperacillin-tazobactam (ZOSYN) 4.5 g in 0.9% sodium chloride (MBP/ADV) 100 mL MBP  4.5 g IntraVENous Q6H 4.5 g at 10/21/21 0955    furosemide (LASIX) injection 20 mg  20 mg IntraVENous BID 20 mg at 10/21/21 0955    vancomycin (VANCOCIN) 1,000 mg in 0.9% sodium chloride 250 mL (VIAL-MATE)  1,000 mg IntraVENous Q12H 1,000 mg at 10/21/21 1459    aspirin chewable tablet 81 mg  81 mg Oral DAILY 81 mg at 10/21/21 0955    guaiFENesin (ROBITUSSIN) 100 mg/5 mL oral liquid 100 mg  100 mg Oral Q4H  mg at 10/21/21 0152    hydrALAZINE (APRESOLINE) tablet 50 mg  50 mg Oral TID 50 mg at 10/21/21 0955    hydrALAZINE (APRESOLINE) 20 mg/mL injection 10 mg  10 mg IntraVENous Q6H PRN 10 mg at 10/19/21 0830    sodium chloride (NS) flush 5-40 mL  5-40 mL IntraVENous Q8H 10 mL at 10/21/21 1501    sodium chloride (NS) flush 5-40 mL  5-40 mL IntraVENous PRN      acetaminophen (TYLENOL) tablet 650 mg  650 mg Oral Q6H PRN      Or    acetaminophen (TYLENOL) suppository 650 mg  650 mg Rectal Q6H PRN      polyethylene glycol (MIRALAX) packet 17 g  17 g Oral DAILY PRN      ondansetron (ZOFRAN ODT) tablet 4 mg  4 mg Oral Q8H PRN      Or    ondansetron (ZOFRAN) injection 4 mg  4 mg IntraVENous Q6H PRN      enoxaparin (LOVENOX) injection 40 mg  40 mg SubCUTAneous DAILY 40 mg at 10/21/21 0956    insulin lispro (HUMALOG) injection   SubCUTAneous AC&HS 6 Units at 10/21/21 1227    glucose chewable tablet 16 g  4 Tablet Oral PRN      glucagon (GLUCAGEN) injection 1 mg  1 mg IntraMUSCular PRN      dextrose (D50W) injection syrg 12.5-25 g  25-50 mL IntraVENous PRN      metoprolol tartrate (LOPRESSOR) tablet 50 mg  50 mg Oral Q12H 50 mg at 10/21/21 0955         Intake/Output Summary (Last 24 hours) at 10/21/2021 1605  Last data filed at 10/21/2021 1005  Gross per 24 hour   Intake 480 ml   Output    Net 480 ml       Physical Exam:      Visit Vitals  /73 (BP 1 Location: Left upper arm, BP Patient Position: At rest)   Pulse 69   Temp 97.4 °F (36.3 °C)   Resp 14   Ht 4' 11\" (1.499 m)   Wt 71.2 kg (157 lb)   SpO2 99%   BMI 31.71 kg/m²       Data Review:     Labs: Results:       Chemistry Recent Labs     10/21/21  0224 10/20/21  0221 10/19/21  0227   * 180* 165*    136 139   K 3.3* 3.8 3.8    108 106   CO2 28 25 26   BUN 16 23* 28*   CREA 0.76 0.70 0.80   CA 8.6 7.9* 8.4*   AGAP 7 3 7   BUCR 21* 33* 35*      CBC w/Diff Recent Labs     10/19/21  0227   WBC 10.4   RBC 4.40   HGB 13.1   HCT 41.0         Cardiac Enzymes No results found for: CPK, CK, CKMMB, CKMB, RCK3, CKMBT, CKNDX, CKND1, DOMINGO, TROPT, TROIQ, JULIETA, TROPT, TNIPOC, BNP, BNPP   Coagulation No results for input(s): PTP, INR, APTT, INREXT in the last 72 hours.     Lipid Panel No results found for: CHOL, CHOLPOCT, CHOLX, CHLST, CHOLV, 234433, HDL, HDLP, LDL, LDLC, DLDLP, 202420, VLDLC, VLDL, TGLX, TRIGL, TRIGP, TGLPOCT, CHHD, CHHDX   BNP No results found for: BNP, BNPP, XBNPT   Liver Enzymes No results for input(s): TP, ALB, TBIL, AP in the last 72 hours.     No lab exists for component: SGOT, GPT, DBIL   Thyroid Studies No results found for: T4, T3U, TSH, TSHEXT       Signed By: Adrianna Roblero NP     October 21, 2021

## 2021-10-22 NOTE — PROGRESS NOTES
[facility] Hospitalist Group  Progress Note    Patient: Abril Shaffer Age: 61 y.o. : 1955 MR#: [id number] [id number]  Date/Time: 10/22/2021     C/C: Acute hypoxic respiratory failure      Subjective:   HPI : Nepali-speaking female with past medical history of congestive heart failure with marginally reduced EF 45 to 50%, concentric hypertrophy, moderate pulmonary hypertension, admitted with a history of sepsis secondary to pneumonia which was seen on x-ray chest evidence however patient has no fever since admission, blood cultures are negative, after initial mild elevation of WBC to 14.4 now normalized. Being treated for exacerbation of systolic heart failure and suspected pneumonia          Review of Systems:     Patient offers no complaints  Appears comfortable in bed  No chest pain, no respiratory distress    Assessment/Plan:     1. Acute hypoxic respiratory failure  2. SepsisPOA, with tachycardia, tachypnea and leukocytosis with a left shift  3. Bilateral community-acquired/atypical pneumonia vs HCAP   4. Hypertensive urgencyresolved  5. Acute systolic CHF  6. Rhinovirus and enterovirus infections  7. Moderate pulmonary hypertension  8. Hx stroke with right-sided hemiparesis and aphasia, dysarthria  9. Seizure disorder  10. HTN  11. Type II DMHbA1c 5.4 on 10/17/2021  12. Obesity  13. Covid19 negative   14. Hypokalemia    Plan    -For CHF: Continue IV diuresis, elevated blood pressure noted-started on low-dose ACE inhibitor, continue other management.     -For suspected pneumonia: I will discontinue antibiotic and monitor patient. pro-Tomás - Pending , this will support diagnosis of pneumonia or rule out pneumonia. CT Reviewed - mediastinal lymphadenopathy, extensive ground glass opacities? Airspace disease? Edema\" -with high BNP and actively improving with diuretics-likely diagnosis continues to be CHF, if pro-Tomás is not elevated will DC antibiotics.       Monitor and replace electrolytes hypokalemia he is being treated    Hyperglycemia-increase the dose of Lantus to 30 units. monitor patient's blood glucose 1 more day-before further increasing Lantus dose. Continue other management      Time spent on direct patient care >30 mints     Complexity : High complex - due to multiple medical issues outlined above. CODE Status : Full code    Case discussed with:  [x]Patient  [] Family  []Nursing  []Case Management     DVT Prophylaxis:  [x]Lovenox  []Hep SQ  []SCDs  []Coumadin   []On Heparin gtt    [] Eliquis [] Xarelto     Objective:   VS:   Visit Vitals  BP (!) 161/70 (BP 1 Location: Left lower arm, BP Patient Position: At rest)   Pulse 83   Temp 97.8 °F (36.6 °C)   Resp 18   Ht 4' 11\" (1.499 m)   Wt 70.3 kg (155 lb)   SpO2 99%   BMI 31.31 kg/m²      Tmax/24hrs: Temp (24hrs), Av °F (36.7 °C), Min:97.3 °F (36.3 °C), Max:98.6 °F (37 °C)  IOBRIEF    Intake/Output Summary (Last 24 hours) at 10/22/2021 0955  Last data filed at 10/22/2021 0918  Gross per 24 hour   Intake 950 ml   Output    Net 950 ml       General:  Alert, cooperative, no acute distress   HEENT: No facial asymmetry, CK Augusto, External ears - WNL    Cardiovascular: S1S2 - regular , No Murmur   Pulmonary: Equal expansion , No Use of accessory muscles , No Rales No Rhonchi    GI:  +BS in all four quadrants, soft, non-tender  Extremities:  No edema; 2+ dorsalis pedis pulses bilaterally  Neuro: Alert and oriented X 2.        Medications:   Current Facility-Administered Medications   Medication Dose Route Frequency    atorvastatin (LIPITOR) tablet 40 mg  40 mg Oral QHS    lisinopriL (PRINIVIL, ZESTRIL) tablet 10 mg  10 mg Oral DAILY    insulin glargine (LANTUS) injection 30 Units  30 Units SubCUTAneous QHS    furosemide (LASIX) injection 20 mg  20 mg IntraVENous BID    aspirin chewable tablet 81 mg  81 mg Oral DAILY    guaiFENesin (ROBITUSSIN) 100 mg/5 mL oral liquid 100 mg  100 mg Oral Q4H PRN    hydrALAZINE (APRESOLINE) tablet 50 mg  50 mg Oral TID    hydrALAZINE (APRESOLINE) 20 mg/mL injection 10 mg  10 mg IntraVENous Q6H PRN    sodium chloride (NS) flush 5-40 mL  5-40 mL IntraVENous Q8H    sodium chloride (NS) flush 5-40 mL  5-40 mL IntraVENous PRN    acetaminophen (TYLENOL) tablet 650 mg  650 mg Oral Q6H PRN    Or    acetaminophen (TYLENOL) suppository 650 mg  650 mg Rectal Q6H PRN    polyethylene glycol (MIRALAX) packet 17 g  17 g Oral DAILY PRN    ondansetron (ZOFRAN ODT) tablet 4 mg  4 mg Oral Q8H PRN    Or    ondansetron (ZOFRAN) injection 4 mg  4 mg IntraVENous Q6H PRN    enoxaparin (LOVENOX) injection 40 mg  40 mg SubCUTAneous DAILY    insulin lispro (HUMALOG) injection   SubCUTAneous AC&HS    glucose chewable tablet 16 g  4 Tablet Oral PRN    glucagon (GLUCAGEN) injection 1 mg  1 mg IntraMUSCular PRN    dextrose (D50W) injection syrg 12.5-25 g  25-50 mL IntraVENous PRN    metoprolol tartrate (LOPRESSOR) tablet 50 mg  50 mg Oral Q12H       Labs:    No results for input(s): WBC, HGB, HCT, PLT, HGBEXT, HCTEXT, PLTEXT, HGBEXT, HCTEXT, PLTEXT in the last 72 hours. Recent Labs     10/22/21  0110 10/21/21  0224 10/20/21  0221    141 136   K 4.3 3.3* 3.8    106 108   CO2 28 28 25   * 209* 180*   BUN 14 16 23*   CREA 0.98 0.76 0.70   CA 8.4* 8.6 7.9*         Disclaimer: Sections of this note are dictated utilizing voice recognition software, which may have resulted in some phonetic based errors in grammar and contents. Even though attempts were made to correct all the mistakes, some may have been missed, and remained in the body of the document. If questions arise, please contact our department.     Signed By: Jennifer Del Cid MD     October 22, 2021

## 2021-10-22 NOTE — PROGRESS NOTES
Call made to [facility] [phone], spoke with Marylou Hendricks, scheduled patient for  on 10/23/2021 at 1:00 pm, [facility] [place]  [facility].   Trip # [id number]

## 2021-10-22 NOTE — ROUTINE PROCESS
Bedside shift change report given to Raymond Higgins RN (oncoming nurse) by Lacey Fox RN (offgoing nurse). Report included the following information SBAR, Kardex, Intake/Output and MAR.

## 2021-10-22 NOTE — PROGRESS NOTES
Problem: Pressure Injury - Risk of  Goal: *Prevention of pressure injury  Description: Document Ankit Scale and appropriate interventions in the flowsheet. Outcome: Progressing Towards Goal  Note: Pressure Injury Interventions:  Sensory Interventions: Assess changes in LOC, Avoid rigorous massage over bony prominences, Keep linens dry and wrinkle-free, Maintain/enhance activity level, Minimize linen layers, Monitor skin under medical devices, Turn and reposition approx. every two hours (pillows and wedges if needed)    Moisture Interventions: Absorbent underpads, Apply protective barrier, creams and emollients, Internal/External urinary devices, Check for incontinence Q2 hours and as needed    Activity Interventions: Pressure redistribution bed/mattress(bed type)    Mobility Interventions: Assess need for specialty bed, Turn and reposition approx. every two hours(pillow and wedges)    Nutrition Interventions: Document food/fluid/supplement intake    Friction and Shear Interventions: Apply protective barrier, creams and emollients, Foam dressings/transparent film/skin sealants, Lift sheet, Transferring/repositioning devices                Problem: Patient Education: Go to Patient Education Activity  Goal: Patient/Family Education  Outcome: Progressing Towards Goal     Problem: Pain  Goal: *Control of Pain  Outcome: Progressing Towards Goal  Goal: *PALLIATIVE CARE:  Alleviation of Pain  Outcome: Progressing Towards Goal     Problem: Patient Education: Go to Patient Education Activity  Goal: Patient/Family Education  Outcome: Progressing Towards Goal     Problem: Falls - Risk of  Goal: *Absence of Falls  Description: Document Diana Fall Risk and appropriate interventions in the flowsheet.   Outcome: Progressing Towards Goal  Note: Fall Risk Interventions:       Mentation Interventions: Adequate sleep, hydration, pain control, Bed/chair exit alarm, Door open when patient unattended    Medication Interventions: Bed/chair exit alarm    Elimination Interventions:  Toileting schedule/hourly rounds, Bed/chair exit alarm              Problem: Patient Education: Go to Patient Education Activity  Goal: Patient/Family Education  Outcome: Progressing Towards Goal     Problem: Pneumonia: Day 1  Goal: Off Pathway (Use only if patient is Off Pathway)  Outcome: Progressing Towards Goal  Goal: Activity/Safety  Outcome: Progressing Towards Goal  Goal: Consults, if ordered  Outcome: Progressing Towards Goal  Goal: Diagnostic Test/Procedures  Outcome: Progressing Towards Goal  Goal: Nutrition/Diet  Outcome: Progressing Towards Goal  Goal: Medications  Outcome: Progressing Towards Goal  Goal: Respiratory  Outcome: Progressing Towards Goal  Goal: Treatments/Interventions/Procedures  Outcome: Progressing Towards Goal  Goal: Psychosocial  Outcome: Progressing Towards Goal  Goal: *Oxygen saturation within defined limits  Outcome: Progressing Towards Goal  Goal: *Influenza vaccine administered (October-March)  Outcome: Progressing Towards Goal  Goal: *Pneumoccocal vaccine administered  Outcome: Progressing Towards Goal  Goal: *Hemodynamically stable  Outcome: Progressing Towards Goal  Goal: *Demonstrates progressive activity  Outcome: Progressing Towards Goal  Goal: *Tolerating diet  Outcome: Progressing Towards Goal     Problem: Patient Education: Go to Patient Education Activity  Goal: Patient/Family Education  Outcome: Progressing Towards Goal     Problem: Patient Education: Go to Patient Education Activity  Goal: Patient/Family Education  Outcome: Progressing Towards Goal     Problem: Pneumonia: Day 2  Goal: Off Pathway (Use only if patient is Off Pathway)  Outcome: Progressing Towards Goal  Goal: Activity/Safety  Outcome: Progressing Towards Goal  Goal: Consults, if ordered  Outcome: Progressing Towards Goal  Goal: Diagnostic Test/Procedures  Outcome: Progressing Towards Goal  Goal: Nutrition/Diet  Outcome: Progressing Towards Goal  Goal: Discharge Planning  Outcome: Progressing Towards Goal  Goal: Medications  Outcome: Progressing Towards Goal  Goal: Respiratory  Outcome: Progressing Towards Goal  Goal: Treatments/Interventions/Procedures  Outcome: Progressing Towards Goal  Goal: Psychosocial  Outcome: Progressing Towards Goal  Goal: *Oxygen saturation within defined limits  Outcome: Progressing Towards Goal  Goal: *Hemodynamically stable  Outcome: Progressing Towards Goal  Goal: *Demonstrates progressive activity  Outcome: Progressing Towards Goal  Goal: *Tolerating diet  Outcome: Progressing Towards Goal  Goal: *Optimal pain control at patient's stated goal  Outcome: Progressing Towards Goal     Problem: Pneumonia: Day 3  Goal: Off Pathway (Use only if patient is Off Pathway)  Outcome: Progressing Towards Goal  Goal: Activity/Safety  Outcome: Progressing Towards Goal  Goal: Consults, if ordered  Outcome: Progressing Towards Goal  Goal: Diagnostic Test/Procedures  Outcome: Progressing Towards Goal  Goal: Nutrition/Diet  Outcome: Progressing Towards Goal  Goal: Discharge Planning  Outcome: Progressing Towards Goal  Goal: Medications  Outcome: Progressing Towards Goal  Goal: Respiratory  Outcome: Progressing Towards Goal  Goal: Treatments/Interventions/Procedures  Outcome: Progressing Towards Goal  Goal: Psychosocial  Outcome: Progressing Towards Goal  Goal: *Oxygen saturation within defined limits  Outcome: Progressing Towards Goal  Goal: *Hemodynamically stable  Outcome: Progressing Towards Goal  Goal: *Demonstrates progressive activity  Outcome: Progressing Towards Goal  Goal: *Tolerating diet  Outcome: Progressing Towards Goal  Goal: *Describes available resources and support systems  Outcome: Progressing Towards Goal  Goal: *Optimal pain control at patient's stated goal  Outcome: Progressing Towards Goal     Problem: Falls - Risk of  Goal: *Absence of Falls  Description: Document Dima Sol Fall Risk and appropriate interventions in the flowsheet. Outcome: Progressing Towards Goal  Note: Fall Risk Interventions:       Mentation Interventions: Adequate sleep, hydration, pain control, Bed/chair exit alarm, Door open when patient unattended    Medication Interventions: Bed/chair exit alarm    Elimination Interventions:  Toileting schedule/hourly rounds, Bed/chair exit alarm              Problem: Patient Education: Go to Patient Education Activity  Goal: Patient/Family Education  Outcome: Progressing Towards Goal

## 2021-10-22 NOTE — PROGRESS NOTES
Bedside shift change report given to CHRIST Mccormick (oncoming nurse) by Arcelia Costello RN (offgoing nurse). Report included the following information SBAR, Kardex, MAR and Recent Results.

## 2021-10-22 NOTE — DIABETES MGMT
GLYCEMIC CONTROL PLAN OF CARE   Recommendations:  Could consider lowering Lantus dose to 15 units/day with concession that some blood glucose readings may be elevated. Assessment:  Pt admitted with pneumonia. Past medical history includes T2DM (well controlled with A1C - 5.4%; use of basal insulin PTA per chart), traumatic brain injury. Lives at [facility] in [place]/ is non-English speaking per chart review. Blood glucose readings widely variable (86 to 247 yesterday). Most BG readings > 180 prior 2 days however pt had one reading 10/20/21 of 71 and one reading on 10/21/21 of 86 mg/dL. Noted Lantus dose was adjusted (one time dose) to 10 units last night for BG of 86 mg/dL but standing order remains for 30 units/day. PTA dose of Lantus is 23 units/day. Most recent blood glucose values:  10/22:  Lab - 249;  POC - 183, 227  10/21:  Lab - 209;  POC - 186,  247, 214, 86  10/20:  Lab - 180;  POC - 214, 240, 213, 71, 144        Current A1C of 5.4 % is equivalent to average blood glucose of 102 mg/dl over the past 2-3 months. Lab Results   Component Value Date/Time    Hemoglobin A1c 5.4 10/17/2021 02:38 AM    Hemoglobin A1c 8.7 (H) 12/11/2017 08:45 PM     Current hospital diabetes medications:   30 units Lantus/d  corrective lispro, very insulin resistant dosing ACHS    Previous day's insulin requirements: 25 units (10 lantus and 15 corrective lispro)    Home diabetes medications:  Key Antihyperglycemic Medications             insulin glargine (LANTUS) 100 unit/mL injection 23 Units by SubCUTAneous route daily. Diet:  ADULT DIET Regular; 4 carb choices (60 gm/meal); Low Sodium (2 gm);  No Concentrated Sweets   Meal Intake:   Patient Vitals for the past 168 hrs:   % Diet Eaten   10/22/21 0918 1 - 25%   10/21/21 1756 1 - 25%   10/21/21 1300 26 - 50%   10/21/21 1005 1 - 25%   10/19/21 1804 51 - 75%   10/19/21 1312 26 - 50%   10/19/21 0920 51 - 75%   10/18/21 1800 26 - 50%   10/18/21 1326 26 - 50% 10/18/21 1018 1 - 25%   10/17/21 1315 26 - 50%     Supplement Intake:  Patient Vitals for the past 168 hrs:   Supplement intake %   10/20/21 2200 76 - 100%       Education:  ____Refer to Diabetes Education Record             __x__Education not indicated at this time      Jhony Fernandez MS, RD, [facility]  Diabetes and Glycemic Control   [employer]

## 2021-10-22 NOTE — PROGRESS NOTES
Cardiology Progress Note    Admit Date: 10/17/2021  Attending Cardiologist: Dr. Raf Martinez:     - Acute HFmrEF. No prior ECHO for comparison, suspect this is a new dx this admission. CXR concerning for pulmonary edema vs PNA. ECHO this admission EF 45-50%. - Acute hypoxic respiratory failure, likely multifactorial CHF vs PNA.   - Sepsis, 2/2 PNA.   - Hypertensive Urgency   - DM2  - Hx CVA, right side hemiparesis with dysarthria.   -Seizure Disorder   - Peripheral arterial diease   - Moderate Pulmonary hypertension, PASP 52 mmHg   -Obesity        No Primary Cardiologist, Initial Referral: Dr. Pedro Cramer:     -Would continue IV Lasix. Unfortunately I/Os do not appear to be accurate.   -Will d/c Lopressor and transition patient to Coreg.   -Continued on Lisinopril. Pressures elevated this am prior to receiving morning antihypertensives. -Will start patient on moderate intensity statin. Continue ASA. Staff addendum:  Continue IV diuresis for today, I/O's not accurate. I saw, examined, and evaluated the patient. I personally reviewed the patient's labs, tests, vitals, orders, medications, updated history, and other providers assessments. I personally agree with the findings as stated and the plan as documented. Cuco Freedman MD    Subjective:     Dysarthria. Limited verbal communication.       Objective:      Patient Vitals for the past 8 hrs:   Temp Pulse Resp BP SpO2   10/22/21 0759 97.8 °F (36.6 °C) 83 18 (!) 161/70 99 %   10/22/21 0527 98.6 °F (37 °C) 73 19 (!) 153/86 98 %         Patient Vitals for the past 96 hrs:   Weight   10/22/21 0011 70.3 kg (155 lb)   10/20/21 1122 71.2 kg (157 lb)   10/19/21 1938 71.2 kg (157 lb)       TELE: SR    Current Facility-Administered Medications   Medication Dose Route Frequency Last Admin    lisinopriL (PRINIVIL, ZESTRIL) tablet 10 mg  10 mg Oral DAILY 10 mg at 10/21/21 1227    insulin glargine (LANTUS) injection 30 Units  30 Units SubCUTAneous QHS 10 Units at 10/21/21 2226    furosemide (LASIX) injection 20 mg  20 mg IntraVENous BID 20 mg at 10/21/21 1719    aspirin chewable tablet 81 mg  81 mg Oral DAILY 81 mg at 10/21/21 0955    guaiFENesin (ROBITUSSIN) 100 mg/5 mL oral liquid 100 mg  100 mg Oral Q4H  mg at 10/21/21 0152    hydrALAZINE (APRESOLINE) tablet 50 mg  50 mg Oral TID 50 mg at 10/21/21 2148    hydrALAZINE (APRESOLINE) 20 mg/mL injection 10 mg  10 mg IntraVENous Q6H PRN 10 mg at 10/19/21 0830    sodium chloride (NS) flush 5-40 mL  5-40 mL IntraVENous Q8H 10 mL at 10/22/21 0615    sodium chloride (NS) flush 5-40 mL  5-40 mL IntraVENous PRN      acetaminophen (TYLENOL) tablet 650 mg  650 mg Oral Q6H PRN      Or    acetaminophen (TYLENOL) suppository 650 mg  650 mg Rectal Q6H PRN      polyethylene glycol (MIRALAX) packet 17 g  17 g Oral DAILY PRN      ondansetron (ZOFRAN ODT) tablet 4 mg  4 mg Oral Q8H PRN      Or    ondansetron (ZOFRAN) injection 4 mg  4 mg IntraVENous Q6H PRN      enoxaparin (LOVENOX) injection 40 mg  40 mg SubCUTAneous DAILY 40 mg at 10/21/21 0956    insulin lispro (HUMALOG) injection   SubCUTAneous AC&HS 6 Units at 10/21/21 1720    glucose chewable tablet 16 g  4 Tablet Oral PRN      glucagon (GLUCAGEN) injection 1 mg  1 mg IntraMUSCular PRN      dextrose (D50W) injection syrg 12.5-25 g  25-50 mL IntraVENous PRN      metoprolol tartrate (LOPRESSOR) tablet 50 mg  50 mg Oral Q12H 50 mg at 10/21/21 2148         Intake/Output Summary (Last 24 hours) at 10/22/2021 0835  Last data filed at 10/22/2021 0759  Gross per 24 hour   Intake 710 ml   Output    Net 710 ml       Physical Exam:  General:  alert, no distress, appears stated age  Neck: , no JVD  Lungs:  Harsh breath sounds B/L   Heart:  regular rate and rhythm, S1, S2 normal, no murmur, click, rub or gallop  Abdomen:  abdomen is soft without significant tenderness, masses, organomegaly or guarding  Extremities:  extremities normal, atraumatic, no cyanosis. Visit Vitals  BP (!) 161/70 (BP 1 Location: Left lower arm, BP Patient Position: At rest)   Pulse 83   Temp 97.8 °F (36.6 °C)   Resp 18   Ht 4' 11\" (1.499 m)   Wt 70.3 kg (155 lb)   SpO2 99%   BMI 31.31 kg/m²       Data Review:     Labs: Results:       Chemistry Recent Labs     10/22/21  0110 10/21/21  0224 10/20/21  0221   * 209* 180*    141 136   K 4.3 3.3* 3.8    106 108   CO2 28 28 25   BUN 14 16 23*   CREA 0.98 0.76 0.70   CA 8.4* 8.6 7.9*   AGAP 6 7 3   BUCR 14 21* 33*      CBC w/Diff No results for input(s): WBC, RBC, HGB, HCT, PLT, GRANS, LYMPH, EOS, HGBEXT, HCTEXT, PLTEXT, HGBEXT, HCTEXT, PLTEXT in the last 72 hours. Cardiac Enzymes No results found for: CPK, CK, CKMMB, CKMB, RCK3, CKMBT, CKNDX, CKND1, DOMINGO, TROPT, TROIQ, JULIETA, TROPT, TNIPOC, BNP, BNPP   Coagulation No results for input(s): PTP, INR, APTT, INREXT, INREXT in the last 72 hours. Lipid Panel No results found for: CHOL, CHOLPOCT, CHOLX, CHLST, CHOLV, 548766, HDL, HDLP, LDL, LDLC, DLDLP, 105728, VLDLC, VLDL, TGLX, TRIGL, TRIGP, TGLPOCT, CHHD, CHHDX   BNP No results found for: BNP, BNPP, XBNPT   Liver Enzymes No results for input(s): TP, ALB, TBIL, AP in the last 72 hours.     No lab exists for component: SGOT, GPT, DBIL   Thyroid Studies No results found for: T4, T3U, TSH, TSHEXT, TSHEXT       Signed By: Lucie Mendoza PA-C     October 22, 2021

## 2021-10-22 NOTE — PROGRESS NOTES
Requested Case Management specialist to assist with transportation to: [facility]      Address is:      [address], [address]       and phone number is:    [phone]      Patient will require BLS transport. Pt requires Stretcher If stretcher, reason: Hx of Stroke with right sided hemiparesis and aphasia, Hx of Seizures, Impaired Mobility  Patient is currently requiring oxygen Yes Yes: Comment: 3 liters  Height: 4\"11   Weight: 155 lbs  Pt is on isolation: No    Is the pt ready now? no  Requested time: Tomorrow 10/23/2021 1pm  PCS Faxed: no  Insurance verified on face sheet: yes  Auth needed for transport: yes  CM completed PCS/ Envelope and placed on chart.

## 2021-10-22 NOTE — PROGRESS NOTES
CM called and spoke with patient's niece Abbey Andrea [phone], and updated her that patient to be discharged tomorrow, and transport being setup for 1pm tomorrow. Patient's niece is agreeable to the discharge plan for tomorrow.            Narendra Lawson RN  Case Management [phone]

## 2021-10-22 NOTE — PROGRESS NOTES
DEEP called and spoke with Mart Velasco with [address], [facility], [facility] let her know that patient will be ready for discharge tomorrow. Mart Velasco asked that transport be setup for 1pm tomorrow. Samantha Lofton and updated.          Katy Naranjo, RN  Case Management [phone]

## 2021-10-22 NOTE — PROGRESS NOTES
Problem: Pressure Injury - Risk of  Goal: *Prevention of pressure injury  Description: Document Ankit Scale and appropriate interventions in the flowsheet. Outcome: Progressing Towards Goal  Note: Pressure Injury Interventions:  Sensory Interventions: Assess changes in LOC    Moisture Interventions: Absorbent underpads    Activity Interventions: Pressure redistribution bed/mattress(bed type)    Mobility Interventions: Assess need for specialty bed, HOB 30 degrees or less    Nutrition Interventions: Document food/fluid/supplement intake    Friction and Shear Interventions: Apply protective barrier, creams and emollients                Problem: Patient Education: Go to Patient Education Activity  Goal: Patient/Family Education  Outcome: Progressing Towards Goal     Problem: Pain  Goal: *Control of Pain  Outcome: Progressing Towards Goal  Goal: *PALLIATIVE CARE:  Alleviation of Pain  Outcome: Progressing Towards Goal     Problem: Falls - Risk of  Goal: *Absence of Falls  Description: Document Diana Fall Risk and appropriate interventions in the flowsheet.   Outcome: Progressing Towards Goal  Note: Fall Risk Interventions:       Mentation Interventions: Adequate sleep, hydration, pain control    Medication Interventions: Bed/chair exit alarm, Evaluate medications/consider consulting pharmacy    Elimination Interventions: Call light in reach, Toileting schedule/hourly rounds              Problem: Patient Education: Go to Patient Education Activity  Goal: Patient/Family Education  Outcome: Progressing Towards Goal     Problem: Patient Education: Go to Patient Education Activity  Goal: Patient/Family Education  Outcome: Progressing Towards Goal     Problem: Pneumonia: Day 1  Goal: Off Pathway (Use only if patient is Off Pathway)  Outcome: Progressing Towards Goal  Goal: Activity/Safety  Outcome: Progressing Towards Goal  Goal: Consults, if ordered  Outcome: Progressing Towards Goal  Goal: Diagnostic Test/Procedures  Outcome: Progressing Towards Goal  Goal: Nutrition/Diet  Outcome: Progressing Towards Goal  Goal: Medications  Outcome: Progressing Towards Goal  Goal: Respiratory  Outcome: Progressing Towards Goal  Goal: Treatments/Interventions/Procedures  Outcome: Progressing Towards Goal  Goal: Psychosocial  Outcome: Progressing Towards Goal  Goal: *Oxygen saturation within defined limits  Outcome: Progressing Towards Goal  Goal: *Influenza vaccine administered (October-March)  Outcome: Progressing Towards Goal  Goal: *Pneumoccocal vaccine administered  Outcome: Progressing Towards Goal  Goal: *Hemodynamically stable  Outcome: Progressing Towards Goal  Goal: *Demonstrates progressive activity  Outcome: Progressing Towards Goal  Goal: *Tolerating diet  Outcome: Progressing Towards Goal     Problem: Patient Education: Go to Patient Education Activity  Goal: Patient/Family Education  Outcome: Progressing Towards Goal     Problem: Patient Education: Go to Patient Education Activity  Goal: Patient/Family Education  Outcome: Progressing Towards Goal

## 2021-10-23 NOTE — PROGRESS NOTES
Problem: Pressure Injury - Risk of  Goal: *Prevention of pressure injury  Description: Document Ankit Scale and appropriate interventions in the flowsheet. Outcome: Progressing Towards Goal  Note: Pressure Injury Interventions:  Sensory Interventions: Assess changes in LOC    Moisture Interventions: Absorbent underpads, Check for incontinence Q2 hours and as needed    Activity Interventions: Increase time out of bed    Mobility Interventions: Assess need for specialty bed, Pressure redistribution bed/mattress (bed type)    Nutrition Interventions: Document food/fluid/supplement intake    Friction and Shear Interventions: Apply protective barrier, creams and emollients, Foam dressings/transparent film/skin sealants                Problem: Patient Education: Go to Patient Education Activity  Goal: Patient/Family Education  Outcome: Progressing Towards Goal     Problem: Pain  Goal: *Control of Pain  Outcome: Progressing Towards Goal  Goal: *PALLIATIVE CARE:  Alleviation of Pain  Outcome: Progressing Towards Goal     Problem: Patient Education: Go to Patient Education Activity  Goal: Patient/Family Education  Outcome: Progressing Towards Goal     Problem: Falls - Risk of  Goal: *Absence of Falls  Description: Document Diana Fall Risk and appropriate interventions in the flowsheet.   Outcome: Progressing Towards Goal  Note: Fall Risk Interventions:       Mentation Interventions: Adequate sleep, hydration, pain control, Bed/chair exit alarm, Door open when patient unattended    Medication Interventions: Bed/chair exit alarm    Elimination Interventions: Bed/chair exit alarm, Call light in reach              Problem: Patient Education: Go to Patient Education Activity  Goal: Patient/Family Education  Outcome: Progressing Towards Goal     Problem: Pneumonia: Day 1  Goal: Off Pathway (Use only if patient is Off Pathway)  Outcome: Progressing Towards Goal  Goal: Activity/Safety  Outcome: Progressing Towards Goal  Goal: Consults, if ordered  Outcome: Progressing Towards Goal  Goal: Diagnostic Test/Procedures  Outcome: Progressing Towards Goal  Goal: Nutrition/Diet  Outcome: Progressing Towards Goal  Goal: Medications  Outcome: Progressing Towards Goal  Goal: Respiratory  Outcome: Progressing Towards Goal  Goal: Treatments/Interventions/Procedures  Outcome: Progressing Towards Goal  Goal: Psychosocial  Outcome: Progressing Towards Goal  Goal: *Oxygen saturation within defined limits  Outcome: Progressing Towards Goal  Goal: *Influenza vaccine administered (October-March)  Outcome: Progressing Towards Goal  Goal: *Pneumoccocal vaccine administered  Outcome: Progressing Towards Goal  Goal: *Hemodynamically stable  Outcome: Progressing Towards Goal  Goal: *Demonstrates progressive activity  Outcome: Progressing Towards Goal  Goal: *Tolerating diet  Outcome: Progressing Towards Goal     Problem: Patient Education: Go to Patient Education Activity  Goal: Patient/Family Education  Outcome: Progressing Towards Goal     Problem: Patient Education: Go to Patient Education Activity  Goal: Patient/Family Education  Outcome: Progressing Towards Goal     Problem: Pneumonia: Day 2  Goal: Off Pathway (Use only if patient is Off Pathway)  Outcome: Progressing Towards Goal  Goal: Activity/Safety  Outcome: Progressing Towards Goal  Goal: Consults, if ordered  Outcome: Progressing Towards Goal  Goal: Diagnostic Test/Procedures  Outcome: Progressing Towards Goal  Goal: Nutrition/Diet  Outcome: Progressing Towards Goal  Goal: Discharge Planning  Outcome: Progressing Towards Goal  Goal: Medications  Outcome: Progressing Towards Goal  Goal: Respiratory  Outcome: Progressing Towards Goal  Goal: Treatments/Interventions/Procedures  Outcome: Progressing Towards Goal  Goal: Psychosocial  Outcome: Progressing Towards Goal  Goal: *Oxygen saturation within defined limits  Outcome: Progressing Towards Goal  Goal: *Hemodynamically stable  Outcome: Progressing Towards Goal  Goal: *Demonstrates progressive activity  Outcome: Progressing Towards Goal  Goal: *Tolerating diet  Outcome: Progressing Towards Goal  Goal: *Optimal pain control at patient's stated goal  Outcome: Progressing Towards Goal     Problem: Pneumonia: Day 3  Goal: Off Pathway (Use only if patient is Off Pathway)  Outcome: Progressing Towards Goal  Goal: Activity/Safety  Outcome: Progressing Towards Goal  Goal: Consults, if ordered  Outcome: Progressing Towards Goal  Goal: Diagnostic Test/Procedures  Outcome: Progressing Towards Goal  Goal: Nutrition/Diet  Outcome: Progressing Towards Goal  Goal: Discharge Planning  Outcome: Progressing Towards Goal  Goal: Medications  Outcome: Progressing Towards Goal  Goal: Respiratory  Outcome: Progressing Towards Goal  Goal: Treatments/Interventions/Procedures  Outcome: Progressing Towards Goal  Goal: Psychosocial  Outcome: Progressing Towards Goal  Goal: *Oxygen saturation within defined limits  Outcome: Progressing Towards Goal  Goal: *Hemodynamically stable  Outcome: Progressing Towards Goal  Goal: *Demonstrates progressive activity  Outcome: Progressing Towards Goal  Goal: *Tolerating diet  Outcome: Progressing Towards Goal  Goal: *Describes available resources and support systems  Outcome: Progressing Towards Goal  Goal: *Optimal pain control at patient's stated goal  Outcome: Progressing Towards Goal     Problem: Falls - Risk of  Goal: *Absence of Falls  Description: Document Diana Fall Risk and appropriate interventions in the flowsheet.   Outcome: Progressing Towards Goal  Note: Fall Risk Interventions:       Mentation Interventions: Adequate sleep, hydration, pain control, Bed/chair exit alarm, Door open when patient unattended    Medication Interventions: Bed/chair exit alarm    Elimination Interventions: Bed/chair exit alarm, Call light in reach              Problem: Patient Education: Go to Patient Education Activity  Goal: Patient/Family Education  Outcome: Progressing Towards Goal

## 2021-10-23 NOTE — PROGRESS NOTES
Brief progress note    Was called by nursing this evening  Ms. Jluis Pretty had gone into atrial flutter with RVR with heart rate in the 150s160s  Per nursing, patient is asymptomatic, although she only speaks [place] and had significant aphasia/dysphasia secondary to major stroke  Vitals reviewed, blood pressure stable, no hypotension  Gave 5 mg metoprolol IV 1 time  Patient had subsequent improvement in heart rate, converted back to sinus rhythm    Discussed with telemetry  Earlier atrial flutter with RVR lasted approximately 5 to 6 minutes, she has also been having bigeminal PVCs, and multiple runs of bigeminy  Overall she is maintaining sinus rhythm in the 70s80s    Day team and cardiology to follow up.     Lance Bailey,    October 23, 2021

## 2021-10-23 NOTE — PROGRESS NOTES
Cardiology Progress Note    Admit Date: 10/17/2021  Attending Cardiologist: Dr. Joylene Olszewski:     - Acute HFmrEF. No prior ECHO for comparison, suspect this is a new dx this admission. CXR concerning for pulmonary edema vs PNA. ECHO this admission EF 45-50%. - Acute hypoxic respiratory failure, likely multifactorial CHF vs PNA.   - Sepsis, 2/2 PNA.   - Hypertensive Urgency   - DM2  - Hx CVA, right side hemiparesis with dysarthria.   -Seizure Disorder   - Peripheral arterial diease   - Moderate Pulmonary hypertension, PASP 52 mmHg   -Obesity        No Primary Cardiologist, Initial Referral: Dr. Anabel Richards     Plan:     Hold Lasix. Creatinine is worsening, unfortunately I/Os do not appear to be accurate.   -Will d/c Lopressor and transition patient to Coreg.   -Continued on Lisinopril. Pressures elevated this am prior to receiving morning antihypertensives. -Will start patient on moderate intensity statin. Continue ASA. Subjective:     Dysarthria. Limited verbal communication.       Objective:      Patient Vitals for the past 8 hrs:   Temp Pulse Resp BP SpO2   10/23/21 0830 98.4 °F (36.9 °C) 88 18 (!) 150/82 98 %   10/23/21 0456 98.3 °F (36.8 °C) 72 18 (!) 157/74 97 %         Patient Vitals for the past 96 hrs:   Weight   10/22/21 0011 70.3 kg (155 lb)   10/20/21 1122 71.2 kg (157 lb)   10/19/21 1938 71.2 kg (157 lb)       TELE: SR    Current Facility-Administered Medications   Medication Dose Route Frequency Last Admin    furosemide (LASIX) tablet 20 mg  20 mg Oral DAILY      atorvastatin (LIPITOR) tablet 40 mg  40 mg Oral QHS 40 mg at 10/22/21 2117    carvediloL (COREG) tablet 6.25 mg  6.25 mg Oral BID WITH MEALS 6.25 mg at 10/23/21 0854    lisinopriL (PRINIVIL, ZESTRIL) tablet 10 mg  10 mg Oral DAILY 10 mg at 10/23/21 0854    insulin glargine (LANTUS) injection 30 Units  30 Units SubCUTAneous QHS 30 Units at 10/22/21 2209    aspirin chewable tablet 81 mg  81 mg Oral DAILY 81 mg at 10/23/21 6253    guaiFENesin (ROBITUSSIN) 100 mg/5 mL oral liquid 100 mg  100 mg Oral Q4H  mg at 10/21/21 0152    hydrALAZINE (APRESOLINE) tablet 50 mg  50 mg Oral TID 50 mg at 10/23/21 0854    hydrALAZINE (APRESOLINE) 20 mg/mL injection 10 mg  10 mg IntraVENous Q6H PRN 10 mg at 10/19/21 0830    sodium chloride (NS) flush 5-40 mL  5-40 mL IntraVENous Q8H 10 mL at 10/23/21 9784    sodium chloride (NS) flush 5-40 mL  5-40 mL IntraVENous PRN      acetaminophen (TYLENOL) tablet 650 mg  650 mg Oral Q6H PRN      Or    acetaminophen (TYLENOL) suppository 650 mg  650 mg Rectal Q6H PRN      polyethylene glycol (MIRALAX) packet 17 g  17 g Oral DAILY PRN      ondansetron (ZOFRAN ODT) tablet 4 mg  4 mg Oral Q8H PRN      Or    ondansetron (ZOFRAN) injection 4 mg  4 mg IntraVENous Q6H PRN      enoxaparin (LOVENOX) injection 40 mg  40 mg SubCUTAneous DAILY 40 mg at 10/23/21 0854    insulin lispro (HUMALOG) injection   SubCUTAneous AC&HS 3 Units at 10/23/21 0858    glucose chewable tablet 16 g  4 Tablet Oral PRN      glucagon (GLUCAGEN) injection 1 mg  1 mg IntraMUSCular PRN      dextrose (D50W) injection syrg 12.5-25 g  25-50 mL IntraVENous PRN           Intake/Output Summary (Last 24 hours) at 10/23/2021 0957  Last data filed at 10/22/2021 1820  Gross per 24 hour   Intake 460 ml   Output    Net 460 ml       Physical Exam:  General:  alert, no distress, appears stated age  Neck: , no JVD  Lungs:  Harsh breath sounds B/L   Heart:  regular rate and rhythm, S1, S2 normal, no murmur, click, rub or gallop  Abdomen:  abdomen is soft without significant tenderness, masses, organomegaly or guarding  Extremities:  extremities normal, atraumatic, no cyanosis.         Visit Vitals  BP (!) 150/82 (BP 1 Location: Left upper arm, BP Patient Position: At rest)   Pulse 88   Temp 98.4 °F (36.9 °C)   Resp 18   Ht 4' 11\" (1.499 m)   Wt 70.3 kg (155 lb)   SpO2 98%   BMI 31.31 kg/m²       Data Review:     Labs: Results:       Chemistry Recent Labs     10/23/21  0038 10/22/21  0110 10/21/21  0224   * 249* 209*    144 141   K 4.2 4.3 3.3*    110 106   CO2 25 28 28   BUN 18 14 16   CREA 1.32* 0.98 0.76   CA 8.9 8.4* 8.6   AGAP 10 6 7   BUCR 14 14 21*      CBC w/Diff No results for input(s): WBC, RBC, HGB, HCT, PLT, GRANS, LYMPH, EOS, HGBEXT, HCTEXT, PLTEXT, HGBEXT, HCTEXT, PLTEXT in the last 72 hours. Cardiac Enzymes No results found for: CPK, CK, CKMMB, CKMB, RCK3, CKMBT, CKNDX, CKND1, DOMINGO, TROPT, TROIQ, JULIETA, TROPT, TNIPOC, BNP, BNPP   Coagulation No results for input(s): PTP, INR, APTT, INREXT, INREXT in the last 72 hours. Lipid Panel No results found for: CHOL, CHOLPOCT, CHOLX, CHLST, CHOLV, 648448, HDL, HDLP, LDL, LDLC, DLDLP, 514254, VLDLC, VLDL, TGLX, TRIGL, TRIGP, TGLPOCT, CHHD, CHHDX   BNP No results found for: BNP, BNPP, XBNPT   Liver Enzymes No results for input(s): TP, ALB, TBIL, AP in the last 72 hours.     No lab exists for component: SGOT, GPT, DBIL   Thyroid Studies Lab Results   Component Value Date/Time    TSH 2.97 10/22/2021 09:36 AM          Signed By: Flavia Guzman MD     October 23, 2021

## 2021-10-23 NOTE — PROGRESS NOTES
Problem: Pressure Injury - Risk of  Goal: *Prevention of pressure injury  Description: Document Ankit Scale and appropriate interventions in the flowsheet. Outcome: Progressing Towards Goal  Note: Pressure Injury Interventions:  Sensory Interventions: Assess changes in LOC    Moisture Interventions: Absorbent underpads, Maintain skin hydration (lotion/cream)    Activity Interventions: Increase time out of bed, Pressure redistribution bed/mattress(bed type)    Mobility Interventions: Assess need for specialty bed, HOB 30 degrees or less, Pressure redistribution bed/mattress (bed type)    Nutrition Interventions: Document food/fluid/supplement intake    Friction and Shear Interventions: Apply protective barrier, creams and emollients, Foam dressings/transparent film/skin sealants, Lift sheet, Transferring/repositioning devices                Problem: Patient Education: Go to Patient Education Activity  Goal: Patient/Family Education  Outcome: Progressing Towards Goal     Problem: Pain  Goal: *Control of Pain  Outcome: Progressing Towards Goal  Goal: *PALLIATIVE CARE:  Alleviation of Pain  Outcome: Progressing Towards Goal     Problem: Patient Education: Go to Patient Education Activity  Goal: Patient/Family Education  Outcome: Progressing Towards Goal     Problem: Falls - Risk of  Goal: *Absence of Falls  Description: Document Diana Fall Risk and appropriate interventions in the flowsheet.   Outcome: Progressing Towards Goal  Note: Fall Risk Interventions:       Mentation Interventions: Adequate sleep, hydration, pain control, More frequent rounding    Medication Interventions: Patient to call before getting OOB    Elimination Interventions: Bed/chair exit alarm, Call light in reach, Patient to call for help with toileting needs              Problem: Patient Education: Go to Patient Education Activity  Goal: Patient/Family Education  Outcome: Progressing Towards Goal     Problem: Falls - Risk of  Goal: *Absence of Falls  Description: Document Franci Mei Fall Risk and appropriate interventions in the flowsheet.   Outcome: Progressing Towards Goal  Note: Fall Risk Interventions:       Mentation Interventions: Adequate sleep, hydration, pain control, More frequent rounding    Medication Interventions: Patient to call before getting OOB    Elimination Interventions: Bed/chair exit alarm, Call light in reach, Patient to call for help with toileting needs              Problem: Patient Education: Go to Patient Education Activity  Goal: Patient/Family Education  Outcome: Progressing Towards Goal     Problem: Pneumonia: Day 1  Goal: Off Pathway (Use only if patient is Off Pathway)  Outcome: Progressing Towards Goal  Goal: Activity/Safety  Outcome: Progressing Towards Goal  Goal: Consults, if ordered  Outcome: Progressing Towards Goal  Goal: Diagnostic Test/Procedures  Outcome: Progressing Towards Goal  Goal: Nutrition/Diet  Outcome: Progressing Towards Goal  Goal: Medications  Outcome: Progressing Towards Goal  Goal: Respiratory  Outcome: Progressing Towards Goal  Goal: Treatments/Interventions/Procedures  Outcome: Progressing Towards Goal  Goal: Psychosocial  Outcome: Progressing Towards Goal  Goal: *Oxygen saturation within defined limits  Outcome: Progressing Towards Goal  Goal: *Influenza vaccine administered (October-March)  Outcome: Progressing Towards Goal  Goal: *Pneumoccocal vaccine administered  Outcome: Progressing Towards Goal  Goal: *Hemodynamically stable  Outcome: Progressing Towards Goal  Goal: *Demonstrates progressive activity  Outcome: Progressing Towards Goal  Goal: *Tolerating diet  Outcome: Progressing Towards Goal     Problem: Pneumonia: Day 2  Goal: Off Pathway (Use only if patient is Off Pathway)  Outcome: Progressing Towards Goal  Goal: Activity/Safety  Outcome: Progressing Towards Goal  Goal: Consults, if ordered  Outcome: Progressing Towards Goal  Goal: Diagnostic Test/Procedures  Outcome: Progressing Towards Goal  Goal: Nutrition/Diet  Outcome: Progressing Towards Goal  Goal: Discharge Planning  Outcome: Progressing Towards Goal  Goal: Medications  Outcome: Progressing Towards Goal  Goal: Respiratory  Outcome: Progressing Towards Goal  Goal: Treatments/Interventions/Procedures  Outcome: Progressing Towards Goal  Goal: Psychosocial  Outcome: Progressing Towards Goal  Goal: *Oxygen saturation within defined limits  Outcome: Progressing Towards Goal  Goal: *Hemodynamically stable  Outcome: Progressing Towards Goal  Goal: *Demonstrates progressive activity  Outcome: Progressing Towards Goal  Goal: *Tolerating diet  Outcome: Progressing Towards Goal  Goal: *Optimal pain control at patient's stated goal  Outcome: Progressing Towards Goal

## 2021-10-23 NOTE — PROGRESS NOTES
Received a call from [id number] that Patient had an uncontrolled, and a sustained A-Flutter at a rate of 150. Patient assessed to be asymptomatic and watching TV with no stated distress. Dr Dinesh Batista was notified and an order of metoprolol 5 mg IV once was obtained. Will administer and monitor.

## 2021-10-23 NOTE — PROGRESS NOTES
Telemetry reported patient heart rate 150-160. Patient is resting quietly with no signs of chest pain or discomfort. Paged physician. 1101 EKG . Patient heart rate at 101.

## 2021-10-23 NOTE — PROGRESS NOTES
[facility] Hospitalist Group  Progress Note    Patient: Valiant Ards Age: 61 y.o. : 1955 MR#: [id number] SSN: [id number]  Date/Time: 10/23/2021     C/C: Acute hypoxic respiratory failure      Subjective:   HPI : German-speaking female with past medical history of congestive heart failure with marginally reduced EF 45 to 50%, concentric hypertrophy, moderate pulmonary hypertension, admitted with a history of sepsis secondary to pneumonia which was seen on x-ray chest evidence however patient has no fever since admission, blood cultures are negative, after initial mild elevation of WBC to 14.4 now normalized. Being treated for exacerbation of systolic heart failure and suspected pneumonia          Review of Systems:     Last night patient went into A. fib RVR per report requiring 1 dose of metoprolol  ecg pending - on tele sinus rhythm     Patient appears comfortable now  Will await for cardiology clearance if patient can be discharged today      Assessment/Plan:     1. Acute hypoxic respiratory failure  2. SepsisPOA, with tachycardia, tachypnea and leukocytosis with a left shift  3. Bilateral community-acquired/atypical pneumonia vs HCAP   4. Hypertensive urgencyresolved  5. Acute systolic CHF  6. Rhinovirus and enterovirus infections  7. Moderate pulmonary hypertension  8. Hx stroke with right-sided hemiparesis and aphasia, dysarthria  9. Seizure disorder  10. HTN  11. Type II DMHbA1c 5.4 on 10/17/2021  12. Obesity  13. Covid19 negative   14. Hypokalemia  15. Pulmonary hypertension-PAP 51 mmHg  16.  Acute on chronic systolic and diastolic heart failure-EF 45 to 50%    Plan    -Tachyarrhythmia-new issue  -Follow EKG  Patient is currently on beta-blocker Coreg she used to be on metoprolol  If EKG shows patient has A. fib or flutter we will start patient on Cardizem reconsult her cardiologist.  -Increase the dose of Coreg-last reading was low otherwise most of the readings are on higher side around 518F to 493V systolic, decrease dose of hydralazine    Discharge which is planned for today will be canceled    -For CHF: Change IV to p.o. Lasix at maintenance dose, may need to monitoring of renal function as an outpatient mild renal dysfunction at high level of diuresis is expected. Monitor renal function      -For suspected pneumonia: I will discontinue antibiotic and monitor patient. pro-Tomás not elevated, -off antibiotics afebrile no evidence of any infection    Patient's chronic shortness of breath could be secondary to some kind of interstitial lung disease and associated acute on chronic systolic heart failure, and moderate pulmonary hypertension    Monitor and replace electrolytes hypokalemia he is being treated    Hyperglycemia-increase the dose of Lantus to 34 units subcu daily, patient outpatient was taking 23 units adhere hypoglycemia is persistent. Continue other management    ADD: Discussed with cardiologist, who in turn reviewed EKG, no evidence of atrial fibrillation or flutter, continue current dose of beta-blocker, hold Lasix, monitor renal function. Disposition:  -Patient with acute systolic heart failure aggressive diuresis resulting in mild renal dysfunction now changed to p.o. Lasix on maintenance dose, also has associated interstitial lung disease pneumonia ruled out, pulmonary hypertension leading to chronic shortness of breath, patient is consulted by cardiology, plan was to discharge patient on 10/23/2021 to rehab however on 10/22/2021 night she developed A. fib RVR versus atrial flutter which improved with 1 dose of IV metoprolol, today again she developed rapid rate EKG is pending once this issue is sorted out patient can be discharged to rehab/nursing home. Time spent on direct patient care >30 mints     Complexity : High complex - due to multiple medical issues outlined above.      CODE Status : Full code    Case discussed with:  [x]Patient  [] Family  []Nursing  []Case Management     DVT Prophylaxis:  [x]Lovenox  []Hep SQ  []SCDs  []Coumadin   []On Heparin gtt    [] Eliquis [] Xarelto     Objective:   VS:   Visit Vitals  BP (!) 150/82 (BP 1 Location: Left upper arm, BP Patient Position: At rest)   Pulse 88   Temp 98.4 °F (36.9 °C)   Resp 18   Ht 4' 11\" (1.499 m)   Wt 70.3 kg (155 lb)   SpO2 98%   BMI 31.31 kg/m²      Tmax/24hrs: Temp (24hrs), Av °F (36.7 °C), Min:97.5 °F (36.4 °C), Max:98.4 °F (36.9 °C)  IOBRIEF    Intake/Output Summary (Last 24 hours) at 10/23/2021 0941  Last data filed at 10/22/2021 1820  Gross per 24 hour   Intake 460 ml   Output    Net 460 ml       General:  Alert, cooperative, no acute distress   HEENT: No facial asymmetry, CK Augusto, External ears - WNL    Cardiovascular: S1S2 - regular , No Murmur   Pulmonary: Equal expansion , No Use of accessory muscles , No Rales No Rhonchi    GI:  +BS in all four quadrants, soft, non-tender  Extremities:  No edema; 2+ dorsalis pedis pulses bilaterally  Neuro: Alert and oriented X 2.        Medications:   Current Facility-Administered Medications   Medication Dose Route Frequency    furosemide (LASIX) tablet 20 mg  20 mg Oral DAILY    atorvastatin (LIPITOR) tablet 40 mg  40 mg Oral QHS    carvediloL (COREG) tablet 6.25 mg  6.25 mg Oral BID WITH MEALS    lisinopriL (PRINIVIL, ZESTRIL) tablet 10 mg  10 mg Oral DAILY    insulin glargine (LANTUS) injection 30 Units  30 Units SubCUTAneous QHS    aspirin chewable tablet 81 mg  81 mg Oral DAILY    guaiFENesin (ROBITUSSIN) 100 mg/5 mL oral liquid 100 mg  100 mg Oral Q4H PRN    hydrALAZINE (APRESOLINE) tablet 50 mg  50 mg Oral TID    hydrALAZINE (APRESOLINE) 20 mg/mL injection 10 mg  10 mg IntraVENous Q6H PRN    sodium chloride (NS) flush 5-40 mL  5-40 mL IntraVENous Q8H    sodium chloride (NS) flush 5-40 mL  5-40 mL IntraVENous PRN    acetaminophen (TYLENOL) tablet 650 mg  650 mg Oral Q6H PRN    Or    acetaminophen (TYLENOL) suppository 650 mg  650 mg Rectal Q6H PRN    polyethylene glycol (MIRALAX) packet 17 g  17 g Oral DAILY PRN    ondansetron (ZOFRAN ODT) tablet 4 mg  4 mg Oral Q8H PRN    Or    ondansetron (ZOFRAN) injection 4 mg  4 mg IntraVENous Q6H PRN    enoxaparin (LOVENOX) injection 40 mg  40 mg SubCUTAneous DAILY    insulin lispro (HUMALOG) injection   SubCUTAneous AC&HS    glucose chewable tablet 16 g  4 Tablet Oral PRN    glucagon (GLUCAGEN) injection 1 mg  1 mg IntraMUSCular PRN    dextrose (D50W) injection syrg 12.5-25 g  25-50 mL IntraVENous PRN       Labs:    No results for input(s): WBC, HGB, HCT, PLT, HGBEXT, HCTEXT, PLTEXT, HGBEXT, HCTEXT, PLTEXT in the last 72 hours. Recent Labs     10/23/21  0038 10/22/21  0110 10/21/21  0224    144 141   K 4.2 4.3 3.3*    110 106   CO2 25 28 28   * 249* 209*   BUN 18 14 16   CREA 1.32* 0.98 0.76   CA 8.9 8.4* 8.6         Disclaimer: Sections of this note are dictated utilizing voice recognition software, which may have resulted in some phonetic based errors in grammar and contents. Even though attempts were made to correct all the mistakes, some may have been missed, and remained in the body of the document. If questions arise, please contact our department.     Signed By: Evita Watt MD     October 23, 2021

## 2021-10-23 NOTE — PROGRESS NOTES
Per Dr. Corinne Bermudez, pt is not medically ready for d/c today. [facility] [phone] and spoke with Katty Rolle to move transport to tomorrow 10/24/21 at 3pm.  Trip number is [id number]. Left a message for Carolina Perez of [address] facilities for [address]: Carolina Perez called back.   Informed her pt's d/c cancelled for today    JOEL ChaviraN RN  Care Management  Pager: [phone]

## 2021-10-24 NOTE — PROGRESS NOTES
Problem: Pressure Injury - Risk of  Goal: *Prevention of pressure injury  Description: Document Ankit Scale and appropriate interventions in the flowsheet. Outcome: Progressing Towards Goal  Note: Pressure Injury Interventions:  Sensory Interventions: Assess changes in LOC, Keep linens dry and wrinkle-free, Minimize linen layers    Moisture Interventions: Absorbent underpads, Check for incontinence Q2 hours and as needed, Minimize layers    Activity Interventions: PT/OT evaluation    Mobility Interventions: Pressure redistribution bed/mattress (bed type), PT/OT evaluation    Nutrition Interventions: Document food/fluid/supplement intake    Friction and Shear Interventions: Apply protective barrier, creams and emollients, Foam dressings/transparent film/skin sealants, Minimize layers                Problem: Patient Education: Go to Patient Education Activity  Goal: Patient/Family Education  Outcome: Progressing Towards Goal     Problem: Pain  Goal: *Control of Pain  Outcome: Progressing Towards Goal  Goal: *PALLIATIVE CARE:  Alleviation of Pain  Outcome: Progressing Towards Goal     Problem: Patient Education: Go to Patient Education Activity  Goal: Patient/Family Education  Outcome: Progressing Towards Goal     Problem: Falls - Risk of  Goal: *Absence of Falls  Description: Document Diana Fall Risk and appropriate interventions in the flowsheet.   Outcome: Progressing Towards Goal  Note: Fall Risk Interventions:       Mentation Interventions: Adequate sleep, hydration, pain control, Bed/chair exit alarm, Door open when patient unattended    Medication Interventions: Bed/chair exit alarm    Elimination Interventions: Bed/chair exit alarm, Call light in reach, Toileting schedule/hourly rounds              Problem: Patient Education: Go to Patient Education Activity  Goal: Patient/Family Education  Outcome: Progressing Towards Goal     Problem: Pneumonia: Day 1  Goal: Off Pathway (Use only if patient is Off Pathway)  Outcome: Progressing Towards Goal  Goal: Activity/Safety  Outcome: Progressing Towards Goal  Goal: Consults, if ordered  Outcome: Progressing Towards Goal  Goal: Diagnostic Test/Procedures  Outcome: Progressing Towards Goal  Goal: Nutrition/Diet  Outcome: Progressing Towards Goal  Goal: Medications  Outcome: Progressing Towards Goal  Goal: Respiratory  Outcome: Progressing Towards Goal  Goal: Treatments/Interventions/Procedures  Outcome: Progressing Towards Goal  Goal: Psychosocial  Outcome: Progressing Towards Goal  Goal: *Oxygen saturation within defined limits  Outcome: Progressing Towards Goal  Goal: *Influenza vaccine administered (October-March)  Outcome: Progressing Towards Goal  Goal: *Pneumoccocal vaccine administered  Outcome: Progressing Towards Goal  Goal: *Hemodynamically stable  Outcome: Progressing Towards Goal  Goal: *Demonstrates progressive activity  Outcome: Progressing Towards Goal  Goal: *Tolerating diet  Outcome: Progressing Towards Goal     Problem: Patient Education: Go to Patient Education Activity  Goal: Patient/Family Education  Outcome: Progressing Towards Goal     Problem: Patient Education: Go to Patient Education Activity  Goal: Patient/Family Education  Outcome: Progressing Towards Goal     Problem: Pneumonia: Day 2  Goal: Off Pathway (Use only if patient is Off Pathway)  Outcome: Progressing Towards Goal  Goal: Activity/Safety  Outcome: Progressing Towards Goal  Goal: Consults, if ordered  Outcome: Progressing Towards Goal  Goal: Diagnostic Test/Procedures  Outcome: Progressing Towards Goal  Goal: Nutrition/Diet  Outcome: Progressing Towards Goal  Goal: Discharge Planning  Outcome: Progressing Towards Goal  Goal: Medications  Outcome: Progressing Towards Goal  Goal: Respiratory  Outcome: Progressing Towards Goal  Goal: Treatments/Interventions/Procedures  Outcome: Progressing Towards Goal  Goal: Psychosocial  Outcome: Progressing Towards Goal  Goal: *Oxygen saturation within defined limits  Outcome: Progressing Towards Goal  Goal: *Hemodynamically stable  Outcome: Progressing Towards Goal  Goal: *Demonstrates progressive activity  Outcome: Progressing Towards Goal  Goal: *Tolerating diet  Outcome: Progressing Towards Goal  Goal: *Optimal pain control at patient's stated goal  Outcome: Progressing Towards Goal     Problem: Pneumonia: Day 3  Goal: Off Pathway (Use only if patient is Off Pathway)  Outcome: Progressing Towards Goal  Goal: Activity/Safety  Outcome: Progressing Towards Goal  Goal: Consults, if ordered  Outcome: Progressing Towards Goal  Goal: Diagnostic Test/Procedures  Outcome: Progressing Towards Goal  Goal: Nutrition/Diet  Outcome: Progressing Towards Goal  Goal: Discharge Planning  Outcome: Progressing Towards Goal  Goal: Medications  Outcome: Progressing Towards Goal  Goal: Respiratory  Outcome: Progressing Towards Goal  Goal: Treatments/Interventions/Procedures  Outcome: Progressing Towards Goal  Goal: Psychosocial  Outcome: Progressing Towards Goal  Goal: *Oxygen saturation within defined limits  Outcome: Progressing Towards Goal  Goal: *Hemodynamically stable  Outcome: Progressing Towards Goal  Goal: *Demonstrates progressive activity  Outcome: Progressing Towards Goal  Goal: *Tolerating diet  Outcome: Progressing Towards Goal  Goal: *Describes available resources and support systems  Outcome: Progressing Towards Goal  Goal: *Optimal pain control at patient's stated goal  Outcome: Progressing Towards Goal     Problem: Falls - Risk of  Goal: *Absence of Falls  Description: Document Diana Fall Risk and appropriate interventions in the flowsheet.   Outcome: Progressing Towards Goal  Note: Fall Risk Interventions:       Mentation Interventions: Adequate sleep, hydration, pain control, Bed/chair exit alarm, Door open when patient unattended    Medication Interventions: Bed/chair exit alarm    Elimination Interventions: Bed/chair exit alarm, Call light in reach, Toileting schedule/hourly rounds              Problem: Patient Education: Go to Patient Education Activity  Goal: Patient/Family Education  Outcome: Progressing Towards Goal

## 2021-10-24 NOTE — DISCHARGE SUMMARY
[facility] Hospitalist Group  Discharge Summary       Patient: Gerhardt Bookbinder Age: 61 y.o. : 1955 MR#: [id number] SSN: [id number]  PCP on record: None  Admit date: 10/17/2021  Discharge date: 10/24/2021    Consults:  JESE Guillaume,-cardiology  -   Procedures: Midline catheter placement on 10/22/21  -     Significant Diagnostic Studies: CT chest 10/21/21  -  IMPRESSION     1. Extensive and patchy groundglass opacities. The finding could represent  airspace disease and/or edema.     2. Moderate mediastinal adenopathy which could be infectious or inflammatory. Potential neoplastic process also needs to be excluded. Recommend short-term  follow-up contrast CT for further changes.     3. Chronic left pleural effusion with significant pleural thickening and  calcifications, unchanged since the prior study. Chronic empyema also needs to  be excluded.     4. Thyroid enlargement and hypodense lesion in posterior right lobe. Recommend  thyroid ultrasound for better evaluation. -CXR 10/19/21:  IMPRESSION  Interval worsening bilateral centrally predominant airspace opacities,  suspicious for worsening multifocal pneumonia. Pulmonary edema or pulmonary  hemorrhage are also possible. Recommend continued imaging follow-up.    -Cardiac echo 10/20/21:  Result status: Final result   · LV: Estimated LVEF is 45 - 50%. Normal cavity size. Mild concentric hypertrophy. Wall motion: normal.  · PA: Moderate pulmonary hypertension. Pulmonary arterial systolic pressure is 51 mmHg. · Image quality for this study was technically difficult. · Echo study was limited due to patient's condition. · LA: Dilated left atrium.        Discharge Diagnoses: -Acute hypoxic respiratory failure  -Sepsis POA  -Bilateral community acquired pneumonia vs HCAP  -Hypertensive urgency  -Acute systolic heart failure  -Rhinovirus/enterovirus infection  -Moderate pulmonary HTN     -Tachyarrythmia  -Thyroid lesion: needs further work up in outpt setting. Patient Active Problem List   Diagnosis Code    UTI (urinary tract infection) N39.0    Pneumonia J18.9       Hospital Course by Problem   HPI : Kazakh-speaking female with past medical history of congestive heart failure with reduced EF 45 to 50%, concentric hypertrophy, moderate pulmonary hypertension, admitted with a history of sepsis secondary to pneumonia which was seen on x-ray chest evidence however patient has no fever since admission, blood cultures are negative, after initial mild elevation of WBC to 14.4 now normalized. Being treated for exacerbation of systolic heart failure and suspected pneumonia     1. Acute hypoxic respiratory failure  2. SepsisPOA, with tachycardia, tachypnea and leukocytosis with a left shift  3. Bilateral community-acquired/atypical pneumonia vs HCAP   4. Hypertensive urgencyresolved  5. Acute on chronic systolic and diastolic heart failure-EF 45 to 50%  6. Rhinovirus and enterovirus infections  7. Moderate pulmonary hypertension  8. Hx stroke with right-sided hemiparesis and aphasia, dysarthria  9. Seizure disorder  10. HTN  11. Type II DMHbA1c 5.4 on 10/17/2021  12. Obesity  13. Covid19 negative   14. Hypokalemia  15. Pulmonary hypertension-PAP 51 mmHg    -Tachyarrhythmia-new issue developed 10/23 resulting in cancellation of discharge. Discussed with cardiologist by previous hospitalist. Cardiologist reviewed EKG, no evidence of atrial fibrillation or flutter, continue current dose of beta-blocker.          -For CHF: Initially treated with IV lasix which has been changed to p.o. Lasix at maintenance dose, may need to monitoring of renal function as an outpatient mild renal dysfunction noted a few days prior to dc, her lasix was transiently held and renal function improved. On date of dc she has been started on lasix .   Monitor renal function        -For suspected pneumonia: Initially treated w antibiotic regimen which she has completed. She has been monitored off abx and has done well. pro-Tomás not elevated, off antibiotics she remianed afebrile no evidence of any infection     Patient's chronic shortness of breath could be secondary to some kind of interstitial lung disease and associated acute on chronic systolic heart failure, and moderate pulmonary hypertension       In terms of her dx of dm, she was noted to have hyperglycemia-increased the dose of Lantus to 34 units subcu daily, patient outpatient was taking 23 units. Cont higher dose after dc unless and monitor for hypoglycemia.      Pt with an incidental finding of thyroid enlargement and mass. TSH wnl. She will need cont'd follow up and work up of this finding in the outpt setting in the near future. Niece alerted of this finding. Today's examination of the patient revealed:     Subjective:   Pt did not answer my questions although alert and awake. Per nursing she is doing okay.   Objective:   VS:   Visit Vitals  /70 (BP 1 Location: Right upper arm, BP Patient Position: At rest)   Pulse 74   Temp 97 °F (36.1 °C)   Resp 16   Ht 4' 11\" (1.499 m)   Wt 70.3 kg (155 lb)   SpO2 99%   BMI 31.31 kg/m²      Tmax/24hrs: Temp (24hrs), Av.9 °F (36.6 °C), Min:97 °F (36.1 °C), Max:98.2 °F (36.8 °C)     Input/Output:     Intake/Output Summary (Last 24 hours) at 10/24/2021 1453  Last data filed at 10/23/2021 2304  Gross per 24 hour   Intake 240 ml   Output    Net 240 ml       General: Alert, awake, in NAD   Cardiovascular:  RRR, no murmurs  Pulmonary:  CTAB  GI:  abd soft, nt, nd  Extremities:  No edema  Additional:      Labs:    Recent Results (from the past 24 hour(s))   GLUCOSE, POC    Collection Time: 10/23/21  6:06 PM   Result Value Ref Range    Glucose (POC) 170 (H) 70 - 110 mg/dL   GLUCOSE, POC    Collection Time: 10/23/21 10:20 PM   Result Value Ref Range    Glucose (POC) 168 (H) 70 - 134 mg/dL   METABOLIC PANEL, BASIC    Collection Time: 10/24/21  4:06 AM Result Value Ref Range    Sodium 135 (L) 136 - 145 mmol/L    Potassium 3.6 3.5 - 5.5 mmol/L    Chloride 101 100 - 111 mmol/L    CO2 26 21 - 32 mmol/L    Anion gap 8 3.0 - 18 mmol/L    Glucose 219 (H) 74 - 99 mg/dL    BUN 19 (H) 7.0 - 18 MG/DL    Creatinine 0.94 0.6 - 1.3 MG/DL    BUN/Creatinine ratio 20 12 - 20      GFR est AA >60 >60 ml/min/1.73m2    GFR est non-AA 60 (L) >60 ml/min/1.73m2    Calcium 8.9 8.5 - 10.1 MG/DL   EKG, 12 LEAD, INITIAL    Collection Time: 10/24/21  4:26 AM   Result Value Ref Range    Ventricular Rate 154 BPM    Atrial Rate 154 BPM    QRS Duration 94 ms    Q-T Interval 314 ms    QTC Calculation (Bezet) 502 ms    Calculated R Axis -19 degrees    Calculated T Axis 96 degrees    Diagnosis       Supraventricular tachycardia  Voltage criteria for left ventricular hypertrophy  ST & T wave abnormality, consider lateral ischemia  Abnormal ECG  When compared with ECG of 23-OCT-2021 13:21,  Supraventricular tachycardia , new  Vent. rate has increased BY  54 BPM  ST now depressed in Lateral leads  Confirmed by Howie Buchanan MD, ----- ([phone]) on 10/24/2021 12:24:59 PM     MAGNESIUM    Collection Time: 10/24/21  6:39 AM   Result Value Ref Range    Magnesium 1.7 1.6 - 2.6 mg/dL   GLUCOSE, POC    Collection Time: 10/24/21  7:40 AM   Result Value Ref Range    Glucose (POC) 180 (H) 70 - 110 mg/dL   GLUCOSE, POC    Collection Time: 10/24/21  1:07 PM   Result Value Ref Range    Glucose (POC) 205 (H) 70 - 110 mg/dL     Additional Data Reviewed:     Condition on discharge:stable   Disposition:    []Home   []Home with Home Health   [x]SNF/NH   []Rehab   []Home with family   []Alternate Facility:____________________      Discharge Medications:     Current Discharge Medication List      START taking these medications    Details   aspirin 81 mg chewable tablet Take 1 Tablet by mouth daily for 30 days.   Qty: 30 Tablet, Refills: 0      atorvastatin (LIPITOR) 40 mg tablet Take 1 Tablet by mouth nightly for 30 days.  Suraj Rosario: 30 Tablet, Refills: 0      carvediloL (COREG) 12.5 mg tablet Take 1 Tablet by mouth two (2) times daily (with meals) for 30 days. Qty: 60 Tablet, Refills: 0      furosemide (LASIX) 40 mg tablet Take 1 Tablet by mouth daily for 30 days. Qty: 30 Tablet, Refills: 0      guaiFENesin (ROBITUSSIN) 100 mg/5 mL liquid Take 5 mL by mouth every four (4) hours as needed for Cough for up to 15 days. Qty: 1 Each, Refills: 0      hydrALAZINE (APRESOLINE) 25 mg tablet Take 1 Tablet by mouth three (3) times daily for 30 days. Qty: 90 Tablet, Refills: 0      lisinopriL (PRINIVIL, ZESTRIL) 10 mg tablet Take 1 Tablet by mouth daily for 30 days. Qty: 30 Tablet, Refills: 0         CONTINUE these medications which have CHANGED    Details   insulin glargine (LANTUS) 100 unit/mL injection 34 Units by SubCUTAneous route nightly for 30 days. Qty: 1 mL, Refills: 0         CONTINUE these medications which have NOT CHANGED    Details   acetaminophen (Tylenol Extra Strength) 500 mg tablet Take 2 Tabs by mouth every six (6) hours as needed for Pain. Qty: 20 Tab, Refills: 0         STOP taking these medications       metoprolol tartrate (LOPRESSOR) 50 mg tablet Comments:   Reason for Stopping: Follow-up Appointments:   1.  Your PCP: None, within 7-10days      >30 minutes spent coordinating this discharge (review instructions/follow-up, prescriptions, preparing report for sign off)    Signed:  Erich Kenney MD  10/24/2021  2:53 PM

## 2021-10-24 NOTE — PROGRESS NOTES
Assume care of patient lying in bed with eyes open, unable to comprehend [place] when communicating with staff. Patient just repeat the phrase of what is being said to her and smile. No facial or body gesture of pain or discomfort. Bed locked in lowest position. Call light within reach, monitor hourly for assistance and needs. 2200 Patient resting with eyes closed. 1252/5031    8067 Patient noted with elevated heart rate in the 150's. Noted SVT rhythm. Assess patient and vital signs taken with machine and noted 150's.     0405 Call placed to Dr. Peguero Dear to notify of elevated heart rate with blood pressure 109/ 64. Heart rate 150's without any distress noted, patient remains calm. New order for Metoprolol IV for abnormal arrhythmia,     EKG, troponin and magnesium level. 0410 EKG done with results SVT. Labs drawn at present. 0420 Metoprolol pulled from cabinet override, not administered with patient converting back in the low 90' and 80's sinus rhythm. 0730 Bedside and Verbal shift change report given to Stephan Kothari RN (oncoming nurse) by Peace Petty RN (offgoing nurse). Report given with SBAR, Kardex, Intake/Output, MAR and Recent Results.

## 2021-10-24 NOTE — PROGRESS NOTES
Per Dr. Keith Flores, pt will be discharged today  Informed nursing  Left a message for [facility] facility for [facility] [address] of [address] [phone] and spoke with Denise Lui. She stated [facility] will be picking pt up and scheduled time was 3pm but to give them more 15 minutes. She stated she will contact [address]. CM called [facility] and spoke with Ady Wolff. He stated they do not have pt on their schedule for today. He stated Medicaid called them yesterday to  pt at 3pm yesterday but when they got to the hospital, they were told pt was not medically ready for discharge.  called [employer] back and waited on the life for them to resolve  with [facility]. [facility] will be picking up pt at 5pm.  Updated pt's nurse gisel. Left a message for pt's nihossein Walters Gone.           JOEL PryorN RN  Care Management  Pager: [phone]

## 2021-10-24 NOTE — PROGRESS NOTES
I have reviewed discharge instructions with the patient and caregiver. The caregiver verbalized understanding. Patient transported to [address] [place] [address] report given to [employer] LPN.

## 2021-10-24 NOTE — PROGRESS NOTES
Problem: Pressure Injury - Risk of  Goal: *Prevention of pressure injury  Description: Document Ankit Scale and appropriate interventions in the flowsheet. 10/24/2021 1350 by Sophy Broderick RN  Outcome: Resolved/Met  10/24/2021 1349 by Sophy Broderick RN  Outcome: Progressing Towards Goal  Note: Pressure Injury Interventions:  Sensory Interventions: Assess changes in LOC, Keep linens dry and wrinkle-free, Minimize linen layers    Moisture Interventions: Absorbent underpads, Check for incontinence Q2 hours and as needed, Minimize layers    Activity Interventions: PT/OT evaluation    Mobility Interventions: Pressure redistribution bed/mattress (bed type), PT/OT evaluation    Nutrition Interventions: Document food/fluid/supplement intake    Friction and Shear Interventions: Apply protective barrier, creams and emollients, Foam dressings/transparent film/skin sealants, Minimize layers                Problem: Patient Education: Go to Patient Education Activity  Goal: Patient/Family Education  10/24/2021 1350 by Sophy Broderick RN  Outcome: Resolved/Met  10/24/2021 1349 by Sophy Broderick RN  Outcome: Progressing Towards Goal     Problem: Pain  Goal: *Control of Pain  10/24/2021 1350 by Sophy Broderick RN  Outcome: Resolved/Met  10/24/2021 1349 by Sophy Broderick RN  Outcome: Progressing Towards Goal  Goal: *PALLIATIVE CARE:  Alleviation of Pain  10/24/2021 1350 by Sophy Broderick RN  Outcome: Resolved/Met  10/24/2021 1349 by Sophy Broderick RN  Outcome: Progressing Towards Goal     Problem: Patient Education: Go to Patient Education Activity  Goal: Patient/Family Education  10/24/2021 1350 by Sophy Broderick RN  Outcome: Resolved/Met  10/24/2021 1349 by Sophy Broderick RN  Outcome: Progressing Towards Goal     Problem: Falls - Risk of  Goal: *Absence of Falls  Description: Document Delores Murray Fall Risk and appropriate interventions in the flowsheet.   10/24/2021 1350 by Sophy Broderick RN  Outcome: Resolved/Met  10/24/2021 1349 by Sammy Alejandro RN  Outcome: Progressing Towards Goal  Note: Fall Risk Interventions:       Mentation Interventions: Adequate sleep, hydration, pain control, Bed/chair exit alarm, Door open when patient unattended    Medication Interventions: Bed/chair exit alarm    Elimination Interventions: Bed/chair exit alarm, Call light in reach, Toileting schedule/hourly rounds              Problem: Patient Education: Go to Patient Education Activity  Goal: Patient/Family Education  10/24/2021 1350 by Sammy Alejandro RN  Outcome: Resolved/Met  10/24/2021 1349 by Sammy Alejandro RN  Outcome: Progressing Towards Goal     Problem: Pneumonia: Day 1  Goal: Off Pathway (Use only if patient is Off Pathway)  10/24/2021 1350 by Sammy Alejandro RN  Outcome: Resolved/Met  10/24/2021 1349 by Sammy Alejandro RN  Outcome: Progressing Towards Goal  Goal: Activity/Safety  10/24/2021 1350 by Sammy Alejandro RN  Outcome: Resolved/Met  10/24/2021 1349 by Sammy Alejandro RN  Outcome: Progressing Towards Goal  Goal: Consults, if ordered  10/24/2021 1350 by Sammy Alejandro RN  Outcome: Resolved/Met  10/24/2021 1349 by Sammy Alejandro RN  Outcome: Progressing Towards Goal  Goal: Diagnostic Test/Procedures  10/24/2021 1350 by Sammy Alejandro RN  Outcome: Resolved/Met  10/24/2021 1349 by Sammy Alejandro RN  Outcome: Progressing Towards Goal  Goal: Nutrition/Diet  10/24/2021 1350 by Sammy Alejandro RN  Outcome: Resolved/Met  10/24/2021 1349 by Sammy Alejandro RN  Outcome: Progressing Towards Goal  Goal: Medications  10/24/2021 1350 by Sammy Alejandro RN  Outcome: Resolved/Met  10/24/2021 1349 by Sammy Alejandro RN  Outcome: Progressing Towards Goal  Goal: Respiratory  10/24/2021 1350 by Sammy Alejandro RN  Outcome: Resolved/Met  10/24/2021 1349 by Sammy Alejandro RN  Outcome: Progressing Towards Goal  Goal: Treatments/Interventions/Procedures  10/24/2021 1350 by Dung Smith RN  Outcome: Resolved/Met  10/24/2021 1349 by Dung Smith RN  Outcome: Progressing Towards Goal  Goal: Psychosocial  10/24/2021 1350 by Dung Smith RN  Outcome: Resolved/Met  10/24/2021 1349 by Dung Smith RN  Outcome: Progressing Towards Goal  Goal: *Oxygen saturation within defined limits  10/24/2021 1350 by Dung Smith RN  Outcome: Resolved/Met  10/24/2021 1349 by Dung Smith RN  Outcome: Progressing Towards Goal  Goal: *Influenza vaccine administered (October-March)  10/24/2021 1350 by Dung Smith RN  Outcome: Resolved/Met  10/24/2021 1349 by Dung Smith RN  Outcome: Progressing Towards Goal  Goal: *Pneumoccocal vaccine administered  10/24/2021 1350 by Dung Smith RN  Outcome: Resolved/Met  10/24/2021 1349 by Dung Smith RN  Outcome: Progressing Towards Goal  Goal: *Hemodynamically stable  10/24/2021 1350 by Dung Smith RN  Outcome: Resolved/Met  10/24/2021 1349 by Dung Smith RN  Outcome: Progressing Towards Goal  Goal: *Demonstrates progressive activity  10/24/2021 1350 by Dung Smith RN  Outcome: Resolved/Met  10/24/2021 1349 by Dung Smith RN  Outcome: Progressing Towards Goal  Goal: *Tolerating diet  10/24/2021 1350 by Dung Smith RN  Outcome: Resolved/Met  10/24/2021 1349 by Dung Smith RN  Outcome: Progressing Towards Goal     Problem: Patient Education: Go to Patient Education Activity  Goal: Patient/Family Education  10/24/2021 1350 by Dung Smith RN  Outcome: Resolved/Met  10/24/2021 1349 by Dung Smith RN  Outcome: Progressing Towards Goal     Problem: Patient Education: Go to Patient Education Activity  Goal: Patient/Family Education  10/24/2021 1350 by Dung Smith RN  Outcome: Resolved/Met  10/24/2021 1349 by Dung Smith RN  Outcome: Progressing Towards Goal     Problem: Pneumonia: Day 2  Goal: Off Pathway (Use only if patient is Off Pathway)  10/24/2021 1350 by Keri Jackson RN  Outcome: Resolved/Met  10/24/2021 1349 by Keri Jackson RN  Outcome: Progressing Towards Goal  Goal: Activity/Safety  10/24/2021 1350 by Keri Jackson, RN  Outcome: Resolved/Met  10/24/2021 1349 by Keri Jackson, RN  Outcome: Progressing Towards Goal  Goal: Consults, if ordered  10/24/2021 1350 by Keri Jackson, RN  Outcome: Resolved/Met  10/24/2021 1349 by Keri Jackson, RN  Outcome: Progressing Towards Goal  Goal: Diagnostic Test/Procedures  10/24/2021 1350 by Keri Jackson, RN  Outcome: Resolved/Met  10/24/2021 1349 by Keri Jackson RN  Outcome: Progressing Towards Goal  Goal: Nutrition/Diet  10/24/2021 1350 by Keri Jackson, RN  Outcome: Resolved/Met  10/24/2021 1349 by Keri Jackson, RN  Outcome: Progressing Towards Goal  Goal: Discharge Planning  10/24/2021 1350 by Keri Jackson, RN  Outcome: Resolved/Met  10/24/2021 1349 by Keri Jackson RN  Outcome: Progressing Towards Goal  Goal: Medications  10/24/2021 1350 by Keri Jackson, RN  Outcome: Resolved/Met  10/24/2021 1349 by Keri Jackson, RN  Outcome: Progressing Towards Goal  Goal: Respiratory  10/24/2021 1350 by Keri Jackson, RN  Outcome: Resolved/Met  10/24/2021 1349 by Keri Jackson, RN  Outcome: Progressing Towards Goal  Goal: Treatments/Interventions/Procedures  10/24/2021 1350 by Keri Jackson, RN  Outcome: Resolved/Met  10/24/2021 1349 by Keri Jackson, RN  Outcome: Progressing Towards Goal  Goal: Psychosocial  10/24/2021 1350 by Keri Jackson, RN  Outcome: Resolved/Met  10/24/2021 1349 by Keri Jackson, RN  Outcome: Progressing Towards Goal  Goal: *Oxygen saturation within defined limits  10/24/2021 1350 by Keri Jackson, RN  Outcome: Resolved/Met  10/24/2021 1349 by Gerrianne Nim, RN  Outcome: Progressing Towards Goal  Goal: *Hemodynamically stable  10/24/2021 1350 by Keri Jackson RN  Outcome: Resolved/Met  10/24/2021 1349 by Sammy Alejandro RN  Outcome: Progressing Towards Goal  Goal: *Demonstrates progressive activity  10/24/2021 1350 by Sammy Alejandro RN  Outcome: Resolved/Met  10/24/2021 1349 by Sammy Alejandro RN  Outcome: Progressing Towards Goal  Goal: *Tolerating diet  10/24/2021 1350 by Sammy Alejandro RN  Outcome: Resolved/Met  10/24/2021 1349 by Sammy Alejandro RN  Outcome: Progressing Towards Goal  Goal: *Optimal pain control at patient's stated goal  10/24/2021 1350 by Sammy Alejandro RN  Outcome: Resolved/Met  10/24/2021 1349 by Samym Alejandro RN  Outcome: Progressing Towards Goal     Problem: Pneumonia: Day 3  Goal: Off Pathway (Use only if patient is Off Pathway)  10/24/2021 1350 by Sammy Alejandro RN  Outcome: Resolved/Met  10/24/2021 1349 by Sammy Alejandro RN  Outcome: Progressing Towards Goal  Goal: Activity/Safety  10/24/2021 1350 by Sammy Alejandro RN  Outcome: Resolved/Met  10/24/2021 1349 by Sammy Alejandro RN  Outcome: Progressing Towards Goal  Goal: Consults, if ordered  10/24/2021 1350 by Sammy Alejandro RN  Outcome: Resolved/Met  10/24/2021 1349 by Sammy Alejandro RN  Outcome: Progressing Towards Goal  Goal: Diagnostic Test/Procedures  10/24/2021 1350 by Sammy Alejandro RN  Outcome: Resolved/Met  10/24/2021 1349 by Sammy Alejandro RN  Outcome: Progressing Towards Goal  Goal: Nutrition/Diet  10/24/2021 1350 by Sammy Alejandro RN  Outcome: Resolved/Met  10/24/2021 1349 by Sammy Alejandro RN  Outcome: Progressing Towards Goal  Goal: Discharge Planning  10/24/2021 1350 by Sammy Alejandro RN  Outcome: Resolved/Met  10/24/2021 1349 by Sammy Alejandro RN  Outcome: Progressing Towards Goal  Goal: Medications  10/24/2021 1350 by Sammy Alejandro RN  Outcome: Resolved/Met  10/24/2021 1349 by Sammy Alejandro RN  Outcome: Progressing Towards Goal  Goal: Respiratory  10/24/2021 1350 by Sammy Alejandro RN  Outcome: Resolved/Met  10/24/2021 1349 by Dung Smith RN  Outcome: Progressing Towards Goal  Goal: Treatments/Interventions/Procedures  10/24/2021 1350 by Dung Smith RN  Outcome: Resolved/Met  10/24/2021 1349 by Dung Smith RN  Outcome: Progressing Towards Goal  Goal: Psychosocial  10/24/2021 1350 by Dung Smith RN  Outcome: Resolved/Met  10/24/2021 1349 by Dung Smith RN  Outcome: Progressing Towards Goal  Goal: *Oxygen saturation within defined limits  10/24/2021 1350 by Dung Smith RN  Outcome: Resolved/Met  10/24/2021 1349 by Dung Smith RN  Outcome: Progressing Towards Goal  Goal: *Hemodynamically stable  10/24/2021 1350 by Dung Smith RN  Outcome: Resolved/Met  10/24/2021 1349 by Dung Smith RN  Outcome: Progressing Towards Goal  Goal: *Demonstrates progressive activity  10/24/2021 1350 by Dung Smith RN  Outcome: Resolved/Met  10/24/2021 1349 by Dung Smith RN  Outcome: Progressing Towards Goal  Goal: *Tolerating diet  10/24/2021 1350 by Dung Smith RN  Outcome: Resolved/Met  10/24/2021 1349 by Dung Smith RN  Outcome: Progressing Towards Goal  Goal: *Describes available resources and support systems  10/24/2021 1350 by Dung Smith RN  Outcome: Resolved/Met  10/24/2021 1349 by Dung Smith RN  Outcome: Progressing Towards Goal  Goal: *Optimal pain control at patient's stated goal  10/24/2021 1350 by Dung Smith RN  Outcome: Resolved/Met  10/24/2021 1349 by Dung Smith RN  Outcome: Progressing Towards Goal     Problem: Falls - Risk of  Goal: *Absence of Falls  Description: Document Miley Minium Fall Risk and appropriate interventions in the flowsheet.   10/24/2021 1350 by Dung Smith RN  Outcome: Resolved/Met  10/24/2021 1349 by Dung Smith RN  Outcome: Progressing Towards Goal  Note: Fall Risk Interventions:       Mentation Interventions: Adequate sleep, hydration, pain control, Bed/chair exit alarm, Door open when patient unattended    Medication Interventions: Bed/chair exit alarm    Elimination Interventions: Bed/chair exit alarm, Call light in reach, Toileting schedule/hourly rounds              Problem: Patient Education: Go to Patient Education Activity  Goal: Patient/Family Education  10/24/2021 1350 by Peggy Rausch, RN  Outcome: Resolved/Met  10/24/2021 1349 by Peggy Rausch RN  Outcome: Progressing Towards Goal

## 2021-10-24 NOTE — PROGRESS NOTES
Cardiology Progress Note    Admit Date: 10/17/2021  Attending Cardiologist: Dr. Maddie Slater:     - Acute HFmrEF. No prior ECHO for comparison, suspect this is a new dx this admission. CXR concerning for pulmonary edema vs PNA. ECHO this admission EF 45-50%. - Acute hypoxic respiratory failure, likely multifactorial CHF vs PNA.   - Sepsis, 2/2 PNA.   - Hypertensive Urgency   - DM2  - Hx CVA, right side hemiparesis with dysarthria.   -Seizure Disorder   - Peripheral arterial diease   - Moderate Pulmonary hypertension, PASP 52 mmHg   -Obesity        No Primary Cardiologist, Initial Referral: Dr. Leslie Davies:     Creatinine is better. Start with p.o. Lasix. Continue carvedilol  -Continued on Lisinopril. Pressures elevated this am prior to receiving morning antihypertensives. -Will start patient on moderate intensity statin. Continue ASA. Okay to discharge from cardiac standpoint and follow-up as outpatient      Subjective:     Dysarthria. Limited verbal communication.       Objective:      Patient Vitals for the past 8 hrs:   Temp Pulse Resp BP SpO2   10/24/21 0501 98.2 °F (36.8 °C) 79 18 106/68 97 %   10/24/21 0500  87  106/60    10/24/21 0436 98 °F (36.7 °C) 87 18 109/64 96 %   10/24/21 0434 98.2 °F (36.8 °C) (!) 153 20 130/83 96 %         Patient Vitals for the past 96 hrs:   Weight   10/22/21 0011 70.3 kg (155 lb)   10/20/21 1122 71.2 kg (157 lb)       TELE: SR, PAC    Current Facility-Administered Medications   Medication Dose Route Frequency Last Admin    carvediloL (COREG) tablet 12.5 mg  12.5 mg Oral BID WITH MEALS 12.5 mg at 10/24/21 0911    metoprolol (LOPRESSOR) injection 5 mg  5 mg IntraVENous ONCE      insulin glargine (LANTUS) injection 34 Units  34 Units SubCUTAneous QHS 34 Units at 10/23/21 2220    hydrALAZINE (APRESOLINE) tablet 25 mg  25 mg Oral TID 25 mg at 10/23/21 2304    atorvastatin (LIPITOR) tablet 40 mg  40 mg Oral QHS 40 mg at 10/23/21 2304    lisinopriL (PRINIVIL, ZESTRIL) tablet 10 mg  10 mg Oral DAILY 10 mg at 10/24/21 0911    aspirin chewable tablet 81 mg  81 mg Oral DAILY 81 mg at 10/24/21 0910    guaiFENesin (ROBITUSSIN) 100 mg/5 mL oral liquid 100 mg  100 mg Oral Q4H  mg at 10/24/21 0912    hydrALAZINE (APRESOLINE) 20 mg/mL injection 10 mg  10 mg IntraVENous Q6H PRN 10 mg at 10/19/21 0830    sodium chloride (NS) flush 5-40 mL  5-40 mL IntraVENous Q8H 10 mL at 10/24/21 0508    sodium chloride (NS) flush 5-40 mL  5-40 mL IntraVENous PRN      acetaminophen (TYLENOL) tablet 650 mg  650 mg Oral Q6H PRN      Or    acetaminophen (TYLENOL) suppository 650 mg  650 mg Rectal Q6H PRN      polyethylene glycol (MIRALAX) packet 17 g  17 g Oral DAILY PRN      ondansetron (ZOFRAN ODT) tablet 4 mg  4 mg Oral Q8H PRN      Or    ondansetron (ZOFRAN) injection 4 mg  4 mg IntraVENous Q6H PRN      enoxaparin (LOVENOX) injection 40 mg  40 mg SubCUTAneous DAILY 40 mg at 10/24/21 0911    insulin lispro (HUMALOG) injection   SubCUTAneous AC&HS 3 Units at 10/24/21 0912    glucose chewable tablet 16 g  4 Tablet Oral PRN      glucagon (GLUCAGEN) injection 1 mg  1 mg IntraMUSCular PRN      dextrose (D50W) injection syrg 12.5-25 g  25-50 mL IntraVENous PRN           Intake/Output Summary (Last 24 hours) at 10/24/2021 1007  Last data filed at 10/23/2021 2304  Gross per 24 hour   Intake 240 ml   Output    Net 240 ml       Physical Exam:  General:  alert, no distress, appears stated age  Neck: , no JVD  Lungs:  Harsh breath sounds B/L   Heart:  regular rate and rhythm, S1, S2 normal, no murmur, click, rub or gallop  Abdomen:  abdomen is soft without significant tenderness, masses, organomegaly or guarding  Extremities:  extremities normal, atraumatic, no cyanosis.         Visit Vitals  /68 (BP 1 Location: Right upper arm)   Pulse 79   Temp 98.2 °F (36.8 °C)   Resp 18   Ht 4' 11\" (1.499 m)   Wt 70.3 kg (155 lb)   SpO2 97%   BMI 31.31 kg/m²       Data Review: Labs: Results:       Chemistry Recent Labs     10/24/21  0639 10/24/21  0406 10/23/21  0038 10/22/21  0110   GLU  --  219* 289* 249*   NA  --  135* 136 144   K  --  3.6 4.2 4.3   CL  --  101 101 110   CO2  --  26 25 28   BUN  --  19* 18 14   CREA  --  0.94 1.32* 0.98   CA  --  8.9 8.9 8.4*   MG 1.7  --   --   --    AGAP  --  8 10 6   BUCR  --  20 14 14      CBC w/Diff Recent Labs     10/23/21  0038   WBC 11.2   RBC 4.08*   HGB 12.0   HCT 38.3         Cardiac Enzymes No results found for: CPK, CK, CKMMB, CKMB, RCK3, CKMBT, CKNDX, CKND1, DOMINGO, TROPT, TROIQ, JULIETA, TROPT, TNIPOC, BNP, BNPP   Coagulation No results for input(s): PTP, INR, APTT, INREXT, INREXT in the last 72 hours. Lipid Panel No results found for: CHOL, CHOLPOCT, CHOLX, CHLST, CHOLV, 935357, HDL, HDLP, LDL, LDLC, DLDLP, 916712, VLDLC, VLDL, TGLX, TRIGL, TRIGP, TGLPOCT, CHHD, CHHDX   BNP No results found for: BNP, BNPP, XBNPT   Liver Enzymes No results for input(s): TP, ALB, TBIL, AP in the last 72 hours.     No lab exists for component: SGOT, GPT, DBIL   Thyroid Studies Lab Results   Component Value Date/Time    TSH 2.97 10/22/2021 09:36 AM          Signed By: Celine Deluca MD     October 24, 2021

## 2021-10-24 NOTE — DISCHARGE INSTRUCTIONS
DISCHARGE SUMMARY from Nurse    PATIENT INSTRUCTIONS:    After general anesthesia or intravenous sedation, for 24 hours or while taking prescription Narcotics:  · Limit your activities  · Do not drive and operate hazardous machinery  · Do not make important personal or business decisions  · Do  not drink alcoholic beverages  · If you have not urinated within 8 hours after discharge, please contact your surgeon on call. Report the following to your surgeon:  · Excessive pain, swelling, redness or odor of or around the surgical area  · Temperature over 100.5  · Nausea and vomiting lasting longer than 4 hours or if unable to take medications  · Any signs of decreased circulation or nerve impairment to extremity: change in color, persistent  numbness, tingling, coldness or increase pain  · Any questions    What to do at Home:  Recommended activity: Activity as tolerated,  LTC    If you experience any of the following symptoms Nausea, vomiting, diarrhea, shortness of breath, fever over 101, abnormal bleeding, dizziness, fainting, or any other issues or concerns please follow up with  Primary Care Physician or call 911 for emergencies. *  Please give a list of your current medications to your Primary Care Provider. *  Please update this list whenever your medications are discontinued, doses are      changed, or new medications (including over-the-counter products) are added. *  Please carry medication information at all times in case of emergency situations. These are general instructions for a healthy lifestyle:    No smoking/ No tobacco products/ Avoid exposure to second hand smoke  Surgeon General's Warning:  Quitting smoking now greatly reduces serious risk to your health.     Obesity, smoking, and sedentary lifestyle greatly increases your risk for illness    A healthy diet, regular physical exercise & weight monitoring are important for maintaining a healthy lifestyle    You may be retaining fluid if you have a history of heart failure or if you experience any of the following symptoms:  Weight gain of 3 pounds or more overnight or 5 pounds in a week, increased swelling in our hands or feet or shortness of breath while lying flat in bed. Please call your doctor as soon as you notice any of these symptoms; do not wait until your next office visit. The discharge information has been reviewed with the caregiver. The caregiver verbalized understanding. Discharge medications reviewed with the caregiver and appropriate educational materials and side effects teaching were provided. ___________________________________________________________________________________________________________________________________    Patient armband removed and shredded    Patient Education        Urinary Tract Infection (UTI) in Women: Care Instructions  Overview     A urinary tract infection, or UTI, is a general term for an infection anywhere between the kidneys and the urethra (where urine comes out). Most UTIs are bladder infections. They often cause pain or burning when you urinate. UTIs are caused by bacteria and can be cured with antibiotics. Be sure to complete your treatment so that the infection does not get worse. Follow-up care is a key part of your treatment and safety. Be sure to make and go to all appointments, and call your doctor if you are having problems. It's also a good idea to know your test results and keep a list of the medicines you take. How can you care for yourself at home? · Take your antibiotics as directed. Do not stop taking them just because you feel better. You need to take the full course of antibiotics. · Drink extra water and other fluids for the next day or two. This will help make the urine less concentrated and help wash out the bacteria that are causing the infection.  (If you have kidney, heart, or liver disease and have to limit fluids, talk with your doctor before you increase the amount of fluids you drink.)  · Avoid drinks that are carbonated or have caffeine. They can irritate the bladder. · Urinate often. Try to empty your bladder each time. · To relieve pain, take a hot bath or lay a heating pad set on low over your lower belly or genital area. Never go to sleep with a heating pad in place. To prevent UTIs  · Drink plenty of water each day. This helps you urinate often, which clears bacteria from your system. (If you have kidney, heart, or liver disease and have to limit fluids, talk with your doctor before you increase the amount of fluids you drink.)  · Urinate when you need to. · If you are sexually active, urinate right after you have sex. · Change sanitary pads often. · Avoid douches, bubble baths, feminine hygiene sprays, and other feminine hygiene products that have deodorants. · After going to the bathroom, wipe from front to back. When should you call for help? Call your doctor now or seek immediate medical care if:    · Symptoms such as fever, chills, nausea, or vomiting get worse or appear for the first time.     · You have new pain in your back just below your rib cage. This is called flank pain.     · There is new blood or pus in your urine.     · You have any problems with your antibiotic medicine. Watch closely for changes in your health, and be sure to contact your doctor if:    · You are not getting better after taking an antibiotic for 2 days.     · Your symptoms go away but then come back. Where can you learn more? Go to http://www.gray.com/  Enter R976 in the search box to learn more about \"Urinary Tract Infection (UTI) in Women: Care Instructions. \"  Current as of: February 10, 2021               Content Version: 13.0  © 4414-1165 [employer]. Care instructions adapted under license by [employer] (which disclaims liability or warranty for this information).  If you have questions about a medical condition or this instruction, always ask your healthcare professional. [address] any warranty or liability for your use of this information. Patient Education        Pneumonia: Care Instructions  Overview     Pneumonia is an infection of the lungs. Most cases are caused by infections from bacteria or viruses. Pneumonia may be mild or very severe. If it is caused by bacteria, you will be treated with antibiotics. It may take a few weeks to a few months to recover fully from pneumonia, depending on how sick you were and whether your overall health is good. Follow-up care is a key part of your treatment and safety. Be sure to make and go to all appointments, and call your doctor if you are having problems. It's also a good idea to know your test results and keep a list of the medicines you take. How can you care for yourself at home? · Take your antibiotics exactly as directed. Do not stop taking the medicine just because you are feeling better. You need to take the full course of antibiotics. · Take your medicines exactly as prescribed. Call your doctor if you think you are having a problem with your medicine. · Get plenty of rest and sleep. You may feel weak and tired for a while, but your energy level will improve with time. · To prevent dehydration, drink plenty of fluids. Choose water and other clear liquids. If you have kidney, heart, or liver disease and have to limit fluids, talk with your doctor before you increase the amount of fluids you drink. · Take care of your cough so you can rest. A cough that brings up mucus from your lungs is common with pneumonia. It is one way your body gets rid of the infection. But if coughing keeps you from resting or causes severe fatigue and chest-wall pain, talk to your doctor. Your doctor may suggest that you take a medicine to reduce the cough. · Use a vaporizer or humidifier to add moisture to your bedroom.  Follow the directions for cleaning the machine. · Do not smoke or allow others to smoke around you. Smoke will make your cough last longer. If you need help quitting, talk to your doctor about stop-smoking programs and medicines. These can increase your chances of quitting for good. · Take an over-the-counter pain medicine, such as acetaminophen (Tylenol), ibuprofen (Advil, Motrin), or naproxen (Aleve). Read and follow all instructions on the label. · Do not take two or more pain medicines at the same time unless the doctor told you to. Many pain medicines have acetaminophen, which is Tylenol. Too much acetaminophen (Tylenol) can be harmful. · If you were given a spirometer to measure how well your lungs are working, use it as instructed. This can help your doctor tell how your recovery is going. · To prevent pneumonia in the future, talk to your doctor about getting a flu vaccine (once a year) and a pneumococcal vaccine (one time only for most people). When should you call for help? Call 911 anytime you think you may need emergency care. For example, call if:    · You have severe trouble breathing. Call your doctor now or seek immediate medical care if:    · You cough up dark brown or bloody mucus (sputum).     · You have new or worse trouble breathing.     · You are dizzy or lightheaded, or you feel like you may faint. Watch closely for changes in your health, and be sure to contact your doctor if:    · You have a new or higher fever.     · You are coughing more deeply or more often.     · You are not getting better after 2 days (48 hours).     · You do not get better as expected. Where can you learn more? Go to http://www.[employer].com/  Enter D336 in the search box to learn more about \"Pneumonia: Care Instructions. \"  Current as of: July 6, 2021               Content Version: 13.0  © 1701-5577 [employer].    Care instructions adapted under license by [employer] (which disclaims liability or warranty for this information). If you have questions about a medical condition or this instruction, always ask your healthcare professional. [place] [address] any warranty or liability for your use of this information.

## 2022-01-01 ENCOUNTER — APPOINTMENT (OUTPATIENT)
Dept: VASCULAR SURGERY | Age: 67
DRG: 871 | End: 2022-01-01
Attending: INTERNAL MEDICINE
Payer: MEDICARE

## 2022-01-01 ENCOUNTER — APPOINTMENT (OUTPATIENT)
Dept: GENERAL RADIOLOGY | Age: 67
End: 2022-01-01
Attending: STUDENT IN AN ORGANIZED HEALTH CARE EDUCATION/TRAINING PROGRAM
Payer: MEDICARE

## 2022-01-01 ENCOUNTER — APPOINTMENT (OUTPATIENT)
Dept: GENERAL RADIOLOGY | Age: 67
DRG: 871 | End: 2022-01-01
Attending: STUDENT IN AN ORGANIZED HEALTH CARE EDUCATION/TRAINING PROGRAM
Payer: MEDICARE

## 2022-01-01 ENCOUNTER — HOSPITAL ENCOUNTER (INPATIENT)
Age: 67
LOS: 15 days | Discharge: NURSING, REHAB OR LONG-TERM CARE | DRG: 871 | End: 2022-04-08
Attending: STUDENT IN AN ORGANIZED HEALTH CARE EDUCATION/TRAINING PROGRAM | Admitting: INTERNAL MEDICINE
Payer: MEDICARE

## 2022-01-01 ENCOUNTER — APPOINTMENT (OUTPATIENT)
Dept: CT IMAGING | Age: 67
DRG: 064 | End: 2022-01-01
Attending: STUDENT IN AN ORGANIZED HEALTH CARE EDUCATION/TRAINING PROGRAM
Payer: MEDICARE

## 2022-01-01 ENCOUNTER — HOME CARE VISIT (OUTPATIENT)
Dept: SCHEDULING | Facility: HOME HEALTH | Age: 67
End: 2022-01-01
Payer: MEDICARE

## 2022-01-01 ENCOUNTER — APPOINTMENT (OUTPATIENT)
Dept: MRI IMAGING | Age: 67
DRG: 064 | End: 2022-01-01
Attending: STUDENT IN AN ORGANIZED HEALTH CARE EDUCATION/TRAINING PROGRAM
Payer: MEDICARE

## 2022-01-01 ENCOUNTER — HOSPICE ADMISSION (OUTPATIENT)
Dept: HOSPICE | Facility: HOSPICE | Age: 67
End: 2022-01-01
Payer: MEDICARE

## 2022-01-01 ENCOUNTER — APPOINTMENT (OUTPATIENT)
Dept: CT IMAGING | Age: 67
DRG: 871 | End: 2022-01-01
Attending: PHYSICIAN ASSISTANT
Payer: MEDICARE

## 2022-01-01 ENCOUNTER — ANESTHESIA EVENT (OUTPATIENT)
Dept: ENDOSCOPY | Age: 67
DRG: 871 | End: 2022-01-01
Payer: MEDICARE

## 2022-01-01 ENCOUNTER — HOSPITAL ENCOUNTER (EMERGENCY)
Age: 67
Discharge: NURSING, REHAB OR LONG-TERM CARE | End: 2022-03-15
Attending: EMERGENCY MEDICINE
Payer: MEDICARE

## 2022-01-01 ENCOUNTER — APPOINTMENT (OUTPATIENT)
Dept: VASCULAR SURGERY | Age: 67
DRG: 871 | End: 2022-01-01
Attending: PHYSICIAN ASSISTANT
Payer: MEDICARE

## 2022-01-01 ENCOUNTER — APPOINTMENT (OUTPATIENT)
Dept: CT IMAGING | Age: 67
End: 2022-01-01
Attending: STUDENT IN AN ORGANIZED HEALTH CARE EDUCATION/TRAINING PROGRAM
Payer: MEDICARE

## 2022-01-01 ENCOUNTER — APPOINTMENT (OUTPATIENT)
Dept: NON INVASIVE DIAGNOSTICS | Age: 67
DRG: 871 | End: 2022-01-01
Attending: NURSE PRACTITIONER
Payer: MEDICARE

## 2022-01-01 ENCOUNTER — HOME CARE VISIT (OUTPATIENT)
Dept: HOSPICE | Facility: HOSPICE | Age: 67
End: 2022-01-01
Payer: MEDICARE

## 2022-01-01 ENCOUNTER — APPOINTMENT (OUTPATIENT)
Dept: GENERAL RADIOLOGY | Age: 67
DRG: 871 | End: 2022-01-01
Attending: INTERNAL MEDICINE
Payer: MEDICARE

## 2022-01-01 ENCOUNTER — APPOINTMENT (OUTPATIENT)
Dept: MRI IMAGING | Age: 67
DRG: 871 | End: 2022-01-01
Attending: INTERNAL MEDICINE
Payer: MEDICARE

## 2022-01-01 ENCOUNTER — TRANSCRIBE ORDER (OUTPATIENT)
Dept: SCHEDULING | Age: 67
End: 2022-01-01

## 2022-01-01 ENCOUNTER — APPOINTMENT (OUTPATIENT)
Dept: ULTRASOUND IMAGING | Age: 67
DRG: 871 | End: 2022-01-01
Attending: NURSE PRACTITIONER
Payer: MEDICARE

## 2022-01-01 ENCOUNTER — ANESTHESIA (OUTPATIENT)
Dept: ENDOSCOPY | Age: 67
DRG: 871 | End: 2022-01-01
Payer: MEDICARE

## 2022-01-01 ENCOUNTER — APPOINTMENT (OUTPATIENT)
Dept: GENERAL RADIOLOGY | Age: 67
DRG: 871 | End: 2022-01-01
Attending: NURSE PRACTITIONER
Payer: MEDICARE

## 2022-01-01 ENCOUNTER — APPOINTMENT (OUTPATIENT)
Dept: GENERAL RADIOLOGY | Age: 67
DRG: 064 | End: 2022-01-01
Attending: STUDENT IN AN ORGANIZED HEALTH CARE EDUCATION/TRAINING PROGRAM
Payer: MEDICARE

## 2022-01-01 ENCOUNTER — APPOINTMENT (OUTPATIENT)
Dept: MRI IMAGING | Age: 67
DRG: 871 | End: 2022-01-01
Attending: STUDENT IN AN ORGANIZED HEALTH CARE EDUCATION/TRAINING PROGRAM
Payer: MEDICARE

## 2022-01-01 ENCOUNTER — HOSPITAL ENCOUNTER (INPATIENT)
Age: 67
LOS: 3 days | Discharge: HOSPICE | DRG: 064 | End: 2022-04-12
Attending: STUDENT IN AN ORGANIZED HEALTH CARE EDUCATION/TRAINING PROGRAM | Admitting: STUDENT IN AN ORGANIZED HEALTH CARE EDUCATION/TRAINING PROGRAM
Payer: MEDICARE

## 2022-01-01 ENCOUNTER — HOSPITAL ENCOUNTER (EMERGENCY)
Dept: CT IMAGING | Age: 67
Discharge: HOME | DRG: 871 | End: 2022-03-24
Attending: STUDENT IN AN ORGANIZED HEALTH CARE EDUCATION/TRAINING PROGRAM
Payer: MEDICARE

## 2022-01-01 VITALS
OXYGEN SATURATION: 89 % | RESPIRATION RATE: 24 BRPM | HEART RATE: 89 BPM | SYSTOLIC BLOOD PRESSURE: 132 MMHG | TEMPERATURE: 98.1 F | DIASTOLIC BLOOD PRESSURE: 74 MMHG

## 2022-01-01 VITALS
WEIGHT: 137.57 LBS | TEMPERATURE: 98 F | HEART RATE: 82 BPM | HEIGHT: 59 IN | SYSTOLIC BLOOD PRESSURE: 125 MMHG | OXYGEN SATURATION: 93 % | DIASTOLIC BLOOD PRESSURE: 64 MMHG | RESPIRATION RATE: 20 BRPM | BODY MASS INDEX: 27.73 KG/M2

## 2022-01-01 VITALS
BODY MASS INDEX: 29.08 KG/M2 | OXYGEN SATURATION: 98 % | SYSTOLIC BLOOD PRESSURE: 137 MMHG | HEART RATE: 92 BPM | DIASTOLIC BLOOD PRESSURE: 89 MMHG | TEMPERATURE: 97.4 F | RESPIRATION RATE: 14 BRPM | WEIGHT: 144 LBS

## 2022-01-01 VITALS
DIASTOLIC BLOOD PRESSURE: 88 MMHG | RESPIRATION RATE: 23 BRPM | TEMPERATURE: 98.1 F | HEIGHT: 59 IN | BODY MASS INDEX: 31.25 KG/M2 | SYSTOLIC BLOOD PRESSURE: 194 MMHG | HEART RATE: 72 BPM | WEIGHT: 155 LBS | OXYGEN SATURATION: 94 %

## 2022-01-01 DIAGNOSIS — G93.49 ENCEPHALOPATHY CHRONIC: ICD-10-CM

## 2022-01-01 DIAGNOSIS — G93.40 ENCEPHALOPATHY ACUTE: ICD-10-CM

## 2022-01-01 DIAGNOSIS — E87.0 HYPERNATREMIA: ICD-10-CM

## 2022-01-01 DIAGNOSIS — J18.9 PNEUMONIA DUE TO INFECTIOUS ORGANISM, UNSPECIFIED LATERALITY, UNSPECIFIED PART OF LUNG: ICD-10-CM

## 2022-01-01 DIAGNOSIS — E83.51 HYPOCALCEMIA: ICD-10-CM

## 2022-01-01 DIAGNOSIS — E83.42 HYPOMAGNESEMIA: ICD-10-CM

## 2022-01-01 DIAGNOSIS — N17.0 ACUTE KIDNEY INJURY (AKI) WITH ACUTE TUBULAR NECROSIS (ATN) (HCC): ICD-10-CM

## 2022-01-01 DIAGNOSIS — Z51.5 COMFORT MEASURES ONLY STATUS: ICD-10-CM

## 2022-01-01 DIAGNOSIS — D64.9 ANEMIA, UNSPECIFIED TYPE: ICD-10-CM

## 2022-01-01 DIAGNOSIS — Z86.73 HISTORY OF CARDIOEMBOLIC CEREBROVASCULAR ACCIDENT (CVA): ICD-10-CM

## 2022-01-01 DIAGNOSIS — R73.9 HYPERGLYCEMIA: ICD-10-CM

## 2022-01-01 DIAGNOSIS — E87.29 HIGH ANION GAP METABOLIC ACIDOSIS: ICD-10-CM

## 2022-01-01 DIAGNOSIS — R57.1 HYPOVOLEMIC SHOCK (HCC): ICD-10-CM

## 2022-01-01 DIAGNOSIS — N30.00 ACUTE CYSTITIS WITHOUT HEMATURIA: Primary | ICD-10-CM

## 2022-01-01 DIAGNOSIS — J18.9 PNEUMONIA OF RIGHT MIDDLE LOBE DUE TO INFECTIOUS ORGANISM: ICD-10-CM

## 2022-01-01 DIAGNOSIS — E86.0 DEHYDRATION: ICD-10-CM

## 2022-01-01 DIAGNOSIS — Z71.89 GOALS OF CARE, COUNSELING/DISCUSSION: ICD-10-CM

## 2022-01-01 DIAGNOSIS — D69.6 THROMBOCYTOPENIA (HCC): ICD-10-CM

## 2022-01-01 DIAGNOSIS — R16.0 LIVER MASS, LEFT LOBE: ICD-10-CM

## 2022-01-01 DIAGNOSIS — R53.81 DEBILITY: ICD-10-CM

## 2022-01-01 DIAGNOSIS — G93.40 ACUTE ENCEPHALOPATHY: ICD-10-CM

## 2022-01-01 DIAGNOSIS — A41.9 SEPTIC SHOCK (HCC): Primary | ICD-10-CM

## 2022-01-01 DIAGNOSIS — I63.9 CEREBROVASCULAR ACCIDENT (CVA), UNSPECIFIED MECHANISM (HCC): Primary | ICD-10-CM

## 2022-01-01 DIAGNOSIS — R65.21 SEPTIC SHOCK (HCC): Primary | ICD-10-CM

## 2022-01-01 DIAGNOSIS — N30.00 ACUTE CYSTITIS WITHOUT HEMATURIA: ICD-10-CM

## 2022-01-01 DIAGNOSIS — R16.0 HEPATOMEGALY: Primary | ICD-10-CM

## 2022-01-01 LAB
AFP L3 MFR SERPL: NORMAL % (ref 0–9.9)
AFP SERPL-MCNC: 0.8 NG/ML (ref 0–9.2)
ALBUMIN SERPL-MCNC: 2.6 G/DL (ref 3.4–5)
ALBUMIN SERPL-MCNC: 2.6 G/DL (ref 3.4–5)
ALBUMIN SERPL-MCNC: 3.2 G/DL (ref 3.4–5)
ALBUMIN SERPL-MCNC: 3.6 G/DL (ref 3.4–5)
ALBUMIN/GLOB SERPL: 0.7 {RATIO} (ref 0.8–1.7)
ALBUMIN/GLOB SERPL: 0.8 {RATIO} (ref 0.8–1.7)
ALBUMIN/GLOB SERPL: 1 {RATIO} (ref 0.8–1.7)
ALBUMIN/GLOB SERPL: 1 {RATIO} (ref 0.8–1.7)
ALP SERPL-CCNC: 117 U/L (ref 45–117)
ALP SERPL-CCNC: 154 U/L (ref 45–117)
ALP SERPL-CCNC: 71 U/L (ref 45–117)
ALP SERPL-CCNC: 76 U/L (ref 45–117)
ALT SERPL-CCNC: 15 U/L (ref 13–56)
ALT SERPL-CCNC: 18 U/L (ref 13–56)
ALT SERPL-CCNC: 24 U/L (ref 13–56)
ALT SERPL-CCNC: 29 U/L (ref 13–56)
AMMONIA PLAS-SCNC: 56 UMOL/L (ref 11–32)
AMMONIA PLAS-SCNC: 75 UMOL/L (ref 11–32)
AMPHET UR QL SCN: NEGATIVE
AMYLASE SERPL-CCNC: 47 U/L (ref 25–115)
ANION GAP SERPL CALC-SCNC: 1 MMOL/L (ref 3–18)
ANION GAP SERPL CALC-SCNC: 10 MMOL/L (ref 3–18)
ANION GAP SERPL CALC-SCNC: 13 MMOL/L (ref 3–18)
ANION GAP SERPL CALC-SCNC: 16 MMOL/L (ref 3–18)
ANION GAP SERPL CALC-SCNC: 18 MMOL/L (ref 3–18)
ANION GAP SERPL CALC-SCNC: 19 MMOL/L (ref 3–18)
ANION GAP SERPL CALC-SCNC: 2 MMOL/L (ref 3–18)
ANION GAP SERPL CALC-SCNC: 2 MMOL/L (ref 3–18)
ANION GAP SERPL CALC-SCNC: 3 MMOL/L (ref 3–18)
ANION GAP SERPL CALC-SCNC: 4 MMOL/L (ref 3–18)
ANION GAP SERPL CALC-SCNC: 5 MMOL/L (ref 3–18)
ANION GAP SERPL CALC-SCNC: 6 MMOL/L (ref 3–18)
ANION GAP SERPL CALC-SCNC: 7 MMOL/L (ref 3–18)
ANION GAP SERPL CALC-SCNC: 7 MMOL/L (ref 3–18)
ANION GAP SERPL CALC-SCNC: 9 MMOL/L (ref 3–18)
APPEARANCE UR: ABNORMAL
APPEARANCE UR: CLEAR
APPEARANCE UR: CLEAR
APTT PPP: 32.3 SEC (ref 23–36.4)
ARTERIAL PATENCY WRIST A: POSITIVE
ARTERIAL PATENCY WRIST A: POSITIVE
AST SERPL-CCNC: 22 U/L (ref 10–38)
AST SERPL-CCNC: 26 U/L (ref 10–38)
AST SERPL-CCNC: 32 U/L (ref 10–38)
AST SERPL-CCNC: 68 U/L (ref 10–38)
ATRIAL RATE: 102 BPM
ATRIAL RATE: 75 BPM
ATRIAL RATE: 77 BPM
ATRIAL RATE: 81 BPM
ATRIAL RATE: 84 BPM
ATRIAL RATE: 98 BPM
B PERT DNA SPEC QL NAA+PROBE: NOT DETECTED
BACTERIA SPEC CULT: ABNORMAL
BACTERIA SPEC CULT: NORMAL
BACTERIA URNS QL MICRO: ABNORMAL /HPF
BACTERIA URNS QL MICRO: ABNORMAL /HPF
BARBITURATES UR QL SCN: NEGATIVE
BASE DEFICIT BLD-SCNC: 13 MMOL/L
BASE DEFICIT BLD-SCNC: 20 MMOL/L
BASE EXCESS BLD CALC-SCNC: 10 MMOL/L
BASOPHILS # BLD: 0 K/UL (ref 0–0.1)
BASOPHILS # BLD: 0.1 K/UL (ref 0–0.1)
BASOPHILS NFR BLD: 0 % (ref 0–2)
BASOPHILS NFR BLD: 1 % (ref 0–2)
BDY SITE: ABNORMAL
BENZODIAZ UR QL: NEGATIVE
BILIRUB DIRECT SERPL-MCNC: 0.2 MG/DL (ref 0–0.2)
BILIRUB SERPL-MCNC: 0.4 MG/DL (ref 0.2–1)
BILIRUB SERPL-MCNC: 0.5 MG/DL (ref 0.2–1)
BILIRUB SERPL-MCNC: 0.6 MG/DL (ref 0.2–1)
BILIRUB SERPL-MCNC: 0.9 MG/DL (ref 0.2–1)
BILIRUB UR QL: NEGATIVE
BORDETELLA PARAPERTUSSIS PCR, BORPAR: NOT DETECTED
BUN SERPL-MCNC: 27 MG/DL (ref 7–18)
BUN SERPL-MCNC: 28 MG/DL (ref 7–18)
BUN SERPL-MCNC: 29 MG/DL (ref 7–18)
BUN SERPL-MCNC: 31 MG/DL (ref 7–18)
BUN SERPL-MCNC: 32 MG/DL (ref 7–18)
BUN SERPL-MCNC: 34 MG/DL (ref 7–18)
BUN SERPL-MCNC: 35 MG/DL (ref 7–18)
BUN SERPL-MCNC: 36 MG/DL (ref 7–18)
BUN SERPL-MCNC: 40 MG/DL (ref 7–18)
BUN SERPL-MCNC: 41 MG/DL (ref 7–18)
BUN SERPL-MCNC: 43 MG/DL (ref 7–18)
BUN SERPL-MCNC: 48 MG/DL (ref 7–18)
BUN SERPL-MCNC: 49 MG/DL (ref 7–18)
BUN SERPL-MCNC: 51 MG/DL (ref 7–18)
BUN SERPL-MCNC: 57 MG/DL (ref 7–18)
BUN/CREAT SERPL: 24 (ref 12–20)
BUN/CREAT SERPL: 24 (ref 12–20)
BUN/CREAT SERPL: 25 (ref 12–20)
BUN/CREAT SERPL: 26 (ref 12–20)
BUN/CREAT SERPL: 26 (ref 12–20)
BUN/CREAT SERPL: 27 (ref 12–20)
BUN/CREAT SERPL: 27 (ref 12–20)
BUN/CREAT SERPL: 28 (ref 12–20)
BUN/CREAT SERPL: 29 (ref 12–20)
BUN/CREAT SERPL: 30 (ref 12–20)
BUN/CREAT SERPL: 31 (ref 12–20)
BUN/CREAT SERPL: 31 (ref 12–20)
BUN/CREAT SERPL: 32 (ref 12–20)
BUN/CREAT SERPL: 35 (ref 12–20)
BUN/CREAT SERPL: 36 (ref 12–20)
BUN/CREAT SERPL: 36 (ref 12–20)
BUN/CREAT SERPL: 37 (ref 12–20)
BUN/CREAT SERPL: 38 (ref 12–20)
BUN/CREAT SERPL: 41 (ref 12–20)
C DIFF GDH STL QL: NEGATIVE
C DIFF TOX A+B STL QL IA: NEGATIVE
C PNEUM DNA SPEC QL NAA+PROBE: NOT DETECTED
CA-I SERPL-SCNC: 0.73 MMOL/L (ref 1.15–1.33)
CA-I SERPL-SCNC: 0.91 MMOL/L (ref 1.15–1.33)
CA-I SERPL-SCNC: 0.98 MMOL/L (ref 1.15–1.33)
CA-I SERPL-SCNC: 1 MMOL/L (ref 1.15–1.33)
CA-I SERPL-SCNC: 1.03 MMOL/L (ref 1.15–1.33)
CA-I SERPL-SCNC: 1.05 MMOL/L (ref 1.15–1.33)
CA-I SERPL-SCNC: 1.07 MMOL/L (ref 1.15–1.33)
CA-I SERPL-SCNC: 1.09 MMOL/L (ref 1.15–1.33)
CA-I SERPL-SCNC: 1.11 MMOL/L (ref 1.15–1.33)
CA-I SERPL-SCNC: 1.14 MMOL/L (ref 1.15–1.33)
CA-I SERPL-SCNC: 1.15 MMOL/L (ref 1.15–1.33)
CA-I SERPL-SCNC: 1.16 MMOL/L (ref 1.15–1.33)
CA-I SERPL-SCNC: 1.17 MMOL/L (ref 1.15–1.33)
CA-I SERPL-SCNC: 1.18 MMOL/L (ref 1.15–1.33)
CA-I SERPL-SCNC: 1.26 MMOL/L (ref 1.15–1.33)
CA-I SERPL-SCNC: 1.26 MMOL/L (ref 1.15–1.33)
CA-I SERPL-SCNC: 1.27 MMOL/L (ref 1.15–1.33)
CALCIUM SERPL-MCNC: 5.5 MG/DL (ref 8.5–10.1)
CALCIUM SERPL-MCNC: 5.9 MG/DL (ref 8.5–10.1)
CALCIUM SERPL-MCNC: 6.5 MG/DL (ref 8.5–10.1)
CALCIUM SERPL-MCNC: 7.4 MG/DL (ref 8.5–10.1)
CALCIUM SERPL-MCNC: 7.9 MG/DL (ref 8.5–10.1)
CALCIUM SERPL-MCNC: 8 MG/DL (ref 8.5–10.1)
CALCIUM SERPL-MCNC: 8.1 MG/DL (ref 8.5–10.1)
CALCIUM SERPL-MCNC: 8.2 MG/DL (ref 8.5–10.1)
CALCIUM SERPL-MCNC: 8.3 MG/DL (ref 8.5–10.1)
CALCIUM SERPL-MCNC: 8.4 MG/DL (ref 8.5–10.1)
CALCIUM SERPL-MCNC: 8.5 MG/DL (ref 8.5–10.1)
CALCIUM SERPL-MCNC: 8.6 MG/DL (ref 8.5–10.1)
CALCIUM SERPL-MCNC: 8.7 MG/DL (ref 8.5–10.1)
CALCULATED P AXIS, ECG09: 19 DEGREES
CALCULATED P AXIS, ECG09: 25 DEGREES
CALCULATED P AXIS, ECG09: 65 DEGREES
CALCULATED P AXIS, ECG09: 66 DEGREES
CALCULATED P AXIS, ECG09: 74 DEGREES
CALCULATED P AXIS, ECG09: 77 DEGREES
CALCULATED R AXIS, ECG10: -12 DEGREES
CALCULATED R AXIS, ECG10: -16 DEGREES
CALCULATED R AXIS, ECG10: -18 DEGREES
CALCULATED R AXIS, ECG10: -20 DEGREES
CALCULATED R AXIS, ECG10: -26 DEGREES
CALCULATED R AXIS, ECG10: -7 DEGREES
CALCULATED T AXIS, ECG11: 13 DEGREES
CALCULATED T AXIS, ECG11: 130 DEGREES
CALCULATED T AXIS, ECG11: 166 DEGREES
CALCULATED T AXIS, ECG11: 18 DEGREES
CALCULATED T AXIS, ECG11: 76 DEGREES
CALCULATED T AXIS, ECG11: 96 DEGREES
CANNABINOIDS UR QL SCN: NEGATIVE
CC UR VC: ABNORMAL
CHLORIDE SERPL-SCNC: 100 MMOL/L (ref 100–111)
CHLORIDE SERPL-SCNC: 101 MMOL/L (ref 100–111)
CHLORIDE SERPL-SCNC: 102 MMOL/L (ref 100–111)
CHLORIDE SERPL-SCNC: 103 MMOL/L (ref 100–111)
CHLORIDE SERPL-SCNC: 104 MMOL/L (ref 100–111)
CHLORIDE SERPL-SCNC: 104 MMOL/L (ref 100–111)
CHLORIDE SERPL-SCNC: 105 MMOL/L (ref 100–111)
CHLORIDE SERPL-SCNC: 105 MMOL/L (ref 100–111)
CHLORIDE SERPL-SCNC: 106 MMOL/L (ref 100–111)
CHLORIDE SERPL-SCNC: 109 MMOL/L (ref 100–111)
CHLORIDE SERPL-SCNC: 110 MMOL/L (ref 100–111)
CHLORIDE SERPL-SCNC: 115 MMOL/L (ref 100–111)
CHLORIDE SERPL-SCNC: 116 MMOL/L (ref 100–111)
CHLORIDE SERPL-SCNC: 116 MMOL/L (ref 100–111)
CHLORIDE SERPL-SCNC: 117 MMOL/L (ref 100–111)
CHLORIDE SERPL-SCNC: 119 MMOL/L (ref 100–111)
CHLORIDE SERPL-SCNC: 119 MMOL/L (ref 100–111)
CHLORIDE SERPL-SCNC: 120 MMOL/L (ref 100–111)
CHLORIDE SERPL-SCNC: 121 MMOL/L (ref 100–111)
CHLORIDE SERPL-SCNC: 123 MMOL/L (ref 100–111)
CHLORIDE SERPL-SCNC: 127 MMOL/L (ref 100–111)
CHLORIDE SERPL-SCNC: 97 MMOL/L (ref 100–111)
CHOLEST SERPL-MCNC: 102 MG/DL
CK SERPL-CCNC: 54 U/L (ref 26–192)
CK SERPL-CCNC: 60 U/L (ref 26–192)
CO2 SERPL-SCNC: 11 MMOL/L (ref 21–32)
CO2 SERPL-SCNC: 12 MMOL/L (ref 21–32)
CO2 SERPL-SCNC: 15 MMOL/L (ref 21–32)
CO2 SERPL-SCNC: 21 MMOL/L (ref 21–32)
CO2 SERPL-SCNC: 24 MMOL/L (ref 21–32)
CO2 SERPL-SCNC: 26 MMOL/L (ref 21–32)
CO2 SERPL-SCNC: 27 MMOL/L (ref 21–32)
CO2 SERPL-SCNC: 28 MMOL/L (ref 21–32)
CO2 SERPL-SCNC: 29 MMOL/L (ref 21–32)
CO2 SERPL-SCNC: 31 MMOL/L (ref 21–32)
CO2 SERPL-SCNC: 32 MMOL/L (ref 21–32)
CO2 SERPL-SCNC: 33 MMOL/L (ref 21–32)
CO2 SERPL-SCNC: 33 MMOL/L (ref 21–32)
CO2 SERPL-SCNC: 34 MMOL/L (ref 21–32)
CO2 SERPL-SCNC: 35 MMOL/L (ref 21–32)
CO2 SERPL-SCNC: 36 MMOL/L (ref 21–32)
CO2 SERPL-SCNC: 37 MMOL/L (ref 21–32)
CO2 SERPL-SCNC: 39 MMOL/L (ref 21–32)
CO2 SERPL-SCNC: 9 MMOL/L (ref 21–32)
COCAINE UR QL SCN: NEGATIVE
COLOR UR: YELLOW
COPPER SERPL-MCNC: 86 UG/DL (ref 80–158)
COVID-19 RAPID TEST, COVR: NOT DETECTED
CREAT SERPL-MCNC: 0.77 MG/DL (ref 0.6–1.3)
CREAT SERPL-MCNC: 0.77 MG/DL (ref 0.6–1.3)
CREAT SERPL-MCNC: 0.78 MG/DL (ref 0.6–1.3)
CREAT SERPL-MCNC: 0.79 MG/DL (ref 0.6–1.3)
CREAT SERPL-MCNC: 0.81 MG/DL (ref 0.6–1.3)
CREAT SERPL-MCNC: 0.84 MG/DL (ref 0.6–1.3)
CREAT SERPL-MCNC: 0.87 MG/DL (ref 0.6–1.3)
CREAT SERPL-MCNC: 0.91 MG/DL (ref 0.6–1.3)
CREAT SERPL-MCNC: 0.93 MG/DL (ref 0.6–1.3)
CREAT SERPL-MCNC: 0.94 MG/DL (ref 0.6–1.3)
CREAT SERPL-MCNC: 0.94 MG/DL (ref 0.6–1.3)
CREAT SERPL-MCNC: 1.04 MG/DL (ref 0.6–1.3)
CREAT SERPL-MCNC: 1.09 MG/DL (ref 0.6–1.3)
CREAT SERPL-MCNC: 1.26 MG/DL (ref 0.6–1.3)
CREAT SERPL-MCNC: 1.27 MG/DL (ref 0.6–1.3)
CREAT SERPL-MCNC: 1.31 MG/DL (ref 0.6–1.3)
CREAT SERPL-MCNC: 1.37 MG/DL (ref 0.6–1.3)
CREAT SERPL-MCNC: 1.38 MG/DL (ref 0.6–1.3)
CREAT SERPL-MCNC: 1.59 MG/DL (ref 0.6–1.3)
CREAT SERPL-MCNC: 1.62 MG/DL (ref 0.6–1.3)
CREAT SERPL-MCNC: 1.72 MG/DL (ref 0.6–1.3)
CREAT SERPL-MCNC: 2 MG/DL (ref 0.6–1.3)
CREAT SERPL-MCNC: 2.07 MG/DL (ref 0.6–1.3)
CREAT SERPL-MCNC: 2.38 MG/DL (ref 0.6–1.3)
D DIMER PPP FEU-MCNC: 2.26 UG/ML(FEU)
DIAGNOSIS, 93000: NORMAL
DIFFERENTIAL METHOD BLD: ABNORMAL
ECHO EST RA PRESSURE: 15 MMHG
ECHO LV FRACTIONAL SHORTENING: 26 % (ref 28–44)
ECHO LV INTERNAL DIMENSION DIASTOLE INDEX: 2.41 CM/M2
ECHO LV INTERNAL DIMENSION DIASTOLIC: 3.8 CM (ref 3.9–5.3)
ECHO LV INTERNAL DIMENSION SYSTOLIC INDEX: 1.77 CM/M2
ECHO LV INTERNAL DIMENSION SYSTOLIC: 2.8 CM
ECHO LV IVSD: 1.4 CM (ref 0.6–0.9)
ECHO LV MASS 2D: 153.2 G (ref 67–162)
ECHO LV MASS INDEX 2D: 97 G/M2 (ref 43–95)
ECHO LV POSTERIOR WALL DIASTOLIC: 1 CM (ref 0.6–0.9)
ECHO LV RELATIVE WALL THICKNESS RATIO: 0.53
ECHO PULMONARY ARTERY SYSTOLIC PRESSURE (PASP): 45 MMHG
ECHO RIGHT VENTRICULAR SYSTOLIC PRESSURE (RVSP): 47 MMHG
ECHO TV REGURGITANT MAX VELOCITY: 2.85 M/S
ECHO TV REGURGITANT PEAK GRADIENT: 32 MMHG
EOSINOPHIL # BLD: 0 K/UL (ref 0–0.4)
EOSINOPHIL # BLD: 0.1 K/UL (ref 0–0.4)
EOSINOPHIL # BLD: 0.2 K/UL (ref 0–0.4)
EOSINOPHIL # BLD: 0.3 K/UL (ref 0–0.4)
EOSINOPHIL # BLD: 0.3 K/UL (ref 0–0.4)
EOSINOPHIL NFR BLD: 0 % (ref 0–5)
EOSINOPHIL NFR BLD: 1 % (ref 0–5)
EOSINOPHIL NFR BLD: 2 % (ref 0–5)
EOSINOPHIL NFR BLD: 3 % (ref 0–5)
EPITH CASTS URNS QL MICRO: ABNORMAL /LPF (ref 0–5)
EPITH CASTS URNS QL MICRO: ABNORMAL /LPF (ref 0–5)
EPITH CASTS URNS QL MICRO: NORMAL /LPF (ref 0–5)
ERYTHROCYTE [DISTWIDTH] IN BLOOD BY AUTOMATED COUNT: 14 % (ref 11.6–14.5)
ERYTHROCYTE [DISTWIDTH] IN BLOOD BY AUTOMATED COUNT: 14.2 % (ref 11.6–14.5)
ERYTHROCYTE [DISTWIDTH] IN BLOOD BY AUTOMATED COUNT: 14.4 % (ref 11.6–14.5)
ERYTHROCYTE [DISTWIDTH] IN BLOOD BY AUTOMATED COUNT: 14.6 % (ref 11.6–14.5)
ERYTHROCYTE [DISTWIDTH] IN BLOOD BY AUTOMATED COUNT: 14.7 % (ref 11.6–14.5)
ERYTHROCYTE [DISTWIDTH] IN BLOOD BY AUTOMATED COUNT: 14.8 % (ref 11.6–14.5)
ERYTHROCYTE [DISTWIDTH] IN BLOOD BY AUTOMATED COUNT: 14.9 % (ref 11.6–14.5)
ERYTHROCYTE [DISTWIDTH] IN BLOOD BY AUTOMATED COUNT: 15.1 % (ref 11.6–14.5)
ERYTHROCYTE [DISTWIDTH] IN BLOOD BY AUTOMATED COUNT: 15.1 % (ref 11.6–14.5)
ERYTHROCYTE [DISTWIDTH] IN BLOOD BY AUTOMATED COUNT: 15.2 % (ref 11.6–14.5)
ERYTHROCYTE [DISTWIDTH] IN BLOOD BY AUTOMATED COUNT: 15.3 % (ref 11.6–14.5)
ERYTHROCYTE [DISTWIDTH] IN BLOOD BY AUTOMATED COUNT: 15.4 % (ref 11.6–14.5)
ERYTHROCYTE [DISTWIDTH] IN BLOOD BY AUTOMATED COUNT: 15.5 % (ref 11.6–14.5)
ERYTHROCYTE [DISTWIDTH] IN BLOOD BY AUTOMATED COUNT: 16.6 % (ref 11.6–14.5)
ERYTHROCYTE [DISTWIDTH] IN BLOOD BY AUTOMATED COUNT: 17.2 % (ref 11.6–14.5)
ERYTHROCYTE [DISTWIDTH] IN BLOOD BY AUTOMATED COUNT: 17.8 % (ref 11.6–14.5)
ERYTHROCYTE [DISTWIDTH] IN BLOOD BY AUTOMATED COUNT: 19.6 % (ref 11.6–14.5)
EST. AVERAGE GLUCOSE BLD GHB EST-MCNC: 114 MG/DL
FIBRINOGEN PPP-MCNC: 555 MG/DL (ref 210–451)
FLUAV SUBTYP SPEC NAA+PROBE: NOT DETECTED
FLUBV RNA SPEC QL NAA+PROBE: NOT DETECTED
FOLATE SERPL-MCNC: 5 NG/ML (ref 3.1–17.5)
GAS FLOW.O2 O2 DELIVERY SYS: ABNORMAL L/MIN
GAS FLOW.O2 O2 DELIVERY SYS: ABNORMAL L/MIN
GAS FLOW.O2 SETTING OXYMISER: 25 BPM
GLOBULIN SER CALC-MCNC: 2.7 G/DL (ref 2–4)
GLOBULIN SER CALC-MCNC: 3.6 G/DL (ref 2–4)
GLOBULIN SER CALC-MCNC: 3.6 G/DL (ref 2–4)
GLOBULIN SER CALC-MCNC: 4.2 G/DL (ref 2–4)
GLUCOSE BLD STRIP.AUTO-MCNC: 107 MG/DL (ref 70–110)
GLUCOSE BLD STRIP.AUTO-MCNC: 108 MG/DL (ref 70–110)
GLUCOSE BLD STRIP.AUTO-MCNC: 122 MG/DL (ref 70–110)
GLUCOSE BLD STRIP.AUTO-MCNC: 123 MG/DL (ref 70–110)
GLUCOSE BLD STRIP.AUTO-MCNC: 135 MG/DL (ref 70–110)
GLUCOSE BLD STRIP.AUTO-MCNC: 138 MG/DL (ref 70–110)
GLUCOSE BLD STRIP.AUTO-MCNC: 139 MG/DL (ref 70–110)
GLUCOSE BLD STRIP.AUTO-MCNC: 141 MG/DL (ref 70–110)
GLUCOSE BLD STRIP.AUTO-MCNC: 143 MG/DL (ref 70–110)
GLUCOSE BLD STRIP.AUTO-MCNC: 143 MG/DL (ref 70–110)
GLUCOSE BLD STRIP.AUTO-MCNC: 145 MG/DL (ref 70–110)
GLUCOSE BLD STRIP.AUTO-MCNC: 146 MG/DL (ref 70–110)
GLUCOSE BLD STRIP.AUTO-MCNC: 161 MG/DL (ref 70–110)
GLUCOSE BLD STRIP.AUTO-MCNC: 162 MG/DL (ref 70–110)
GLUCOSE BLD STRIP.AUTO-MCNC: 163 MG/DL (ref 70–110)
GLUCOSE BLD STRIP.AUTO-MCNC: 165 MG/DL (ref 70–110)
GLUCOSE BLD STRIP.AUTO-MCNC: 167 MG/DL (ref 70–110)
GLUCOSE BLD STRIP.AUTO-MCNC: 168 MG/DL (ref 70–110)
GLUCOSE BLD STRIP.AUTO-MCNC: 173 MG/DL (ref 70–110)
GLUCOSE BLD STRIP.AUTO-MCNC: 173 MG/DL (ref 70–110)
GLUCOSE BLD STRIP.AUTO-MCNC: 175 MG/DL (ref 70–110)
GLUCOSE BLD STRIP.AUTO-MCNC: 182 MG/DL (ref 70–110)
GLUCOSE BLD STRIP.AUTO-MCNC: 183 MG/DL (ref 70–110)
GLUCOSE BLD STRIP.AUTO-MCNC: 188 MG/DL (ref 70–110)
GLUCOSE BLD STRIP.AUTO-MCNC: 189 MG/DL (ref 70–110)
GLUCOSE BLD STRIP.AUTO-MCNC: 190 MG/DL (ref 70–110)
GLUCOSE BLD STRIP.AUTO-MCNC: 193 MG/DL (ref 70–110)
GLUCOSE BLD STRIP.AUTO-MCNC: 194 MG/DL (ref 70–110)
GLUCOSE BLD STRIP.AUTO-MCNC: 200 MG/DL (ref 70–110)
GLUCOSE BLD STRIP.AUTO-MCNC: 205 MG/DL (ref 70–110)
GLUCOSE BLD STRIP.AUTO-MCNC: 211 MG/DL (ref 70–110)
GLUCOSE BLD STRIP.AUTO-MCNC: 213 MG/DL (ref 70–110)
GLUCOSE BLD STRIP.AUTO-MCNC: 215 MG/DL (ref 70–110)
GLUCOSE BLD STRIP.AUTO-MCNC: 216 MG/DL (ref 70–110)
GLUCOSE BLD STRIP.AUTO-MCNC: 219 MG/DL (ref 70–110)
GLUCOSE BLD STRIP.AUTO-MCNC: 223 MG/DL (ref 70–110)
GLUCOSE BLD STRIP.AUTO-MCNC: 227 MG/DL (ref 70–110)
GLUCOSE BLD STRIP.AUTO-MCNC: 230 MG/DL (ref 70–110)
GLUCOSE BLD STRIP.AUTO-MCNC: 231 MG/DL (ref 70–110)
GLUCOSE BLD STRIP.AUTO-MCNC: 231 MG/DL (ref 70–110)
GLUCOSE BLD STRIP.AUTO-MCNC: 234 MG/DL (ref 70–110)
GLUCOSE BLD STRIP.AUTO-MCNC: 235 MG/DL (ref 70–110)
GLUCOSE BLD STRIP.AUTO-MCNC: 238 MG/DL (ref 70–110)
GLUCOSE BLD STRIP.AUTO-MCNC: 239 MG/DL (ref 70–110)
GLUCOSE BLD STRIP.AUTO-MCNC: 241 MG/DL (ref 70–110)
GLUCOSE BLD STRIP.AUTO-MCNC: 245 MG/DL (ref 70–110)
GLUCOSE BLD STRIP.AUTO-MCNC: 246 MG/DL (ref 70–110)
GLUCOSE BLD STRIP.AUTO-MCNC: 251 MG/DL (ref 70–110)
GLUCOSE BLD STRIP.AUTO-MCNC: 253 MG/DL (ref 70–110)
GLUCOSE BLD STRIP.AUTO-MCNC: 254 MG/DL (ref 70–110)
GLUCOSE BLD STRIP.AUTO-MCNC: 254 MG/DL (ref 70–110)
GLUCOSE BLD STRIP.AUTO-MCNC: 255 MG/DL (ref 70–110)
GLUCOSE BLD STRIP.AUTO-MCNC: 258 MG/DL (ref 70–110)
GLUCOSE BLD STRIP.AUTO-MCNC: 261 MG/DL (ref 70–110)
GLUCOSE BLD STRIP.AUTO-MCNC: 263 MG/DL (ref 70–110)
GLUCOSE BLD STRIP.AUTO-MCNC: 267 MG/DL (ref 70–110)
GLUCOSE BLD STRIP.AUTO-MCNC: 271 MG/DL (ref 70–110)
GLUCOSE BLD STRIP.AUTO-MCNC: 271 MG/DL (ref 70–110)
GLUCOSE BLD STRIP.AUTO-MCNC: 274 MG/DL (ref 70–110)
GLUCOSE BLD STRIP.AUTO-MCNC: 277 MG/DL (ref 70–110)
GLUCOSE BLD STRIP.AUTO-MCNC: 285 MG/DL (ref 70–110)
GLUCOSE BLD STRIP.AUTO-MCNC: 285 MG/DL (ref 70–110)
GLUCOSE BLD STRIP.AUTO-MCNC: 286 MG/DL (ref 70–110)
GLUCOSE BLD STRIP.AUTO-MCNC: 288 MG/DL (ref 70–110)
GLUCOSE BLD STRIP.AUTO-MCNC: 295 MG/DL (ref 70–110)
GLUCOSE BLD STRIP.AUTO-MCNC: 305 MG/DL (ref 70–110)
GLUCOSE BLD STRIP.AUTO-MCNC: 307 MG/DL (ref 70–110)
GLUCOSE BLD STRIP.AUTO-MCNC: 314 MG/DL (ref 70–110)
GLUCOSE BLD STRIP.AUTO-MCNC: 326 MG/DL (ref 70–110)
GLUCOSE BLD STRIP.AUTO-MCNC: 344 MG/DL (ref 70–110)
GLUCOSE BLD STRIP.AUTO-MCNC: 62 MG/DL (ref 70–110)
GLUCOSE BLD STRIP.AUTO-MCNC: 63 MG/DL (ref 70–110)
GLUCOSE SERPL-MCNC: 117 MG/DL (ref 74–99)
GLUCOSE SERPL-MCNC: 129 MG/DL (ref 74–99)
GLUCOSE SERPL-MCNC: 137 MG/DL (ref 74–99)
GLUCOSE SERPL-MCNC: 147 MG/DL (ref 74–99)
GLUCOSE SERPL-MCNC: 151 MG/DL (ref 74–99)
GLUCOSE SERPL-MCNC: 153 MG/DL (ref 74–99)
GLUCOSE SERPL-MCNC: 154 MG/DL (ref 74–99)
GLUCOSE SERPL-MCNC: 157 MG/DL (ref 74–99)
GLUCOSE SERPL-MCNC: 161 MG/DL (ref 74–99)
GLUCOSE SERPL-MCNC: 166 MG/DL (ref 74–99)
GLUCOSE SERPL-MCNC: 172 MG/DL (ref 74–99)
GLUCOSE SERPL-MCNC: 174 MG/DL (ref 74–99)
GLUCOSE SERPL-MCNC: 183 MG/DL (ref 74–99)
GLUCOSE SERPL-MCNC: 193 MG/DL (ref 74–99)
GLUCOSE SERPL-MCNC: 201 MG/DL (ref 74–99)
GLUCOSE SERPL-MCNC: 201 MG/DL (ref 74–99)
GLUCOSE SERPL-MCNC: 205 MG/DL (ref 74–99)
GLUCOSE SERPL-MCNC: 210 MG/DL (ref 74–99)
GLUCOSE SERPL-MCNC: 220 MG/DL (ref 74–99)
GLUCOSE SERPL-MCNC: 224 MG/DL (ref 74–99)
GLUCOSE SERPL-MCNC: 229 MG/DL (ref 74–99)
GLUCOSE SERPL-MCNC: 238 MG/DL (ref 74–99)
GLUCOSE SERPL-MCNC: 243 MG/DL (ref 74–99)
GLUCOSE SERPL-MCNC: 278 MG/DL (ref 74–99)
GLUCOSE SERPL-MCNC: 288 MG/DL (ref 74–99)
GLUCOSE SERPL-MCNC: 358 MG/DL (ref 74–99)
GLUCOSE UR STRIP.AUTO-MCNC: NEGATIVE MG/DL
GRAN CASTS URNS QL MICRO: ABNORMAL /LPF
HADV DNA SPEC QL NAA+PROBE: NOT DETECTED
HAPTOGLOB SERPL-MCNC: 139 MG/DL (ref 30–200)
HAV IGM SER QL: NEGATIVE
HBA1C MFR BLD: 5.6 % (ref 4.2–5.6)
HBV CORE IGM SER QL: NEGATIVE
HBV SURFACE AG SER QL: <0.1 INDEX
HBV SURFACE AG SER QL: NEGATIVE
HCO3 BLD-SCNC: 13 MMOL/L (ref 22–26)
HCO3 BLD-SCNC: 34.2 MMOL/L (ref 22–26)
HCO3 BLD-SCNC: 8 MMOL/L (ref 22–26)
HCOV 229E RNA SPEC QL NAA+PROBE: NOT DETECTED
HCOV HKU1 RNA SPEC QL NAA+PROBE: NOT DETECTED
HCOV NL63 RNA SPEC QL NAA+PROBE: NOT DETECTED
HCOV OC43 RNA SPEC QL NAA+PROBE: NOT DETECTED
HCT VFR BLD AUTO: 23.2 % (ref 35–45)
HCT VFR BLD AUTO: 24.4 % (ref 35–45)
HCT VFR BLD AUTO: 24.5 % (ref 35–45)
HCT VFR BLD AUTO: 25.2 % (ref 35–45)
HCT VFR BLD AUTO: 25.7 % (ref 35–45)
HCT VFR BLD AUTO: 25.8 % (ref 35–45)
HCT VFR BLD AUTO: 26.1 % (ref 35–45)
HCT VFR BLD AUTO: 26.9 % (ref 35–45)
HCT VFR BLD AUTO: 27.6 % (ref 35–45)
HCT VFR BLD AUTO: 29.8 % (ref 35–45)
HCT VFR BLD AUTO: 31.9 % (ref 35–45)
HCT VFR BLD AUTO: 32.2 % (ref 35–45)
HCT VFR BLD AUTO: 32.3 % (ref 35–45)
HCT VFR BLD AUTO: 33.8 % (ref 35–45)
HCT VFR BLD AUTO: 34.5 % (ref 35–45)
HCT VFR BLD AUTO: 34.5 % (ref 35–45)
HCT VFR BLD AUTO: 34.8 % (ref 35–45)
HCT VFR BLD AUTO: 36.1 % (ref 35–45)
HCT VFR BLD AUTO: 37.6 % (ref 35–45)
HCT VFR BLD AUTO: 40.9 % (ref 35–45)
HCT VFR BLD AUTO: 41 % (ref 35–45)
HCT VFR BLD AUTO: 45.2 % (ref 35–45)
HCV AB SER IA-ACNC: <0.02 INDEX
HCV AB SERPL QL IA: NEGATIVE
HCV COMMENT,HCGAC: NORMAL
HDLC SERPL-MCNC: 27 MG/DL (ref 40–60)
HDLC SERPL: 3.8 {RATIO} (ref 0–5)
HDSCOM,HDSCOM: ABNORMAL
HEPARIN AGGREGATION,XHEAGT: NEGATIVE
HGB BLD-MCNC: 10.2 G/DL (ref 12–16)
HGB BLD-MCNC: 10.2 G/DL (ref 12–16)
HGB BLD-MCNC: 10.4 G/DL (ref 12–16)
HGB BLD-MCNC: 10.7 G/DL (ref 12–16)
HGB BLD-MCNC: 10.8 G/DL (ref 12–16)
HGB BLD-MCNC: 11.2 G/DL (ref 12–16)
HGB BLD-MCNC: 11.6 G/DL (ref 12–16)
HGB BLD-MCNC: 12.6 G/DL (ref 12–16)
HGB BLD-MCNC: 12.8 G/DL (ref 12–16)
HGB BLD-MCNC: 14.4 G/DL (ref 12–16)
HGB BLD-MCNC: 7.2 G/DL (ref 12–16)
HGB BLD-MCNC: 7.3 G/DL (ref 12–16)
HGB BLD-MCNC: 7.6 G/DL (ref 12–16)
HGB BLD-MCNC: 7.9 G/DL (ref 12–16)
HGB BLD-MCNC: 8.1 G/DL (ref 12–16)
HGB BLD-MCNC: 8.2 G/DL (ref 12–16)
HGB BLD-MCNC: 8.3 G/DL (ref 12–16)
HGB BLD-MCNC: 9.6 G/DL (ref 12–16)
HGB UR QL STRIP: NEGATIVE
HIV 1+2 AB+HIV1 P24 AG SERPL QL IA: NONREACTIVE
HIV12 RESULT COMMENT, HHIVC: NORMAL
HMPV RNA SPEC QL NAA+PROBE: NOT DETECTED
HPIV1 RNA SPEC QL NAA+PROBE: NOT DETECTED
HPIV2 RNA SPEC QL NAA+PROBE: NOT DETECTED
HPIV3 RNA SPEC QL NAA+PROBE: NOT DETECTED
HPIV4 RNA SPEC QL NAA+PROBE: NOT DETECTED
IMM GRANULOCYTES # BLD AUTO: 0 K/UL (ref 0–0.04)
IMM GRANULOCYTES # BLD AUTO: 0 K/UL (ref 0–0.04)
IMM GRANULOCYTES # BLD AUTO: 0.1 K/UL (ref 0–0.04)
IMM GRANULOCYTES # BLD AUTO: 0.2 K/UL (ref 0–0.04)
IMM GRANULOCYTES # BLD AUTO: 0.3 K/UL (ref 0–0.04)
IMM GRANULOCYTES # BLD AUTO: 0.4 K/UL (ref 0–0.04)
IMM GRANULOCYTES # BLD AUTO: 0.5 K/UL (ref 0–0.04)
IMM GRANULOCYTES NFR BLD AUTO: 0 % (ref 0–0.5)
IMM GRANULOCYTES NFR BLD AUTO: 1 % (ref 0–0.5)
IMM GRANULOCYTES NFR BLD AUTO: 2 % (ref 0–0.5)
IMM GRANULOCYTES NFR BLD AUTO: 4 % (ref 0–0.5)
INR PPP: 1.4 (ref 0.8–1.2)
INTERPRETATION: NORMAL
KETONES UR QL STRIP.AUTO: ABNORMAL MG/DL
KETONES UR QL STRIP.AUTO: NEGATIVE MG/DL
KETONES UR QL STRIP.AUTO: NEGATIVE MG/DL
LACTATE BLD-SCNC: 1.26 MMOL/L (ref 0.4–2)
LACTATE BLD-SCNC: 1.98 MMOL/L (ref 0.4–2)
LACTATE BLD-SCNC: 2.23 MMOL/L (ref 0.4–2)
LACTATE SERPL-SCNC: 1.3 MMOL/L (ref 0.4–2)
LACTATE SERPL-SCNC: 1.9 MMOL/L (ref 0.4–2)
LACTATE SERPL-SCNC: 2.2 MMOL/L (ref 0.4–2)
LACTATE SERPL-SCNC: 2.6 MMOL/L (ref 0.4–2)
LDH SERPL L TO P-CCNC: 219 U/L (ref 81–234)
LDLC SERPL CALC-MCNC: 59.2 MG/DL (ref 0–100)
LEUKOCYTE ESTERASE UR QL STRIP.AUTO: ABNORMAL
LEUKOCYTE ESTERASE UR QL STRIP.AUTO: ABNORMAL
LEUKOCYTE ESTERASE UR QL STRIP.AUTO: NEGATIVE
LIPASE SERPL-CCNC: 181 U/L (ref 73–393)
LIPID PROFILE,FLP: ABNORMAL
LYMPHOCYTES # BLD: 0.8 K/UL (ref 0.9–3.6)
LYMPHOCYTES # BLD: 1 K/UL (ref 0.9–3.6)
LYMPHOCYTES # BLD: 1 K/UL (ref 0.9–3.6)
LYMPHOCYTES # BLD: 1.1 K/UL (ref 0.9–3.6)
LYMPHOCYTES # BLD: 1.2 K/UL (ref 0.9–3.6)
LYMPHOCYTES # BLD: 1.3 K/UL (ref 0.9–3.6)
LYMPHOCYTES # BLD: 1.3 K/UL (ref 0.9–3.6)
LYMPHOCYTES # BLD: 1.4 K/UL (ref 0.9–3.6)
LYMPHOCYTES # BLD: 1.4 K/UL (ref 0.9–3.6)
LYMPHOCYTES # BLD: 1.5 K/UL (ref 0.9–3.6)
LYMPHOCYTES # BLD: 1.5 K/UL (ref 0.9–3.6)
LYMPHOCYTES # BLD: 1.6 K/UL (ref 0.9–3.6)
LYMPHOCYTES # BLD: 1.7 K/UL (ref 0.9–3.6)
LYMPHOCYTES # BLD: 1.7 K/UL (ref 0.9–3.6)
LYMPHOCYTES # BLD: 2 K/UL (ref 0.9–3.6)
LYMPHOCYTES # BLD: 2.1 K/UL (ref 0.9–3.6)
LYMPHOCYTES # BLD: 2.3 K/UL (ref 0.9–3.6)
LYMPHOCYTES NFR BLD: 10 % (ref 21–52)
LYMPHOCYTES NFR BLD: 11 % (ref 21–52)
LYMPHOCYTES NFR BLD: 12 % (ref 21–52)
LYMPHOCYTES NFR BLD: 12 % (ref 21–52)
LYMPHOCYTES NFR BLD: 13 % (ref 21–52)
LYMPHOCYTES NFR BLD: 14 % (ref 21–52)
LYMPHOCYTES NFR BLD: 14 % (ref 21–52)
LYMPHOCYTES NFR BLD: 15 % (ref 21–52)
LYMPHOCYTES NFR BLD: 17 % (ref 21–52)
LYMPHOCYTES NFR BLD: 21 % (ref 21–52)
LYMPHOCYTES NFR BLD: 21 % (ref 21–52)
LYMPHOCYTES NFR BLD: 6 % (ref 21–52)
LYMPHOCYTES NFR BLD: 8 % (ref 21–52)
LYMPHOCYTES NFR BLD: 9 % (ref 21–52)
LYMPHOCYTES NFR BLD: 9 % (ref 21–52)
M PNEUMO DNA SPEC QL NAA+PROBE: NOT DETECTED
MAGNESIUM SERPL-MCNC: 1 MG/DL (ref 1.6–2.6)
MAGNESIUM SERPL-MCNC: 1.1 MG/DL (ref 1.6–2.6)
MAGNESIUM SERPL-MCNC: 1.4 MG/DL (ref 1.6–2.6)
MAGNESIUM SERPL-MCNC: 1.5 MG/DL (ref 1.6–2.6)
MAGNESIUM SERPL-MCNC: 1.6 MG/DL (ref 1.6–2.6)
MAGNESIUM SERPL-MCNC: 1.8 MG/DL (ref 1.6–2.6)
MAGNESIUM SERPL-MCNC: 1.9 MG/DL (ref 1.6–2.6)
MAGNESIUM SERPL-MCNC: 2 MG/DL (ref 1.6–2.6)
MAGNESIUM SERPL-MCNC: 2 MG/DL (ref 1.6–2.6)
MAGNESIUM SERPL-MCNC: 2.1 MG/DL (ref 1.6–2.6)
MAGNESIUM SERPL-MCNC: 2.2 MG/DL (ref 1.6–2.6)
MAGNESIUM SERPL-MCNC: 2.3 MG/DL (ref 1.6–2.6)
MAGNESIUM SERPL-MCNC: 2.4 MG/DL (ref 1.6–2.6)
MAGNESIUM SERPL-MCNC: 2.5 MG/DL (ref 1.6–2.6)
MAGNESIUM SERPL-MCNC: 2.5 MG/DL (ref 1.6–2.6)
MAGNESIUM SERPL-MCNC: 2.6 MG/DL (ref 1.6–2.6)
MAGNESIUM SERPL-MCNC: 3.1 MG/DL (ref 1.6–2.6)
MCH RBC QN AUTO: 27.9 PG (ref 24–34)
MCH RBC QN AUTO: 28 PG (ref 24–34)
MCH RBC QN AUTO: 28 PG (ref 24–34)
MCH RBC QN AUTO: 28.1 PG (ref 24–34)
MCH RBC QN AUTO: 28.1 PG (ref 24–34)
MCH RBC QN AUTO: 28.2 PG (ref 24–34)
MCH RBC QN AUTO: 28.3 PG (ref 24–34)
MCH RBC QN AUTO: 28.4 PG (ref 24–34)
MCH RBC QN AUTO: 28.4 PG (ref 24–34)
MCH RBC QN AUTO: 28.5 PG (ref 24–34)
MCH RBC QN AUTO: 28.6 PG (ref 24–34)
MCH RBC QN AUTO: 28.6 PG (ref 24–34)
MCH RBC QN AUTO: 28.7 PG (ref 24–34)
MCH RBC QN AUTO: 28.8 PG (ref 24–34)
MCH RBC QN AUTO: 28.9 PG (ref 24–34)
MCH RBC QN AUTO: 28.9 PG (ref 24–34)
MCH RBC QN AUTO: 29 PG (ref 24–34)
MCH RBC QN AUTO: 29 PG (ref 24–34)
MCHC RBC AUTO-ENTMCNC: 29.7 G/DL (ref 31–37)
MCHC RBC AUTO-ENTMCNC: 29.9 G/DL (ref 31–37)
MCHC RBC AUTO-ENTMCNC: 30.3 G/DL (ref 31–37)
MCHC RBC AUTO-ENTMCNC: 30.7 G/DL (ref 31–37)
MCHC RBC AUTO-ENTMCNC: 30.7 G/DL (ref 31–37)
MCHC RBC AUTO-ENTMCNC: 30.9 G/DL (ref 31–37)
MCHC RBC AUTO-ENTMCNC: 30.9 G/DL (ref 31–37)
MCHC RBC AUTO-ENTMCNC: 31 G/DL (ref 31–37)
MCHC RBC AUTO-ENTMCNC: 31.3 G/DL (ref 31–37)
MCHC RBC AUTO-ENTMCNC: 31.4 G/DL (ref 31–37)
MCHC RBC AUTO-ENTMCNC: 31.7 G/DL (ref 31–37)
MCHC RBC AUTO-ENTMCNC: 31.9 G/DL (ref 31–37)
MCHC RBC AUTO-ENTMCNC: 32 G/DL (ref 31–37)
MCHC RBC AUTO-ENTMCNC: 32 G/DL (ref 31–37)
MCHC RBC AUTO-ENTMCNC: 32.2 G/DL (ref 31–37)
MCHC RBC AUTO-ENTMCNC: 32.2 G/DL (ref 31–37)
MCV RBC AUTO: 88.9 FL (ref 78–100)
MCV RBC AUTO: 89 FL (ref 78–100)
MCV RBC AUTO: 89.7 FL (ref 78–100)
MCV RBC AUTO: 90 FL (ref 78–100)
MCV RBC AUTO: 90.2 FL (ref 78–100)
MCV RBC AUTO: 90.4 FL (ref 78–100)
MCV RBC AUTO: 90.5 FL (ref 78–100)
MCV RBC AUTO: 90.6 FL (ref 78–100)
MCV RBC AUTO: 90.6 FL (ref 78–100)
MCV RBC AUTO: 90.8 FL (ref 78–100)
MCV RBC AUTO: 91 FL (ref 78–100)
MCV RBC AUTO: 91.1 FL (ref 78–100)
MCV RBC AUTO: 91.2 FL (ref 78–100)
MCV RBC AUTO: 91.7 FL (ref 78–100)
MCV RBC AUTO: 92.1 FL (ref 78–100)
MCV RBC AUTO: 92.2 FL (ref 78–100)
MCV RBC AUTO: 92.8 FL (ref 78–100)
MCV RBC AUTO: 93.5 FL (ref 78–100)
MCV RBC AUTO: 93.8 FL (ref 78–100)
MCV RBC AUTO: 96.5 FL (ref 78–100)
METHADONE UR QL: NEGATIVE
MONOCYTES # BLD: 0.4 K/UL (ref 0.05–1.2)
MONOCYTES # BLD: 0.5 K/UL (ref 0.05–1.2)
MONOCYTES # BLD: 0.6 K/UL (ref 0.05–1.2)
MONOCYTES # BLD: 0.7 K/UL (ref 0.05–1.2)
MONOCYTES # BLD: 0.8 K/UL (ref 0.05–1.2)
MONOCYTES # BLD: 0.9 K/UL (ref 0.05–1.2)
MONOCYTES # BLD: 1 K/UL (ref 0.05–1.2)
MONOCYTES # BLD: 1 K/UL (ref 0.05–1.2)
MONOCYTES # BLD: 1.2 K/UL (ref 0.05–1.2)
MONOCYTES # BLD: 1.2 K/UL (ref 0.05–1.2)
MONOCYTES NFR BLD: 10 % (ref 3–10)
MONOCYTES NFR BLD: 11 % (ref 3–10)
MONOCYTES NFR BLD: 11 % (ref 3–10)
MONOCYTES NFR BLD: 12 % (ref 3–10)
MONOCYTES NFR BLD: 3 % (ref 3–10)
MONOCYTES NFR BLD: 3 % (ref 3–10)
MONOCYTES NFR BLD: 4 % (ref 3–10)
MONOCYTES NFR BLD: 5 % (ref 3–10)
MONOCYTES NFR BLD: 5 % (ref 3–10)
MONOCYTES NFR BLD: 6 % (ref 3–10)
MONOCYTES NFR BLD: 7 % (ref 3–10)
MONOCYTES NFR BLD: 9 % (ref 3–10)
MONOCYTES NFR BLD: 9 % (ref 3–10)
NEUTS SEG # BLD: 11.9 K/UL (ref 1.8–8)
NEUTS SEG # BLD: 12.2 K/UL (ref 1.8–8)
NEUTS SEG # BLD: 18.7 K/UL (ref 1.8–8)
NEUTS SEG # BLD: 29.2 K/UL (ref 1.8–8)
NEUTS SEG # BLD: 5.6 K/UL (ref 1.8–8)
NEUTS SEG # BLD: 6.2 K/UL (ref 1.8–8)
NEUTS SEG # BLD: 6.3 K/UL (ref 1.8–8)
NEUTS SEG # BLD: 6.3 K/UL (ref 1.8–8)
NEUTS SEG # BLD: 6.4 K/UL (ref 1.8–8)
NEUTS SEG # BLD: 6.5 K/UL (ref 1.8–8)
NEUTS SEG # BLD: 6.6 K/UL (ref 1.8–8)
NEUTS SEG # BLD: 6.7 K/UL (ref 1.8–8)
NEUTS SEG # BLD: 7.1 K/UL (ref 1.8–8)
NEUTS SEG # BLD: 7.2 K/UL (ref 1.8–8)
NEUTS SEG # BLD: 7.3 K/UL (ref 1.8–8)
NEUTS SEG # BLD: 7.6 K/UL (ref 1.8–8)
NEUTS SEG # BLD: 7.7 K/UL (ref 1.8–8)
NEUTS SEG # BLD: 7.8 K/UL (ref 1.8–8)
NEUTS SEG # BLD: 7.9 K/UL (ref 1.8–8)
NEUTS SEG # BLD: 8.2 K/UL (ref 1.8–8)
NEUTS SEG # BLD: 8.4 K/UL (ref 1.8–8)
NEUTS SEG # BLD: 8.5 K/UL (ref 1.8–8)
NEUTS SEG NFR BLD: 68 % (ref 40–73)
NEUTS SEG NFR BLD: 69 % (ref 40–73)
NEUTS SEG NFR BLD: 71 % (ref 40–73)
NEUTS SEG NFR BLD: 72 % (ref 40–73)
NEUTS SEG NFR BLD: 73 % (ref 40–73)
NEUTS SEG NFR BLD: 74 % (ref 40–73)
NEUTS SEG NFR BLD: 76 % (ref 40–73)
NEUTS SEG NFR BLD: 77 % (ref 40–73)
NEUTS SEG NFR BLD: 78 % (ref 40–73)
NEUTS SEG NFR BLD: 79 % (ref 40–73)
NEUTS SEG NFR BLD: 80 % (ref 40–73)
NEUTS SEG NFR BLD: 81 % (ref 40–73)
NEUTS SEG NFR BLD: 81 % (ref 40–73)
NEUTS SEG NFR BLD: 84 % (ref 40–73)
NEUTS SEG NFR BLD: 85 % (ref 40–73)
NEUTS SEG NFR BLD: 88 % (ref 40–73)
NEUTS SEG NFR BLD: 89 % (ref 40–73)
NITRITE UR QL STRIP.AUTO: NEGATIVE
NRBC # BLD: 0 K/UL (ref 0–0.01)
NRBC # BLD: 0.02 K/UL (ref 0–0.01)
NRBC # BLD: 0.03 K/UL (ref 0–0.01)
NRBC BLD-RTO: 0 PER 100 WBC
NRBC BLD-RTO: 0.2 PER 100 WBC
NRBC BLD-RTO: 0.3 PER 100 WBC
O2/TOTAL GAS SETTING VFR VENT: 2 %
O2/TOTAL GAS SETTING VFR VENT: 28 %
O2/TOTAL GAS SETTING VFR VENT: 4 %
OPIATES UR QL: POSITIVE
P-R INTERVAL, ECG05: 158 MS
P-R INTERVAL, ECG05: 162 MS
P-R INTERVAL, ECG05: 164 MS
P-R INTERVAL, ECG05: 164 MS
P-R INTERVAL, ECG05: 166 MS
P-R INTERVAL, ECG05: 186 MS
PCO2 BLD: 25.2 MMHG (ref 35–45)
PCO2 BLD: 30.1 MMHG (ref 35–45)
PCO2 BLD: 44.3 MMHG (ref 35–45)
PCP UR QL: NEGATIVE
PERIPHERAL SMEAR,PSM: NORMAL
PH BLD: 7.11 [PH] (ref 7.35–7.45)
PH BLD: 7.24 [PH] (ref 7.35–7.45)
PH BLD: 7.5 [PH] (ref 7.35–7.45)
PH UR STRIP: 5 [PH] (ref 5–8)
PH UR STRIP: 5 [PH] (ref 5–8)
PH UR STRIP: 6.5 [PH] (ref 5–8)
PHOSPHATE SERPL-MCNC: 2.3 MG/DL (ref 2.5–4.9)
PHOSPHATE SERPL-MCNC: 2.3 MG/DL (ref 2.5–4.9)
PHOSPHATE SERPL-MCNC: 2.4 MG/DL (ref 2.5–4.9)
PHOSPHATE SERPL-MCNC: 2.4 MG/DL (ref 2.5–4.9)
PHOSPHATE SERPL-MCNC: 2.7 MG/DL (ref 2.5–4.9)
PHOSPHATE SERPL-MCNC: 2.9 MG/DL (ref 2.5–4.9)
PHOSPHATE SERPL-MCNC: 3 MG/DL (ref 2.5–4.9)
PHOSPHATE SERPL-MCNC: 3 MG/DL (ref 2.5–4.9)
PHOSPHATE SERPL-MCNC: 3.1 MG/DL (ref 2.5–4.9)
PHOSPHATE SERPL-MCNC: 3.2 MG/DL (ref 2.5–4.9)
PHOSPHATE SERPL-MCNC: 3.2 MG/DL (ref 2.5–4.9)
PHOSPHATE SERPL-MCNC: 3.3 MG/DL (ref 2.5–4.9)
PHOSPHATE SERPL-MCNC: 3.3 MG/DL (ref 2.5–4.9)
PHOSPHATE SERPL-MCNC: 3.4 MG/DL (ref 2.5–4.9)
PHOSPHATE SERPL-MCNC: 3.6 MG/DL (ref 2.5–4.9)
PHOSPHATE SERPL-MCNC: 3.9 MG/DL (ref 2.5–4.9)
PHOSPHATE SERPL-MCNC: 4 MG/DL (ref 2.5–4.9)
PHOSPHATE SERPL-MCNC: 4.3 MG/DL (ref 2.5–4.9)
PHOSPHATE SERPL-MCNC: 4.4 MG/DL (ref 2.5–4.9)
PHOSPHATE SERPL-MCNC: 4.8 MG/DL (ref 2.5–4.9)
PLATELET # BLD AUTO: 112 K/UL (ref 135–420)
PLATELET # BLD AUTO: 151 K/UL (ref 135–420)
PLATELET # BLD AUTO: 157 K/UL (ref 135–420)
PLATELET # BLD AUTO: 185 K/UL (ref 135–420)
PLATELET # BLD AUTO: 187 K/UL (ref 135–420)
PLATELET # BLD AUTO: 198 K/UL (ref 135–420)
PLATELET # BLD AUTO: 198 K/UL (ref 135–420)
PLATELET # BLD AUTO: 20 K/UL (ref 135–420)
PLATELET # BLD AUTO: 204 K/UL (ref 135–420)
PLATELET # BLD AUTO: 205 K/UL (ref 135–420)
PLATELET # BLD AUTO: 22 K/UL (ref 135–420)
PLATELET # BLD AUTO: 24 K/UL (ref 135–420)
PLATELET # BLD AUTO: 25 K/UL (ref 135–420)
PLATELET # BLD AUTO: 27 K/UL (ref 135–420)
PLATELET # BLD AUTO: 28 K/UL (ref 135–420)
PLATELET # BLD AUTO: 30 K/UL (ref 135–420)
PLATELET # BLD AUTO: 32 K/UL (ref 135–420)
PLATELET # BLD AUTO: 35 K/UL (ref 135–420)
PLATELET # BLD AUTO: 46 K/UL (ref 135–420)
PLATELET # BLD AUTO: 59 K/UL (ref 135–420)
PLATELET # BLD AUTO: 81 K/UL (ref 135–420)
PLATELET # BLD AUTO: 89 K/UL (ref 135–420)
PLATELET # BLD AUTO: 92 K/UL (ref 135–420)
PLATELET COMMENTS,PCOM: ABNORMAL
PLATELET COMMENTS,PCOM: ABNORMAL
PLATELET FACTOR 4,XPF4T: NEGATIVE
PMV BLD AUTO: 11.8 FL (ref 9.2–11.8)
PMV BLD AUTO: 12.3 FL (ref 9.2–11.8)
PMV BLD AUTO: 12.3 FL (ref 9.2–11.8)
PMV BLD AUTO: 12.5 FL (ref 9.2–11.8)
PMV BLD AUTO: 12.5 FL (ref 9.2–11.8)
PMV BLD AUTO: 12.7 FL (ref 9.2–11.8)
PMV BLD AUTO: 13.3 FL (ref 9.2–11.8)
PMV BLD AUTO: 13.4 FL (ref 9.2–11.8)
PMV BLD AUTO: 13.7 FL (ref 9.2–11.8)
PMV BLD AUTO: 13.9 FL (ref 9.2–11.8)
PMV BLD AUTO: 14.5 FL (ref 9.2–11.8)
PMV BLD AUTO: 9.9 FL (ref 9.2–11.8)
PO2 BLD: 76 MMHG (ref 80–100)
PO2 BLD: 82 MMHG (ref 80–100)
PO2 BLD: 97 MMHG (ref 80–100)
POTASSIUM SERPL-SCNC: 2.7 MMOL/L (ref 3.5–5.5)
POTASSIUM SERPL-SCNC: 3 MMOL/L (ref 3.5–5.5)
POTASSIUM SERPL-SCNC: 3.2 MMOL/L (ref 3.5–5.5)
POTASSIUM SERPL-SCNC: 3.3 MMOL/L (ref 3.5–5.5)
POTASSIUM SERPL-SCNC: 3.3 MMOL/L (ref 3.5–5.5)
POTASSIUM SERPL-SCNC: 3.4 MMOL/L (ref 3.5–5.5)
POTASSIUM SERPL-SCNC: 3.5 MMOL/L (ref 3.5–5.5)
POTASSIUM SERPL-SCNC: 3.5 MMOL/L (ref 3.5–5.5)
POTASSIUM SERPL-SCNC: 3.6 MMOL/L (ref 3.5–5.5)
POTASSIUM SERPL-SCNC: 3.7 MMOL/L (ref 3.5–5.5)
POTASSIUM SERPL-SCNC: 3.8 MMOL/L (ref 3.5–5.5)
POTASSIUM SERPL-SCNC: 3.9 MMOL/L (ref 3.5–5.5)
POTASSIUM SERPL-SCNC: 3.9 MMOL/L (ref 3.5–5.5)
POTASSIUM SERPL-SCNC: 4 MMOL/L (ref 3.5–5.5)
POTASSIUM SERPL-SCNC: 4 MMOL/L (ref 3.5–5.5)
POTASSIUM SERPL-SCNC: 4.1 MMOL/L (ref 3.5–5.5)
POTASSIUM SERPL-SCNC: 4.1 MMOL/L (ref 3.5–5.5)
POTASSIUM SERPL-SCNC: 4.2 MMOL/L (ref 3.5–5.5)
POTASSIUM SERPL-SCNC: 4.3 MMOL/L (ref 3.5–5.5)
POTASSIUM SERPL-SCNC: 4.9 MMOL/L (ref 3.5–5.5)
POTASSIUM SERPL-SCNC: 5.5 MMOL/L (ref 3.5–5.5)
POTASSIUM SERPL-SCNC: 5.9 MMOL/L (ref 3.5–5.5)
PROCALCITONIN SERPL-MCNC: 0.27 NG/ML
PROCALCITONIN SERPL-MCNC: 0.3 NG/ML
PROT SERPL-MCNC: 5.3 G/DL (ref 6.4–8.2)
PROT SERPL-MCNC: 6.2 G/DL (ref 6.4–8.2)
PROT SERPL-MCNC: 7.2 G/DL (ref 6.4–8.2)
PROT SERPL-MCNC: 7.4 G/DL (ref 6.4–8.2)
PROT UR STRIP-MCNC: 100 MG/DL
PROT UR STRIP-MCNC: 300 MG/DL
PROT UR STRIP-MCNC: 300 MG/DL
PROTHROMBIN TIME: 16.7 SEC (ref 11.5–15.2)
Q-T INTERVAL, ECG07: 370 MS
Q-T INTERVAL, ECG07: 416 MS
Q-T INTERVAL, ECG07: 422 MS
Q-T INTERVAL, ECG07: 428 MS
Q-T INTERVAL, ECG07: 446 MS
Q-T INTERVAL, ECG07: 454 MS
QRS DURATION, ECG06: 72 MS
QRS DURATION, ECG06: 74 MS
QRS DURATION, ECG06: 76 MS
QRS DURATION, ECG06: 76 MS
QRS DURATION, ECG06: 78 MS
QRS DURATION, ECG06: 78 MS
QTC CALCULATION (BEZET), ECG08: 437 MS
QTC CALCULATION (BEZET), ECG08: 490 MS
QTC CALCULATION (BEZET), ECG08: 504 MS
QTC CALCULATION (BEZET), ECG08: 506 MS
QTC CALCULATION (BEZET), ECG08: 542 MS
QTC CALCULATION (BEZET), ECG08: 546 MS
RBC # BLD AUTO: 2.56 M/UL (ref 4.2–5.3)
RBC # BLD AUTO: 2.61 M/UL (ref 4.2–5.3)
RBC # BLD AUTO: 2.64 M/UL (ref 4.2–5.3)
RBC # BLD AUTO: 2.79 M/UL (ref 4.2–5.3)
RBC # BLD AUTO: 2.8 M/UL (ref 4.2–5.3)
RBC # BLD AUTO: 2.83 M/UL (ref 4.2–5.3)
RBC # BLD AUTO: 2.85 M/UL (ref 4.2–5.3)
RBC # BLD AUTO: 2.86 M/UL (ref 4.2–5.3)
RBC # BLD AUTO: 2.95 M/UL (ref 4.2–5.3)
RBC # BLD AUTO: 3.35 M/UL (ref 4.2–5.3)
RBC # BLD AUTO: 3.56 M/UL (ref 4.2–5.3)
RBC # BLD AUTO: 3.59 M/UL (ref 4.2–5.3)
RBC # BLD AUTO: 3.6 M/UL (ref 4.2–5.3)
RBC # BLD AUTO: 3.73 M/UL (ref 4.2–5.3)
RBC # BLD AUTO: 3.74 M/UL (ref 4.2–5.3)
RBC # BLD AUTO: 3.78 M/UL (ref 4.2–5.3)
RBC # BLD AUTO: 3.79 M/UL (ref 4.2–5.3)
RBC # BLD AUTO: 3.86 M/UL (ref 4.2–5.3)
RBC # BLD AUTO: 4.01 M/UL (ref 4.2–5.3)
RBC # BLD AUTO: 4.46 M/UL (ref 4.2–5.3)
RBC # BLD AUTO: 4.5 M/UL (ref 4.2–5.3)
RBC # BLD AUTO: 5.01 M/UL (ref 4.2–5.3)
RBC #/AREA URNS HPF: NEGATIVE /HPF (ref 0–5)
RBC #/AREA URNS HPF: NEGATIVE /HPF (ref 0–5)
RBC MORPH BLD: ABNORMAL
RETICS/RBC NFR AUTO: 0.8 % (ref 0.5–2.5)
RIGHT CFA DIST SYS PSV: 92.6 CM/S
RIGHT DIST ATA VELOCITY: 12.4 CM/S
RIGHT DIST PTA PSV: 0 CM/S
RIGHT MID ATA VELOCITY: 12.8 CM/S
RIGHT PERONEAL DIST VELOCITY: 11.9 CM/S
RIGHT PERONEAL MID SYS PSV: 11.5 CM/S
RIGHT PERONEAL PROX SYS PSV: 11.5 CM/S
RIGHT POP A DIST VEL RATIO: 0.43
RIGHT POP A MID VEL RATIO: 0.96
RIGHT POP A PROX VEL RATIO: 0.5
RIGHT POPLITEAL DIST SYS PSV: 13.3 CM/S
RIGHT POPLITEAL MID SYS PSV: 30.7 CM/S
RIGHT POPLITEAL PROX SYS PSV: 32.1 CM/S
RIGHT PROX ATA VELOCITY: 83.1 CM/S
RIGHT PROX PFA A PSV: 76.1 CM/S
RIGHT PROX PTA PSV: 25 CM/S
RIGHT PTA MID SYS PSV: 0 CM/S
RIGHT SFA DIST VEL RATIO: 4.71
RIGHT SFA MID VEL RATIO: 0
RIGHT SFA PROX VEL RATIO: 3.1
RIGHT SUPER FEMORAL DIST SYS PSV: 64 CM/S
RIGHT SUPER FEMORAL MID SYS PSV: 13.6 CM/S
RIGHT SUPER FEMORAL PROX SYS PSV: 288.6 CM/S
RSV RNA SPEC QL NAA+PROBE: NOT DETECTED
RV+EV RNA SPEC QL NAA+PROBE: NOT DETECTED
SAO2 % BLD: 94 % (ref 92–97)
SAO2 % BLD: 94.6 % (ref 92–97)
SAO2 % BLD: 96.1 % (ref 92–97)
SARS-COV-2 PCR, COVPCR: NOT DETECTED
SERVICE CMNT-IMP: ABNORMAL
SERVICE CMNT-IMP: NORMAL
SODIUM SERPL-SCNC: 137 MMOL/L (ref 136–145)
SODIUM SERPL-SCNC: 138 MMOL/L (ref 136–145)
SODIUM SERPL-SCNC: 139 MMOL/L (ref 136–145)
SODIUM SERPL-SCNC: 140 MMOL/L (ref 136–145)
SODIUM SERPL-SCNC: 141 MMOL/L (ref 136–145)
SODIUM SERPL-SCNC: 141 MMOL/L (ref 136–145)
SODIUM SERPL-SCNC: 142 MMOL/L (ref 136–145)
SODIUM SERPL-SCNC: 143 MMOL/L (ref 136–145)
SODIUM SERPL-SCNC: 144 MMOL/L (ref 136–145)
SODIUM SERPL-SCNC: 145 MMOL/L (ref 136–145)
SODIUM SERPL-SCNC: 146 MMOL/L (ref 136–145)
SODIUM SERPL-SCNC: 147 MMOL/L (ref 136–145)
SODIUM SERPL-SCNC: 147 MMOL/L (ref 136–145)
SODIUM SERPL-SCNC: 148 MMOL/L (ref 136–145)
SODIUM SERPL-SCNC: 148 MMOL/L (ref 136–145)
SODIUM SERPL-SCNC: 149 MMOL/L (ref 136–145)
SODIUM SERPL-SCNC: 150 MMOL/L (ref 136–145)
SODIUM SERPL-SCNC: 150 MMOL/L (ref 136–145)
SODIUM SERPL-SCNC: 151 MMOL/L (ref 136–145)
SODIUM SERPL-SCNC: 152 MMOL/L (ref 136–145)
SOURCE, COVRS: NORMAL
SP GR UR REFRACTOMETRY: 1.02 (ref 1–1.03)
SP1: NORMAL
SP2: NORMAL
SP3: NORMAL
SPECIMEN TYPE: ABNORMAL
TOTAL RESP. RATE, ITRR: 20
TOTAL RESP. RATE, ITRR: 20
TOTAL RESP. RATE, ITRR: 25
TRIGL SERPL-MCNC: 79 MG/DL (ref ?–150)
TROPONIN-HIGH SENSITIVITY: 1053 NG/L (ref 0–54)
TROPONIN-HIGH SENSITIVITY: 1070 NG/L (ref 0–54)
TROPONIN-HIGH SENSITIVITY: 25 NG/L (ref 0–54)
TROPONIN-HIGH SENSITIVITY: 27 NG/L (ref 0–54)
TROPONIN-HIGH SENSITIVITY: 285 NG/L (ref 0–54)
TROPONIN-HIGH SENSITIVITY: 43 NG/L (ref 0–54)
TROPONIN-HIGH SENSITIVITY: 70 NG/L (ref 0–54)
TROPONIN-HIGH SENSITIVITY: 790 NG/L (ref 0–54)
TROPONIN-HIGH SENSITIVITY: 81 NG/L (ref 0–54)
TSH SERPL DL<=0.05 MIU/L-ACNC: 1.55 UIU/ML (ref 0.36–3.74)
UROBILINOGEN UR QL STRIP.AUTO: 0.2 EU/DL (ref 0.2–1)
UROBILINOGEN UR QL STRIP.AUTO: 1 EU/DL (ref 0.2–1)
UROBILINOGEN UR QL STRIP.AUTO: 1 EU/DL (ref 0.2–1)
VANCOMYCIN SERPL-MCNC: 11.1 UG/ML (ref 5–40)
VANCOMYCIN SERPL-MCNC: 16.3 UG/ML (ref 5–40)
VANCOMYCIN SERPL-MCNC: 23.9 UG/ML (ref 5–40)
VENTRICULAR RATE, ECG03: 102 BPM
VENTRICULAR RATE, ECG03: 75 BPM
VENTRICULAR RATE, ECG03: 77 BPM
VENTRICULAR RATE, ECG03: 81 BPM
VENTRICULAR RATE, ECG03: 84 BPM
VENTRICULAR RATE, ECG03: 98 BPM
VIT B12 SERPL-MCNC: 281 PG/ML (ref 211–911)
VLDLC SERPL CALC-MCNC: 15.8 MG/DL
WBC # BLD AUTO: 10.1 K/UL (ref 4.6–13.2)
WBC # BLD AUTO: 10.1 K/UL (ref 4.6–13.2)
WBC # BLD AUTO: 10.4 K/UL (ref 4.6–13.2)
WBC # BLD AUTO: 10.6 K/UL (ref 4.6–13.2)
WBC # BLD AUTO: 10.6 K/UL (ref 4.6–13.2)
WBC # BLD AUTO: 11.2 K/UL (ref 4.6–13.2)
WBC # BLD AUTO: 11.6 K/UL (ref 4.6–13.2)
WBC # BLD AUTO: 14.1 K/UL (ref 4.6–13.2)
WBC # BLD AUTO: 14.5 K/UL (ref 4.6–13.2)
WBC # BLD AUTO: 21.2 K/UL (ref 4.6–13.2)
WBC # BLD AUTO: 32.8 K/UL (ref 4.6–13.2)
WBC # BLD AUTO: 7.6 K/UL (ref 4.6–13.2)
WBC # BLD AUTO: 7.9 K/UL (ref 4.6–13.2)
WBC # BLD AUTO: 8.1 K/UL (ref 4.6–13.2)
WBC # BLD AUTO: 8.3 K/UL (ref 4.6–13.2)
WBC # BLD AUTO: 8.4 K/UL (ref 4.6–13.2)
WBC # BLD AUTO: 9.1 K/UL (ref 4.6–13.2)
WBC # BLD AUTO: 9.2 K/UL (ref 4.6–13.2)
WBC # BLD AUTO: 9.3 K/UL (ref 4.6–13.2)
WBC # BLD AUTO: 9.4 K/UL (ref 4.6–13.2)
WBC URNS QL MICRO: >100 /HPF (ref 0–4)
WBC URNS QL MICRO: ABNORMAL /HPF (ref 0–4)
WBC URNS QL MICRO: NORMAL /HPF (ref 0–4)

## 2022-01-01 PROCEDURE — 80074 ACUTE HEPATITIS PANEL: CPT

## 2022-01-01 PROCEDURE — 99285 EMERGENCY DEPT VISIT HI MDM: CPT

## 2022-01-01 PROCEDURE — 94762 N-INVAS EAR/PLS OXIMTRY CONT: CPT

## 2022-01-01 PROCEDURE — 74011636637 HC RX REV CODE- 636/637: Performed by: STUDENT IN AN ORGANIZED HEALTH CARE EDUCATION/TRAINING PROGRAM

## 2022-01-01 PROCEDURE — 83735 ASSAY OF MAGNESIUM: CPT

## 2022-01-01 PROCEDURE — 74011000250 HC RX REV CODE- 250: Performed by: STUDENT IN AN ORGANIZED HEALTH CARE EDUCATION/TRAINING PROGRAM

## 2022-01-01 PROCEDURE — 87040 BLOOD CULTURE FOR BACTERIA: CPT

## 2022-01-01 PROCEDURE — 74011250636 HC RX REV CODE- 250/636: Performed by: ANESTHESIOLOGY

## 2022-01-01 PROCEDURE — 96366 THER/PROPH/DIAG IV INF ADDON: CPT

## 2022-01-01 PROCEDURE — 84100 ASSAY OF PHOSPHORUS: CPT

## 2022-01-01 PROCEDURE — 00731 ANES UPR GI NDSC PX NOS: CPT | Performed by: NURSE ANESTHETIST, CERTIFIED REGISTERED

## 2022-01-01 PROCEDURE — 74011000258 HC RX REV CODE- 258: Performed by: STUDENT IN AN ORGANIZED HEALTH CARE EDUCATION/TRAINING PROGRAM

## 2022-01-01 PROCEDURE — 74181 MRI ABDOMEN W/O CONTRAST: CPT

## 2022-01-01 PROCEDURE — 82962 GLUCOSE BLOOD TEST: CPT

## 2022-01-01 PROCEDURE — 74011250636 HC RX REV CODE- 250/636: Performed by: NURSE ANESTHETIST, CERTIFIED REGISTERED

## 2022-01-01 PROCEDURE — 74011636637 HC RX REV CODE- 636/637: Performed by: PHYSICIAN ASSISTANT

## 2022-01-01 PROCEDURE — 77010033678 HC OXYGEN DAILY

## 2022-01-01 PROCEDURE — 99223 1ST HOSP IP/OBS HIGH 75: CPT | Performed by: INTERNAL MEDICINE

## 2022-01-01 PROCEDURE — 77030018798 HC PMP KT ENTRL FED COVD -A

## 2022-01-01 PROCEDURE — 74011250637 HC RX REV CODE- 250/637: Performed by: STUDENT IN AN ORGANIZED HEALTH CARE EDUCATION/TRAINING PROGRAM

## 2022-01-01 PROCEDURE — 84484 ASSAY OF TROPONIN QUANT: CPT

## 2022-01-01 PROCEDURE — 83010 ASSAY OF HAPTOGLOBIN QUANT: CPT

## 2022-01-01 PROCEDURE — 99291 CRITICAL CARE FIRST HOUR: CPT | Performed by: INTERNAL MEDICINE

## 2022-01-01 PROCEDURE — 85025 COMPLETE CBC W/AUTO DIFF WBC: CPT

## 2022-01-01 PROCEDURE — 74011636637 HC RX REV CODE- 636/637: Performed by: NURSE ANESTHETIST, CERTIFIED REGISTERED

## 2022-01-01 PROCEDURE — 77030040392 HC DRSG OPTIFOAM MDII -A

## 2022-01-01 PROCEDURE — 71045 X-RAY EXAM CHEST 1 VIEW: CPT

## 2022-01-01 PROCEDURE — 80048 BASIC METABOLIC PNL TOTAL CA: CPT

## 2022-01-01 PROCEDURE — 85384 FIBRINOGEN ACTIVITY: CPT

## 2022-01-01 PROCEDURE — 74011250637 HC RX REV CODE- 250/637: Performed by: INTERNAL MEDICINE

## 2022-01-01 PROCEDURE — 74011250637 HC RX REV CODE- 250/637: Performed by: PHYSICIAN ASSISTANT

## 2022-01-01 PROCEDURE — 74011250636 HC RX REV CODE- 250/636: Performed by: INTERNAL MEDICINE

## 2022-01-01 PROCEDURE — 0651 HSPC ROUTINE HOME CARE

## 2022-01-01 PROCEDURE — 82330 ASSAY OF CALCIUM: CPT

## 2022-01-01 PROCEDURE — 99232 SBSQ HOSP IP/OBS MODERATE 35: CPT | Performed by: INTERNAL MEDICINE

## 2022-01-01 PROCEDURE — 83605 ASSAY OF LACTIC ACID: CPT

## 2022-01-01 PROCEDURE — 74011250637 HC RX REV CODE- 250/637: Performed by: NURSE PRACTITIONER

## 2022-01-01 PROCEDURE — 74011250637 HC RX REV CODE- 250/637: Performed by: EMERGENCY MEDICINE

## 2022-01-01 PROCEDURE — 65660000000 HC RM CCU STEPDOWN

## 2022-01-01 PROCEDURE — 82150 ASSAY OF AMYLASE: CPT

## 2022-01-01 PROCEDURE — 74011000250 HC RX REV CODE- 250: Performed by: NURSE PRACTITIONER

## 2022-01-01 PROCEDURE — 74011250636 HC RX REV CODE- 250/636: Performed by: REGISTERED NURSE

## 2022-01-01 PROCEDURE — 82140 ASSAY OF AMMONIA: CPT

## 2022-01-01 PROCEDURE — 2709999900 HC NON-CHARGEABLE SUPPLY

## 2022-01-01 PROCEDURE — 99233 SBSQ HOSP IP/OBS HIGH 50: CPT | Performed by: STUDENT IN AN ORGANIZED HEALTH CARE EDUCATION/TRAINING PROGRAM

## 2022-01-01 PROCEDURE — 74011000258 HC RX REV CODE- 258: Performed by: PHYSICIAN ASSISTANT

## 2022-01-01 PROCEDURE — 70030 X-RAY EYE FOR FOREIGN BODY: CPT

## 2022-01-01 PROCEDURE — 82107 ALPHA-FETOPROTEIN L3: CPT

## 2022-01-01 PROCEDURE — 87635 SARS-COV-2 COVID-19 AMP PRB: CPT

## 2022-01-01 PROCEDURE — 77030038269 HC DRN EXT URIN PURWCK BARD -A

## 2022-01-01 PROCEDURE — 82550 ASSAY OF CK (CPK): CPT

## 2022-01-01 PROCEDURE — 36415 COLL VENOUS BLD VENIPUNCTURE: CPT

## 2022-01-01 PROCEDURE — 70551 MRI BRAIN STEM W/O DYE: CPT

## 2022-01-01 PROCEDURE — 74011250636 HC RX REV CODE- 250/636: Performed by: PHYSICIAN ASSISTANT

## 2022-01-01 PROCEDURE — 99233 SBSQ HOSP IP/OBS HIGH 50: CPT | Performed by: NURSE PRACTITIONER

## 2022-01-01 PROCEDURE — 99232 SBSQ HOSP IP/OBS MODERATE 35: CPT | Performed by: EMERGENCY MEDICINE

## 2022-01-01 PROCEDURE — 80061 LIPID PANEL: CPT

## 2022-01-01 PROCEDURE — 74011000250 HC RX REV CODE- 250: Performed by: PHYSICIAN ASSISTANT

## 2022-01-01 PROCEDURE — 74011000258 HC RX REV CODE- 258: Performed by: NURSE PRACTITIONER

## 2022-01-01 PROCEDURE — 92526 ORAL FUNCTION THERAPY: CPT

## 2022-01-01 PROCEDURE — 93005 ELECTROCARDIOGRAM TRACING: CPT

## 2022-01-01 PROCEDURE — G0155 HHCP-SVS OF CSW,EA 15 MIN: HCPCS

## 2022-01-01 PROCEDURE — 3331090004 HSPC SERVICE INTENSITY ADD-ON

## 2022-01-01 PROCEDURE — 80053 COMPREHEN METABOLIC PANEL: CPT

## 2022-01-01 PROCEDURE — 2709999900 HC NON-CHARGEABLE SUPPLY: Performed by: INTERNAL MEDICINE

## 2022-01-01 PROCEDURE — G0156 HHCP-SVS OF AIDE,EA 15 MIN: HCPCS

## 2022-01-01 PROCEDURE — 74011250636 HC RX REV CODE- 250/636: Performed by: STUDENT IN AN ORGANIZED HEALTH CARE EDUCATION/TRAINING PROGRAM

## 2022-01-01 PROCEDURE — 80076 HEPATIC FUNCTION PANEL: CPT

## 2022-01-01 PROCEDURE — 3336500001 HSPC ELECTION

## 2022-01-01 PROCEDURE — 74018 RADEX ABDOMEN 1 VIEW: CPT

## 2022-01-01 PROCEDURE — 74011636637 HC RX REV CODE- 636/637: Performed by: EMERGENCY MEDICINE

## 2022-01-01 PROCEDURE — 97165 OT EVAL LOW COMPLEX 30 MIN: CPT

## 2022-01-01 PROCEDURE — 74011000250 HC RX REV CODE- 250: Performed by: INTERNAL MEDICINE

## 2022-01-01 PROCEDURE — 74011000250 HC RX REV CODE- 250

## 2022-01-01 PROCEDURE — 36620 INSERTION CATHETER ARTERY: CPT | Performed by: NURSE PRACTITIONER

## 2022-01-01 PROCEDURE — 83690 ASSAY OF LIPASE: CPT

## 2022-01-01 PROCEDURE — 93926 LOWER EXTREMITY STUDY: CPT

## 2022-01-01 PROCEDURE — 74011000636 HC RX REV CODE- 636: Performed by: INTERNAL MEDICINE

## 2022-01-01 PROCEDURE — 03HY32Z INSERTION OF MONITORING DEVICE INTO UPPER ARTERY, PERCUTANEOUS APPROACH: ICD-10-PCS | Performed by: INTERNAL MEDICINE

## 2022-01-01 PROCEDURE — 65610000006 HC RM INTENSIVE CARE

## 2022-01-01 PROCEDURE — 94640 AIRWAY INHALATION TREATMENT: CPT

## 2022-01-01 PROCEDURE — 85610 PROTHROMBIN TIME: CPT

## 2022-01-01 PROCEDURE — 74011250636 HC RX REV CODE- 250/636: Performed by: NURSE PRACTITIONER

## 2022-01-01 PROCEDURE — 93970 EXTREMITY STUDY: CPT

## 2022-01-01 PROCEDURE — APPSS30 APP SPLIT SHARED TIME 16-30 MINUTES: Performed by: REGISTERED NURSE

## 2022-01-01 PROCEDURE — 84443 ASSAY THYROID STIM HORMONE: CPT

## 2022-01-01 PROCEDURE — 80307 DRUG TEST PRSMV CHEM ANLYZR: CPT

## 2022-01-01 PROCEDURE — 83615 LACTATE (LD) (LDH) ENZYME: CPT

## 2022-01-01 PROCEDURE — 76060000031 HC ANESTHESIA FIRST 0.5 HR: Performed by: INTERNAL MEDICINE

## 2022-01-01 PROCEDURE — 97161 PT EVAL LOW COMPLEX 20 MIN: CPT

## 2022-01-01 PROCEDURE — 99239 HOSP IP/OBS DSCHRG MGMT >30: CPT | Performed by: STUDENT IN AN ORGANIZED HEALTH CARE EDUCATION/TRAINING PROGRAM

## 2022-01-01 PROCEDURE — 0202U NFCT DS 22 TRGT SARS-COV-2: CPT

## 2022-01-01 PROCEDURE — 82607 VITAMIN B-12: CPT

## 2022-01-01 PROCEDURE — 87324 CLOSTRIDIUM AG IA: CPT

## 2022-01-01 PROCEDURE — C9113 INJ PANTOPRAZOLE SODIUM, VIA: HCPCS | Performed by: PHYSICIAN ASSISTANT

## 2022-01-01 PROCEDURE — 70450 CT HEAD/BRAIN W/O DYE: CPT

## 2022-01-01 PROCEDURE — 51798 US URINE CAPACITY MEASURE: CPT

## 2022-01-01 PROCEDURE — 87186 SC STD MICRODIL/AGAR DIL: CPT

## 2022-01-01 PROCEDURE — 70544 MR ANGIOGRAPHY HEAD W/O DYE: CPT

## 2022-01-01 PROCEDURE — 84145 PROCALCITONIN (PCT): CPT

## 2022-01-01 PROCEDURE — G0299 HHS/HOSPICE OF RN EA 15 MIN: HCPCS

## 2022-01-01 PROCEDURE — 73564 X-RAY EXAM KNEE 4 OR MORE: CPT

## 2022-01-01 PROCEDURE — 74011636637 HC RX REV CODE- 636/637: Performed by: INTERNAL MEDICINE

## 2022-01-01 PROCEDURE — 36591 DRAW BLOOD OFF VENOUS DEVICE: CPT

## 2022-01-01 PROCEDURE — 77030018842 HC SOL IRR SOD CL 9% BAXT -A

## 2022-01-01 PROCEDURE — 96375 TX/PRO/DX INJ NEW DRUG ADDON: CPT

## 2022-01-01 PROCEDURE — 76705 ECHO EXAM OF ABDOMEN: CPT

## 2022-01-01 PROCEDURE — 99239 HOSP IP/OBS DSCHRG MGMT >30: CPT | Performed by: EMERGENCY MEDICINE

## 2022-01-01 PROCEDURE — 96374 THER/PROPH/DIAG INJ IV PUSH: CPT

## 2022-01-01 PROCEDURE — 92610 EVALUATE SWALLOWING FUNCTION: CPT

## 2022-01-01 PROCEDURE — 85049 AUTOMATED PLATELET COUNT: CPT

## 2022-01-01 PROCEDURE — 74011000250 HC RX REV CODE- 250: Performed by: ANESTHESIOLOGY

## 2022-01-01 PROCEDURE — 36600 WITHDRAWAL OF ARTERIAL BLOOD: CPT

## 2022-01-01 PROCEDURE — 76040000019: Performed by: INTERNAL MEDICINE

## 2022-01-01 PROCEDURE — 65270000029 HC RM PRIVATE

## 2022-01-01 PROCEDURE — 99221 1ST HOSP IP/OBS SF/LOW 40: CPT | Performed by: NEUROLOGICAL SURGERY

## 2022-01-01 PROCEDURE — 99222 1ST HOSP IP/OBS MODERATE 55: CPT | Performed by: NURSE PRACTITIONER

## 2022-01-01 PROCEDURE — 02HV33Z INSERTION OF INFUSION DEVICE INTO SUPERIOR VENA CAVA, PERCUTANEOUS APPROACH: ICD-10-PCS | Performed by: STUDENT IN AN ORGANIZED HEALTH CARE EDUCATION/TRAINING PROGRAM

## 2022-01-01 PROCEDURE — G0300 HHS/HOSPICE OF LPN EA 15 MIN: HCPCS

## 2022-01-01 PROCEDURE — 80202 ASSAY OF VANCOMYCIN: CPT

## 2022-01-01 PROCEDURE — 71275 CT ANGIOGRAPHY CHEST: CPT

## 2022-01-01 PROCEDURE — 70547 MR ANGIOGRAPHY NECK W/O DYE: CPT

## 2022-01-01 PROCEDURE — 3E0G76Z INTRODUCTION OF NUTRITIONAL SUBSTANCE INTO UPPER GI, VIA NATURAL OR ARTIFICIAL OPENING: ICD-10-PCS | Performed by: INTERNAL MEDICINE

## 2022-01-01 PROCEDURE — 99223 1ST HOSP IP/OBS HIGH 75: CPT | Performed by: STUDENT IN AN ORGANIZED HEALTH CARE EDUCATION/TRAINING PROGRAM

## 2022-01-01 PROCEDURE — C8923 2D TTE W OR W/O FOL W/CON,CO: HCPCS

## 2022-01-01 PROCEDURE — 0DH63UZ INSERTION OF FEEDING DEVICE INTO STOMACH, PERCUTANEOUS APPROACH: ICD-10-PCS | Performed by: INTERNAL MEDICINE

## 2022-01-01 PROCEDURE — 74011636637 HC RX REV CODE- 636/637: Performed by: NURSE PRACTITIONER

## 2022-01-01 PROCEDURE — 99231 SBSQ HOSP IP/OBS SF/LOW 25: CPT | Performed by: NURSE PRACTITIONER

## 2022-01-01 PROCEDURE — 00731 ANES UPR GI NDSC PX NOS: CPT | Performed by: ANESTHESIOLOGY

## 2022-01-01 PROCEDURE — 81001 URINALYSIS AUTO W/SCOPE: CPT

## 2022-01-01 PROCEDURE — HOSPICE MEDICATION HC HH HOSPICE MEDICATION

## 2022-01-01 PROCEDURE — 85045 AUTOMATED RETICULOCYTE COUNT: CPT

## 2022-01-01 PROCEDURE — 74183 MRI ABD W/O CNTR FLWD CNTR: CPT

## 2022-01-01 PROCEDURE — 36592 COLLECT BLOOD FROM PICC: CPT

## 2022-01-01 PROCEDURE — 74011000636 HC RX REV CODE- 636: Performed by: EMERGENCY MEDICINE

## 2022-01-01 PROCEDURE — 82803 BLOOD GASES ANY COMBINATION: CPT

## 2022-01-01 PROCEDURE — 86022 PLATELET ANTIBODIES: CPT

## 2022-01-01 PROCEDURE — 96365 THER/PROPH/DIAG IV INF INIT: CPT

## 2022-01-01 PROCEDURE — 94760 N-INVAS EAR/PLS OXIMETRY 1: CPT

## 2022-01-01 PROCEDURE — 82525 ASSAY OF COPPER: CPT

## 2022-01-01 PROCEDURE — 74011000250 HC RX REV CODE- 250: Performed by: NURSE ANESTHETIST, CERTIFIED REGISTERED

## 2022-01-01 PROCEDURE — A9576 INJ PROHANCE MULTIPACK: HCPCS | Performed by: STUDENT IN AN ORGANIZED HEALTH CARE EDUCATION/TRAINING PROGRAM

## 2022-01-01 PROCEDURE — 85379 FIBRIN DEGRADATION QUANT: CPT

## 2022-01-01 PROCEDURE — 87077 CULTURE AEROBIC IDENTIFY: CPT

## 2022-01-01 PROCEDURE — 77030008771 HC TU NG SALEM SUMP -A

## 2022-01-01 PROCEDURE — 71250 CT THORAX DX C-: CPT

## 2022-01-01 PROCEDURE — 75810000455 HC PLCMT CENT VENOUS CATH LVL 2 5182

## 2022-01-01 PROCEDURE — 83036 HEMOGLOBIN GLYCOSYLATED A1C: CPT

## 2022-01-01 PROCEDURE — 74011250636 HC RX REV CODE- 250/636: Performed by: EMERGENCY MEDICINE

## 2022-01-01 PROCEDURE — 87389 HIV-1 AG W/HIV-1&-2 AB AG IA: CPT

## 2022-01-01 PROCEDURE — 99233 SBSQ HOSP IP/OBS HIGH 50: CPT | Performed by: INTERNAL MEDICINE

## 2022-01-01 PROCEDURE — 74177 CT ABD & PELVIS W/CONTRAST: CPT

## 2022-01-01 PROCEDURE — 85730 THROMBOPLASTIN TIME PARTIAL: CPT

## 2022-01-01 PROCEDURE — 87086 URINE CULTURE/COLONY COUNT: CPT

## 2022-01-01 RX ORDER — INSULIN GLARGINE 100 [IU]/ML
5 INJECTION, SOLUTION SUBCUTANEOUS DAILY
Status: DISCONTINUED | OUTPATIENT
Start: 2022-01-01 | End: 2022-01-01

## 2022-01-01 RX ORDER — SODIUM CHLORIDE, SODIUM LACTATE, POTASSIUM CHLORIDE, CALCIUM CHLORIDE 600; 310; 30; 20 MG/100ML; MG/100ML; MG/100ML; MG/100ML
25 INJECTION, SOLUTION INTRAVENOUS CONTINUOUS
Status: DISCONTINUED | OUTPATIENT
Start: 2022-01-01 | End: 2022-01-01 | Stop reason: HOSPADM

## 2022-01-01 RX ORDER — LORAZEPAM 0.5 MG/1
0.5 TABLET ORAL ONCE
Status: DISCONTINUED | OUTPATIENT
Start: 2022-01-01 | End: 2022-01-01

## 2022-01-01 RX ORDER — INSULIN LISPRO 100 [IU]/ML
4 INJECTION, SOLUTION INTRAVENOUS; SUBCUTANEOUS EVERY 6 HOURS
Status: DISCONTINUED | OUTPATIENT
Start: 2022-01-01 | End: 2022-01-01 | Stop reason: HOSPADM

## 2022-01-01 RX ORDER — NOREPINEPHRINE BITARTRATE/D5W 8 MG/250ML
.5-5 PLASTIC BAG, INJECTION (ML) INTRAVENOUS
Status: DISCONTINUED | OUTPATIENT
Start: 2022-01-01 | End: 2022-01-01

## 2022-01-01 RX ORDER — INSULIN LISPRO 100 [IU]/ML
INJECTION, SOLUTION INTRAVENOUS; SUBCUTANEOUS EVERY 6 HOURS
Status: DISCONTINUED | OUTPATIENT
Start: 2022-01-01 | End: 2022-01-01 | Stop reason: HOSPADM

## 2022-01-01 RX ORDER — HYDROMORPHONE HYDROCHLORIDE 1 MG/ML
1 INJECTION, SOLUTION INTRAMUSCULAR; INTRAVENOUS; SUBCUTANEOUS ONCE
Status: COMPLETED | OUTPATIENT
Start: 2022-01-01 | End: 2022-01-01

## 2022-01-01 RX ORDER — ACETAMINOPHEN 650 MG/1
650 SUPPOSITORY RECTAL
Status: DISCONTINUED | OUTPATIENT
Start: 2022-01-01 | End: 2022-01-01

## 2022-01-01 RX ORDER — LISINOPRIL 10 MG/1
10 TABLET ORAL DAILY
Status: DISCONTINUED | OUTPATIENT
Start: 2022-01-01 | End: 2022-01-01 | Stop reason: HOSPADM

## 2022-01-01 RX ORDER — METOPROLOL TARTRATE 5 MG/5ML
5 INJECTION INTRAVENOUS 2 TIMES DAILY
Status: DISCONTINUED | OUTPATIENT
Start: 2022-01-01 | End: 2022-01-01

## 2022-01-01 RX ORDER — IPRATROPIUM BROMIDE AND ALBUTEROL SULFATE 2.5; .5 MG/3ML; MG/3ML
3 SOLUTION RESPIRATORY (INHALATION)
Status: DISCONTINUED | OUTPATIENT
Start: 2022-01-01 | End: 2022-01-01 | Stop reason: HOSPADM

## 2022-01-01 RX ORDER — INSULIN LISPRO 100 [IU]/ML
4 INJECTION, SOLUTION INTRAVENOUS; SUBCUTANEOUS EVERY 6 HOURS
Qty: 1 EACH | Refills: 0 | Status: SHIPPED
Start: 2022-01-01

## 2022-01-01 RX ORDER — INSULIN GLARGINE 100 [IU]/ML
10 INJECTION, SOLUTION SUBCUTANEOUS DAILY
Status: DISCONTINUED | OUTPATIENT
Start: 2022-01-01 | End: 2022-01-01

## 2022-01-01 RX ORDER — INSULIN LISPRO 100 [IU]/ML
INJECTION, SOLUTION INTRAVENOUS; SUBCUTANEOUS ONCE
Status: DISCONTINUED | OUTPATIENT
Start: 2022-01-01 | End: 2022-01-01 | Stop reason: HOSPADM

## 2022-01-01 RX ORDER — ONDANSETRON 4 MG/1
4 TABLET, ORALLY DISINTEGRATING ORAL
Status: DISCONTINUED | OUTPATIENT
Start: 2022-01-01 | End: 2022-01-01 | Stop reason: HOSPADM

## 2022-01-01 RX ORDER — ACETAMINOPHEN 325 MG/1
650 TABLET ORAL
Status: DISCONTINUED | OUTPATIENT
Start: 2022-01-01 | End: 2022-01-01 | Stop reason: HOSPADM

## 2022-01-01 RX ORDER — NYSTATIN 100000 U/G
CREAM TOPICAL 2 TIMES DAILY
Status: DISCONTINUED | OUTPATIENT
Start: 2022-01-01 | End: 2022-01-01

## 2022-01-01 RX ORDER — LORAZEPAM 2 MG/ML
0.5 INJECTION, SOLUTION INTRAMUSCULAR; INTRAVENOUS
Status: COMPLETED | OUTPATIENT
Start: 2022-01-01 | End: 2022-01-01

## 2022-01-01 RX ORDER — LISINOPRIL 5 MG/1
10 TABLET ORAL DAILY
Status: DISCONTINUED | OUTPATIENT
Start: 2022-01-01 | End: 2022-01-01

## 2022-01-01 RX ORDER — NYSTATIN 100000 [USP'U]/G
POWDER TOPICAL 2 TIMES DAILY
Status: DISCONTINUED | OUTPATIENT
Start: 2022-01-01 | End: 2022-01-01 | Stop reason: HOSPADM

## 2022-01-01 RX ORDER — HEPARIN SODIUM 5000 [USP'U]/ML
5000 INJECTION, SOLUTION INTRAVENOUS; SUBCUTANEOUS EVERY 8 HOURS
Status: DISCONTINUED | OUTPATIENT
Start: 2022-01-01 | End: 2022-01-01 | Stop reason: HOSPADM

## 2022-01-01 RX ORDER — POTASSIUM CHLORIDE 7.45 MG/ML
10 INJECTION INTRAVENOUS
Status: DISCONTINUED | OUTPATIENT
Start: 2022-01-01 | End: 2022-01-01 | Stop reason: SINTOL

## 2022-01-01 RX ORDER — CYANOCOBALAMIN 1000 UG/ML
1000 INJECTION, SOLUTION INTRAMUSCULAR; SUBCUTANEOUS DAILY
Status: COMPLETED | OUTPATIENT
Start: 2022-01-01 | End: 2022-01-01

## 2022-01-01 RX ORDER — MORPHINE SULFATE 20 MG/ML
2.4 SOLUTION ORAL
Qty: 15 ML | Refills: 0 | Status: SHIPPED | OUTPATIENT
Start: 2022-01-01 | End: 2022-04-26

## 2022-01-01 RX ORDER — MAGNESIUM SULFATE HEPTAHYDRATE 40 MG/ML
2 INJECTION, SOLUTION INTRAVENOUS
Status: DISPENSED | OUTPATIENT
Start: 2022-01-01 | End: 2022-01-01

## 2022-01-01 RX ORDER — LANOLIN ALCOHOL/MO/W.PET/CERES
200 CREAM (GRAM) TOPICAL 3 TIMES DAILY
Status: DISCONTINUED | OUTPATIENT
Start: 2022-01-01 | End: 2022-01-01

## 2022-01-01 RX ORDER — CARVEDILOL 12.5 MG/1
12.5 TABLET ORAL EVERY 12 HOURS
Qty: 60 TABLET | Refills: 0 | Status: SHIPPED | OUTPATIENT
Start: 2022-01-01

## 2022-01-01 RX ORDER — METOPROLOL TARTRATE 25 MG/1
25 TABLET, FILM COATED ORAL 2 TIMES DAILY
Status: DISCONTINUED | OUTPATIENT
Start: 2022-01-01 | End: 2022-01-01

## 2022-01-01 RX ORDER — LIDOCAINE HYDROCHLORIDE 10 MG/ML
0.1 INJECTION, SOLUTION EPIDURAL; INFILTRATION; INTRACAUDAL; PERINEURAL AS NEEDED
Status: DISCONTINUED | OUTPATIENT
Start: 2022-01-01 | End: 2022-01-01 | Stop reason: HOSPADM

## 2022-01-01 RX ORDER — INSULIN GLARGINE 100 [IU]/ML
22 INJECTION, SOLUTION SUBCUTANEOUS DAILY
Status: DISCONTINUED | OUTPATIENT
Start: 2022-01-01 | End: 2022-01-01 | Stop reason: HOSPADM

## 2022-01-01 RX ORDER — IBUPROFEN 200 MG
1 TABLET ORAL EVERY 24 HOURS
Qty: 30 PATCH | Refills: 0 | Status: SHIPPED | OUTPATIENT
Start: 2022-01-01 | End: 2022-01-01

## 2022-01-01 RX ORDER — FUROSEMIDE 10 MG/ML
20 INJECTION INTRAMUSCULAR; INTRAVENOUS DAILY
Status: DISCONTINUED | OUTPATIENT
Start: 2022-01-01 | End: 2022-01-01 | Stop reason: HOSPADM

## 2022-01-01 RX ORDER — POTASSIUM CHLORIDE 7.45 MG/ML
10 INJECTION INTRAVENOUS
Status: DISCONTINUED | OUTPATIENT
Start: 2022-01-01 | End: 2022-01-01

## 2022-01-01 RX ORDER — METOPROLOL TARTRATE 5 MG/5ML
5 INJECTION INTRAVENOUS EVERY 6 HOURS
Status: DISCONTINUED | OUTPATIENT
Start: 2022-01-01 | End: 2022-01-01

## 2022-01-01 RX ORDER — OXYCODONE HCL 5 MG/5 ML
7.5 SOLUTION, ORAL ORAL
Status: DISCONTINUED | OUTPATIENT
Start: 2022-01-01 | End: 2022-01-01

## 2022-01-01 RX ORDER — VANCOMYCIN/0.9 % SOD CHLORIDE 1.5G/250ML
1500 PLASTIC BAG, INJECTION (ML) INTRAVENOUS
Status: COMPLETED | OUTPATIENT
Start: 2022-01-01 | End: 2022-01-01

## 2022-01-01 RX ORDER — POTASSIUM CHLORIDE 14.9 MG/ML
20 INJECTION INTRAVENOUS
Status: COMPLETED | OUTPATIENT
Start: 2022-01-01 | End: 2022-01-01

## 2022-01-01 RX ORDER — GUAIFENESIN 100 MG/5ML
324 LIQUID (ML) ORAL
Status: DISCONTINUED | OUTPATIENT
Start: 2022-01-01 | End: 2022-01-01

## 2022-01-01 RX ORDER — QUETIAPINE FUMARATE 25 MG/1
50 TABLET, FILM COATED ORAL
Status: DISCONTINUED | OUTPATIENT
Start: 2022-01-01 | End: 2022-01-01 | Stop reason: HOSPADM

## 2022-01-01 RX ORDER — CHOLECALCIFEROL (VITAMIN D3) 125 MCG
10 CAPSULE ORAL
Status: DISCONTINUED | OUTPATIENT
Start: 2022-01-01 | End: 2022-01-01 | Stop reason: HOSPADM

## 2022-01-01 RX ORDER — SODIUM CHLORIDE, SODIUM LACTATE, POTASSIUM CHLORIDE, CALCIUM CHLORIDE 600; 310; 30; 20 MG/100ML; MG/100ML; MG/100ML; MG/100ML
50 INJECTION, SOLUTION INTRAVENOUS CONTINUOUS
Status: DISCONTINUED | OUTPATIENT
Start: 2022-01-01 | End: 2022-01-01

## 2022-01-01 RX ORDER — INSULIN LISPRO 100 [IU]/ML
INJECTION, SOLUTION INTRAVENOUS; SUBCUTANEOUS ONCE
Status: COMPLETED | OUTPATIENT
Start: 2022-01-01 | End: 2022-01-01

## 2022-01-01 RX ORDER — CARVEDILOL 12.5 MG/1
12.5 TABLET ORAL ONCE
Status: DISCONTINUED | OUTPATIENT
Start: 2022-01-01 | End: 2022-01-01

## 2022-01-01 RX ORDER — POLYETHYLENE GLYCOL 3350 17 G/17G
17 POWDER, FOR SOLUTION ORAL DAILY PRN
Status: DISCONTINUED | OUTPATIENT
Start: 2022-01-01 | End: 2022-01-01

## 2022-01-01 RX ORDER — NYSTATIN 100000 [USP'U]/G
POWDER TOPICAL 2 TIMES DAILY
Qty: 30 G | Refills: 0 | Status: SHIPPED | OUTPATIENT
Start: 2022-01-01 | End: 2022-05-07

## 2022-01-01 RX ORDER — MIDODRINE HYDROCHLORIDE 5 MG/1
10 TABLET ORAL ONCE
Status: COMPLETED | OUTPATIENT
Start: 2022-01-01 | End: 2022-01-01

## 2022-01-01 RX ORDER — HYDRALAZINE HYDROCHLORIDE 25 MG/1
25 TABLET, FILM COATED ORAL 3 TIMES DAILY
Status: DISCONTINUED | OUTPATIENT
Start: 2022-01-01 | End: 2022-01-01

## 2022-01-01 RX ORDER — QUETIAPINE FUMARATE 25 MG/1
25 TABLET, FILM COATED ORAL
Status: DISCONTINUED | OUTPATIENT
Start: 2022-01-01 | End: 2022-01-01

## 2022-01-01 RX ORDER — LEVOFLOXACIN 5 MG/ML
750 INJECTION, SOLUTION INTRAVENOUS EVERY 24 HOURS
Status: DISCONTINUED | OUTPATIENT
Start: 2022-01-01 | End: 2022-01-01 | Stop reason: HOSPADM

## 2022-01-01 RX ORDER — ONDANSETRON 2 MG/ML
4 INJECTION INTRAMUSCULAR; INTRAVENOUS
Status: DISCONTINUED | OUTPATIENT
Start: 2022-01-01 | End: 2022-01-01 | Stop reason: HOSPADM

## 2022-01-01 RX ORDER — HYDROMORPHONE HYDROCHLORIDE 1 MG/ML
0.5 INJECTION, SOLUTION INTRAMUSCULAR; INTRAVENOUS; SUBCUTANEOUS ONCE
Status: COMPLETED | OUTPATIENT
Start: 2022-01-01 | End: 2022-01-01

## 2022-01-01 RX ORDER — CEFDINIR 300 MG/1
300 CAPSULE ORAL 2 TIMES DAILY
Qty: 20 CAPSULE | Refills: 0 | Status: SHIPPED | OUTPATIENT
Start: 2022-01-01 | End: 2022-01-01 | Stop reason: ALTCHOICE

## 2022-01-01 RX ORDER — DEXTROSE MONOHYDRATE AND SODIUM CHLORIDE 5; .45 G/100ML; G/100ML
50 INJECTION, SOLUTION INTRAVENOUS CONTINUOUS
Status: DISCONTINUED | OUTPATIENT
Start: 2022-01-01 | End: 2022-01-01

## 2022-01-01 RX ORDER — ACETAMINOPHEN 500 MG
1000 TABLET ORAL
Qty: 20 TABLET | Refills: 0 | Status: SHIPPED | OUTPATIENT
Start: 2022-01-01

## 2022-01-01 RX ORDER — HYOSCYAMINE SULFATE 0.12 MG/1
0.12 TABLET SUBLINGUAL
Status: DISCONTINUED | OUTPATIENT
Start: 2022-01-01 | End: 2022-01-01 | Stop reason: HOSPADM

## 2022-01-01 RX ORDER — DEXTROSE MONOHYDRATE 100 MG/ML
0-250 INJECTION, SOLUTION INTRAVENOUS AS NEEDED
Status: DISCONTINUED | OUTPATIENT
Start: 2022-01-01 | End: 2022-01-01 | Stop reason: SDUPTHER

## 2022-01-01 RX ORDER — INSULIN LISPRO 100 [IU]/ML
INJECTION, SOLUTION INTRAVENOUS; SUBCUTANEOUS EVERY 6 HOURS
Status: DISCONTINUED | OUTPATIENT
Start: 2022-01-01 | End: 2022-01-01

## 2022-01-01 RX ORDER — CARVEDILOL 12.5 MG/1
12.5 TABLET ORAL EVERY 12 HOURS
Status: DISCONTINUED | OUTPATIENT
Start: 2022-01-01 | End: 2022-01-01 | Stop reason: HOSPADM

## 2022-01-01 RX ORDER — ACETAMINOPHEN 650 MG/1
650 SUPPOSITORY RECTAL
Status: DISCONTINUED | OUTPATIENT
Start: 2022-01-01 | End: 2022-01-01 | Stop reason: HOSPADM

## 2022-01-01 RX ORDER — HYDRALAZINE HYDROCHLORIDE 50 MG/1
50 TABLET, FILM COATED ORAL 3 TIMES DAILY
Status: DISCONTINUED | OUTPATIENT
Start: 2022-01-01 | End: 2022-01-01 | Stop reason: HOSPADM

## 2022-01-01 RX ORDER — MAGNESIUM SULFATE HEPTAHYDRATE 40 MG/ML
2 INJECTION, SOLUTION INTRAVENOUS ONCE
Status: DISCONTINUED | OUTPATIENT
Start: 2022-01-01 | End: 2022-01-01 | Stop reason: SDUPTHER

## 2022-01-01 RX ORDER — LANOLIN ALCOHOL/MO/W.PET/CERES
200 CREAM (GRAM) TOPICAL 3 TIMES DAILY
Status: DISCONTINUED | OUTPATIENT
Start: 2022-01-01 | End: 2022-01-01 | Stop reason: HOSPADM

## 2022-01-01 RX ORDER — BACITRACIN ZINC AND POLYMYXIN B SULFATE 500; 1000 [USP'U]/G; [USP'U]/G
OINTMENT TOPICAL 2 TIMES DAILY
Qty: 14 G | Refills: 0 | Status: SHIPPED
Start: 2022-01-01 | End: 2022-04-19

## 2022-01-01 RX ORDER — METOPROLOL TARTRATE 5 MG/5ML
5 INJECTION INTRAVENOUS
Status: DISCONTINUED | OUTPATIENT
Start: 2022-01-01 | End: 2022-01-01

## 2022-01-01 RX ORDER — FUROSEMIDE 10 MG/ML
20 INJECTION INTRAMUSCULAR; INTRAVENOUS DAILY
Status: DISCONTINUED | OUTPATIENT
Start: 2022-01-01 | End: 2022-01-01

## 2022-01-01 RX ORDER — HYDRALAZINE HYDROCHLORIDE 20 MG/ML
20 INJECTION INTRAMUSCULAR; INTRAVENOUS
Status: DISCONTINUED | OUTPATIENT
Start: 2022-01-01 | End: 2022-01-01 | Stop reason: HOSPADM

## 2022-01-01 RX ORDER — MAGNESIUM SULFATE 100 %
4 CRYSTALS MISCELLANEOUS AS NEEDED
Status: DISCONTINUED | OUTPATIENT
Start: 2022-01-01 | End: 2022-01-01 | Stop reason: HOSPADM

## 2022-01-01 RX ORDER — OXYCODONE HYDROCHLORIDE 5 MG/1
5 TABLET ORAL
Status: DISCONTINUED | OUTPATIENT
Start: 2022-01-01 | End: 2022-01-01

## 2022-01-01 RX ORDER — QUETIAPINE FUMARATE 25 MG/1
25 TABLET, FILM COATED ORAL DAILY
Status: DISCONTINUED | OUTPATIENT
Start: 2022-01-01 | End: 2022-01-01 | Stop reason: HOSPADM

## 2022-01-01 RX ORDER — DEXTROSE MONOHYDRATE 100 MG/ML
0-250 INJECTION, SOLUTION INTRAVENOUS AS NEEDED
Status: DISCONTINUED | OUTPATIENT
Start: 2022-01-01 | End: 2022-01-01 | Stop reason: HOSPADM

## 2022-01-01 RX ORDER — QUETIAPINE FUMARATE 25 MG/1
25 TABLET, FILM COATED ORAL
Qty: 15 TABLET | Refills: 0 | Status: SHIPPED
Start: 2022-01-01

## 2022-01-01 RX ORDER — BACITRACIN ZINC AND POLYMYXIN B SULFATE 500; 1000 [USP'U]/G; [USP'U]/G
OINTMENT TOPICAL 2 TIMES DAILY
Status: DISCONTINUED | OUTPATIENT
Start: 2022-01-01 | End: 2022-01-01 | Stop reason: HOSPADM

## 2022-01-01 RX ORDER — MAGNESIUM SULFATE 100 %
4 CRYSTALS MISCELLANEOUS AS NEEDED
Status: DISCONTINUED | OUTPATIENT
Start: 2022-01-01 | End: 2022-01-01 | Stop reason: SDUPTHER

## 2022-01-01 RX ORDER — INSULIN LISPRO 100 [IU]/ML
4 INJECTION, SOLUTION INTRAVENOUS; SUBCUTANEOUS EVERY 6 HOURS
Status: DISCONTINUED | OUTPATIENT
Start: 2022-01-01 | End: 2022-01-01

## 2022-01-01 RX ORDER — MORPHINE SULFATE 20 MG/ML
2.4 SOLUTION ORAL
Status: DISCONTINUED | OUTPATIENT
Start: 2022-01-01 | End: 2022-01-01 | Stop reason: HOSPADM

## 2022-01-01 RX ORDER — INSULIN GLARGINE 100 [IU]/ML
8 INJECTION, SOLUTION SUBCUTANEOUS
Status: COMPLETED | OUTPATIENT
Start: 2022-01-01 | End: 2022-01-01

## 2022-01-01 RX ORDER — CARVEDILOL 12.5 MG/1
12.5 TABLET ORAL EVERY 12 HOURS
Status: DISCONTINUED | OUTPATIENT
Start: 2022-01-01 | End: 2022-01-01

## 2022-01-01 RX ORDER — NALOXONE HYDROCHLORIDE 0.4 MG/ML
0.4 INJECTION, SOLUTION INTRAMUSCULAR; INTRAVENOUS; SUBCUTANEOUS ONCE
Status: DISCONTINUED | OUTPATIENT
Start: 2022-01-01 | End: 2022-01-01

## 2022-01-01 RX ORDER — SODIUM BICARBONATE 1 MEQ/ML
SYRINGE (ML) INTRAVENOUS
Status: COMPLETED
Start: 2022-01-01 | End: 2022-01-01

## 2022-01-01 RX ORDER — IPRATROPIUM BROMIDE AND ALBUTEROL SULFATE 2.5; .5 MG/3ML; MG/3ML
3 SOLUTION RESPIRATORY (INHALATION)
Qty: 30 NEBULE | Refills: 0 | Status: SHIPPED | OUTPATIENT
Start: 2022-01-01

## 2022-01-01 RX ORDER — HYDRALAZINE HYDROCHLORIDE 25 MG/1
25 TABLET, FILM COATED ORAL
Status: COMPLETED | OUTPATIENT
Start: 2022-01-01 | End: 2022-01-01

## 2022-01-01 RX ORDER — HYDRALAZINE HYDROCHLORIDE 20 MG/ML
20 INJECTION INTRAMUSCULAR; INTRAVENOUS
Status: DISCONTINUED | OUTPATIENT
Start: 2022-01-01 | End: 2022-01-01

## 2022-01-01 RX ORDER — ACETAMINOPHEN, DIPHENHYDRAMINE HCL, PHENYLEPHRINE HCL 325; 25; 5 MG/1; MG/1; MG/1
10 TABLET ORAL
Qty: 30 TABLET | Refills: 0 | Status: SHIPPED | OUTPATIENT
Start: 2022-01-01

## 2022-01-01 RX ORDER — INSULIN GLARGINE 100 [IU]/ML
4 INJECTION, SOLUTION SUBCUTANEOUS ONCE
Status: COMPLETED | OUTPATIENT
Start: 2022-01-01 | End: 2022-01-01

## 2022-01-01 RX ORDER — INSULIN GLARGINE 100 [IU]/ML
5 INJECTION, SOLUTION SUBCUTANEOUS
Status: COMPLETED | OUTPATIENT
Start: 2022-01-01 | End: 2022-01-01

## 2022-01-01 RX ORDER — INSULIN GLARGINE 100 [IU]/ML
18 INJECTION, SOLUTION SUBCUTANEOUS DAILY
Status: DISCONTINUED | OUTPATIENT
Start: 2022-01-01 | End: 2022-01-01 | Stop reason: HOSPADM

## 2022-01-01 RX ORDER — CHOLECALCIFEROL (VITAMIN D3) 125 MCG
10 CAPSULE ORAL
Status: DISCONTINUED | OUTPATIENT
Start: 2022-01-01 | End: 2022-01-01

## 2022-01-01 RX ORDER — LABETALOL HCL 20 MG/4 ML
10 SYRINGE (ML) INTRAVENOUS ONCE
Status: COMPLETED | OUTPATIENT
Start: 2022-01-01 | End: 2022-01-01

## 2022-01-01 RX ORDER — OXYCODONE HYDROCHLORIDE 5 MG/1
5 TABLET ORAL
Status: DISCONTINUED | OUTPATIENT
Start: 2022-01-01 | End: 2022-01-01 | Stop reason: HOSPADM

## 2022-01-01 RX ORDER — CARVEDILOL 25 MG/1
25 TABLET ORAL EVERY 12 HOURS
Status: DISCONTINUED | OUTPATIENT
Start: 2022-01-01 | End: 2022-01-01

## 2022-01-01 RX ORDER — NYSTATIN 100000 U/G
CREAM TOPICAL 3 TIMES DAILY
Status: DISCONTINUED | OUTPATIENT
Start: 2022-01-01 | End: 2022-01-01

## 2022-01-01 RX ORDER — POLYETHYLENE GLYCOL 3350 17 G/17G
17 POWDER, FOR SOLUTION ORAL DAILY PRN
Status: DISCONTINUED | OUTPATIENT
Start: 2022-01-01 | End: 2022-01-01 | Stop reason: HOSPADM

## 2022-01-01 RX ORDER — ACETAMINOPHEN 325 MG/1
650 TABLET ORAL
Status: DISCONTINUED | OUTPATIENT
Start: 2022-01-01 | End: 2022-01-01

## 2022-01-01 RX ORDER — SODIUM BICARBONATE 1 MEQ/ML
100 SYRINGE (ML) INTRAVENOUS ONCE
Status: COMPLETED | OUTPATIENT
Start: 2022-01-01 | End: 2022-01-01

## 2022-01-01 RX ORDER — LIDOCAINE HYDROCHLORIDE 20 MG/ML
INJECTION, SOLUTION EPIDURAL; INFILTRATION; INTRACAUDAL; PERINEURAL AS NEEDED
Status: DISCONTINUED | OUTPATIENT
Start: 2022-01-01 | End: 2022-01-01 | Stop reason: HOSPADM

## 2022-01-01 RX ORDER — INSULIN GLARGINE 100 [IU]/ML
INJECTION, SOLUTION SUBCUTANEOUS
Qty: 1 ML | Refills: 0 | Status: ON HOLD | OUTPATIENT
Start: 2022-01-01 | End: 2022-01-01 | Stop reason: SDUPTHER

## 2022-01-01 RX ORDER — LEVOFLOXACIN 750 MG/1
750 TABLET ORAL DAILY
Qty: 10 TABLET | Refills: 0 | OUTPATIENT
Start: 2022-01-01 | End: 2022-01-01 | Stop reason: SDUPTHER

## 2022-01-01 RX ORDER — MAGNESIUM SULFATE HEPTAHYDRATE 40 MG/ML
2 INJECTION, SOLUTION INTRAVENOUS
Status: COMPLETED | OUTPATIENT
Start: 2022-01-01 | End: 2022-01-01

## 2022-01-01 RX ORDER — INSULIN GLARGINE 100 [IU]/ML
5 INJECTION, SOLUTION SUBCUTANEOUS ONCE
Status: COMPLETED | OUTPATIENT
Start: 2022-01-01 | End: 2022-01-01

## 2022-01-01 RX ORDER — INSULIN GLARGINE 100 [IU]/ML
18 INJECTION, SOLUTION SUBCUTANEOUS DAILY
Status: DISCONTINUED | OUTPATIENT
Start: 2022-01-01 | End: 2022-01-01

## 2022-01-01 RX ORDER — POLYETHYLENE GLYCOL 3350 17 G/17G
17 POWDER, FOR SOLUTION ORAL
Qty: 15 PACKET | Refills: 0 | Status: SHIPPED
Start: 2022-01-01

## 2022-01-01 RX ORDER — MAGNESIUM SULFATE HEPTAHYDRATE 40 MG/ML
2 INJECTION, SOLUTION INTRAVENOUS ONCE
Status: COMPLETED | OUTPATIENT
Start: 2022-01-01 | End: 2022-01-01

## 2022-01-01 RX ORDER — INSULIN GLARGINE 100 [IU]/ML
20 INJECTION, SOLUTION SUBCUTANEOUS DAILY
Qty: 1 ML | Refills: 0 | Status: SHIPPED
Start: 2022-01-01

## 2022-01-01 RX ORDER — MAGNESIUM SULFATE 100 %
4 CRYSTALS MISCELLANEOUS AS NEEDED
Status: DISCONTINUED | OUTPATIENT
Start: 2022-01-01 | End: 2022-01-01

## 2022-01-01 RX ORDER — BACITRACIN ZINC AND POLYMYXIN B SULFATE 500; 1000 [USP'U]/G; [USP'U]/G
OINTMENT TOPICAL 2 TIMES DAILY
Status: DISCONTINUED | OUTPATIENT
Start: 2022-01-01 | End: 2022-01-01

## 2022-01-01 RX ORDER — FUROSEMIDE 10 MG/ML
40 INJECTION INTRAMUSCULAR; INTRAVENOUS DAILY
Status: DISCONTINUED | OUTPATIENT
Start: 2022-01-01 | End: 2022-01-01

## 2022-01-01 RX ORDER — LORAZEPAM 2 MG/ML
0.5 CONCENTRATE ORAL
Qty: 30 ML | Refills: 0 | Status: SHIPPED | OUTPATIENT
Start: 2022-01-01

## 2022-01-01 RX ORDER — HYDROMORPHONE HYDROCHLORIDE 1 MG/ML
INJECTION, SOLUTION INTRAMUSCULAR; INTRAVENOUS; SUBCUTANEOUS
Status: DISPENSED
Start: 2022-01-01 | End: 2022-01-01

## 2022-01-01 RX ORDER — SODIUM CHLORIDE, SODIUM LACTATE, POTASSIUM CHLORIDE, CALCIUM CHLORIDE 600; 310; 30; 20 MG/100ML; MG/100ML; MG/100ML; MG/100ML
75 INJECTION, SOLUTION INTRAVENOUS CONTINUOUS
Status: DISCONTINUED | OUTPATIENT
Start: 2022-01-01 | End: 2022-01-01

## 2022-01-01 RX ORDER — INSULIN LISPRO 100 [IU]/ML
3 INJECTION, SOLUTION INTRAVENOUS; SUBCUTANEOUS EVERY 6 HOURS
Status: DISCONTINUED | OUTPATIENT
Start: 2022-01-01 | End: 2022-01-01

## 2022-01-01 RX ORDER — HYDROCORTISONE SODIUM SUCCINATE 100 MG/2ML
50 INJECTION, POWDER, FOR SOLUTION INTRAMUSCULAR; INTRAVENOUS EVERY 6 HOURS
Status: DISCONTINUED | OUTPATIENT
Start: 2022-01-01 | End: 2022-01-01

## 2022-01-01 RX ORDER — LORAZEPAM 2 MG/ML
0.5 INJECTION, SOLUTION INTRAMUSCULAR; INTRAVENOUS ONCE
Status: DISPENSED | OUTPATIENT
Start: 2022-01-01 | End: 2022-01-01

## 2022-01-01 RX ORDER — INSULIN LISPRO 100 [IU]/ML
INJECTION, SOLUTION INTRAVENOUS; SUBCUTANEOUS
Status: DISCONTINUED | OUTPATIENT
Start: 2022-01-01 | End: 2022-01-01

## 2022-01-01 RX ORDER — HYDROMORPHONE HYDROCHLORIDE 1 MG/ML
1 INJECTION, SOLUTION INTRAMUSCULAR; INTRAVENOUS; SUBCUTANEOUS
Status: DISCONTINUED | OUTPATIENT
Start: 2022-01-01 | End: 2022-01-01

## 2022-01-01 RX ORDER — FENTANYL CITRATE 50 UG/ML
50 INJECTION, SOLUTION INTRAMUSCULAR; INTRAVENOUS AS NEEDED
Status: DISCONTINUED | OUTPATIENT
Start: 2022-01-01 | End: 2022-01-01 | Stop reason: HOSPADM

## 2022-01-01 RX ORDER — NICOTINE 7MG/24HR
1 PATCH, TRANSDERMAL 24 HOURS TRANSDERMAL EVERY 24 HOURS
Qty: 30 PATCH | Refills: 0 | Status: SHIPPED | OUTPATIENT
Start: 2022-01-01 | End: 2022-01-01

## 2022-01-01 RX ORDER — LISINOPRIL 5 MG/1
20 TABLET ORAL DAILY
Status: DISCONTINUED | OUTPATIENT
Start: 2022-01-01 | End: 2022-01-01

## 2022-01-01 RX ORDER — LEVOFLOXACIN 750 MG/1
750 TABLET ORAL DAILY
Qty: 10 TABLET | Refills: 0 | Status: SHIPPED | OUTPATIENT
Start: 2022-01-01 | End: 2022-01-01

## 2022-01-01 RX ORDER — OXYCODONE HCL 5 MG/5 ML
7.5 SOLUTION, ORAL ORAL
Status: DISCONTINUED | OUTPATIENT
Start: 2022-01-01 | End: 2022-01-01 | Stop reason: HOSPADM

## 2022-01-01 RX ORDER — QUETIAPINE FUMARATE 25 MG/1
50 TABLET, FILM COATED ORAL
Status: DISCONTINUED | OUTPATIENT
Start: 2022-01-01 | End: 2022-01-01

## 2022-01-01 RX ORDER — LANOLIN ALCOHOL/MO/W.PET/CERES
200 CREAM (GRAM) TOPICAL 3 TIMES DAILY
Qty: 90 TABLET | Refills: 0 | Status: SHIPPED | OUTPATIENT
Start: 2022-01-01 | End: 2022-01-01

## 2022-01-01 RX ORDER — LORAZEPAM 2 MG/ML
0.5 INJECTION, SOLUTION INTRAMUSCULAR; INTRAVENOUS ONCE
Status: COMPLETED | OUTPATIENT
Start: 2022-01-01 | End: 2022-01-01

## 2022-01-01 RX ORDER — HYDRALAZINE HYDROCHLORIDE 50 MG/1
50 TABLET, FILM COATED ORAL 3 TIMES DAILY
Qty: 90 TABLET | Refills: 0 | Status: SHIPPED
Start: 2022-01-01

## 2022-01-01 RX ORDER — LORAZEPAM 2 MG/ML
0.5 CONCENTRATE ORAL
Status: DISCONTINUED | OUTPATIENT
Start: 2022-01-01 | End: 2022-01-01 | Stop reason: HOSPADM

## 2022-01-01 RX ORDER — POTASSIUM CHLORIDE 7.45 MG/ML
10 INJECTION INTRAVENOUS
Status: DISPENSED | OUTPATIENT
Start: 2022-01-01 | End: 2022-01-01

## 2022-01-01 RX ORDER — QUETIAPINE FUMARATE 50 MG/1
50 TABLET, FILM COATED ORAL
Qty: 30 TABLET | Refills: 0 | Status: SHIPPED | OUTPATIENT
Start: 2022-01-01

## 2022-01-01 RX ORDER — INSULIN LISPRO 100 [IU]/ML
INJECTION, SOLUTION INTRAVENOUS; SUBCUTANEOUS
Qty: 1 EACH | Refills: 0 | Status: ON HOLD
Start: 2022-01-01 | End: 2022-01-01 | Stop reason: SDUPTHER

## 2022-01-01 RX ORDER — LISINOPRIL 10 MG/1
10 TABLET ORAL DAILY
Qty: 30 TABLET | Refills: 0 | Status: SHIPPED | OUTPATIENT
Start: 2022-01-01

## 2022-01-01 RX ORDER — PROPOFOL 10 MG/ML
INJECTION, EMULSION INTRAVENOUS AS NEEDED
Status: DISCONTINUED | OUTPATIENT
Start: 2022-01-01 | End: 2022-01-01 | Stop reason: HOSPADM

## 2022-01-01 RX ORDER — SODIUM CHLORIDE 450 MG/100ML
75 INJECTION, SOLUTION INTRAVENOUS CONTINUOUS
Status: DISCONTINUED | OUTPATIENT
Start: 2022-01-01 | End: 2022-01-01

## 2022-01-01 RX ORDER — INSULIN LISPRO 100 [IU]/ML
INJECTION, SOLUTION INTRAVENOUS; SUBCUTANEOUS EVERY 6 HOURS
Status: DISCONTINUED | OUTPATIENT
Start: 2022-01-01 | End: 2022-01-01 | Stop reason: SDUPTHER

## 2022-01-01 RX ORDER — HYDROCORTISONE SODIUM SUCCINATE 100 MG/2ML
100 INJECTION, POWDER, FOR SOLUTION INTRAMUSCULAR; INTRAVENOUS ONCE
Status: COMPLETED | OUTPATIENT
Start: 2022-01-01 | End: 2022-01-01

## 2022-01-01 RX ADMIN — NYSTATIN: 100000 CREAM TOPICAL at 09:44

## 2022-01-01 RX ADMIN — POTASSIUM PHOSPHATE, MONOBASIC POTASSIUM PHOSPHATE, DIBASIC: 224; 236 INJECTION, SOLUTION, CONCENTRATE INTRAVENOUS at 09:34

## 2022-01-01 RX ADMIN — Medication 4 UNITS: at 11:39

## 2022-01-01 RX ADMIN — SODIUM CHLORIDE 1000 ML: 9 INJECTION, SOLUTION INTRAVENOUS at 20:00

## 2022-01-01 RX ADMIN — Medication 5 UNITS: at 11:49

## 2022-01-01 RX ADMIN — Medication 18 UNITS: at 08:43

## 2022-01-01 RX ADMIN — HYDRALAZINE HYDROCHLORIDE 50 MG: 50 TABLET, FILM COATED ORAL at 12:15

## 2022-01-01 RX ADMIN — Medication 200 MG: at 08:35

## 2022-01-01 RX ADMIN — HYDROMORPHONE HYDROCHLORIDE 1 MG: 1 INJECTION, SOLUTION INTRAMUSCULAR; INTRAVENOUS; SUBCUTANEOUS at 08:29

## 2022-01-01 RX ADMIN — QUETIAPINE FUMARATE 50 MG: 25 TABLET ORAL at 23:06

## 2022-01-01 RX ADMIN — FUROSEMIDE 20 MG: 10 INJECTION, SOLUTION INTRAMUSCULAR; INTRAVENOUS at 14:16

## 2022-01-01 RX ADMIN — Medication 200 MG: at 08:58

## 2022-01-01 RX ADMIN — Medication 5 UNITS: at 08:58

## 2022-01-01 RX ADMIN — HYDRALAZINE HYDROCHLORIDE 50 MG: 50 TABLET, FILM COATED ORAL at 21:57

## 2022-01-01 RX ADMIN — Medication 200 MG: at 08:26

## 2022-01-01 RX ADMIN — MULTIVITAMIN 15 ML: LIQUID ORAL at 09:51

## 2022-01-01 RX ADMIN — MEROPENEM 1 G: 1 INJECTION INTRAVENOUS at 15:45

## 2022-01-01 RX ADMIN — CARVEDILOL 12.5 MG: 12.5 TABLET, FILM COATED ORAL at 12:15

## 2022-01-01 RX ADMIN — INSULIN LISPRO 6 UNITS: 100 INJECTION, SOLUTION INTRAVENOUS; SUBCUTANEOUS at 17:42

## 2022-01-01 RX ADMIN — Medication 4 UNITS: at 01:07

## 2022-01-01 RX ADMIN — SODIUM BICARBONATE: 84 INJECTION, SOLUTION INTRAVENOUS at 12:22

## 2022-01-01 RX ADMIN — CARVEDILOL 12.5 MG: 12.5 TABLET, FILM COATED ORAL at 08:58

## 2022-01-01 RX ADMIN — SODIUM CHLORIDE, SODIUM LACTATE, POTASSIUM CHLORIDE, AND CALCIUM CHLORIDE 75 ML/HR: 600; 310; 30; 20 INJECTION, SOLUTION INTRAVENOUS at 04:43

## 2022-01-01 RX ADMIN — VANCOMYCIN HYDROCHLORIDE 1000 MG: 1 INJECTION, POWDER, LYOPHILIZED, FOR SOLUTION INTRAVENOUS at 14:25

## 2022-01-01 RX ADMIN — QUETIAPINE FUMARATE 50 MG: 25 TABLET ORAL at 21:43

## 2022-01-01 RX ADMIN — NYSTATIN: 100000 POWDER TOPICAL at 17:37

## 2022-01-01 RX ADMIN — Medication 200 MG: at 15:21

## 2022-01-01 RX ADMIN — HYDRALAZINE HYDROCHLORIDE 25 MG: 25 TABLET, FILM COATED ORAL at 15:19

## 2022-01-01 RX ADMIN — NYSTATIN: 100000 POWDER TOPICAL at 18:25

## 2022-01-01 RX ADMIN — LISINOPRIL 10 MG: 10 TABLET ORAL at 08:59

## 2022-01-01 RX ADMIN — ACETAMINOPHEN 650 MG: 325 TABLET ORAL at 08:26

## 2022-01-01 RX ADMIN — Medication 200 MG: at 17:09

## 2022-01-01 RX ADMIN — CARVEDILOL 12.5 MG: 12.5 TABLET, FILM COATED ORAL at 23:06

## 2022-01-01 RX ADMIN — ACETAMINOPHEN 650 MG: 325 TABLET ORAL at 01:14

## 2022-01-01 RX ADMIN — Medication 4 UNITS: at 01:08

## 2022-01-01 RX ADMIN — POTASSIUM CHLORIDE 10 MEQ: 7.46 INJECTION, SOLUTION INTRAVENOUS at 21:42

## 2022-01-01 RX ADMIN — HYDROMORPHONE HYDROCHLORIDE 1 MG: 1 INJECTION, SOLUTION INTRAMUSCULAR; INTRAVENOUS; SUBCUTANEOUS at 03:12

## 2022-01-01 RX ADMIN — Medication 1500 MG: at 21:30

## 2022-01-01 RX ADMIN — Medication 9 UNITS: at 10:00

## 2022-01-01 RX ADMIN — CARVEDILOL 12.5 MG: 12.5 TABLET, FILM COATED ORAL at 08:43

## 2022-01-01 RX ADMIN — MIDODRINE HYDROCHLORIDE 10 MG: 5 TABLET ORAL at 04:21

## 2022-01-01 RX ADMIN — NYSTATIN: 100000 POWDER TOPICAL at 18:36

## 2022-01-01 RX ADMIN — CARVEDILOL 12.5 MG: 12.5 TABLET, FILM COATED ORAL at 08:36

## 2022-01-01 RX ADMIN — NYSTATIN: 100000 POWDER TOPICAL at 09:10

## 2022-01-01 RX ADMIN — FUROSEMIDE 20 MG: 10 INJECTION, SOLUTION INTRAMUSCULAR; INTRAVENOUS at 09:10

## 2022-01-01 RX ADMIN — IPRATROPIUM BROMIDE AND ALBUTEROL SULFATE 3 ML: .5; 2.5 SOLUTION RESPIRATORY (INHALATION) at 00:04

## 2022-01-01 RX ADMIN — QUETIAPINE FUMARATE 50 MG: 25 TABLET ORAL at 01:27

## 2022-01-01 RX ADMIN — MEROPENEM 1 G: 1 INJECTION INTRAVENOUS at 02:20

## 2022-01-01 RX ADMIN — Medication 9 UNITS: at 11:28

## 2022-01-01 RX ADMIN — Medication 10 MG: at 21:15

## 2022-01-01 RX ADMIN — CARVEDILOL 12.5 MG: 12.5 TABLET, FILM COATED ORAL at 20:13

## 2022-01-01 RX ADMIN — SODIUM CHLORIDE 1000 ML: 900 INJECTION, SOLUTION INTRAVENOUS at 20:42

## 2022-01-01 RX ADMIN — LORAZEPAM 0.5 MG: 2 INJECTION, SOLUTION INTRAMUSCULAR; INTRAVENOUS at 22:42

## 2022-01-01 RX ADMIN — POTASSIUM CHLORIDE 10 MEQ: 7.45 INJECTION INTRAVENOUS at 13:46

## 2022-01-01 RX ADMIN — CARVEDILOL 12.5 MG: 12.5 TABLET, FILM COATED ORAL at 13:11

## 2022-01-01 RX ADMIN — Medication 200 MG: at 22:48

## 2022-01-01 RX ADMIN — HYDRALAZINE HYDROCHLORIDE 20 MG: 20 INJECTION INTRAMUSCULAR; INTRAVENOUS at 12:35

## 2022-01-01 RX ADMIN — Medication 10 UNITS: at 08:32

## 2022-01-01 RX ADMIN — QUETIAPINE FUMARATE 50 MG: 25 TABLET ORAL at 21:31

## 2022-01-01 RX ADMIN — Medication 200 MG: at 17:37

## 2022-01-01 RX ADMIN — THIAMINE HYDROCHLORIDE 200 MG: 100 INJECTION, SOLUTION INTRAMUSCULAR; INTRAVENOUS at 22:16

## 2022-01-01 RX ADMIN — Medication 3 UNITS: at 05:30

## 2022-01-01 RX ADMIN — QUETIAPINE FUMARATE 50 MG: 25 TABLET ORAL at 21:21

## 2022-01-01 RX ADMIN — POTASSIUM CHLORIDE 20 MEQ: 14.9 INJECTION, SOLUTION INTRAVENOUS at 04:41

## 2022-01-01 RX ADMIN — HYDROMORPHONE HYDROCHLORIDE 0.5 MG: 1 INJECTION, SOLUTION INTRAMUSCULAR; INTRAVENOUS; SUBCUTANEOUS at 20:15

## 2022-01-01 RX ADMIN — OXYCODONE HYDROCHLORIDE 7.5 MG: 5 SOLUTION ORAL at 18:32

## 2022-01-01 RX ADMIN — Medication 6 UNITS: at 17:03

## 2022-01-01 RX ADMIN — HYDROCORTISONE SODIUM SUCCINATE 50 MG: 100 INJECTION, POWDER, FOR SOLUTION INTRAMUSCULAR; INTRAVENOUS at 01:51

## 2022-01-01 RX ADMIN — HYDRALAZINE HYDROCHLORIDE 20 MG: 20 INJECTION INTRAMUSCULAR; INTRAVENOUS at 23:51

## 2022-01-01 RX ADMIN — Medication 200 MG: at 17:08

## 2022-01-01 RX ADMIN — NYSTATIN: 100000 POWDER TOPICAL at 18:32

## 2022-01-01 RX ADMIN — Medication 9 UNITS: at 17:18

## 2022-01-01 RX ADMIN — CARVEDILOL 12.5 MG: 12.5 TABLET, FILM COATED ORAL at 21:57

## 2022-01-01 RX ADMIN — Medication 200 MG: at 16:27

## 2022-01-01 RX ADMIN — NYSTATIN: 100000 POWDER TOPICAL at 17:22

## 2022-01-01 RX ADMIN — PROPOFOL 80 MG: 10 INJECTION, EMULSION INTRAVENOUS at 11:40

## 2022-01-01 RX ADMIN — MULTIVITAMIN 15 ML: LIQUID ORAL at 09:09

## 2022-01-01 RX ADMIN — NYSTATIN: 100000 CREAM TOPICAL at 22:16

## 2022-01-01 RX ADMIN — NYSTATIN: 100000 POWDER TOPICAL at 08:33

## 2022-01-01 RX ADMIN — Medication 10 MG: at 21:31

## 2022-01-01 RX ADMIN — MULTIVITAMIN 15 ML: LIQUID ORAL at 10:39

## 2022-01-01 RX ADMIN — Medication 2 UNITS: at 05:53

## 2022-01-01 RX ADMIN — Medication 3 UNITS: at 05:40

## 2022-01-01 RX ADMIN — Medication 10 MG: at 22:23

## 2022-01-01 RX ADMIN — NYSTATIN: 100000 POWDER TOPICAL at 18:08

## 2022-01-01 RX ADMIN — Medication 6 UNITS: at 12:11

## 2022-01-01 RX ADMIN — METOPROLOL TARTRATE 5 MG: 1 INJECTION, SOLUTION INTRAVENOUS at 11:02

## 2022-01-01 RX ADMIN — PERFLUTREN 2 ML: 6.52 INJECTION, SUSPENSION INTRAVENOUS at 16:19

## 2022-01-01 RX ADMIN — Medication 10 MG: at 01:27

## 2022-01-01 RX ADMIN — HYDROMORPHONE HYDROCHLORIDE 0.5 MG: 1 INJECTION, SOLUTION INTRAMUSCULAR; INTRAVENOUS; SUBCUTANEOUS at 16:55

## 2022-01-01 RX ADMIN — Medication 200 MG: at 16:36

## 2022-01-01 RX ADMIN — HYDRALAZINE HYDROCHLORIDE 20 MG: 20 INJECTION INTRAMUSCULAR; INTRAVENOUS at 08:26

## 2022-01-01 RX ADMIN — CARVEDILOL 12.5 MG: 12.5 TABLET, FILM COATED ORAL at 08:23

## 2022-01-01 RX ADMIN — SODIUM CHLORIDE, POTASSIUM CHLORIDE, SODIUM LACTATE AND CALCIUM CHLORIDE 25 ML/HR: 600; 310; 30; 20 INJECTION, SOLUTION INTRAVENOUS at 10:59

## 2022-01-01 RX ADMIN — THIAMINE HYDROCHLORIDE 500 MG: 100 INJECTION, SOLUTION INTRAMUSCULAR; INTRAVENOUS at 09:34

## 2022-01-01 RX ADMIN — METOPROLOL TARTRATE 5 MG: 1 INJECTION, SOLUTION INTRAVENOUS at 15:49

## 2022-01-01 RX ADMIN — NYSTATIN: 100000 POWDER TOPICAL at 18:09

## 2022-01-01 RX ADMIN — CARVEDILOL 12.5 MG: 12.5 TABLET, FILM COATED ORAL at 21:42

## 2022-01-01 RX ADMIN — Medication 18 UNITS: at 10:43

## 2022-01-01 RX ADMIN — Medication 3 UNITS: at 23:50

## 2022-01-01 RX ADMIN — HYDRALAZINE HYDROCHLORIDE 50 MG: 50 TABLET, FILM COATED ORAL at 08:26

## 2022-01-01 RX ADMIN — CEFTRIAXONE 1 G: 1 INJECTION, POWDER, FOR SOLUTION INTRAMUSCULAR; INTRAVENOUS at 11:29

## 2022-01-01 RX ADMIN — Medication 6 UNITS: at 01:07

## 2022-01-01 RX ADMIN — Medication 9 UNITS: at 06:20

## 2022-01-01 RX ADMIN — Medication 3 UNITS: at 12:43

## 2022-01-01 RX ADMIN — CARVEDILOL 12.5 MG: 12.5 TABLET, FILM COATED ORAL at 08:04

## 2022-01-01 RX ADMIN — Medication 9 UNITS: at 12:13

## 2022-01-01 RX ADMIN — Medication 200 MG: at 21:20

## 2022-01-01 RX ADMIN — Medication 200 MG: at 21:42

## 2022-01-01 RX ADMIN — Medication 10 MG: at 23:06

## 2022-01-01 RX ADMIN — Medication 3 UNITS: at 05:24

## 2022-01-01 RX ADMIN — POTASSIUM CHLORIDE 20 MEQ: 14.9 INJECTION, SOLUTION INTRAVENOUS at 03:29

## 2022-01-01 RX ADMIN — MAGNESIUM SULFATE 2 G: 2 INJECTION INTRAVENOUS at 03:15

## 2022-01-01 RX ADMIN — LISINOPRIL 20 MG: 5 TABLET ORAL at 08:27

## 2022-01-01 RX ADMIN — NITROGLYCERIN 1 INCH: 20 OINTMENT TOPICAL at 05:39

## 2022-01-01 RX ADMIN — SODIUM BICARBONATE 50 MEQ: 84 INJECTION, SOLUTION INTRAVENOUS at 03:41

## 2022-01-01 RX ADMIN — Medication 200 MG: at 21:15

## 2022-01-01 RX ADMIN — POTASSIUM BICARBONATE 20 MEQ: 391 TABLET, EFFERVESCENT ORAL at 09:05

## 2022-01-01 RX ADMIN — LISINOPRIL 10 MG: 10 TABLET ORAL at 08:41

## 2022-01-01 RX ADMIN — MULTIVITAMIN 15 ML: LIQUID ORAL at 08:43

## 2022-01-01 RX ADMIN — Medication 4 UNITS: at 19:00

## 2022-01-01 RX ADMIN — HYDROCORTISONE SODIUM SUCCINATE 50 MG: 100 INJECTION, POWDER, FOR SOLUTION INTRAMUSCULAR; INTRAVENOUS at 11:08

## 2022-01-01 RX ADMIN — Medication 4 UNITS: at 12:14

## 2022-01-01 RX ADMIN — Medication 4 UNITS: at 01:00

## 2022-01-01 RX ADMIN — Medication 200 MG: at 17:22

## 2022-01-01 RX ADMIN — Medication 4 UNITS: at 11:35

## 2022-01-01 RX ADMIN — NYSTATIN: 100000 POWDER TOPICAL at 17:02

## 2022-01-01 RX ADMIN — Medication 5 UNITS: at 12:24

## 2022-01-01 RX ADMIN — HYDRALAZINE HYDROCHLORIDE 20 MG: 20 INJECTION INTRAMUSCULAR; INTRAVENOUS at 02:33

## 2022-01-01 RX ADMIN — Medication 5 UNITS: at 09:00

## 2022-01-01 RX ADMIN — NYSTATIN: 100000 POWDER TOPICAL at 08:46

## 2022-01-01 RX ADMIN — CEFAZOLIN SODIUM 2 G: 1 INJECTION, POWDER, FOR SOLUTION INTRAMUSCULAR; INTRAVENOUS at 11:42

## 2022-01-01 RX ADMIN — Medication 9 UNITS: at 11:32

## 2022-01-01 RX ADMIN — METOPROLOL TARTRATE 25 MG: 25 TABLET, FILM COATED ORAL at 08:34

## 2022-01-01 RX ADMIN — Medication 3 UNITS: at 06:09

## 2022-01-01 RX ADMIN — THIAMINE HYDROCHLORIDE 500 MG: 100 INJECTION, SOLUTION INTRAMUSCULAR; INTRAVENOUS at 12:21

## 2022-01-01 RX ADMIN — WATER: 1000 INJECTION, SOLUTION INTRAVENOUS at 03:41

## 2022-01-01 RX ADMIN — Medication 200 MG: at 08:23

## 2022-01-01 RX ADMIN — LIDOCAINE HYDROCHLORIDE 50 MG: 20 INJECTION, SOLUTION EPIDURAL; INFILTRATION; INTRACAUDAL; PERINEURAL at 11:40

## 2022-01-01 RX ADMIN — NYSTATIN: 100000 POWDER TOPICAL at 10:07

## 2022-01-01 RX ADMIN — Medication 8 UNITS: at 10:04

## 2022-01-01 RX ADMIN — POTASSIUM BICARBONATE 20 MEQ: 391 TABLET, EFFERVESCENT ORAL at 04:32

## 2022-01-01 RX ADMIN — Medication 9 UNITS: at 12:49

## 2022-01-01 RX ADMIN — SODIUM CHLORIDE, SODIUM LACTATE, POTASSIUM CHLORIDE, AND CALCIUM CHLORIDE 50 ML/HR: 600; 310; 30; 20 INJECTION, SOLUTION INTRAVENOUS at 20:51

## 2022-01-01 RX ADMIN — MULTIVITAMIN 15 ML: LIQUID ORAL at 08:58

## 2022-01-01 RX ADMIN — QUETIAPINE FUMARATE 50 MG: 25 TABLET ORAL at 21:00

## 2022-01-01 RX ADMIN — QUETIAPINE FUMARATE 50 MG: 25 TABLET ORAL at 22:11

## 2022-01-01 RX ADMIN — Medication 6 UNITS: at 06:00

## 2022-01-01 RX ADMIN — Medication 18 UNITS: at 09:05

## 2022-01-01 RX ADMIN — CYANOCOBALAMIN 1000 MCG: 1000 INJECTION, SOLUTION INTRAMUSCULAR at 08:03

## 2022-01-01 RX ADMIN — Medication 4 UNITS: at 07:03

## 2022-01-01 RX ADMIN — Medication 9 UNITS: at 17:42

## 2022-01-01 RX ADMIN — VANCOMYCIN HYDROCHLORIDE 1000 MG: 1 INJECTION, POWDER, LYOPHILIZED, FOR SOLUTION INTRAVENOUS at 09:35

## 2022-01-01 RX ADMIN — BACITRACIN ZINC AND POLYMYXIN B SULFATE: 500; 10000 OINTMENT TOPICAL at 18:07

## 2022-01-01 RX ADMIN — MEROPENEM 1 G: 1 INJECTION INTRAVENOUS at 03:35

## 2022-01-01 RX ADMIN — LABETALOL HYDROCHLORIDE 10 MG: 5 INJECTION, SOLUTION INTRAVENOUS at 04:48

## 2022-01-01 RX ADMIN — BACITRACIN ZINC AND POLYMYXIN B SULFATE: 500; 10000 OINTMENT TOPICAL at 08:42

## 2022-01-01 RX ADMIN — LISINOPRIL 10 MG: 10 TABLET ORAL at 12:15

## 2022-01-01 RX ADMIN — CALCIUM GLUCONATE 2 G: 98 INJECTION, SOLUTION INTRAVENOUS at 05:17

## 2022-01-01 RX ADMIN — CEFTRIAXONE 1 G: 1 INJECTION, POWDER, FOR SOLUTION INTRAMUSCULAR; INTRAVENOUS at 12:43

## 2022-01-01 RX ADMIN — CARVEDILOL 12.5 MG: 12.5 TABLET, FILM COATED ORAL at 21:16

## 2022-01-01 RX ADMIN — Medication 200 MG: at 08:39

## 2022-01-01 RX ADMIN — POTASSIUM CHLORIDE 20 MEQ: 14.9 INJECTION, SOLUTION INTRAVENOUS at 08:42

## 2022-01-01 RX ADMIN — VASOPRESSIN 0.04 UNITS/MIN: 20 INJECTION PARENTERAL at 01:07

## 2022-01-01 RX ADMIN — NYSTATIN: 100000 CREAM TOPICAL at 15:51

## 2022-01-01 RX ADMIN — FUROSEMIDE 20 MG: 10 INJECTION, SOLUTION INTRAMUSCULAR; INTRAVENOUS at 10:22

## 2022-01-01 RX ADMIN — SODIUM CHLORIDE 75 ML/HR: 450 INJECTION, SOLUTION INTRAVENOUS at 14:00

## 2022-01-01 RX ADMIN — Medication 6 UNITS: at 23:51

## 2022-01-01 RX ADMIN — THIAMINE HYDROCHLORIDE 500 MG: 100 INJECTION, SOLUTION INTRAMUSCULAR; INTRAVENOUS at 19:10

## 2022-01-01 RX ADMIN — PROPOFOL 30 MG: 10 INJECTION, EMULSION INTRAVENOUS at 11:44

## 2022-01-01 RX ADMIN — Medication 12 UNITS: at 17:22

## 2022-01-01 RX ADMIN — Medication 5 UNITS: at 09:22

## 2022-01-01 RX ADMIN — BACITRACIN ZINC AND POLYMYXIN B SULFATE: 500; 10000 OINTMENT TOPICAL at 08:44

## 2022-01-01 RX ADMIN — Medication 200 MG: at 09:50

## 2022-01-01 RX ADMIN — HYDRALAZINE HYDROCHLORIDE 50 MG: 50 TABLET, FILM COATED ORAL at 21:01

## 2022-01-01 RX ADMIN — Medication 10 MG: at 21:00

## 2022-01-01 RX ADMIN — MULTIVITAMIN 15 ML: LIQUID ORAL at 11:35

## 2022-01-01 RX ADMIN — BACITRACIN ZINC AND POLYMYXIN B SULFATE: 500; 10000 OINTMENT TOPICAL at 17:36

## 2022-01-01 RX ADMIN — NALOXONE HYDROCHLORIDE 0.4 MG: 0.4 INJECTION, SOLUTION INTRAMUSCULAR; INTRAVENOUS; SUBCUTANEOUS at 20:20

## 2022-01-01 RX ADMIN — CARVEDILOL 12.5 MG: 12.5 TABLET, FILM COATED ORAL at 09:11

## 2022-01-01 RX ADMIN — DEXTROSE MONOHYDRATE 4 MCG/MIN: 50 INJECTION, SOLUTION INTRAVENOUS at 22:31

## 2022-01-01 RX ADMIN — MULTIVITAMIN 15 ML: LIQUID ORAL at 08:33

## 2022-01-01 RX ADMIN — HEPARIN SODIUM 5000 UNITS: 5000 INJECTION INTRAVENOUS; SUBCUTANEOUS at 12:25

## 2022-01-01 RX ADMIN — NYSTATIN: 100000 CREAM TOPICAL at 08:34

## 2022-01-01 RX ADMIN — Medication 200 MG: at 22:23

## 2022-01-01 RX ADMIN — QUETIAPINE FUMARATE 50 MG: 25 TABLET ORAL at 21:18

## 2022-01-01 RX ADMIN — Medication 200 MG: at 15:36

## 2022-01-01 RX ADMIN — Medication 200 MG: at 21:00

## 2022-01-01 RX ADMIN — MULTIVITAMIN 15 ML: LIQUID ORAL at 12:59

## 2022-01-01 RX ADMIN — MEROPENEM 1 G: 1 INJECTION INTRAVENOUS at 02:04

## 2022-01-01 RX ADMIN — Medication 9 UNITS: at 23:41

## 2022-01-01 RX ADMIN — HYDRALAZINE HYDROCHLORIDE 20 MG: 20 INJECTION, SOLUTION INTRAMUSCULAR; INTRAVENOUS at 07:47

## 2022-01-01 RX ADMIN — WATER 1 G: 1 INJECTION INTRAMUSCULAR; INTRAVENOUS; SUBCUTANEOUS at 02:41

## 2022-01-01 RX ADMIN — DEXTROSE MONOHYDRATE 12 MCG/MIN: 50 INJECTION, SOLUTION INTRAVENOUS at 08:49

## 2022-01-01 RX ADMIN — HYDRALAZINE HYDROCHLORIDE 25 MG: 25 TABLET, FILM COATED ORAL at 07:13

## 2022-01-01 RX ADMIN — POTASSIUM CHLORIDE 10 MEQ: 7.46 INJECTION, SOLUTION INTRAVENOUS at 23:08

## 2022-01-01 RX ADMIN — MEROPENEM 1 G: 1 INJECTION INTRAVENOUS at 14:26

## 2022-01-01 RX ADMIN — HYDRALAZINE HYDROCHLORIDE 50 MG: 50 TABLET, FILM COATED ORAL at 08:43

## 2022-01-01 RX ADMIN — LISINOPRIL 10 MG: 10 TABLET ORAL at 09:59

## 2022-01-01 RX ADMIN — Medication 4 UNITS: at 00:42

## 2022-01-01 RX ADMIN — CYANOCOBALAMIN 1000 MCG: 1000 INJECTION, SOLUTION INTRAMUSCULAR at 11:11

## 2022-01-01 RX ADMIN — Medication 200 MG: at 16:15

## 2022-01-01 RX ADMIN — Medication 9 UNITS: at 00:00

## 2022-01-01 RX ADMIN — Medication 3 UNITS: at 00:49

## 2022-01-01 RX ADMIN — NYSTATIN: 100000 CREAM TOPICAL at 23:13

## 2022-01-01 RX ADMIN — FENTANYL CITRATE 50 MCG: 50 INJECTION INTRAMUSCULAR; INTRAVENOUS at 12:16

## 2022-01-01 RX ADMIN — NYSTATIN: 100000 POWDER TOPICAL at 10:43

## 2022-01-01 RX ADMIN — HYDRALAZINE HYDROCHLORIDE 20 MG: 20 INJECTION INTRAMUSCULAR; INTRAVENOUS at 09:33

## 2022-01-01 RX ADMIN — MULTIVITAMIN 15 ML: LIQUID ORAL at 08:35

## 2022-01-01 RX ADMIN — Medication 9 UNITS: at 18:08

## 2022-01-01 RX ADMIN — Medication 10 UNITS: at 14:04

## 2022-01-01 RX ADMIN — QUETIAPINE FUMARATE 50 MG: 25 TABLET ORAL at 21:57

## 2022-01-01 RX ADMIN — Medication 3 UNITS: at 18:34

## 2022-01-01 RX ADMIN — Medication 200 MG: at 17:46

## 2022-01-01 RX ADMIN — CARVEDILOL 12.5 MG: 12.5 TABLET, FILM COATED ORAL at 08:41

## 2022-01-01 RX ADMIN — MAGNESIUM SULFATE 2 G: 2 INJECTION INTRAVENOUS at 23:25

## 2022-01-01 RX ADMIN — Medication 5 UNITS: at 12:50

## 2022-01-01 RX ADMIN — CARVEDILOL 12.5 MG: 12.5 TABLET, FILM COATED ORAL at 21:09

## 2022-01-01 RX ADMIN — Medication 200 MG: at 21:21

## 2022-01-01 RX ADMIN — FAMOTIDINE 20 MG: 10 INJECTION INTRAVENOUS at 10:59

## 2022-01-01 RX ADMIN — HYDROCORTISONE SODIUM SUCCINATE 50 MG: 100 INJECTION, POWDER, FOR SOLUTION INTRAMUSCULAR; INTRAVENOUS at 00:00

## 2022-01-01 RX ADMIN — SODIUM CHLORIDE 40 MG: 9 INJECTION, SOLUTION INTRAMUSCULAR; INTRAVENOUS; SUBCUTANEOUS at 09:35

## 2022-01-01 RX ADMIN — CARVEDILOL 12.5 MG: 12.5 TABLET, FILM COATED ORAL at 21:20

## 2022-01-01 RX ADMIN — SODIUM CHLORIDE 40 MG: 9 INJECTION, SOLUTION INTRAMUSCULAR; INTRAVENOUS; SUBCUTANEOUS at 08:33

## 2022-01-01 RX ADMIN — Medication 18 UNITS: at 08:40

## 2022-01-01 RX ADMIN — Medication 3 UNITS: at 06:00

## 2022-01-01 RX ADMIN — FUROSEMIDE 20 MG: 10 INJECTION, SOLUTION INTRAMUSCULAR; INTRAVENOUS at 08:41

## 2022-01-01 RX ADMIN — DEXTROSE MONOHYDRATE AND SODIUM CHLORIDE 50 ML/HR: 5; .45 INJECTION, SOLUTION INTRAVENOUS at 10:41

## 2022-01-01 RX ADMIN — Medication 10 MG: at 21:57

## 2022-01-01 RX ADMIN — METOPROLOL TARTRATE 5 MG: 1 INJECTION, SOLUTION INTRAVENOUS at 06:46

## 2022-01-01 RX ADMIN — Medication 6 UNITS: at 23:28

## 2022-01-01 RX ADMIN — HYDRALAZINE HYDROCHLORIDE 20 MG: 20 INJECTION INTRAMUSCULAR; INTRAVENOUS at 16:17

## 2022-01-01 RX ADMIN — HYDRALAZINE HYDROCHLORIDE 20 MG: 20 INJECTION INTRAMUSCULAR; INTRAVENOUS at 02:12

## 2022-01-01 RX ADMIN — Medication 200 MG: at 09:09

## 2022-01-01 RX ADMIN — Medication 4 UNITS: at 07:07

## 2022-01-01 RX ADMIN — Medication 10 MG: at 21:20

## 2022-01-01 RX ADMIN — MAGNESIUM SULFATE HEPTAHYDRATE 3 G: 500 INJECTION, SOLUTION INTRAMUSCULAR; INTRAVENOUS at 12:50

## 2022-01-01 RX ADMIN — SODIUM CHLORIDE 40 MG: 9 INJECTION, SOLUTION INTRAMUSCULAR; INTRAVENOUS; SUBCUTANEOUS at 09:22

## 2022-01-01 RX ADMIN — NYSTATIN: 100000 POWDER TOPICAL at 08:26

## 2022-01-01 RX ADMIN — HYDROCORTISONE SODIUM SUCCINATE 50 MG: 100 INJECTION, POWDER, FOR SOLUTION INTRAMUSCULAR; INTRAVENOUS at 05:53

## 2022-01-01 RX ADMIN — HYDRALAZINE HYDROCHLORIDE 50 MG: 50 TABLET, FILM COATED ORAL at 08:45

## 2022-01-01 RX ADMIN — ACETAMINOPHEN 975 MG: 650 SUPPOSITORY RECTAL at 20:27

## 2022-01-01 RX ADMIN — FUROSEMIDE 20 MG: 10 INJECTION, SOLUTION INTRAMUSCULAR; INTRAVENOUS at 12:39

## 2022-01-01 RX ADMIN — Medication 10 MG: at 22:11

## 2022-01-01 RX ADMIN — Medication 12 UNITS: at 11:40

## 2022-01-01 RX ADMIN — Medication 6 UNITS: at 23:22

## 2022-01-01 RX ADMIN — Medication 10 MG: at 21:43

## 2022-01-01 RX ADMIN — QUETIAPINE FUMARATE 50 MG: 25 TABLET ORAL at 21:09

## 2022-01-01 RX ADMIN — QUETIAPINE FUMARATE 50 MG: 25 TABLET ORAL at 21:20

## 2022-01-01 RX ADMIN — Medication 3 UNITS: at 06:10

## 2022-01-01 RX ADMIN — THIAMINE HYDROCHLORIDE 200 MG: 100 INJECTION, SOLUTION INTRAMUSCULAR; INTRAVENOUS at 09:35

## 2022-01-01 RX ADMIN — CALCIUM GLUCONATE 4 G: 98 INJECTION, SOLUTION INTRAVENOUS at 03:38

## 2022-01-01 RX ADMIN — CARVEDILOL 12.5 MG: 12.5 TABLET, FILM COATED ORAL at 08:46

## 2022-01-01 RX ADMIN — VASOPRESSIN 0.04 UNITS/MIN: 20 INJECTION PARENTERAL at 15:05

## 2022-01-01 RX ADMIN — NYSTATIN: 100000 CREAM TOPICAL at 15:41

## 2022-01-01 RX ADMIN — POTASSIUM CHLORIDE 20 MEQ: 14.9 INJECTION, SOLUTION INTRAVENOUS at 07:57

## 2022-01-01 RX ADMIN — Medication 6 UNITS: at 19:21

## 2022-01-01 RX ADMIN — Medication 18 UNITS: at 14:36

## 2022-01-01 RX ADMIN — DEXTROSE MONOHYDRATE AND SODIUM CHLORIDE 50 ML/HR: 5; .45 INJECTION, SOLUTION INTRAVENOUS at 12:47

## 2022-01-01 RX ADMIN — NYSTATIN: 100000 POWDER TOPICAL at 08:42

## 2022-01-01 RX ADMIN — GADOTERIDOL 10 ML: 279.3 INJECTION, SOLUTION INTRAVENOUS at 21:13

## 2022-01-01 RX ADMIN — HYDROCORTISONE SODIUM SUCCINATE 50 MG: 100 INJECTION, POWDER, FOR SOLUTION INTRAMUSCULAR; INTRAVENOUS at 12:10

## 2022-01-01 RX ADMIN — Medication 6 UNITS: at 17:34

## 2022-01-01 RX ADMIN — Medication 200 MG: at 21:18

## 2022-01-01 RX ADMIN — NYSTATIN: 100000 POWDER TOPICAL at 08:03

## 2022-01-01 RX ADMIN — NYSTATIN: 100000 CREAM TOPICAL at 01:52

## 2022-01-01 RX ADMIN — Medication 6 UNITS: at 18:06

## 2022-01-01 RX ADMIN — FUROSEMIDE 20 MG: 10 INJECTION, SOLUTION INTRAMUSCULAR; INTRAVENOUS at 10:39

## 2022-01-01 RX ADMIN — PIPERACILLIN AND TAZOBACTAM 4.5 G: 4; .5 INJECTION, POWDER, FOR SOLUTION INTRAVENOUS at 20:31

## 2022-01-01 RX ADMIN — HYDROCORTISONE SODIUM SUCCINATE 50 MG: 100 INJECTION, POWDER, FOR SOLUTION INTRAMUSCULAR; INTRAVENOUS at 06:15

## 2022-01-01 RX ADMIN — CARVEDILOL 12.5 MG: 12.5 TABLET, FILM COATED ORAL at 09:50

## 2022-01-01 RX ADMIN — NYSTATIN: 100000 POWDER TOPICAL at 17:09

## 2022-01-01 RX ADMIN — OXYCODONE 5 MG: 5 TABLET ORAL at 16:27

## 2022-01-01 RX ADMIN — POTASSIUM BICARBONATE 20 MEQ: 391 TABLET, EFFERVESCENT ORAL at 21:57

## 2022-01-01 RX ADMIN — FUROSEMIDE 40 MG: 10 INJECTION, SOLUTION INTRAMUSCULAR; INTRAVENOUS at 10:15

## 2022-01-01 RX ADMIN — POTASSIUM CHLORIDE 10 MEQ: 7.45 INJECTION INTRAVENOUS at 10:20

## 2022-01-01 RX ADMIN — THIAMINE HYDROCHLORIDE 500 MG: 100 INJECTION, SOLUTION INTRAMUSCULAR; INTRAVENOUS at 15:52

## 2022-01-01 RX ADMIN — Medication 3 UNITS: at 12:38

## 2022-01-01 RX ADMIN — CEFTRIAXONE 1 G: 1 INJECTION, POWDER, FOR SOLUTION INTRAMUSCULAR; INTRAVENOUS at 13:11

## 2022-01-01 RX ADMIN — Medication 4 UNITS: at 18:06

## 2022-01-01 RX ADMIN — Medication 4 UNITS: at 11:40

## 2022-01-01 RX ADMIN — NYSTATIN: 100000 POWDER TOPICAL at 08:44

## 2022-01-01 RX ADMIN — MEROPENEM 1 G: 1 INJECTION INTRAVENOUS at 02:12

## 2022-01-01 RX ADMIN — ACETAMINOPHEN 650 MG: 325 TABLET ORAL at 13:39

## 2022-01-01 RX ADMIN — Medication 6 UNITS: at 00:17

## 2022-01-01 RX ADMIN — HYDROCORTISONE SODIUM SUCCINATE 50 MG: 100 INJECTION, POWDER, FOR SOLUTION INTRAMUSCULAR; INTRAVENOUS at 05:18

## 2022-01-01 RX ADMIN — MEROPENEM 1 G: 1 INJECTION INTRAVENOUS at 15:41

## 2022-01-01 RX ADMIN — Medication 200 MG: at 12:18

## 2022-01-01 RX ADMIN — HYDROCORTISONE SODIUM SUCCINATE 50 MG: 100 INJECTION, POWDER, FOR SOLUTION INTRAMUSCULAR; INTRAVENOUS at 06:46

## 2022-01-01 RX ADMIN — THIAMINE HYDROCHLORIDE 200 MG: 100 INJECTION, SOLUTION INTRAMUSCULAR; INTRAVENOUS at 15:49

## 2022-01-01 RX ADMIN — NYSTATIN: 100000 CREAM TOPICAL at 15:52

## 2022-01-01 RX ADMIN — MAGNESIUM SULFATE 2 G: 2 INJECTION INTRAVENOUS at 02:21

## 2022-01-01 RX ADMIN — LORAZEPAM 0.5 MG: 2 INJECTION, SOLUTION INTRAMUSCULAR; INTRAVENOUS at 03:30

## 2022-01-01 RX ADMIN — SODIUM CHLORIDE, SODIUM LACTATE, POTASSIUM CHLORIDE, AND CALCIUM CHLORIDE 1000 ML: 600; 310; 30; 20 INJECTION, SOLUTION INTRAVENOUS at 21:21

## 2022-01-01 RX ADMIN — INSULIN GLARGINE 5 UNITS: 100 INJECTION, SOLUTION SUBCUTANEOUS at 12:42

## 2022-01-01 RX ADMIN — Medication 3 UNITS: at 12:55

## 2022-01-01 RX ADMIN — LISINOPRIL 10 MG: 5 TABLET ORAL at 08:45

## 2022-01-01 RX ADMIN — Medication 200 MG: at 08:33

## 2022-01-01 RX ADMIN — HYDRALAZINE HYDROCHLORIDE 25 MG: 25 TABLET, FILM COATED ORAL at 22:48

## 2022-01-01 RX ADMIN — Medication 6 UNITS: at 18:35

## 2022-01-01 RX ADMIN — THIAMINE HYDROCHLORIDE 500 MG: 100 INJECTION, SOLUTION INTRAMUSCULAR; INTRAVENOUS at 22:00

## 2022-01-01 RX ADMIN — LEVOFLOXACIN 750 MG: 5 INJECTION, SOLUTION INTRAVENOUS at 05:24

## 2022-01-01 RX ADMIN — Medication 3 UNITS: at 11:36

## 2022-01-01 RX ADMIN — HYDROCORTISONE SODIUM SUCCINATE 100 MG: 100 INJECTION, POWDER, FOR SOLUTION INTRAMUSCULAR; INTRAVENOUS at 02:21

## 2022-01-01 RX ADMIN — HYDROCORTISONE SODIUM SUCCINATE 50 MG: 100 INJECTION, POWDER, FOR SOLUTION INTRAMUSCULAR; INTRAVENOUS at 18:04

## 2022-01-01 RX ADMIN — Medication 200 MG: at 21:31

## 2022-01-01 RX ADMIN — IOPAMIDOL 60 ML: 755 INJECTION, SOLUTION INTRAVENOUS at 10:33

## 2022-01-01 RX ADMIN — Medication 200 MG: at 23:06

## 2022-01-01 RX ADMIN — FUROSEMIDE 40 MG: 10 INJECTION, SOLUTION INTRAMUSCULAR; INTRAVENOUS at 08:03

## 2022-01-01 RX ADMIN — CALCIUM GLUCONATE 4 G: 98 INJECTION, SOLUTION INTRAVENOUS at 11:01

## 2022-01-01 RX ADMIN — Medication 200 MG: at 08:45

## 2022-01-01 RX ADMIN — Medication 6 UNITS: at 06:16

## 2022-01-01 RX ADMIN — Medication 9 UNITS: at 09:20

## 2022-01-01 RX ADMIN — Medication 6 UNITS: at 11:37

## 2022-01-01 RX ADMIN — Medication 9 UNITS: at 11:10

## 2022-01-01 RX ADMIN — MULTIVITAMIN 15 ML: LIQUID ORAL at 08:39

## 2022-01-01 RX ADMIN — LISINOPRIL 10 MG: 5 TABLET ORAL at 08:26

## 2022-01-01 RX ADMIN — HYDRALAZINE HYDROCHLORIDE 20 MG: 20 INJECTION INTRAMUSCULAR; INTRAVENOUS at 21:00

## 2022-01-01 RX ADMIN — NYSTATIN: 100000 POWDER TOPICAL at 18:07

## 2022-01-01 RX ADMIN — LISINOPRIL 10 MG: 10 TABLET ORAL at 08:43

## 2022-01-01 RX ADMIN — QUETIAPINE FUMARATE 50 MG: 25 TABLET ORAL at 21:16

## 2022-01-01 RX ADMIN — FUROSEMIDE 40 MG: 10 INJECTION, SOLUTION INTRAMUSCULAR; INTRAVENOUS at 08:45

## 2022-01-01 RX ADMIN — NYSTATIN: 100000 POWDER TOPICAL at 08:59

## 2022-01-01 RX ADMIN — Medication 200 MG: at 22:11

## 2022-01-01 RX ADMIN — POTASSIUM CHLORIDE 10 MEQ: 7.45 INJECTION INTRAVENOUS at 08:51

## 2022-01-01 RX ADMIN — Medication 200 MG: at 16:42

## 2022-01-01 RX ADMIN — FAMOTIDINE 20 MG: 10 INJECTION INTRAVENOUS at 08:49

## 2022-01-01 RX ADMIN — SODIUM BICARBONATE 100 MEQ: 84 INJECTION, SOLUTION INTRAVENOUS at 03:42

## 2022-01-01 RX ADMIN — VASOPRESSIN 0.04 UNITS/MIN: 20 INJECTION PARENTERAL at 05:58

## 2022-01-01 RX ADMIN — Medication 9 UNITS: at 00:57

## 2022-01-01 RX ADMIN — HYDRALAZINE HYDROCHLORIDE 20 MG: 20 INJECTION INTRAMUSCULAR; INTRAVENOUS at 14:44

## 2022-01-01 RX ADMIN — Medication 10 MG: at 21:18

## 2022-01-01 RX ADMIN — HYDROCORTISONE SODIUM SUCCINATE 50 MG: 100 INJECTION, POWDER, FOR SOLUTION INTRAMUSCULAR; INTRAVENOUS at 12:25

## 2022-01-01 RX ADMIN — Medication 6 UNITS: at 12:38

## 2022-01-01 RX ADMIN — Medication 12 UNITS: at 07:06

## 2022-01-01 RX ADMIN — Medication 200 MG: at 08:43

## 2022-01-01 RX ADMIN — MULTIVITAMIN 15 ML: LIQUID ORAL at 08:24

## 2022-01-01 RX ADMIN — IOPAMIDOL 80 ML: 612 INJECTION, SOLUTION INTRAVENOUS at 22:35

## 2022-01-01 RX ADMIN — HYDROCORTISONE SODIUM SUCCINATE 50 MG: 100 INJECTION, POWDER, FOR SOLUTION INTRAMUSCULAR; INTRAVENOUS at 00:51

## 2022-01-01 RX ADMIN — Medication 3 UNITS: at 18:25

## 2022-01-01 RX ADMIN — Medication 200 MG: at 21:57

## 2022-01-01 RX ADMIN — HYDROMORPHONE HYDROCHLORIDE 1 MG: 1 INJECTION, SOLUTION INTRAMUSCULAR; INTRAVENOUS; SUBCUTANEOUS at 09:18

## 2022-01-01 RX ADMIN — CARVEDILOL 12.5 MG: 12.5 TABLET, FILM COATED ORAL at 20:15

## 2022-01-01 RX ADMIN — FUROSEMIDE 20 MG: 10 INJECTION, SOLUTION INTRAMUSCULAR; INTRAVENOUS at 09:09

## 2022-01-01 RX ADMIN — Medication 12 UNITS: at 13:45

## 2022-01-01 RX ADMIN — Medication 200 MG: at 10:39

## 2022-01-01 RX ADMIN — Medication 6 UNITS: at 06:08

## 2022-01-01 RX ADMIN — NYSTATIN: 100000 CREAM TOPICAL at 08:50

## 2022-01-01 RX ADMIN — HYDROMORPHONE HYDROCHLORIDE 0.5 MG: 1 INJECTION, SOLUTION INTRAMUSCULAR; INTRAVENOUS; SUBCUTANEOUS at 22:25

## 2022-01-01 RX ADMIN — CARVEDILOL 12.5 MG: 12.5 TABLET, FILM COATED ORAL at 08:26

## 2022-01-01 RX ADMIN — BACITRACIN ZINC AND POLYMYXIN B SULFATE: 500; 10000 OINTMENT TOPICAL at 09:09

## 2022-01-01 RX ADMIN — POTASSIUM PHOSPHATE, MONOBASIC POTASSIUM PHOSPHATE, DIBASIC: 224; 236 INJECTION, SOLUTION, CONCENTRATE INTRAVENOUS at 12:55

## 2022-01-01 RX ADMIN — HYDROCORTISONE SODIUM SUCCINATE 50 MG: 100 INJECTION, POWDER, FOR SOLUTION INTRAMUSCULAR; INTRAVENOUS at 18:50

## 2022-01-01 RX ADMIN — Medication 10 MG: at 21:09

## 2022-01-01 RX ADMIN — HYDRALAZINE HYDROCHLORIDE 50 MG: 50 TABLET, FILM COATED ORAL at 21:43

## 2022-01-01 RX ADMIN — NYSTATIN: 100000 POWDER TOPICAL at 08:36

## 2022-01-01 RX ADMIN — CARVEDILOL 12.5 MG: 12.5 TABLET, FILM COATED ORAL at 20:05

## 2022-01-01 RX ADMIN — ACETAMINOPHEN 650 MG: 325 TABLET ORAL at 17:09

## 2022-01-01 RX ADMIN — NYSTATIN: 100000 POWDER TOPICAL at 17:25

## 2022-01-01 RX ADMIN — THIAMINE HYDROCHLORIDE 200 MG: 100 INJECTION, SOLUTION INTRAMUSCULAR; INTRAVENOUS at 12:34

## 2022-01-01 RX ADMIN — CARVEDILOL 12.5 MG: 12.5 TABLET, FILM COATED ORAL at 21:32

## 2022-01-01 RX ADMIN — METOPROLOL TARTRATE 5 MG: 1 INJECTION, SOLUTION INTRAVENOUS at 00:51

## 2022-01-01 RX ADMIN — Medication 3 UNITS: at 00:18

## 2022-01-01 RX ADMIN — NYSTATIN: 100000 POWDER TOPICAL at 11:11

## 2022-01-01 RX ADMIN — MULTIVITAMIN 15 ML: LIQUID ORAL at 09:05

## 2022-01-01 RX ADMIN — Medication 200 MG: at 08:03

## 2022-01-01 RX ADMIN — NYSTATIN: 100000 CREAM TOPICAL at 09:35

## 2022-01-01 RX ADMIN — HYDROCORTISONE SODIUM SUCCINATE 50 MG: 100 INJECTION, POWDER, FOR SOLUTION INTRAMUSCULAR; INTRAVENOUS at 17:03

## 2022-01-01 RX ADMIN — FUROSEMIDE 20 MG: 10 INJECTION, SOLUTION INTRAMUSCULAR; INTRAVENOUS at 08:35

## 2022-01-01 RX ADMIN — OXYCODONE 5 MG: 5 TABLET ORAL at 23:04

## 2022-01-01 RX ADMIN — Medication 3 UNITS: at 18:23

## 2022-01-01 RX ADMIN — Medication 4 UNITS: at 17:34

## 2022-01-01 RX ADMIN — Medication 9 UNITS: at 12:10

## 2022-01-01 RX ADMIN — DEXTROSE MONOHYDRATE 125 ML: 100 INJECTION, SOLUTION INTRAVENOUS at 06:09

## 2022-01-01 RX ADMIN — Medication 18 UNITS: at 08:35

## 2022-01-01 RX ADMIN — MAGNESIUM SULFATE 2 G: 2 INJECTION INTRAVENOUS at 03:28

## 2022-01-01 RX ADMIN — FUROSEMIDE 40 MG: 10 INJECTION, SOLUTION INTRAMUSCULAR; INTRAVENOUS at 09:51

## 2022-01-01 RX ADMIN — CYANOCOBALAMIN 1000 MCG: 1000 INJECTION, SOLUTION INTRAMUSCULAR at 08:58

## 2022-01-01 RX ADMIN — Medication 10 MG: at 21:21

## 2022-01-01 RX ADMIN — FUROSEMIDE 40 MG: 10 INJECTION, SOLUTION INTRAMUSCULAR; INTRAVENOUS at 08:58

## 2022-01-01 RX ADMIN — Medication 18 UNITS: at 09:37

## 2022-01-01 RX ADMIN — Medication 3 UNITS: at 06:47

## 2022-01-01 RX ADMIN — NYSTATIN: 100000 POWDER TOPICAL at 19:09

## 2022-01-01 RX ADMIN — METOPROLOL TARTRATE 5 MG: 1 INJECTION, SOLUTION INTRAVENOUS at 09:47

## 2022-01-01 RX ADMIN — HEPARIN SODIUM 5000 UNITS: 5000 INJECTION INTRAVENOUS; SUBCUTANEOUS at 21:49

## 2022-01-01 RX ADMIN — SODIUM CHLORIDE, SODIUM LACTATE, POTASSIUM CHLORIDE, AND CALCIUM CHLORIDE 50 ML/HR: 600; 310; 30; 20 INJECTION, SOLUTION INTRAVENOUS at 16:12

## 2022-01-01 RX ADMIN — Medication 6 UNITS: at 02:30

## 2022-01-01 RX ADMIN — METOPROLOL TARTRATE 25 MG: 25 TABLET, FILM COATED ORAL at 18:03

## 2022-01-01 RX ADMIN — Medication 10 UNITS: at 08:39

## 2022-01-01 RX ADMIN — Medication 200 MG: at 21:09

## 2022-01-01 RX ADMIN — Medication 200 MG: at 17:26

## 2022-01-01 RX ADMIN — CARVEDILOL 12.5 MG: 12.5 TABLET, FILM COATED ORAL at 22:23

## 2022-01-01 RX ADMIN — Medication 200 MG: at 18:08

## 2022-01-01 RX ADMIN — Medication 200 MG: at 08:41

## 2022-01-01 RX ADMIN — HYDRALAZINE HYDROCHLORIDE 50 MG: 50 TABLET, FILM COATED ORAL at 17:09

## 2022-01-01 RX ADMIN — Medication 3 UNITS: at 18:18

## 2022-01-01 RX ADMIN — POTASSIUM CHLORIDE 10 MEQ: 7.45 INJECTION INTRAVENOUS at 12:23

## 2022-03-14 NOTE — ED TRIAGE NOTES
Pt arrived EMS from nursing home for possible unwitnessed seizure. Pt primary language is English and does not speak [place]    Staff reports walking into room with pt altered and not at baseline.       Hx of right sided weakness from previous stroke  Hypothyroidism  PTSD  Dementia  HTN  Hyperlipidemia  Depression  Type 2 Diabetes

## 2022-03-14 NOTE — ED NOTES
[facility] Pt is AAOX2, speaking few words, follows commands at this time, unable to move right arm and leg due to previous stroke, seems anxious, points to her stomach and chest when asked if she is in pain. Per day shift RN, the nursing home states she is usually able to express herself vocally. Pt's niece at bedside states that she is at her baseline, typically speaks few words and is oriented. Dr. Dilip Mckeon at bedside.

## 2022-03-14 NOTE — ED PROVIDER NOTES
EMERGENCY DEPARTMENT HISTORY AND PHYSICAL EXAM    7:51 PM      Date: 3/14/2022  Patient Name: Jerri Habermann    History of Presenting Illness     Chief Complaint   Patient presents with    Seizure         History Provided By: patient    Additional History (Context): Jerri Habermann is a 74 y.o. female with a past medical history of HFrEF and insulin-dependent diabetes that presents with concerns for altered mental status from her nursing home. Patient noted to be [place]-speaking only and limited on words due to previous stroke. Patient's niece was present in the room and able to give more history. Niece noted that patient appears to be at baseline with no notable deficits as far she can tell. Patient is oriented and able to converse with limited word choice. She endorses chest pain and abdominal pain as well as pain with urination. She is not able to communicate for how long the pain has been going on for nor the quality. She has not noted to have had any recent injuries or illnesses. She does not currently feel sick. PCP: None        Current Outpatient Medications   Medication Sig Dispense Refill    cefdinir (OMNICEF) 300 mg capsule Take 1 Capsule by mouth two (2) times a day for 10 days. 20 Capsule 0    acetaminophen (Tylenol Extra Strength) 500 mg tablet Take 2 Tabs by mouth every six (6) hours as needed for Pain. 20 Tab 0       Past History     Past Medical History:  No past medical history on file. Past Surgical History:  No past surgical history on file. Family History:  No family history on file. Social History:  Social History     Tobacco Use    Smoking status: Former Smoker    Smokeless tobacco: Never Used   Substance Use Topics    Alcohol use: Not on file    Drug use: Not on file       Allergies:  No Known Allergies      Review of Systems     Review of Systems   Constitutional: Negative for chills and fever. HENT: Negative for sinus pressure, sinus pain and tinnitus.     Eyes: Negative for pain and visual disturbance. Respiratory: Positive for cough. Negative for shortness of breath and wheezing. Cardiovascular: Positive for chest pain. Negative for palpitations. Gastrointestinal: Positive for abdominal pain. Negative for nausea and vomiting. Genitourinary: Positive for dysuria. Musculoskeletal: Negative for back pain and neck pain. Neurological: Positive for headaches. Negative for dizziness and seizures. Physical Exam       Patient Vitals for the past 12 hrs:   Temp Pulse Resp BP SpO2   03/15/22 0015 -- 75 17 (!) 177/78 95 %   03/14/22 2045 -- 71 18 119/80 92 %   03/14/22 2030 -- 73 26 138/85 95 %   03/14/22 2000 -- 76 24 (!) 157/82 95 %   03/14/22 1945 -- 72 19 (!) 149/78 94 %   03/14/22 1921 98.2 °F (36.8 °C) 75 26 (!) 154/103 95 %       IPVITALS  Patient Vitals for the past 24 hrs:   BP Temp Pulse Resp SpO2 Height Weight   03/15/22 0015 (!) 177/78 -- 75 17 95 % -- --   03/14/22 2045 119/80 -- 71 18 92 % -- --   03/14/22 2030 138/85 -- 73 26 95 % -- --   03/14/22 2000 (!) 157/82 -- 76 24 95 % -- --   03/14/22 1945 (!) 149/78 -- 72 19 94 % -- --   03/14/22 1921 (!) 154/103 98.2 °F (36.8 °C) 75 26 95 % 4' 11\" (1.499 m) 70.3 kg (155 lb)       Physical Exam  Constitutional:       General: She is not in acute distress. Appearance: She is not ill-appearing, toxic-appearing or diaphoretic. HENT:      Head: Normocephalic and atraumatic. Mouth/Throat:      Mouth: Mucous membranes are moist.      Pharynx: Oropharynx is clear. Eyes:      Extraocular Movements: Extraocular movements intact. Conjunctiva/sclera: Conjunctivae normal.      Pupils: Pupils are equal, round, and reactive to light. Cardiovascular:      Rate and Rhythm: Normal rate and regular rhythm. Pulses: Normal pulses. Heart sounds: Normal heart sounds. Pulmonary:      Effort: Pulmonary effort is normal.      Breath sounds: Normal breath sounds. Abdominal:      General: Abdomen is flat. Bowel sounds are normal.      Palpations: Abdomen is soft. Tenderness: There is abdominal tenderness. Comments: Tenderness to palpation of lower abdomen   Musculoskeletal:         General: No tenderness or deformity. Cervical back: Normal range of motion. Right lower leg: No edema. Left lower leg: No edema. Skin:     General: Skin is warm and dry. Capillary Refill: Capillary refill takes less than 2 seconds. Neurological:      Mental Status: She is alert and oriented to person, place, and time. Mental status is at baseline. Motor: Weakness present. Comments: Right-sided weakness in upper and lower extremity at baseline from previous stroke   Psychiatric:         Mood and Affect: Mood normal.         Behavior: Behavior normal.         Thought Content: Thought content normal.         Judgment: Judgment normal.           Diagnostic Study Results   Labs -  Recent Results (from the past 24 hour(s))   METABOLIC PANEL, COMPREHENSIVE    Collection Time: 03/14/22  7:10 PM   Result Value Ref Range    Sodium 137 136 - 145 mmol/L    Potassium 5.9 (H) 3.5 - 5.5 mmol/L    Chloride 109 100 - 111 mmol/L    CO2 24 21 - 32 mmol/L    Anion gap 4 3.0 - 18 mmol/L    Glucose 117 (H) 74 - 99 mg/dL    BUN 27 (H) 7.0 - 18 MG/DL    Creatinine 1.09 0.6 - 1.3 MG/DL    BUN/Creatinine ratio 25 (H) 12 - 20      GFR est AA >60 >60 ml/min/1.73m2    GFR est non-AA 50 (L) >60 ml/min/1.73m2    Calcium 8.2 (L) 8.5 - 10.1 MG/DL    Bilirubin, total 0.5 0.2 - 1.0 MG/DL    ALT (SGPT) 29 13 - 56 U/L    AST (SGOT) 68 (H) 10 - 38 U/L    Alk. phosphatase 117 45 - 117 U/L    Protein, total 7.4 6.4 - 8.2 g/dL    Albumin 3.2 (L) 3.4 - 5.0 g/dL    Globulin 4.2 (H) 2.0 - 4.0 g/dL    A-G Ratio 0.8 0.8 - 1.7     CBC WITH AUTOMATED DIFF    Collection Time: 03/14/22  7:10 PM   Result Value Ref Range    WBC 9.3 4.6 - 13.2 K/uL    RBC 4.50 4. 20 - 5.30 M/uL    HGB 12.6 12.0 - 16.0 g/dL    HCT 41.0 35.0 - 45.0 %    MCV 91.1 78.0 - 100.0 FL    MCH 28.0 24.0 - 34.0 PG    MCHC 30.7 (L) 31.0 - 37.0 g/dL    RDW 14.6 (H) 11.6 - 14.5 %    PLATELET 096 098 - 203 K/uL    MPV 12.3 (H) 9.2 - 11.8 FL    NRBC 0.0 0  WBC    ABSOLUTE NRBC 0.00 0.00 - 0.01 K/uL    NEUTROPHILS 68 40 - 73 %    LYMPHOCYTES 21 21 - 52 %    MONOCYTES 7 3 - 10 %    EOSINOPHILS 3 0 - 5 %    BASOPHILS 1 0 - 2 %    IMMATURE GRANULOCYTES 0 0.0 - 0.5 %    ABS. NEUTROPHILS 6.4 1.8 - 8.0 K/UL    ABS. LYMPHOCYTES 2.0 0.9 - 3.6 K/UL    ABS. MONOCYTES 0.7 0.05 - 1.2 K/UL    ABS. EOSINOPHILS 0.2 0.0 - 0.4 K/UL    ABS. BASOPHILS 0.1 0.0 - 0.1 K/UL    ABS. IMM. GRANS. 0.0 0.00 - 0.04 K/UL    DF AUTOMATED     TROPONIN-HIGH SENSITIVITY    Collection Time: 03/14/22  7:10 PM   Result Value Ref Range    Troponin-High Sensitivity 25 0 - 54 ng/L   GLUCOSE, POC    Collection Time: 03/14/22  7:37 PM   Result Value Ref Range    Glucose (POC) 122 (H) 70 - 110 mg/dL   EKG, 12 LEAD, INITIAL    Collection Time: 03/14/22 11:14 PM   Result Value Ref Range    Ventricular Rate 77 BPM    Atrial Rate 77 BPM    P-R Interval 186 ms    QRS Duration 76 ms    Q-T Interval 446 ms    QTC Calculation (Bezet) 504 ms    Calculated P Axis 65 degrees    Calculated R Axis -20 degrees    Calculated T Axis 18 degrees    Diagnosis       Normal sinus rhythm  Minimal voltage criteria for LVH, may be normal variant ( R in aVL )  Cannot rule out Anterior infarct , age undetermined  Abnormal ECG  When compared with ECG of 24-OCT-2021 04:26,  Vent.  rate has decreased BY  77 BPM  ST no longer depressed in Inferior leads  ST no longer depressed in Lateral leads  Nonspecific T wave abnormality now evident in Inferior leads     TROPONIN-HIGH SENSITIVITY    Collection Time: 03/14/22 11:46 PM   Result Value Ref Range    Troponin-High Sensitivity 27 0 - 54 ng/L   URINALYSIS W/ RFLX MICROSCOPIC    Collection Time: 03/15/22  1:18 AM   Result Value Ref Range    Color YELLOW      Appearance CLEAR      Specific gravity 1.020 1.005 - 1.030      pH (UA) 5.0 5.0 - 8.0      Protein 100 (A) NEG mg/dL    Glucose Negative NEG mg/dL    Ketone Negative NEG mg/dL    Bilirubin Negative NEG      Blood Negative NEG      Urobilinogen 1.0 0.2 - 1.0 EU/dL    Nitrites Negative NEG      Leukocyte Esterase SMALL (A) NEG     URINE MICROSCOPIC ONLY    Collection Time: 03/15/22  1:18 AM   Result Value Ref Range    WBC 20 to 25 0 - 4 /hpf    RBC Negative 0 - 5 /hpf    Epithelial cells FEW 0 - 5 /lpf    Bacteria 3+ (A) NEG /hpf       Radiologic Studies -   XR ORBIT BI FOREIGN BODY   Final Result   No metallic foreign bodies       CT ABD PELV W CONT   Final Result   1. New mass or infectious process involving the right lobe of the liver near   the IVC. Correlation with laboratory values as well as MRI with and without   contrast.      2.  Irregular thickened and distal rectum. This may represent mild proctitis. However, the possibility of an underlying lesion is not excluded. Correlate with   direct visualization. 3.  Groundglass opacities in the mid to lower lung fields which may be acute or   chronic. 4.  Chronic complex left pleural effusion. 5.  Cholelithiasis without evidence of cholecystitis. XR CHEST PORT   Final Result   1. Mild pulmonary edema. MRI ABD W WO CONT    (Results Pending)     CT ABD PELV W CONT    Result Date: 3/14/2022  EXAM: CT Abdomen and Pelvis with IV contrast CLINICAL INDICATION:  lower abd pain TECHNIQUE: CT of the abdomen and pelvis performed post IV contrast. Sagittal and coronal reformations obtained. All CT scans at this facility are performed using dose optimization technique as appropriate to a performed exam, to include automated exposure control, adjustment of the mA and/or kV according to patient size (including appropriate matching for site specific examination) or use of iterative reconstruction technique.   IV CONTRAST: 80 cc of Isovue 300 ENTERIC CONTRAST: None COMPARISON: None FINDINGS: Lower Chest:Bibasilar opacities are noted in the lung bases. There is an unchanged complex left pleural effusion with calcifications along the rim and thickened pleura. The visualized heart and pericardium are unremarkable. Peritoneum: No free air appreciated. No free fluid identified. No fluid collections seen. Liver: In the liver adjacent the IVC, there is an irregular heterogeneously hypoenhancing structure which could be infectious or neoplastic. It measures approximately 3.1 x 5.8 x 4.2 cm. This was not present in 2017. Biliary/Gallbladder: No intrahepatic or extrahepatic biliary ductal dilatation appreciated. The gallbladder is distended. Multiple stones are present within the gallbladder. Spleen: Unremarkable. Pancreas: No focal lesion appreciated. The pancreatic duct is unremarkable. No peripancreatic inflammation or adenopathy. Adrenals: The adrenal glands are unremarkable. Right Kidney:  No perinephric fat stranding appreciated. There is normal enhancement. No hydroureteronephrosis of the collecting system. Left Kidney: No perinephric fat stranding appreciated. There is normal enhancement. No hydroureteronephrosis of the collecting system.  Pelvic organs:  No acute pathology in the bladder. GI: The stomach is unremarkable. The small bowel is without evidence of obstruction. No small bowel wall thickening. The mesentery is without inflammation or adenopathy. The appendix is unremarkable. The rectum has an irregular thickened wall. The remainder of the colon appears unremarkable. Vasculature: No aortic aneurysm seen. The IVC is unremarkable. Retroperitoneum:  Multiple nonspecific periaortic lymph nodes are noted. These were present previously. No enlarged lymph nodes identified. No fluid collections in the retroperitoneum appreciated. Abdominal wall: Unremarkable Musculoskeletal: Osteopenia is present. Multilevel degenerative changes are noted.      1.  New mass or infectious process involving the right lobe of the liver near the IVC. Correlation with laboratory values as well as MRI with and without contrast. 2.  Irregular thickened and distal rectum. This may represent mild proctitis. However, the possibility of an underlying lesion is not excluded. Correlate with direct visualization. 3.  Groundglass opacities in the mid to lower lung fields which may be acute or chronic. 4.  Chronic complex left pleural effusion. 5.  Cholelithiasis without evidence of cholecystitis. XR ORBIT BI FOREIGN BODY    Result Date: 3/15/2022  Screening orbits INDICATION: MRI clearance. Two views show no metallic foreign bodies in orbits. No metallic foreign bodies     XR CHEST PORT    Result Date: 3/14/2022  EXAM: Chest Radiograph INDICATION:  chest pain TECHNIQUE: AP view of the chest COMPARISON: 10/19/2021, 10/17/2021, 12/26/2020 FINDINGS: No pneumothorax identified. Subtle groundglass opacities are noted bilaterally. This is improved since most recent prior. Small left pleural effusion is present. The cardiac silhouette is enlarged. The pulmonary vasculature is prominent. The osseous structures are unremarkable. 1.  Mild pulmonary edema. Medications ordered:   Medications   iopamidoL (ISOVUE 300) 61 % contrast injection  mL (80 mL IntraVENous Given 3/14/22 9105)   cefTRIAXone (ROCEPHIN) 1 g in sterile water (preservative free) 10 mL IV syringe (1 g IntraVENous Given 3/15/22 0241)         Medical Decision Making   Initial Medical Decision Making and DDx:  Patient is a 44-year-old female presenting to the ED with concerns of altered mental status but noted to be at baseline per family members during evaluation. Patient endorsed chest pain, abdominal pain, and dysuria. Initial concern was for infectious etiology versus cardiac pathology for noted altered mental status prior to arrival.  POC glucose upon arrival noted to be 122 ruling out concerns of hypoglycemia and hyperglycemia.  EKG was noted to be without signs of ischemia. Chest x-ray was obtained which showed mild pulmonary edema. A CT of the abdomen pelvis showed a liver mass in the right lobe. An MRI was recommended and discussed with radiology and can be done outpatient. CBC, CMP were also ordered with results notable fora mild decrease in GFR as well as a slight elevation in AST. Troponin was noted to be 25 with a delta troponin of 27. Urinalysis results concerning for UTI. Patient was given one-time dose of Rocephin with a prescription for Omnicef. Patient noted to be medically stable and able to be discharged to nursing home. ED Course: Progress Notes, Reevaluation, and Consults:  ED Course as of 03/15/22 0409   Mon Mar 14, 2022   2000 Patient evaluated with niece at bedside who was able to translate communicate with patient. Niece noted that patient was at baseline without any deficits. [BB]   2001 CBC within normal limits. CMP notable for mild reduction in GFR to 50 and mild AST elevation to 68. Initial troponin of 25 will get delta troponin. [BB]   2152 Chest x-ray results notable for mild pulmonary edema. [BB]   2306 CT abdomen and pelvis result with notable liver mass requiring MRI for further evaluation. [BB]   e Mar 15, 2022   0020 Family unable to be reached for MRI screening. Ordered orbit x-ray for clearance. [BB]   [id number] Discussed necessity of emergent MRI with radiology with recommendations for outpatient imaging. [BB]      ED Course User Index  [BB] Terrie Chávez DO         I am the first provider for this patient. I reviewed the vital signs, available nursing notes, past medical history, past surgical history, family history and social history.     Patient Vitals for the past 12 hrs:   Temp Pulse Resp BP SpO2   03/15/22 0015 -- 75 17 (!) 177/78 95 %   03/14/22 2045 -- 71 18 119/80 92 %   03/14/22 2030 -- 73 26 138/85 95 %   03/14/22 2000 -- 76 24 (!) 157/82 95 %   03/14/22 1945 -- 72 19 (!) 149/78 94 %   03/14/22 1921 98.2 °F (36.8 °C) 75 26 (!) 154/103 95 %       Vital Signs-Reviewed the patient's vital signs. Pulse Oximetry Analysis, Cardiac Monitor, 12 lead ekg:      Interpreted by the EP. Records Reviewed: Nursing notes reviewed (Time of Review: 7:51 PM)      Diagnosis     Clinical Impression:   1. Acute cystitis without hematuria    2. Liver mass, left lobe        Disposition:   Discharged to nursing home    Follow-up Information     Follow up With Specialties Details Why [facility]    [facility] EMERGENCY DEPT Emergency Medicine Go to  As needed, If symptoms worsen [address] [place] [id number]  [phone]    Your primary care provider  Schedule an appointment as soon as possible for a visit today Please schedule an appointment with your primary care provider     MRI  Schedule an appointment as soon as possible for a visit today You will need to schedule appointment for an outpatient MRI of your abdomen to evaluate for cancer            Patient's Medications   Start Taking    CEFDINIR (OMNICEF) 300 MG CAPSULE    Take 1 Capsule by mouth two (2) times a day for 10 days. Continue Taking    ACETAMINOPHEN (TYLENOL EXTRA STRENGTH) 500 MG TABLET    Take 2 Tabs by mouth every six (6) hours as needed for Pain.    These Medications have changed    No medications on file   Stop Taking    No medications on file     _______________________________    Notes:    Caio Matters, DO using Dragon dictation      _______________________________

## 2022-03-15 NOTE — ED NOTES
Spoke with Miss Rodriguez Drivers from [facility], summary of ED visit and report given to nurse. Miss Rodriguez Drivers aware of pt's return, transport arranged to facility.

## 2022-03-15 NOTE — ED NOTES
Pt is discharged. Pt currently hypertensive on MD DEEP aware and states she is stable for discharge and should continue her BP meds as scheduled at home.

## 2022-03-15 NOTE — ED NOTES
Pt is discharged. Pt left with transport on  home, discharge paperwork and L NC, prescription given to EMS.

## 2022-03-15 NOTE — ED NOTES
Spoke with staff at [facility], states they will accept pt with oxygen. Update to report given that pt will return on 2 L NC. MD aware, states pt is discharged. Transport arranged, pending .

## 2022-03-15 NOTE — ED NOTES
Third call attempt placed to [facility] yet no answer. Pt return to facility was delayed due to pt requiring oxygen (2 L NC) for discharge. Pending discharge.

## 2022-03-15 NOTE — ED NOTES
Assumed care for the patient from Dr. Aniya Dunbar at shift change pending UA and abdominal CT. There was questionable concern about mental status change however the niece came and confirmed that the patient is at her baseline mental status. MRI concerning for neoplastic issue versus infection. Patient does not have signs of infection otherwise, no leukocytosis or transaminitis. Her abdominal tenderness was lower which is more consistent with her UTI. We also talked to the radiologist who said that the MRI could be done nonemergently as an outpatient for neoplastic pathology. Will discharge with care instructions and return precautions. Urine culture was sent and patient was treated with Rocephin. Will discharge back to the nursing home on Levaquin. Patient is requiring 2 L of oxygen and there is evidence of pneumonia on her CT. Her Covid test was negative. Nursing home confirmed that they can keep her on oxygen as needed. She is not in any respiratory distress. I think the patient can be discharged back to the nursing home now.

## 2022-03-15 NOTE — DISCHARGE INSTRUCTIONS
You were brought to the emergency department today for concerns of altered mental status. You were noted to be at baseline upon arrival.  You had concerns of chest and abdominal pain. You were evaluated with imaging which noted some fluid on your lungs as well as a mass on your liver. You will need to follow-up with your primary care provider to have an MRI of your abdomen scheduled for further evaluation. Please return to the ER as needed for any concerning symptoms.

## 2022-03-15 NOTE — ED NOTES
Pt had large soft BM. Pt cleaned, cintia care performed, new linens and chucks provided, pt given clean gown, mepliex applied to sacrum, clean brief provided.

## 2022-03-24 PROBLEM — A41.9 SEPTIC SHOCK (HCC): Status: ACTIVE | Noted: 2022-01-01

## 2022-03-24 PROBLEM — R65.21 SEPTIC SHOCK (HCC): Status: ACTIVE | Noted: 2022-01-01

## 2022-03-24 NOTE — Clinical Note
Status[de-identified] INPATIENT [101]   Type of Bed: Intensive Care [6]   Cardiac Monitoring Required?: Yes   Inpatient Hospitalization Certified Necessary for the Following Reasons: 4.  Patient requires ICU level of care interventions (further clarification in H&P documentation)   Admitting Diagnosis: Septic shock [facility]) [[id number]]   Admitting Physician: Partha Smith [[id number]]   Attending Physician: Partha Smith [[id number]]   Estimated Length of Stay: 7+ Midnights   Discharge Plan[de-identified] Extended Care Facility (e.g. Adult Home, Nursing Home, etc.)

## 2022-03-25 NOTE — ED PROVIDER NOTES
EMERGENCY DEPARTMENT HISTORY AND PHYSICAL EXAM    11:58 PM    Date: 3/24/2022  Patient Name: Laurel Dean    History of Presenting Illness     Chief Complaint   Patient presents with    Hypotension       History Provided By: EMS  Location/Duration/Severity/Modifying factors   Patient 63-year-old female with a history that includes stroke with residual right-sided deficits, dementia, and hypertension. Patient presents today with primary complaint of altered mental status, per nursing home staff patient developed a sore on her lower lip and had a brief period of altered mental status at which point they summoned EMS to the scene and she was transported in route without difficulty however upon arrival at the emergency department patient developed decreased responsiveness and was unable to provide any history therefore history was obtained via chart review and via EMS.           PCP: None    Current Facility-Administered Medications   Medication Dose Route Frequency Provider Last Rate Last Admin    magnesium sulfate 2 g/50 ml IVPB (premix or compounded)  2 g IntraVENous ONCE Kinzer, Davee Goldmann, MD        NOREPINephrine (LEVOPHED) 8 mg in 5% dextrose 250mL (32 mcg/mL) infusion  0.5-50 mcg/min IntraVENous TITRATE Kinzer, Davee Goldmann, MD 11.3 mL/hr at 03/24/22 2348 6 mcg/min at 03/24/22 2348    meropenem (MERREM) 1 g in sterile water (preservative free) 20 mL IV syringe  1 g IntraVENous NOW Kinzer, Davee Goldmann, MD        acetaminophen (TYLENOL) tablet 650 mg  650 mg Oral Q6H PRN Bekah Patrick NP        Or    acetaminophen (TYLENOL) suppository 650 mg  650 mg Rectal Q6H PRN Bekah Patrick NP        polyethylene glycol (MIRALAX) packet 17 g  17 g Oral DAILY PRN Sharonda CAIN NP        ondansetron (ZOFRAN ODT) tablet 4 mg  4 mg Oral Q8H PRN Sharonda CAIN NP        Or    ondansetron (ZOFRAN) injection 4 mg  4 mg IntraVENous Q6H PRN Bekah Patrick NP        [START ON 3/25/2022] famotidine (PF) (PEPCID) 20 mg in 0.9% sodium chloride 10 mL injection  20 mg IntraVENous BID Elois Colder C, NP        magnesium sulfate 2 g/50 ml IVPB (premix or compounded)  2 g IntraVENous Q1H Oralia Hunterer C, NP 25 mL/hr at 03/24/22 2325 2 g at 03/24/22 2325    Vancomycin: Pharmacy to dose   Other Rx Dosing/Monitoring Alyssa Montano NP        [START ON 3/25/2022] meropenem (MERREM) 500 mg in 0.9% sodium chloride (MBP/ADV) 50 mL MBP  500 mg IntraVENous Q12H Lb Ramirez MD         Current Outpatient Medications   Medication Sig Dispense Refill    levoFLOXacin (Levaquin) 750 mg tablet Take 1 Tablet by mouth daily for 10 days. 10 Tablet 0    acetaminophen (Tylenol Extra Strength) 500 mg tablet Take 2 Tabs by mouth every six (6) hours as needed for Pain. 20 Tab 0       Past History     Past Medical History:  No past medical history on file. Past Surgical History:  No past surgical history on file. Family History:  No family history on file. Social History:  Social History     Tobacco Use    Smoking status: Former Smoker    Smokeless tobacco: Never Used   Substance Use Topics    Alcohol use: Not on file    Drug use: Not on file       Allergies:  No Known Allergies    I reviewed and confirmed the above information with patient and updated as necessary. Review of Systems     Review of Systems   Unable to perform ROS: Mental status change       Physical Exam     Visit Vitals  BP 99/67   Pulse (!) 108   Temp (!) 100.9 °F (38.3 °C)   Resp 27   SpO2 95%       Physical Exam  Constitutional:       Appearance: She is ill-appearing. HENT:      Head: Normocephalic. Nose: No congestion or rhinorrhea. Mouth/Throat:      Comments: Abrasion noted to right lower lip with no active bleeding  Eyes:      General: No scleral icterus. Right eye: No discharge. Left eye: No discharge. Pupils: Pupils are equal, round, and reactive to light.    Cardiovascular:      Rate and Rhythm: Regular rhythm. Tachycardia present. Heart sounds: No murmur heard. No gallop. Pulmonary:      Effort: No respiratory distress. Breath sounds: No wheezing, rhonchi or rales. Abdominal:      General: Abdomen is flat. There is no distension. Tenderness: There is abdominal tenderness. There is guarding and rebound. Musculoskeletal:         General: No tenderness. Cervical back: No rigidity or tenderness. Right lower leg: No edema. Left lower leg: No edema. Lymphadenopathy:      Cervical: No cervical adenopathy. Skin:     Capillary Refill: Capillary refill takes 2 to 3 seconds. Coloration: Skin is not jaundiced or pale. Findings: No bruising. Neurological:      Mental Status: She is disoriented. Diagnostic Study Results     Labs -  Recent Results (from the past 24 hour(s))   GLUCOSE, POC    Collection Time: 03/24/22  7:27 PM   Result Value Ref Range    Glucose (POC) 163 (H) 70 - 110 mg/dL   CBC WITH AUTOMATED DIFF    Collection Time: 03/24/22  7:52 PM   Result Value Ref Range    WBC 21.2 (H) 4.6 - 13.2 K/uL    RBC 5.01 4.20 - 5.30 M/uL    HGB 14.4 12.0 - 16.0 g/dL    HCT 45.2 (H) 35.0 - 45.0 %    MCV 90.2 78.0 - 100.0 FL    MCH 28.7 24.0 - 34.0 PG    MCHC 31.9 31.0 - 37.0 g/dL    RDW 15.1 (H) 11.6 - 14.5 %    PLATELET 89 (L) 563 - 420 K/uL    MPV 13.4 (H) 9.2 - 11.8 FL    NRBC 0.0 0  WBC    ABSOLUTE NRBC 0.00 0.00 - 0.01 K/uL    NEUTROPHILS 88 (H) 40 - 73 %    LYMPHOCYTES 8 (L) 21 - 52 %    MONOCYTES 3 3 - 10 %    EOSINOPHILS 0 0 - 5 %    BASOPHILS 0 0 - 2 %    IMMATURE GRANULOCYTES 1 (H) 0.0 - 0.5 %    ABS. NEUTROPHILS 18.7 (H) 1.8 - 8.0 K/UL    ABS. LYMPHOCYTES 1.7 0.9 - 3.6 K/UL    ABS. MONOCYTES 0.6 0.05 - 1.2 K/UL    ABS. EOSINOPHILS 0.0 0.0 - 0.4 K/UL    ABS. BASOPHILS 0.0 0.0 - 0.1 K/UL    ABS. IMM.  GRANS. 0.2 (H) 0.00 - 0.04 K/UL    DF AUTOMATED      PLATELET COMMENTS DECREASED PLATELETS      RBC COMMENTS LILIAM CELLS  FEW       METABOLIC PANEL, COMPREHENSIVE    Collection Time: 03/24/22  7:52 PM   Result Value Ref Range    Sodium 151 (H) 136 - 145 mmol/L    Potassium 4.0 3.5 - 5.5 mmol/L    Chloride 120 (H) 100 - 111 mmol/L    CO2 15 (L) 21 - 32 mmol/L    Anion gap 16 3.0 - 18 mmol/L    Glucose 166 (H) 74 - 99 mg/dL    BUN 57 (H) 7.0 - 18 MG/DL    Creatinine 2.38 (H) 0.6 - 1.3 MG/DL    BUN/Creatinine ratio 24 (H) 12 - 20      GFR est AA 25 (L) >60 ml/min/1.73m2    GFR est non-AA 20 (L) >60 ml/min/1.73m2    Calcium 6.5 (L) 8.5 - 10.1 MG/DL    Bilirubin, total 0.4 0.2 - 1.0 MG/DL    ALT (SGPT) 15 13 - 56 U/L    AST (SGOT) 22 10 - 38 U/L    Alk.  phosphatase 76 45 - 117 U/L    Protein, total 7.2 6.4 - 8.2 g/dL    Albumin 3.6 3.4 - 5.0 g/dL    Globulin 3.6 2.0 - 4.0 g/dL    A-G Ratio 1.0 0.8 - 1.7     TROPONIN-HIGH SENSITIVITY    Collection Time: 03/24/22  7:52 PM   Result Value Ref Range    Troponin-High Sensitivity 43 0 - 54 ng/L   MAGNESIUM    Collection Time: 03/24/22  7:52 PM   Result Value Ref Range    Magnesium 1.0 (L) 1.6 - 2.6 mg/dL   POC LACTIC ACID    Collection Time: 03/24/22  8:05 PM   Result Value Ref Range    Lactic Acid (POC) 2.23 (HH) 0.40 - 2.00 mmol/L   EKG, 12 LEAD, INITIAL    Collection Time: 03/24/22  9:01 PM   Result Value Ref Range    Ventricular Rate 102 BPM    Atrial Rate 102 BPM    P-R Interval 162 ms    QRS Duration 74 ms    Q-T Interval 416 ms    QTC Calculation (Bezet) 542 ms    Calculated P Axis 77 degrees    Calculated R Axis -18 degrees    Calculated T Axis 76 degrees    Diagnosis       Sinus tachycardia with premature atrial complexes and premature ventricular   complexes or fusion complexes  Minimal voltage criteria for LVH, may be normal variant ( R in aVL )  Prolonged QT  Abnormal ECG  When compared with ECG of 14-MAR-2022 23:14,  fusion complexes are now present  premature ventricular complexes are now present  premature atrial complexes are now present  Nonspecific T wave abnormality no longer evident in Inferior leads  Nonspecific T wave abnormality, improved in Lateral leads           Radiologic Studies -   CT HEAD WO CONT   Final Result      1. No acute intracranial abnormalities. 2.  Chronic LMCA infarct. 3.  Right frontal lobe encephalomalacia is nonspecific and could be from chronic   ischemic infarct, remote traumatic injury or some other remote process. XR CHEST PORT   Final Result   1. New right IJ catheter without evidence complication. 2.  Diffuse bilateral airspace opacities. XR CHEST PORT   Final Result   1. Bilateral airspace opacities. Follow-up to resolution is recommended. CT CHEST ABD PELV WO CONT    (Results Pending)           Medical Decision Making   I am the first provider for this patient. I reviewed the vital signs, available nursing notes, past medical history, past surgical history, family history and social history. Vital Signs-Reviewed the patient's vital signs. EKG: EKG: unchanged from previous tracings, sinus tachycardia. Records Reviewed: Nursing Notes, Old Medical Records, Previous electrocardiograms, Previous Radiology Studies and Previous Laboratory Studies (Time of Review: 11:58 PM)      Provider Notes (Medical Decision Making):   MDM  Number of Diagnoses or Management Options  Diagnosis management comments: Exam with primary complaint of altered mental status socially found to have significant hypotension and fever, presumed source is intra-abdominal given patient's recent history of a known liver mass/abscess and profound abdominal tenderness on exam.  Aggressive fluid resuscitation was initiated with initial improvement however patient continued to remain hypotensive with a map below 65 therefore central line was placed and Levophed was began. Consulted ICU team, who agreed to admit patient for ICU level care.         ED Course: Progress Notes, Reevaluation, and Consults:  ED Course as of 03/24/22 8290   u Mar 24, 2022   1406 Case was discussed with ICU team, Dr. Jo Cheek, they will be evaluating patient for admission. [JK]      ED Course User Index  [JK] June Moreno MD       Critical Care  Performed by: June Moreno MD  Authorized by: June Moreno MD     Critical care provider statement:     Critical care time (minutes):  65    Critical care time was exclusive of:  Separately billable procedures and treating other patients and teaching time    Critical care was necessary to treat or prevent imminent or life-threatening deterioration of the following conditions:  Circulatory failure, CNS failure or compromise, sepsis and shock    Critical care was time spent personally by me on the following activities:  Development of treatment plan with patient or surrogate, discussions with consultants, evaluation of patient's response to treatment, examination of patient, interpretation of cardiac output measurements, review of old charts, re-evaluation of patient's condition, pulse oximetry, ordering and review of radiographic studies, ordering and review of laboratory studies, ordering and performing treatments and interventions and obtaining history from patient or surrogate    I assumed direction of critical care for this patient from another provider in my specialty: no    Central Line    Date/Time: 3/25/2022 12:04 AM  Performed by: June Moreno MD  Authorized by: June Moreno MD     Consent:     Consent obtained:  Emergent situation  Pre-procedure details:     Hand hygiene: Hand hygiene performed prior to insertion      Sterile barrier technique: All elements of maximal sterile technique followed      Skin preparation:  2% chlorhexidine    Skin preparation agent: Skin preparation agent completely dried prior to procedure    Anesthesia (see MAR for exact dosages):      Anesthesia method:  Local infiltration    Local anesthetic:  Lidocaine 1% w/o epi  Procedure details:     Location:  R internal jugular    Patient position:  Trendelenburg    Procedural supplies:  Triple lumen    Catheter size:  7 Fr    Landmarks identified: yes      Ultrasound guidance: yes      Number of attempts:  1    Successful placement: yes    Post-procedure details:     Post-procedure:  Dressing applied and line sutured    Assessment:  Blood return through all ports, free fluid flow, no pneumothorax on x-ray and placement verified by x-ray    Patient tolerance of procedure: Tolerated well, no immediate complications        Critical Care Time: 65    Diagnosis     Clinical Impression: No diagnosis found. Disposition: Admit ICU    Follow-up Information    None          Patient's Medications   Start Taking    No medications on file   Continue Taking    ACETAMINOPHEN (TYLENOL EXTRA STRENGTH) 500 MG TABLET    Take 2 Tabs by mouth every six (6) hours as needed for Pain. LEVOFLOXACIN (LEVAQUIN) 750 MG TABLET    Take 1 Tablet by mouth daily for 10 days. These Medications have changed    No medications on file   Stop Taking    No medications on file       Jose Guerrero MD   Emergency Medicine   March 24, 2022, 11:58 PM     This note is dictated utilizing Dragon voice recognition software. Unfortunately this leads to occasional typographical errors using the voice recognition. I apologize in advance if the situation occurs. If questions occur please do not hesitate to contact me directly. Patient was seen  and treated during the context of the COVID-19 pandemic. Contemporary protocols utilized based on the best available evidence, utilizing evolving public health  guidelines and treatment protocols.     Burnetta Hodgkins, MD

## 2022-03-25 NOTE — ROUTINE PROCESS
rec'd pt via stretcher from ED. Pt mostly only mumbles, nurse from ED reports pt only speaks Ukrainian. General assessment completed. Remains on levophed -changed to central line instead of peripheral site. Vasopressin started, will continue to titrate to map >65. Mandeep Mims 0330  Multiple lab draws completed per orders, pt appears to be more comfortable, a line insertion completed without difficulty by Nadia Madsen NP.occasionally pt restless, mumbles. [phone]    Updated Cynthia NP on pt status /lab results, also oncoming resident, in process of rec'd new orders.

## 2022-03-25 NOTE — PROGRESS NOTES
MRI Screening form needs to be filled out and faxed to [phone] MRI can be scheduled. If unable to obtain information from pt, [facility] needs to be contacted.  If pt is claustro or will need pain meds, please have ordered in advance in order to facilitate exam.

## 2022-03-25 NOTE — PROGRESS NOTES
Results for Andrea Sun (MRN [id number]) as of 3/25/2022 16:43   Ref. Range 3/25/2022 08:01   pH (POC) Latest Ref Range: 7.35 - 7.45   7.24 (LL)   pCO2 (POC) Latest Ref Range: 35.0 - 45.0 MMHG 30.1 (L)   pO2 (POC) Latest Ref Range: 80 - 100 MMHG 82   HCO3 (POC) Latest Ref Range: 22 - 26 MMOL/L 13.0 (L)   sO2 (POC) Latest Ref Range: 92 - 97 % 94.0   Base deficit (POC) Latest Units: mmol/L 13.0   FIO2 (POC) Latest Units: % 2   Specimen type (POC) Latest Units:   ARTERIAL   Site Latest Units:   DRAWN FROM ARTERIAL LINE   Total resp. rate Latest Units:   20   Results given to Dr Radha Wyatt resident and Agueda PAGAN, she did not want a repeat ABG, pt was already on bicarb drip.

## 2022-03-25 NOTE — CONSULTS
Consult Note         Consult requested by: Elmira Mcknight MD    ADMIT DATE: 3/24/2022    CONSULT DATE: March 25, 2022           Admission diagnosis: AMS   Reason for Nephrology Consultation: GUIDO    Assessment/Plan:  1. Nonoliguric GUIDO ,likely prerenal +/- ATN   2. AMS   3. Urosepsis  4. Hypoxia with Asp PNA  5. Hypotension needing pressors   6. Hypovolumic Hypernatremia   7. AG Metabolic acidoses with lactic acidoses   8. Constipation  9. Hypokalemia   10. Severe hypocalcemia   11. Thrombocytopenia   12. CT concern for liver abscess      Plan:  1) looks really dry, continue bicarb gtt , but make it hypotonic d/t hypernatremia   Increase rate to 125 cc/hrs , add 20 meq Kcl /l   2) replace electrolytes aggressively per protocol , monitor BMP q6 hrs.  3) dove in place   4) keep MAP > 65 , on pressors     Please see consult note to follow,     Please call with questions,     Alexa Llanes MD [facility]  Cell [phone]  Pager: [phone]    HPI:   Patient is a 80-year-old female with history of congestive heart failure failure, prior admissions for altered mental status and UTI, nursing home resident who presents with hypotension and increased somnolence. Patient unable to provide any history as sleepy and barely arousable. Noted to be hypotensive in the emergency room, was started on IV fluids and pressors. Started on Vanco and Zosyn for sepsis followed urosepsis. ID was consulted. Patient also was little bit hypoxic and therefore thought to have aspiration pneumonia. Patient was noted to have an GUIDO, severe metabolic acidosis with lactic acidosis, also thrombocytopenia and leukocytosis. CT abdomen showed possible right liver abscess versus mass, kidneys showed no hydronephrosis or calculi       No past medical history on file. No past surgical history on file.     Social History     Socioeconomic History    Marital status: SINGLE     Spouse name: Not on file    Number of children: Not on file    Years of education: Not on file    Highest education level: Not on file   Occupational History    Not on file   Tobacco Use    Smoking status: Former Smoker    Smokeless tobacco: Never Used   Substance and Sexual Activity    Alcohol use: Not on file    Drug use: Not on file    Sexual activity: Not on file   Other Topics Concern    Not on file   Social History Narrative    Not on file     Social Determinants of Health     Financial Resource Strain:     Difficulty of Paying Living Expenses: Not on file   Food Insecurity:     Worried About Running Out of Food in the Last Year: Not on file    Dorian of Food in the Last Year: Not on file   Transportation Needs:     Lack of Transportation (Medical): Not on file    Lack of Transportation (Non-Medical): Not on file   Physical Activity:     Days of Exercise per Week: Not on file    Minutes of Exercise per Session: Not on file   Stress:     Feeling of Stress : Not on file   Social Connections:     Frequency of Communication with Friends and Family: Not on file    Frequency of Social Gatherings with Friends and Family: Not on file    Attends Yazidi Services: Not on file    Active Member of [address] [place] [address] or Organizations: Not on file    Attends Club or Organization Meetings: Not on file    Marital Status: Not on file   Intimate Partner Violence:     Fear of Current or Ex-Partner: Not on file    Emotionally Abused: Not on file    Physically Abused: Not on file    Sexually Abused: Not on file   Housing Stability:     Unable to Pay for Housing in the Last Year: Not on file    Number of [place] in the Last Year: Not on file    Unstable Housing in the Last Year: Not on file       No family history on file. No Known Allergies     Home Medications:     Medications Prior to Admission   Medication Sig    levoFLOXacin (Levaquin) 750 mg tablet Take 1 Tablet by mouth daily for 10 days.     acetaminophen (Tylenol Extra Strength) 500 mg tablet Take 2 Tabs by mouth every six (6) hours as needed for Pain.        Current Inpatient Medications:     Current Facility-Administered Medications   Medication Dose Route Frequency    vasopressin (VASOSTRICT) 20 Units in 0.9% sodium chloride 100 mL infusion  0.04 Units/min IntraVENous CONTINUOUS    hydrocortisone Sod Succ (PF) (SOLU-CORTEF) injection 50 mg  50 mg IntraVENous Q6H    nystatin (MYCOSTATIN) 100,000 unit/gram cream   Topical TID    insulin lispro (HUMALOG) injection   SubCUTAneous Q6H    glucose chewable tablet 16 g  4 Tablet Oral PRN    glucagon (GLUCAGEN) injection 1 mg  1 mg IntraMUSCular PRN    dextrose 10% infusion 0-250 mL  0-250 mL IntraVENous PRN    albuterol-ipratropium (DUO-NEB) 2.5 MG-0.5 MG/3 ML  3 mL Nebulization Q4H PRN    meropenem (MERREM) 1 g in 0.9% sodium chloride (MBP/ADV) 50 mL MBP  1 g IntraVENous Q12H    thiamine (B-1) 500 mg in 0.9% sodium chloride 50 mL IVPB  500 mg IntraVENous TID    Followed by   María Elena Diana ON 3/27/2022] thiamine (B-1) 200 mg in 0.9% sodium chloride 50 mL IVPB  200 mg IntraVENous TID    sodium bicarbonate (8.4%) 75 mEq in dextrose 5% 1,000 mL infusion   IntraVENous CONTINUOUS    [START ON 3/26/2022] famotidine (PF) (PEPCID) 20 mg in 0.9% sodium chloride 10 mL injection  20 mg IntraVENous Q24H    insulin glargine (LANTUS) injection 5 Units  5 Units SubCUTAneous DAILY    heparin (porcine) injection 5,000 Units  5,000 Units SubCUTAneous Q8H    NOREPINephrine (LEVOPHED) 8 mg in 5% dextrose 250mL (32 mcg/mL) infusion  0.5-50 mcg/min IntraVENous TITRATE    acetaminophen (TYLENOL) tablet 650 mg  650 mg Oral Q6H PRN    Or    acetaminophen (TYLENOL) suppository 650 mg  650 mg Rectal Q6H PRN    polyethylene glycol (MIRALAX) packet 17 g  17 g Oral DAILY PRN    ondansetron (ZOFRAN ODT) tablet 4 mg  4 mg Oral Q8H PRN    Or    ondansetron (ZOFRAN) injection 4 mg  4 mg IntraVENous Q6H PRN    VANCOMYCIN INFORMATION NOTE   Other Rx Dosing/Monitoring     Review of Systems: Unable to obtain       Physical Assessment:     Vitals:    03/25/22 1200 03/25/22 1300 03/25/22 1352 03/25/22 1400   BP: (!) 166/111 (!) 153/89  (!) 147/105   Pulse: 94 94  85   Resp: 24 24 22   Temp: 97.6 °F (36.4 °C)      SpO2: 96% 95%  92%   Weight:       Height:   4' 11\" (1.499 m)      Last 3 Recorded Weights in this Encounter    03/25/22 0130   Weight: 58.1 kg (128 lb 1.4 oz)     Admission weight: Weight: 58.1 kg (128 lb 1.4 oz) (03/25/22 0130)      Intake/Output Summary (Last 24 hours) at 3/25/2022 1501  Last data filed at 3/25/2022 0600  Gross per 24 hour   Intake 846.77 ml   Output 600 ml   Net 246.77 ml     Sleepy , minimally responsive   HEENT: dry mucosa   Lungs: good air entry, clear to auscultation bilaterally. Cardiovascular system: S1, S2, regular rate and rhythm. Abdomen: soft, non tender, non distended. Extremities: no edema     Data Review:    Labs: Results:       Chemistry Recent Labs     03/25/22  1350 03/25/22  0600 03/25/22  0600 03/25/22  0140 03/25/22  0140 03/25/22  0000 03/24/22 1952   *  --  193*  --  238*   < > 166*   *  --  152*  --  150*   < > 151*   K 3.7  --  3.0*  --  3.9   < > 4.0   *  --  121*  --  123*   < > 120*   CO2 21  --  12*  --  9*   < > 15*   BUN 43*  --  48*  --  51*   < > 57*   CREA 1.62*  --  1.72*  --  2.07*   < > 2.38*   CA 8.3*  --  7.4*  --  5.9*   < > 6.5*   AGAP 10  --  19*  --  18   < > 16   BUCR 27*  --  28*  --  25*   < > 24*   AP  --   --   --   --   --   --  76   TP  --   --   --   --   --   --  7.2   ALB  --   --   --   --   --   --  3.6   GLOB  --   --   --   --   --   --  3.6   AGRAT  --   --   --   --   --   --  1.0   PHOS 3.3   < > 4.3   < > 4.8   < >  --     < > = values in this interval not displayed.          CBC w/Diff Recent Labs     03/25/22  1008 03/24/22 1952   WBC 32.8* 21.2*   RBC 4.46 5.01   HGB 12.8 14.4   HCT 40.9 45.2*   PLT 81* 89*   GRANS 89* 88*   LYMPH 6* 8*   EOS 0 0         Iron/Ferritin No results for input(s): IRON in the last 72 hours. No lab exists for component: TIBCCALC   PTH/VIT D No results for input(s): PTH in the last 72 hours.     No lab exists for component: VITD           Kaushal Kingsley MD  3/25/2022  3:01 PM      March 25, 2022

## 2022-03-25 NOTE — CONSULTS
Cardiology Initial Patient Referral Note    Cardiology referral request from [facility]  for evaluation and management/treatment of elevated troponin. Date of  Admission: 3/24/2022  7:17 PM   Primary Care Physician:  None    Attending Cardiologist: Dr. Sánchez Shields:     Hospital Problems  Never Reviewed          Codes Class Noted POA    Septic shock ([facility] [address] ICD-10-CM: A41.9, R65.21  ICD-9-CM: 038.9, 785.52, 995.92  3/24/2022 Unknown             - Metabolic Encephalopathy, with baseline dementia and dysarthria from previous CVA. - Septic shock, likely 2/2 UTI vs GI source, requiring pressor support. - GUIDO, slowly improving.    - NSTEMI, likely type 2. Suspect demand ischemia in setting of above. ECG showed LVH with secondary ST changes. - Loculated left basilar pleural effusion by CT.    - Acute Cystitis, no growth noted on recent cultures. Previous culture + for Klebsiella on 3/15.    - Possible Aspiration PNA. - Thrombocytopenia, plts 89 on admission.    - Cardiomyopathy, ECHO from 10/2021 EF 45-50%. - Moderate Pulmonary HTN by ECHO.   - DM2  - HTN, on Lisinopril, Coreg, Hydralazine and Lasix as outpatient. - Hx CVA, right side hemiparesis with dysarthria and baseline dementia.   -Seizure Disorder   - Peripheral arterial diease   - Moderate Pulmonary hypertension, PASP 52 mmHg   -Obesity     No Primary cardiologist     Plan:       I saw, evaluated, interviewed and examined the patient personally. 77-year-old female who was admitted with altered mental status. Patient currently cannot provide any meaningful history. Patient has been admitted for severe sepsis requiring pressors. Also concern for C. difficile as well as acute cystitis and also aspiration pneumonia. Requiring multiple pressors  Cardiology asked to comment about elevated troponin  I suspect patient's elevated troponins are likely from physiologic demand with severe sepsis.   Type II MI  EKG reviewed personally some nonspecific ST changes  Slight elevation of troponin in the setting of significant physiologic stress and sepsis  We will proceed with echocardiogram to evaluate for EF and wall motion  Continue supportive care from cardiovascular standpoint  Further plan depending on hospital course and echo finding    Significant time spent in reviewing the case, multiple EMR databases, physician notes, reviewing pertinent labs and imaging studies  I spent significant amount of time for medical decision making and updated history, and other providers assessments as well. I personally agree with the findings as stated and the plan as documented. I saw, examined, and evaluated this patient and performed the substantive portion of the encounter for > 50% of the time including extensive history, physical exam, assessment and plan    Rebekah Bowles MD       Trend cardiac enzymes. ECHO pending. Would not start Heparin at this time, unless significant WMA is noted on ECHO. Would closely monitor respiratory status with underlying h/o HFmrEF. Will optimize further GDMT for CMY once off pressor support and able to tolerate po medications. Would resume statin once able. Would continue to hold ASA with thrombocytopenia. Would continue supportive care for now in setting of sepsis. No plans for ischemic workup at this time. Further recommendations pending hospital course/test results. History of Present Illness: This is a 74 y.o. female admitted for Septic shock ([facility] [address] [A41.9, R65.21]. Patient complains of: Sepsis  Alondra Colmenares is a 74 y.o. female, pmhx as stated above, who we are seeing for elevated troponin. Patient is unable to provide pertinent hx d/t AMS and baseline dysarthria. Patient presented from her nursing home with c/o worsening AMS. Patient was hypotensive, suspect 2/2 sepsis with underlying acute cystitis and possible aspiration PNA, requiring multiple pressor support.  Her troponins were found to be elevated, her ECG showed ST changes secondary to LVH. Cardiac risk factors: diabetes mellitus, sedentary life style, hypertension, post-menopausal  Review of Symptoms:     Review of systems not obtained due to patient factors. Past Medical History:   No past medical history on file. Social History:     Social History     Socioeconomic History    Marital status: SINGLE   Tobacco Use    Smoking status: Former Smoker    Smokeless tobacco: Never Used        Family History:   No family history on file.      Medications:   No Known Allergies     Current Facility-Administered Medications   Medication Dose Route Frequency    vasopressin (VASOSTRICT) 20 Units in 0.9% sodium chloride 100 mL infusion  0.04 Units/min IntraVENous CONTINUOUS    hydrocortisone Sod Succ (PF) (SOLU-CORTEF) injection 50 mg  50 mg IntraVENous Q6H    nystatin (MYCOSTATIN) 100,000 unit/gram cream   Topical TID    insulin lispro (HUMALOG) injection   SubCUTAneous Q6H    glucose chewable tablet 16 g  4 Tablet Oral PRN    glucagon (GLUCAGEN) injection 1 mg  1 mg IntraMUSCular PRN    dextrose 10% infusion 0-250 mL  0-250 mL IntraVENous PRN    albuterol-ipratropium (DUO-NEB) 2.5 MG-0.5 MG/3 ML  3 mL Nebulization Q4H PRN    meropenem (MERREM) 1 g in 0.9% sodium chloride (MBP/ADV) 50 mL MBP  1 g IntraVENous Q12H    thiamine (B-1) 500 mg in 0.9% sodium chloride 50 mL IVPB  500 mg IntraVENous TID    Followed by   [facility] ON 3/27/2022] thiamine (B-1) 200 mg in 0.9% sodium chloride 50 mL IVPB  200 mg IntraVENous TID    sodium bicarbonate (8.4%) 75 mEq in dextrose 5% 1,000 mL infusion   IntraVENous CONTINUOUS    [START ON 3/26/2022] famotidine (PF) (PEPCID) 20 mg in 0.9% sodium chloride 10 mL injection  20 mg IntraVENous Q24H    insulin glargine (LANTUS) injection 5 Units  5 Units SubCUTAneous DAILY    heparin (porcine) injection 5,000 Units  5,000 Units SubCUTAneous Q8H    perflutren lipid microspheres (DEFINITY) contrast injection 2 mL  2 mL IntraVENous ONCE    NOREPINephrine (LEVOPHED) 8 mg in 5% dextrose 250mL (32 mcg/mL) infusion  0.5-50 mcg/min IntraVENous TITRATE    acetaminophen (TYLENOL) tablet 650 mg  650 mg Oral Q6H PRN    Or    acetaminophen (TYLENOL) suppository 650 mg  650 mg Rectal Q6H PRN    polyethylene glycol (MIRALAX) packet 17 g  17 g Oral DAILY PRN    ondansetron (ZOFRAN ODT) tablet 4 mg  4 mg Oral Q8H PRN    Or    ondansetron (ZOFRAN) injection 4 mg  4 mg IntraVENous Q6H PRN    VANCOMYCIN INFORMATION NOTE   Other Rx Dosing/Monitoring         Physical Exam:     Visit Vitals  BP (!) 147/105   Pulse 85   Temp 97.6 °F (36.4 °C)   Resp 22   Ht 4' 11\" (1.499 m)   Wt 63.5 kg (140 lb)   SpO2 92%   BMI 28.28 kg/m²       TELE: normal sinus rhythm    BP Readings from Last 3 Encounters:   03/25/22 (!) 147/105   03/15/22 (!) 194/88   10/24/21 139/76     Pulse Readings from Last 3 Encounters:   03/25/22 85   03/15/22 72   10/24/21 80     Wt Readings from Last 3 Encounters:   03/25/22 63.5 kg (140 lb)   03/14/22 70.3 kg (155 lb)   10/22/21 70.3 kg (155 lb)       General:  alert, no distress, appears stated age, confused  Head: Atraumatic and normocephalic   Neck:  no JVD  Lungs:  clear to auscultation bilaterally  Heart:  regular rate and rhythm, S1, S2 normal, no murmur, click, rub or gallop  Abdomen:  abdomen is soft without significant tenderness, masses, organomegaly or guarding  Extremities:  No significant edema B/L   Skin: Warm and dry.  no hyperpigmentation, vitiligo, or suspicious lesions  Neuro: alert, no involuntary movements noted, dysarthria         Data Review:     Recent Labs     03/25/22  1008 03/24/22  1952   WBC 32.8* 21.2*   HGB 12.8 14.4   HCT 40.9 45.2*   PLT 81* 89*     Recent Labs     03/25/22  1350 03/25/22  0600 03/25/22  0140 03/25/22  0000 03/24/22 1952   * 152* 150*   < > 151*   K 3.7 3.0* 3.9   < > 4.0   * 121* 123*   < > 120*   CO2 21 12* 9*   < > 15*   * 193* 238*   < > 166*   BUN 43* 48* 51*   < > 57*   CREA 1.62* 1.72* 2.07*   < > 2.38*   CA 8.3* 7.4* 5.9*   < > 6.5*   MG 2.3 3.1* 1.4*   < > 1.0*   PHOS 3.3 4.3 4.8   < >  --    ALB  --   --   --   --  3.6   ALT  --   --   --   --  15   INR  --   --   --   --  1.4*    < > = values in this interval not displayed.        Results for orders placed or performed during the hospital encounter of 03/24/22   EKG, 12 LEAD, INITIAL   Result Value Ref Range    Ventricular Rate 102 BPM    Atrial Rate 102 BPM    P-R Interval 162 ms    QRS Duration 74 ms    Q-T Interval 416 ms    QTC Calculation (Bezet) 542 ms    Calculated P Axis 77 degrees    Calculated R Axis -18 degrees    Calculated T Axis 76 degrees    Diagnosis       Sinus tachycardia with premature atrial complexes and premature ventricular   complexes or fusion complexes  Minimal voltage criteria for LVH, may be normal variant  Prolonged QT  Abnormal ECG  When compared with ECG of 14-MAR-2022 23:14,  fusion complexes are now present  premature ventricular complexes are now present  premature atrial complexes are now present    Confirmed by Lawernce Hodgkin, MD, Marisol Le ([id number]) on 3/25/2022 9:09:30 AM         All Cardiac Markers in the last 24 hours:    Lab Results   Component Value Date/Time    CPK 60 03/24/2022 07:52 PM       Last Lipid:  No results found for: CHOL, CHOLX, CHLST, CHOLV, HDL, HDLP, LDL, LDLC, DLDLP, TGLX, TRIGL, TRIGP, CHHD, CHHDX    Cardiographics:     EKG Results     Procedure 720 Value Units Date/Time    EKG, 12 LEAD, SUBSEQUENT [[id number]]     Order Status: Sent     EKG, 12 LEAD, SUBSEQUENT [[id number]] Collected: 03/25/22 0925    Order Status: Completed Updated: 03/25/22 1241     Ventricular Rate 98 BPM      Atrial Rate 98 BPM      P-R Interval 164 ms      QRS Duration 76 ms      Q-T Interval 428 ms      QTC Calculation (Bezet) 546 ms      Calculated P Axis 19 degrees      Calculated R Axis -26 degrees      Calculated T Axis 130 degrees      Diagnosis --     Normal sinus rhythm  Left ventricular hypertrophy with repolarization abnormality ( R in aVL )  Prolonged QT  Abnormal ECG  When compared with ECG of 24-MAR-2022 21:01,  premature ventricular complexes are no longer present  T wave inversion now evident in Anterolateral leads  Confirmed by Tim Reyna MD, Caty Lyons ([id number]) on 3/25/2022 12:41:09 PM      EKG, 12 LEAD, INITIAL [[id number]] Collected: 03/24/22 2101    Order Status: Completed Updated: 03/25/22 0909     Ventricular Rate 102 BPM      Atrial Rate 102 BPM      P-R Interval 162 ms      QRS Duration 74 ms      Q-T Interval 416 ms      QTC Calculation (Bezet) 542 ms      Calculated P Axis 77 degrees      Calculated R Axis -18 degrees      Calculated T Axis 76 degrees      Diagnosis --     Sinus tachycardia with premature atrial complexes and premature ventricular   complexes or fusion complexes  Minimal voltage criteria for LVH, may be normal variant  Prolonged QT  Abnormal ECG  When compared with ECG of 14-MAR-2022 23:14,  fusion complexes are now present  premature ventricular complexes are now present  premature atrial complexes are now present    Confirmed by Tim Reyna MD, Caty Lyons [id number]) on 3/25/2022 9:09:30 AM          10/17/21    ECHO ADULT COMPLETE 10/20/2021 10/20/2021    Interpretation Summary  · LV: Estimated LVEF is 45 - 50%. Normal cavity size. Mild concentric hypertrophy. Wall motion: normal.  · PA: Moderate pulmonary hypertension. Pulmonary arterial systolic pressure is 51 mmHg. · Image quality for this study was technically difficult. · Echo study was limited due to patient's condition. · LA: Dilated left atrium. Signed by: Mahamed Starks MD on 10/20/2021 12:28 PM            XR Results (most recent):  Results from [place] encounter on 03/24/22    XR CHEST PORT    Narrative  EXAM: Chest X-Ray    INDICATION:  central line placement.     TECHNIQUE: AP view of the chest 3/24/2022 at 2226 hours    COMPARISON: 3/24/2022 at 2000 hours, 3/14/2022, 10/19/2021    FINDINGS:  Tubes and Lines: Right IJ catheter is present with tip overlying the SVC. Pleura: No pneumothorax appreciated. No effusions appreciated. Lungs:  Interstitial opacities noted bilaterally. This is progressed. Cardiac/Mediastinum/Aorta: It is unremarkable    Pulmonary Vascularity: The pulmonary vasculature is unremarkable. Chest Wall: No acute finding    Osseous Structures: Unremarkable    Upper Abdomen: No acute findings appreciated. Impression  1. New right IJ catheter without evidence complication. 2.  Diffuse bilateral airspace opacities.         Signed By: Christy Smiley PA-C     March 25, 2022

## 2022-03-25 NOTE — CONSULTS
[facility]  (A Division of [address] [place] [address])      Consultation Note      Date of Admission: 3/24/2022    Date of Note: 3/25/2022      Reason for Referral: Sepsis Dr. Robert Stovall  Referring Physician: Dr. Carlita Jules from this admission:   3/24 blood culture: No growth to date x2  3/25 RSV PCR negative  3/25 MRSA nasal swab negative  3/25 respiratory viral panel: Negative    Current Antimicrobials:    Prior Antimicrobials:  Vancomycin 3/24 to present  Meropenem 3/25- present Zosyn 3/24       Assessment:         Septic shock on multiple pressors with lactic acidosis: Suspect related to  source given abnormal UA vs liver abscess? Vs pleural process  Urinary tract infection: UA with greater than 100 WBCs few bacteria large leukocyte esterase negative nitrite; recent cx + for Klebsiella on 3/15  Aspiration pneumonia with hypoxia  Leukocytosis: Increase over the last 24 hours despite antibiotics. Loculated left pleural effusion with calcification and pleural thickening as well as non-specific diffuse opacities -  Noted on CT chest from 3/24. ? Chronic   GUIDO: Creatinine 2.38 upon presentation now improving  Metabolic encephalopathy with baseline dementia/prior CVA/limited verbal interaction  Suspected Right liver abscess vs mass (new since 2017)-3.1 x 5.8 x 4.2 cm, infection vs malignancy. Noted on 3/14 CT scn but not mentioedn in 3/24 CT report. ?? source  Thrombocytopenia  Plan:   Continue vancomycin and meropenem for now   - vanc trough 15-20  F/u blood cx  F/u urine cx  CBC, BMP in am  Trend lactic acid. MRI liver with MRCP ( ok to give without contrast) to help clarify liver process    - further decisions based upon imaging results    Discussed with Dr. Pavel Jules, ICU team and nursing at time of consult. Reviewed CT from 3/14 and 3/24 with radiology.      Rosario Barnard DO  [facility]  [address], [address], [place], [address] [facility] [address]  Office: [phone]  Mobile/Text: [phone]    Lines / Catheters:  A-line left wrist  Peripheral  Right IJ TLC  dove    HPI:  Ms. Collin Anglin is a 75 yo female SNF resident with hx of prior CHF, UTI, and TBI with aphasia who is presenting to the ED from her SNF with hypotension and increased sonmolence. She is not able to provide any pertinet history as she is non-verbal. In the ED she was noted to have severe hypotension with BP with SBP in 70's for which she was given IVF fluid resuscitation and started on IV Vanc and Zosyn. She was subsequently admitted to the ICU for pressor support. Blood cx currently negative, urine cx negative. COVID negative and RVP negative. She had a recetn CT of the abdomen on 3/14 which had revealed a possible abscess/liver lesion. Repeat CT C/A/P with diffuse non-specific infiltrates and a left effusion, no mention of intra-abdominal process. She had been given Ceftriaxone at her ED visit on 3/14 followed by a course of ceftin. No past medical history on file. No past surgical history on file. No family history on file.   Medications reviewed as below:   Current Facility-Administered Medications   Medication Dose Route Frequency Provider Last Rate Last Admin    vasopressin (VASOSTRICT) 20 Units in 0.9% sodium chloride 100 mL infusion  0.04 Units/min IntraVENous CONTINUOUS Jose CAIN NP 12 mL/hr at 03/25/22 0558 0.04 Units/min at 03/25/22 0558    hydrocortisone Sod Succ (PF) (SOLU-CORTEF) injection 50 mg  50 mg IntraVENous Q6H Cynthia Montano NP   50 mg at 03/25/22 0553    nystatin (MYCOSTATIN) 100,000 unit/gram cream   Topical TID Cheri Barthel, NP   Given at 03/25/22 0850    insulin lispro (HUMALOG) injection   SubCUTAneous Q6H Jose CAIN NP   2 Units at 03/25/22 0553    glucose chewable tablet 16 g  4 Tablet Oral PRN Cheri Barthel, NP        glucagon (GLUCAGEN) injection 1 mg  1 mg IntraMUSCular PRN Cheri Barthel, NP        dextrose 10% infusion 0-250 mL  0-250 mL IntraVENous PRN Shayan Gavin NP        albuterol-ipratropium (DUO-NEB) 2.5 MG-0.5 MG/3 ML  3 mL Nebulization Q4H PRN Shweta Blackburn NP        meropenem (MERREM) 1 g in 0.9% sodium chloride (MBP/ADV) 50 mL MBP  1 g IntraVENous Q12H Shayan Gavin NP        calcium gluconate 4 g in 0.9% sodium chloride 100 mL IVPB  4 g IntraVENous ONCE Agueda Alanis PA-C        potassium chloride 10 mEq in 100 ml IVPB  10 mEq IntraVENous Q1H Agueda Alanis PA-C 100 mL/hr at 03/25/22 0851 10 mEq at 03/25/22 0851    thiamine (B-1) 500 mg in 0.9% sodium chloride 50 mL IVPB  500 mg IntraVENous TID Agueda Flores PA-C        Followed by   Shelby Bond ON 3/27/2022] thiamine (B-1) 200 mg in 0.9% sodium chloride 50 mL IVPB  200 mg IntraVENous TID Agueda Alanis PA-C        sodium bicarbonate (8.4%) 75 mEq in dextrose 5% 1,000 mL infusion   IntraVENous CONTINUOUS Betty Marti MD        [facility] ON 3/26/2022] famotidine (PF) (PEPCID) 20 mg in 0.9% sodium chloride 10 mL injection  20 mg IntraVENous Q24H Nilsa Keen MD        insulin glargine (LANTUS) injection 5 Units  5 Units SubCUTAneous DAILY Zach Aldridge MD        NOREPINephrine (LEVOPHED) 8 mg in 5% dextrose 250mL (32 mcg/mL) infusion  0.5-50 mcg/min IntraVENous TITRATE Aniyah Ramirez MD 15 mL/hr at 03/25/22 1002 8 mcg/min at 03/25/22 1002    acetaminophen (TYLENOL) tablet 650 mg  650 mg Oral Q6H PRN Shayan Gavin NP        Or    acetaminophen (TYLENOL) suppository 650 mg  650 mg Rectal Q6H PRN Shayan Gavin NP        polyethylene glycol (MIRALAX) packet 17 g  17 g Oral DAILY PRN Mannie CAIN NP        ondansetron (ZOFRAN ODT) tablet 4 mg  4 mg Oral Q8H PRN Mannie CANI NP        Or    ondansetron (ZOFRAN) injection 4 mg  4 mg IntraVENous Q6H PRN Shayan Gavin NP        VANCOMYCIN INFORMATION NOTE   Other Rx Dosing/Monitoring Shayan Gavin NP         No Known Allergies  Social History     Socioeconomic History    Marital status: SINGLE     Spouse name: Not on file    Number of children: Not on file    Years of education: Not on file    Highest education level: Not on file   Occupational History    Not on file   Tobacco Use    Smoking status: Former Smoker    Smokeless tobacco: Never Used   Substance and Sexual Activity    Alcohol use: Not on file    Drug use: Not on file    Sexual activity: Not on file   Other Topics Concern    Not on file   Social History Narrative    Not on file     Social Determinants of Health     Financial Resource Strain:     Difficulty of Paying Living Expenses: Not on file   Food Insecurity:     Worried About Running Out of Food in the Last Year: Not on file    Dorian of Food in the Last Year: Not on file   Transportation Needs:     Lack of Transportation (Medical): Not on file    Lack of Transportation (Non-Medical):  Not on file   Physical Activity:     Days of Exercise per Week: Not on file    Minutes of Exercise per Session: Not on file   Stress:     Feeling of Stress : Not on file   Social Connections:     Frequency of Communication with Friends and Family: Not on file    Frequency of Social Gatherings with Friends and Family: Not on file    Attends [employer] Services: Not on file    Active Member of [address] [place] [address] [place] or Organizations: Not on file    Attends Club or Organization Meetings: Not on file    Marital Status: Not on file   Intimate Partner Violence:     Fear of Current or Ex-Partner: Not on file    Emotionally Abused: Not on file    Physically Abused: Not on file    Sexually Abused: Not on file   Housing Stability:     Unable to Pay for Housing in the Last Year: Not on file    Number of [place] in the Last Year: Not on file    Unstable Housing in the Last Year: Not on file        Review of Systems    unable to assess due to clinical condition    Objective:        Visit Vitals  BP (!) 144/101   Pulse 99 Temp 98.7 °F (37.1 °C)   Resp 22   Ht 4' 11\" (1.499 m)   Wt 58.1 kg (128 lb 1.4 oz)   SpO2 98%   BMI 25.87 kg/m²     Temp (24hrs), Av.4 °F (37.4 °C), Min:97.7 °F (36.5 °C), Max:100.9 °F (38.3 °C)        General:  , Difficult to arouse, sleepy, nonverbal   Skin:   no rashes or skin lesions noted on limited exam, cool to touch   HEENT:  Normocephalic, atraumatic, PERRL, EOMI, no scleral icterus or pallor; no conjunctival hemmohage;  nasal and oral mucous are moist and without evidence of lesions. No thrush. Neck supple, no bruits. Lymph Nodes:   no cervical, axillary or inguinal adenopathy   Lungs:   non-labored, bilaterally clear to aspiration- no crackles wheezes rales or rhonchi   Heart:  RRR, s1 and s2; no murmurs rubs or gallops, no edema, + pedal pulses   Abdomen:  soft, non-distended, active bowel sounds, no hepatomegaly, no splenomegaly. Grunts with palpation to the lower abdomen. No mid-epigastric tenderness   Genitourinary: Fallon   Extremities:   no clubbing, cyanosis; no joint effusions or swelling; Full ROM of all large joints to the upper and lower extremities; muscle mass appropriate for age   Neurologic:  No gross focal sensory abnormalities; opens eyes and tracks, response to pain nonverbal, right upper extremity appears to be contracted, focal weakness on the right when compared to the left as left side has noted independent movement.    Psychiatric:   Calm       Labs: Results:   Chemistry Recent Labs     22  0600 22  0140 22  0000 22   * 238* 154*   < > 166*   * 150* 151*   < > 151*   K 3.0* 3.9 4.0   < > 4.0   * 123* 127*   < > 120*   CO2 12* 9* 11*   < > 15*   BUN 48* 51* 49*   < > 57*   CREA 1.72* 2.07* 2.00*   < > 2.38*   CA 7.4* 5.9* 5.5*   < > 6.5*   AGAP 19* 18 13   < > 16   BUCR 28* 25* 25*   < > 24*   AP  --   --   --   --  76   TP  --   --   --   --  7.2   ALB  --   --   --   --  3.6   GLOB  --   --   --   --  3.6 AGRAT  --   --   --   --  1.0    < > = values in this interval not displayed.       CBC w/Diff Recent Labs     03/25/22  1008 03/24/22 1952   WBC 32.8* 21.2*   RBC 4.46 5.01   HGB 12.8 14.4   HCT 40.9 45.2*   PLT 81* 89*   GRANS 89* 88*   LYMPH 6* 8*   EOS 0 0            No results found for: SDES Lab Results   Component Value Date/Time    Culture result: NO GROWTH AFTER 13 HOURS 03/24/2022 07:52 PM    Culture result: NO GROWTH AFTER 13 HOURS 03/24/2022 07:45 PM    Culture result: NO GROWTH 6 DAYS 03/15/2022 05:05 AM    Culture result: NO GROWTH 6 DAYS 03/15/2022 04:50 AM    Culture result: KLEBSIELLA PNEUMONIAE (A) 03/15/2022 01:18 AM        Results     Procedure Component Value Units Date/Time    CULTURE, MRSA [[id number]] Collected: 03/25/22 0134    Order Status: Completed Specimen: Nasal from Nares Updated: 03/25/22 0212    RESPIRATORY VIRUS PANEL W/COVID-19, PCR [[id number]] Collected: 03/25/22 0030    Order Status: Completed Specimen: Nasopharyngeal Updated: 03/25/22 0205     Adenovirus Not detected        Coronavirus 229E Not detected        Coronavirus HKU1 Not detected        Coronavirus CVNL63 Not detected        Coronavirus OC43 Not detected        SARS-CoV-2, PCR Not detected        Metapneumovirus Not detected        Rhinovirus and Enterovirus Not detected        Influenza A Not detected        Influenza B Not detected        Parainfluenza 1 Not detected        Parainfluenza 2 Not detected        Parainfluenza 3 Not detected        Parainfluenza virus 4 Not detected        RSV by PCR Not detected        B. parapertussis, PCR Not detected        Bordetella pertussis - PCR Not detected        Chlamydophila pneumoniae DNA, QL, PCR Not detected        Mycoplasma pneumoniae DNA, QL, PCR Not detected       CULTURE, RESPIRATORY/SPUTUM/BRONCH Renee Patel [[id number]]     Order Status: Sent Specimen: Sputum     CULTURE, BLOOD [[id number]] Collected: 03/24/22 1952    Order Status: Completed Specimen: Blood Updated: 03/25/22 0956     Special Requests: NO SPECIAL REQUESTS        Culture result: NO GROWTH AFTER 13 HOURS       CULTURE, BLOOD [[id number]] Collected: 03/24/22 1945    Order Status: Completed Specimen: Blood Updated: 03/25/22 0956     Special Requests: NO SPECIAL REQUESTS        Culture result: NO GROWTH AFTER 13 HOURS       CULTURE, BLOOD [[id number]] Collected: 03/15/22 0505    Order Status: Completed Specimen: Blood Updated: 03/21/22 0746     Special Requests: NO SPECIAL REQUESTS        Culture result: NO GROWTH 6 DAYS       COVID-19 RAPID TEST [[id number]] Collected: 03/15/22 0450    Order Status: Completed Specimen: Nasopharyngeal Updated: 03/15/22 0525     Specimen source Nasopharyngeal        COVID-19 rapid test Not detected        Comment: Rapid Abbott ID Now       Rapid NAAT:  The specimen is NEGATIVE for SARS-CoV-2, the novel coronavirus associated with COVID-19. Negative results should be treated as presumptive and, if inconsistent with clinical signs and symptoms or necessary for patient management, should be tested with an alternative molecular assay. Negative results do not preclude SARS-CoV-2 infection and should not be used as the sole basis for patient management decisions. This test has been authorized by the FDA under an Emergency Use Authorization (EUA) for use by authorized laboratories.    Fact sheet for Healthcare Providers: ConventionUpdate.co.nz  Fact sheet for Patients: ConventionUpdate.co.nz       Methodology: Isothermal Nucleic Acid Amplification         CULTURE, BLOOD [[id number]] Collected: 03/15/22 0450    Order Status: Completed Specimen: Blood Updated: 03/21/22 0746     Special Requests: NO SPECIAL REQUESTS        Culture result: NO GROWTH 6 DAYS       CULTURE, URINE [[id number]]     Order Status: Canceled Specimen: Urine from Clean catch     CULTURE, URINE [[id number]]  (Abnormal)  (Susceptibility) Collected: 03/15/22 0118    Order Status: Completed Specimen: Urine from Clean catch Updated: 03/17/22 0912     Special Requests: NO SPECIAL REQUESTS        Hunter Count --        >100,000  COLONIES/mL       Culture result: KLEBSIELLA PNEUMONIAE       Susceptibility      Klebsiella pneumoniae     ESTRELLA     Amikacin ($) Susceptible     Ampicillin ($) Resistant     Ampicillin/sulbactam ($) Susceptible     Cefazolin ($) Susceptible     Cefepime ($$) Susceptible     Cefoxitin Susceptible     Ceftazidime ($) Susceptible     Ceftriaxone ($) Susceptible     Ciprofloxacin ($) Susceptible     Gentamicin ($) Susceptible     Levofloxacin ($) Susceptible     Meropenem ($$) Susceptible     Nitrofurantoin Susceptible     Piperacillin/Tazobac ($) Susceptible     Tobramycin ($) Susceptible     Trimeth/Sulfa Susceptible                  Linear View                           Imaging:      All imaging reviewed from Admission to present as per radiology interpretation in [address] [place] [address]

## 2022-03-25 NOTE — CONSULTS
Comprehensive Nutrition Assessment    Type and Reason for Visit: Initial,Positive nutrition screen,Consult      Nutrition Recommendations/Plan:  - When NGT placement is verified by KUB, start trickle tube feeds of Glucerna 1.5 at rate of 10 mL/hr with water flushes of 50 mL q 4 hours. - If tube feeds tolerated in next 24-48 hours and electrolytes stable/ WNL for advancement of tube feeds, advance by 10 mL q 8 hours to goal rate of 45 mL/hr with water flushes of 100 mL q 4 hours. - MD to address as medically appropriate       (goal EN regimen provides: 1620 kcal, 89 gm protein, 820 mL free water, 100% RDIs)    - IVF per MD  - Monitor and replace electrolytes as needed due to concern for refeeding syndrome - MD currently addressing       Nutrition Assessment:  Pt admitted from nursing home with altered mentation. Pt is non-verbal. SLP consulted for swallow evaluation; pt uncooperative, refused po, and spit out water. Pt remains NPO. Per MD, plan for NGT placement and initiation of trickle tube feeds due to concern for refeeding syndrome. Pt admitted for septic shock. S/p CT of abdomen pelvis on 3/24; findings included cholelithiasis, moderate burden of rectal stool. K low this morning; being replaced. Discussed with MD about providing tube feeding recommendations for when plan to advance towards goal rate; MD agreed with plan and will address advancing tube feeding rate when medically appropriate    Malnutrition Assessment:  Malnutrition Status: At risk for malnutrition (specify) (wt loss PTA per chart hx)      Nutrition History and Allergies: Past medical hx: heart failure, stroke, aphasia, non-verbal at baseline, HTN, DM type 2, hx of obesity. Wt trends:  160 lb on 12/11/2017,   160 lb on 9/26/2019,   155 lb on 10/22/2021,  155 lb on 3/14/2022,   128 lb on 3/25/2022.     No known food allergies     Estimated Daily Nutrient Needs:  Energy (kcal): 2070-5910; Weight Used for Energy Requirements: Admission (58.1 kg)  Protein (g): 46-58; Weight Used for Protein Requirements: Admission (x0.8-1)  Fluid (ml/day): 500 mL + total output; Method Used for Fluid Requirements: Standard renal (per recommendations for GUIDO)      Nutrition Related Findings:  BM unknown. no edema. Pertinent meds: Ca gluconate, steroid, lantus, SSI, levophed, KCl.     IVF, Na bicarbonate 75 mEq in D5 1000 mL infusion at 125 mL/hr (150 gm dextrose, 510 kcal per day)      Wounds:    None       Current Nutrition Therapies:  DIET NPO    Anthropometric Measures:  · Height:  4' 11\" (149.9 cm)  · Current Body Wt:  58.1 kg (128 lb 1.4 oz)   · Admission Body Wt:  128 lb 1.4 oz    · Usual Body Wt:  70.3 kg (155 lb)     · Ideal Body Wt:  95 lbs:  134.8 %   · Adjusted Body Weight:   ; Weight Adjustment for: No adjustment  · BMI Category: Overweight (BMI 25.0-29. 9)       Nutrition Diagnosis:   · Inadequate oral intake related to cognitive or neurological impairment as evidenced by NPO or clear liquid status due to medical condition      Nutrition Interventions:   Food and/or Nutrient Delivery: Continue NPO,Mineral supplement,Start tube feeding,IV fluid delivery  Nutrition Education and Counseling: No recommendations at this time,Education not indicated  Coordination of Nutrition Care: Continue to monitor while inpatient,Speech therapy,Swallow evaluation,Interdisciplinary rounds    Goals:  Nutritional needs will be met through adequate oral intake or nutrition support within the next follow up date       Nutrition Monitoring and Evaluation:   Behavioral-Environmental Outcomes: None identified  Food/Nutrient Intake Outcomes: Diet advancement/tolerance,Enteral nutrition intake/tolerance,Vitamin/mineral intake,IVF intake  Physical Signs/Symptoms Outcomes: Biochemical data,Chewing or swallowing    Discharge Planning:     Too soon to determine     Electronically signed by Anitra Carr RD on 3/25/2022 at 10:50 AM    Contact: [phone] 24-Nov-2021 18:00

## 2022-03-25 NOTE — PROGRESS NOTES
[facility] Pharmacokinetic Monitoring Service - Vancomycin    Indication: sepsis  Target Concentration: Dosing based on anticipated concentration < 20 mg/L due to renal impairment/insufficiency  Day of Therapy: 2  Additional Antimicrobials: meropenem    Pertinent Laboratory Values: Wt Readings from Last 1 Encounters:   03/25/22 58.1 kg (128 lb 1.4 oz)     Temp Readings from Last 1 Encounters:   03/25/22 97.7 °F (36.5 °C)     No components found for: PROCAL  Estimated Creatinine Clearance: 25.1 mL/min (A) (based on SCr of 1.72 mg/dL (H)). Recent Labs     03/24/22 1952   WBC 21.2*     Pertinent Cultures:  Culture Date Source Results   3/24 blood pending   MRSA Nasal Swab: was ordered by provider, awaiting results.     Assessment:  Date/Time Current Dose Concentration Timing of Concentration (h) AUC   3/24 1,500 mg x1 - - -   3/25 - - - -   Note: Serum concentrations collected for AUC dosing may appear elevated if collected in close proximity to the dose administered, this is not necessarily an indication of toxicity    Plan:  GUIDO, SCr trending down, however remains elevated - dose by level  No dose today  Ordered a level for 3/26 with AM labs  Pharmacy will continue to monitor patient and adjust therapy as indicated    Thank you for the consult,  JULIAN Barnard  3/25/2022

## 2022-03-25 NOTE — PROGRESS NOTES
Bedside and Verbal shift change report given to [facility], RN (oncoming nurse) by Gail Alcantara RN (offgoing nurse). Report included the following information SBAR, Intake/Output, MAR, Recent Results and Med Rec Status. Bedside and Verbal shift change report given to RN (oncoming nurse) by [facility], RN (offgoing nurse). Report included the following information SBAR, Intake/Output, MAR, Recent Results and Med Rec Status.

## 2022-03-25 NOTE — PROGRESS NOTES
Rang both Numbers in chart. Message left on cell number, home phone number's answering machine with a name not matching name in chart- no message left.      Amilcar Alexis PA-C  03/25/22  Pulmonary, Critical Care Medicine  [address] [facility]

## 2022-03-25 NOTE — PROGRESS NOTES
provided an introductory visit for [facility], who is a 74 y.o.,female. Patient's Primary Language is: Ukrainian. According to the patient's EMR Worship Affiliation is: No Baptism. The reason the Patient came to the hospital is:   Patient Active Problem List    Diagnosis Date Noted    Septic shock ([facility] [address] 03/24/2022    Pneumonia 10/17/2021    UTI (urinary tract infection) 12/08/2017        The  provided the following Interventions:  Initiated a relationship of care and support through a compassionate presence and an introduction to the availability of spiritual and emotional support. Chart reviewed. The following outcomes where achieved:  Ms. Mari Sutton received a 's visit. Assessment:  Ms. Mari Sutton did not appear distressed at the time of my visit. Plan:  Chaplains will continue to follow and will provide pastoral care on an as needed/requested basis.  recommends bedside caregivers page  on duty if patient shows signs of acute spiritual or emotional distress.     [address] [place]   [phone]

## 2022-03-25 NOTE — PROCEDURES
[facility]  Arterial  Line Procedure Note    Indication: shock, need for freq BP monitoring     Arterial line placed emergently      Line Bundle:   Full sterile barrier precautions used. 7-Step Sterility Protocol followed. (cap, mask sterile gown, sterile gloves, large sterile sheet, hand hygiene, 2% chlorhexidine for cutaneous antisepsis)  5 mL 1% Lidocaine placed at insertion site. Left radial artery cannulated x 1 attempt(s) utilizing the modified Seldinger technique. Good blood return. Catheter secured & Biopatch applied. Sterile Tegaderm placed. First assist: None.     Yessenia Barcenas NP  03/25/22  Pulmonary, Critical Care Medicine  [facility]

## 2022-03-25 NOTE — PROGRESS NOTES
Patient seen and examined, please see consult note from later today   Nonoliguric GUIDO ,likely prerenal +/- ATN   AMS  Urosepsis  Hypoxia with Asp PNA  Hypotension needing pressors   Hypovolumic Hypernatremia   AG Metabolic acidoses with lactic acidoses   Constipation  Hypokalemia   Severe hypocalcemia   Thrombocytopenia     Plan:  1) looks really dry, continue bicarb gtt , but make it hypotonic d/t hypernatremia   Increase rate to 125 cc/hrs , add 20 meq Kcl /l   2) replace electrolytes aggressively per protocol , monitor BMP qhrs.   3) dove in place     Please see consult note to follow    Please call with questions,    Vanessa Steel MD FASN  Cell [phone]  Pager: [phone]

## 2022-03-25 NOTE — H&P
[facility]  Pulmonary, Critical Care, and Sleep Medicine    Name: Joey Stone MRN: [id number]   : 1955 Hospital: [address] [place] [address] Dr   Date: 3/24/2022        Critical Care History and Physical      IMPRESSION:   · Septic vs. hypovolemic shock- febrile, leukocytosis, hypotensive, tachycardic. Unknown etiology, likely in the setting of acute cystitis with hx of frequent UTI's growing Klebsiella pneumoniae and Serratia marcescens vs. abd etiology with noted dilated gallbladder on CT A/P vs. Atypical PNA. No obstructive uropathy noted on CT A/P.   · Cannot r/o Atypical PNA. Will r/o COVID   · Acute on chronic encephalopathy- superimposed on TBI (non-verbal at baseline), in the setting of sepsis  · GUIDO- pre-renal vs. Ischemic ATN- likely in the setting of dehydration and shock  · Anion Gap Metabolic Acidosis- in the setting of GUIDO, lactic acidosis   · Electrolyte derangements- hyperchloremia, hypernatremia, hypomagnesemia, and hypocalcemia  · Thrombocytopenia  · Distended gallbladder with cholelithiasis- no evidence of cholecystitis  · Liver Mass, right lobe- infectious vs. Neoplastic. 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 Some fluid overload/pulmonary edema noted on xray   · Traumatic brain injury and intellectual disability   · Loculated, Chronic left pleural Effusion  · Multinodular thyroid- enlarged, heterogeneous   · Chronic LMCA infarct-Hx stroke with right-sided hemiparesis, and aphasia, dysarthria  · Right frontal lobe encephalomalacia- likely in the setting of chronic infarct vs. TBI   · Seizure disorder  · HTN  · DM II   · Obesity   · Moderate Pulmonary HTN- PASP 52 mmHg     Patient Active Problem List   Diagnosis Code    UTI (urinary tract infection) N39.0    Pneumonia J18.9        RECOMMENDATIONS:   Neuro:  - Q4h neuro checks per unit protocol.   -CT head negative  -non-verbal baseline, currently only moaning and groaning to pain/discomfort   Pulm:   -On room air   -Supplemental O2 via NC if needed, titrate flow for goal SPO2> 90%   -Pulmonary hygiene care  - Aspiration precautions, Keep HOB >30 degrees  CVS :  -Actively titrate vasopressors aim MAP >65mmHg  -Continue Levophed. Start Vasopressin   -Check cardiac panel  -Obtain ECHO results   GI:   -SUP  -Trend LFTs  Zofran PRN for N/V  -Diet/NPO  -Obtain RUQ US-->re: gallbladder distention, liver mass  -Obtain acute hepatitis panel, lipase, and amylase  -Can obtain non-emergent HIDA scan if gallbladder becomes of concern   Renal:    -Trend Renal indices   -Strict Is/Os  -Dove (+)  -Consult Nephrology if renal indices show no improvement   Hem/Onc:   -Subq Heparin   -Monitor for s/o active bleeding  -Monitor platelets, may need to hold subq Heparin if further decline   I/D:  -Abx were started prior to collecting U/A while patient in ER  -Sepsis bundle per hospital protocol, Blood, Sputum, and Urine cultures drawn and will be followed  -Lactic acid ordered- initial and repeat Q4hrs till normalized. Antibiotics: Merrem/Vanco  - Trend WBCs and temperature curve  -Obtain procal   -Obtain RVP   -Obtain MRSA swab   Endocrine:   -Q6 glucoses, SSI.   -Check TSH level  -Add stress dose steroids   Metabolic:    -Daily BMP, mag, phos  - Trend lytes, replace as needed. Musc/Skin:   -Presented from nursing home with Stage II on sacrum and an additional healed area to sacrum   -Wound care to cintia-area. Apply Nystatin TID  Full Code       Best practice : APPLICABLE TO PATIENT    Glycemic control  IHI ICU bundles:   Central Line Bundle Followed  and Dove Bundle Followed    Mech Vent patients-    N/A   Stress ulcer prophylaxis. Pepcid  DVT prophylaxis. SQH  Need for Lines, dove assessed. Palliative care evaluation. Restraints need. Restraint evaluation     Patient does not require restraints           Subjective/History:      This patient has been seen and evaluated at the request of  Nisha Underwood for Septic Shock requiring vasopressor support. 03/24/22    Patient is a 74 y.o. female with a PMHx of Systolic HFmrEF- EF 04-36% , stroke with right-sided hemiparesis, aphasia, and dysarthria, TBI with a non-verbal baseline, HTN, DM II, and obesity presented today from the nursing home. According to the nursing home staff, the patient had altered mental status and they noticed a new sore on her lip. On arrival to the ER, the patient was noted to be severely hypotensive (SBP's 70's) with a fever. Aggressive fluid resuscitation was initiated with initial improvement, however, patient continued to remain hypotensive with a MAP below 65, therefore, central line was placed and Levophed was started. She was also noted to be tachycardic, with elevated WBC's (21.2), and with a lactic acid of 2.23. Sepsis protocol was initiated and broad spectrum abx were given. Of note, patient was given abx prior to urine specimen being collected. Initially, ER thought the source was largely r/t  intra-abdominal source given patient's recent history of a known liver mass/abscess and profound abdominal tenderness on exam. Patient also demonstrates a hx of multiple UTI's and some possible atypical PNA findings on CT chest/A/P. She also has a dilated gallbladder with cholelithiasis. There were several electrolyte derangements, to include,  Hyperchloremia (120), hypernatremia (151), hypomagnesemia (1), and hypocalcemia (6.5). Mg 2 gm was given in ER. Coags are unremarkable. Lipase 181 and amylase 47. Troponin 43. Thrombocytopenic with platelets level of 89. She has a significant GUIDO, likely in the setting of dehydration +/- ischemic ATN with Cr 2.38. She is currently on room air. Requires Levophed gtt for BP support. BP in ER 80's/50's on the above dose. Vasopressin will added. Will r/o COVID with RVP. The patient is critically ill and can not provide additional history due to Unable to speak.        No past medical history on file. No past surgical history on file. Prior to Admission medications    Medication Sig Start Date End Date Taking? Authorizing Provider   levoFLOXacin (Levaquin) 750 mg tablet Take 1 Tablet by mouth daily for 10 days. 3/15/22 3/25/22  Mely Gaytan MD   acetaminophen (Tylenol Extra Strength) 500 mg tablet Take 2 Tabs by mouth every six (6) hours as needed for Pain. 20   LATASHA Swift       Current Facility-Administered Medications   Medication Dose Route Frequency    vancomycin (VANCOCIN) 1500 mg in  ml infusion  1,500 mg IntraVENous NOW    magnesium sulfate 2 g/50 ml IVPB (premix or compounded)  2 g IntraVENous ONCE    NOREPINephrine (LEVOPHED) 8 mg in 5% dextrose 250mL (32 mcg/mL) infusion  0.5-50 mcg/min IntraVENous TITRATE    meropenem (MERREM) 1 g in sterile water (preservative free) 20 mL IV syringe  1 g IntraVENous NOW    [START ON 3/25/2022] famotidine (PF) (PEPCID) 20 mg in 0.9% sodium chloride 10 mL injection  20 mg IntraVENous BID    magnesium sulfate 2 g/50 ml IVPB (premix or compounded)  2 g IntraVENous Q1H       No Known Allergies     Social History     Tobacco Use    Smoking status: Former Smoker    Smokeless tobacco: Never Used   Substance Use Topics    Alcohol use: Not on file        No family history on file. Review of Systems:  Review of systems not obtained due to patient factors. Objective:   Vital Signs:    Visit Vitals  BP (!) 79/45   Pulse 100   Temp (!) 100.9 °F (38.3 °C)   Resp 25   SpO2 94%       O2 Device: None (Room air)       Temp (24hrs), Av.9 °F (38.3 °C), Min:100.9 °F (38.3 °C), Max:100.9 °F (38.3 °C)       Intake/Output:   Last shift:      No intake/output data recorded. Last 3 shifts: No intake/output data recorded.   No intake or output data in the 24 hours ending 22 [id number]        Physical Exam:     General:   Arousable to pain, non-verbal, moans and groans, mild distress    Head:  Normocephalic, without obvious abnormality, atraumatic. Eyes:  Conjunctivae/corneas clear. PERRL,   Nose: Nares normal. Septum midline. Mucosa normal. No drainage or sinus tenderness. Throat: Lips, mucosa, and tongue dry. Teeth and gums normal or poor dentition with old, dried blood in oral cavity    Neck: Supple, symmetrical, trachea midline, no adenopathy, thyroid: no enlargment/tenderness/nodules, no carotid bruit and no JVD. Back:   Symmetric, no curvature. Lungs:   Clear to auscultation bilaterally. Chest wall:  No tenderness or deformity. Heart:  Tachycardia , S1, S2 normal, no murmur, click, rub or gallop. Abdomen:   Soft,+ tender with palpation. Bowel sounds hypoactive. No masses,  No organomegaly. Extremities: Extremities normal, atraumatic, no cyanosis. Trace Edema BUE/BLE. RUE hand contracture. NO restraints    Pulses: 2+ and symmetric all extremities. Skin: Skin color, texture, turgor normal. No rashes or lesions. RLE shin/foot with bruising.  Extensive redness to cintia-area (appears yeast in nature)    Lymph nodes:  Cervical, supraclavicular, and axillary nodes normal.   Neurologic: Arouses to discomfort, moans and groans, no command following      Devices:  · Lines: Right CVL IJ  · Fallon:    Data:     Recent Results (from the past 24 hour(s))   GLUCOSE, POC    Collection Time: 03/24/22  7:27 PM   Result Value Ref Range    Glucose (POC) 163 (H) 70 - 110 mg/dL   CBC WITH AUTOMATED DIFF    Collection Time: 03/24/22  7:52 PM   Result Value Ref Range    WBC 21.2 (H) 4.6 - 13.2 K/uL    RBC 5.01 4.20 - 5.30 M/uL    HGB 14.4 12.0 - 16.0 g/dL    HCT 45.2 (H) 35.0 - 45.0 %    MCV 90.2 78.0 - 100.0 FL    MCH 28.7 24.0 - 34.0 PG    MCHC 31.9 31.0 - 37.0 g/dL    RDW 15.1 (H) 11.6 - 14.5 %    PLATELET 89 (L) 431 - 420 K/uL    MPV 13.4 (H) 9.2 - 11.8 FL    NRBC 0.0 0  WBC    ABSOLUTE NRBC 0.00 0.00 - 0.01 K/uL    NEUTROPHILS 88 (H) 40 - 73 %    LYMPHOCYTES 8 (L) 21 - 52 %    MONOCYTES 3 3 - 10 %    EOSINOPHILS 0 0 - 5 % BASOPHILS 0 0 - 2 %    IMMATURE GRANULOCYTES 1 (H) 0.0 - 0.5 %    ABS. NEUTROPHILS 18.7 (H) 1.8 - 8.0 K/UL    ABS. LYMPHOCYTES 1.7 0.9 - 3.6 K/UL    ABS. MONOCYTES 0.6 0.05 - 1.2 K/UL    ABS. EOSINOPHILS 0.0 0.0 - 0.4 K/UL    ABS. BASOPHILS 0.0 0.0 - 0.1 K/UL    ABS. IMM. GRANS. 0.2 (H) 0.00 - 0.04 K/UL    DF AUTOMATED      PLATELET COMMENTS DECREASED PLATELETS      RBC COMMENTS LILIAM CELLS  FEW       METABOLIC PANEL, COMPREHENSIVE    Collection Time: 03/24/22  7:52 PM   Result Value Ref Range    Sodium 151 (H) 136 - 145 mmol/L    Potassium 4.0 3.5 - 5.5 mmol/L    Chloride 120 (H) 100 - 111 mmol/L    CO2 15 (L) 21 - 32 mmol/L    Anion gap 16 3.0 - 18 mmol/L    Glucose 166 (H) 74 - 99 mg/dL    BUN 57 (H) 7.0 - 18 MG/DL    Creatinine 2.38 (H) 0.6 - 1.3 MG/DL    BUN/Creatinine ratio 24 (H) 12 - 20      GFR est AA 25 (L) >60 ml/min/1.73m2    GFR est non-AA 20 (L) >60 ml/min/1.73m2    Calcium 6.5 (L) 8.5 - 10.1 MG/DL    Bilirubin, total 0.4 0.2 - 1.0 MG/DL    ALT (SGPT) 15 13 - 56 U/L    AST (SGOT) 22 10 - 38 U/L    Alk.  phosphatase 76 45 - 117 U/L    Protein, total 7.2 6.4 - 8.2 g/dL    Albumin 3.6 3.4 - 5.0 g/dL    Globulin 3.6 2.0 - 4.0 g/dL    A-G Ratio 1.0 0.8 - 1.7     TROPONIN-HIGH SENSITIVITY    Collection Time: 03/24/22  7:52 PM   Result Value Ref Range    Troponin-High Sensitivity 43 0 - 54 ng/L   MAGNESIUM    Collection Time: 03/24/22  7:52 PM   Result Value Ref Range    Magnesium 1.0 (L) 1.6 - 2.6 mg/dL   POC LACTIC ACID    Collection Time: 03/24/22  8:05 PM   Result Value Ref Range    Lactic Acid (POC) 2.23 (HH) 0.40 - 2.00 mmol/L   EKG, 12 LEAD, INITIAL    Collection Time: 03/24/22  9:01 PM   Result Value Ref Range    Ventricular Rate 102 BPM    Atrial Rate 102 BPM    P-R Interval 162 ms    QRS Duration 74 ms    Q-T Interval 416 ms    QTC Calculation (Bezet) 542 ms    Calculated P Axis 77 degrees    Calculated R Axis -18 degrees    Calculated T Axis 76 degrees    Diagnosis       Sinus tachycardia with premature atrial complexes and premature ventricular   complexes or fusion complexes  Minimal voltage criteria for LVH, may be normal variant ( R in aVL )  Prolonged QT  Abnormal ECG  When compared with ECG of 14-MAR-2022 23:14,  fusion complexes are now present  premature ventricular complexes are now present  premature atrial complexes are now present  Nonspecific T wave abnormality no longer evident in Inferior leads  Nonspecific T wave abnormality, improved in Lateral leads             No results for input(s): FIO2I, IFO2, HCO3I, IHCO3, HCOPOC, PCO2I, PCOPOC, IPHI, PHI, PHPOC, PO2I, PO2POC in the last 72 hours. No lab exists for component: IPOC2    Telemetry:normal sinus rhythm, Sinus Tachy     Imaging:  I have personally reviewed the patients radiographs and have reviewed the reports:    XR Results (most recent):  Results from Hospital Encounter encounter on 03/24/22    XR CHEST PORT    Narrative  EXAM: Chest Radiograph    INDICATION:  Sepsis    TECHNIQUE: AP view of the chest    COMPARISON: 3/14/2022, 10/19/2021, 10/17/2021    FINDINGS: No pneumothorax identified. Bilateral opacities are noted. No  effusions identified. The cardiomediastinal silhouette is unremarkable. The  pulmonary vasculature is unremarkable. Diffuse osteopenia is noted. Impression  1. Bilateral airspace opacities. Follow-up to resolution is recommended. CT Results (most recent):  Results from Hospital Encounter encounter on 03/14/22    CT ABD PELV W CONT    Narrative  EXAM: CT Abdomen and Pelvis with IV contrast    CLINICAL INDICATION:  lower abd pain    TECHNIQUE: CT of the abdomen and pelvis performed post IV contrast. Sagittal and  coronal reformations obtained.     All CT scans at this facility are performed using dose optimization technique as  appropriate to a performed exam, to include automated exposure control,  adjustment of the mA and/or kV according to patient size (including appropriate  matching for site specific examination) or use of iterative reconstruction  technique. IV CONTRAST: 80 cc of Isovue 300    ENTERIC CONTRAST: None    COMPARISON: None    FINDINGS:  Lower Chest:Bibasilar opacities are noted in the lung bases. There is an  unchanged complex left pleural effusion with calcifications along the rim and  thickened pleura. The visualized heart and pericardium are unremarkable. Peritoneum: No free air appreciated. No free fluid identified. No fluid  collections seen. Liver: In the liver adjacent the IVC, there is an irregular heterogeneously  hypoenhancing structure which could be infectious or neoplastic. It measures  approximately 3.1 x 5.8 x 4.2 cm. This was not present in 2017. Biliary/Gallbladder: No intrahepatic or extrahepatic biliary ductal dilatation  appreciated. The gallbladder is distended. Multiple stones are present within  the gallbladder. Spleen: Unremarkable. Pancreas: No focal lesion appreciated. The pancreatic duct is unremarkable. No  peripancreatic inflammation or adenopathy. Adrenals: The adrenal glands are unremarkable. Right Kidney:  No perinephric fat stranding appreciated. There is normal  enhancement. No hydroureteronephrosis of the collecting system. Left Kidney: No perinephric fat stranding appreciated. There is normal  enhancement. No hydroureteronephrosis of the collecting system.  Pelvic organs:  No acute pathology in the bladder. GI: The stomach is unremarkable. The small bowel is without evidence of  obstruction. No small bowel wall thickening. The mesentery is without  inflammation or adenopathy. The appendix is unremarkable. The rectum has an  irregular thickened wall. The remainder of the colon appears unremarkable. Vasculature: No aortic aneurysm seen. The IVC is unremarkable. Retroperitoneum:  Multiple nonspecific periaortic lymph nodes are noted. These  were present previously. No enlarged lymph nodes identified.  No fluid  collections in the retroperitoneum appreciated. Abdominal wall: Unremarkable    Musculoskeletal: Osteopenia is present. Multilevel degenerative changes are  noted. Impression  1. New mass or infectious process involving the right lobe of the liver near  the IVC. Correlation with laboratory values as well as MRI with and without  contrast.    2.  Irregular thickened and distal rectum. This may represent mild proctitis. However, the possibility of an underlying lesion is not excluded. Correlate with  direct visualization. 3.  Groundglass opacities in the mid to lower lung fields which may be acute or  chronic. 4.  Chronic complex left pleural effusion. 5.  Cholelithiasis without evidence of cholecystitis.                    ABBY Ivey-BC  03/25/22  Pulmonary, Critical Care Medicine  [facility]

## 2022-03-25 NOTE — PROGRESS NOTES
Eval orders noted. Attempted SLP eval. Patient uncooperative, refusal of PO, and spit out water. Would not keep eyes open despite max cues from SLP and RN (able to speak Latvian). Will continue to follow per POC.       Josh Duval M.S., CFY-SLP  Speech-Language Pathologist

## 2022-03-25 NOTE — PROGRESS NOTES
[facility] Pharmacokinetic Monitoring Service - Vancomycin     Helio Lewis is a 74 y.o. female starting on vancomycin therapy for sepsis of unknown etiology. Pharmacy consulted by Akhil Sutton NP for monitoring and adjustment. Target Concentration: Dosing based on anticipated concentration <15 mg/L due to renal impairment/insufficiency    Additional Antimicrobials: meropenem 1 gram IV x 1 dose then 500 mg IV over 180 minutes q12 hours    Pertinent Laboratory Values: Wt Readings from Last 1 Encounters:   03/14/22 70.3 kg (155 lb)     Temp Readings from Last 1 Encounters:   03/24/22 (!) 100.9 °F (38.3 °C)     No components found for: PROCAL  Estimated Creatinine Clearance: 18.2 mL/min (A) (based on SCr of 2.38 mg/dL (H)). Recent Labs     03/24/22 1952   WBC 21.2*       Pertinent Cultures:  Culture Date Source Results   3/24/2022 Sputum  Blood x2 In process   MRSA Nasal Swab: N/A. Non-respiratory infection. .    Plan:  Concentration-guided dosing due to renal impairment/insufficiency  Start vancomycin 1500 mg x 1 dose ordered  Dose by levels  Renal labs as indicated   Vancomycin concentration to be ordered in the AM after initial dose  Pharmacy will continue to monitor patient and adjust therapy as indicated    Thank you for the consult,  JULIAN Mcallister  3/24/2022 11:39 PM

## 2022-03-25 NOTE — DIABETES MGMT
Diabetes/ Glycemic Control Plan of Care  Recommendations:   Blood glucose above target range. Noted patient receiving steroids. Receiving IVF containing dextrose. Recommend adding Lantus 5 units daily to start. Continue corrective insulin coverage as needed. Will advance to very insulin resistant dosing  Will continue inpatient monitoring. Assessment:   DX:   1. Septic shock ([address]     2. Acute encephalopathy     3. Acute kidney injury (GUIDO) with acute tubular necrosis (ATN) (HCC)     4. Hypomagnesemia     5. Pneumonia due to infectious organism, unspecified laterality, unspecified part of lung     6. Acute cystitis without hematuria     7. High anion gap metabolic acidosis     8. History of cardioembolic cerebrovascular accident (CVA)     9. Thrombocytopenia ([address]     10. Hypocalcemia     11. Dehydration     12. Hypernatremia     13. Hypovolemic shock (HCC)        Fasting/ Morning blood glucose:   Lab Results   Component Value Date/Time    Glucose 193 (H) 03/25/2022 06:00 AM    Glucose (POC) 188 (H) 03/25/2022 05:46 AM     IV Fluids containing dextrose:   Levophed, IVF sodium bicarb at 125 ml/hr    Rx Glucocorticoids (24h ago, onward)         Start     Dose Route Frequency Ordered Stop    03/25/22 0600  hydrocortisone Sod Succ (PF) (SOLU-CORTEF) injection 50 mg         50 mg IV EVERY 6 HOURS 03/25/22 0051 --            Blood glucose values:       Results for KOWALSKI, IRIS (MRN [id number]) as of 3/25/2022 10:36   Ref. Range 3/25/2022 02:25 3/25/2022 05:46   GLUCOSE,FAST - POC Latest Ref Range: 70 - 110 mg/dL 216 (H) 188 (H)     Within target range (70-180mg/dL): No   Current insulin orders:   Lispro corrective insulin coverage every 6 hours.   Total Daily Dose previous 24 hours =  None   Current A1c:   Lab Results   Component Value Date/Time    Hemoglobin A1c 5.6 03/25/2022 12:00 AM      equivalent  to ave Blood Glucose of  mg/dl for 2-3 months prior to admission  Adequate glycemic control PTA:   Yes Nutrition/Diet:   Active Orders   Diet    DIET NPO      Meal Intake:  No data found. Supplement Intake:  No data found. Home diabetes medications:   Key Antihyperglycemic Medications     Patient is on no antihyperglycemic meds. Plan/Goals:   Blood glucose will be within target of 70 - 180 mg/dl within 72 hours  Reinforce dietary and medication compliance at home.        Education:  [] Refer to Diabetes Education Record                          [x] Education not indicated at this time     Katia Hyde, [facility] CDE  Ext [phone]

## 2022-03-25 NOTE — ED TRIAGE NOTES
Patient arrived by ems from nursing home. Presented with low blood pressure, increased heart rate and wheezing. Pt rectal temp was 100.9 Pt only responding to pain.

## 2022-03-25 NOTE — INTERDISCIPLINARY ROUNDS
[facility]  Pulmonary, Critical Care, and Sleep Medicine  Interdisciplinary and Ventilator Weaning Rounds    Patient discussed in morning walking rounds and interdisciplinary rounds. ICU day: 3/25    Overnight events:    Admitted, replacing lytes   Improving acidosis    Assessments and best practice:   Ventilator  - na   Sedation  o none   Other pertinent drips  o Bicarbonate drip and levophed   Lines noted  o IJ CVL and Radial Arterial Line    Critical labs assessed  o Yes   Antibiotics  o Vancomycin and Merrem   Medications reviewed  o Yes   Pending imaging  o echo   Pending send out labs  o No   Pending Procedures  o None   Glycemic control   Stress ulcer prophylaxis. o Pepcid   DVT prophylaxis.   o none   Need for Lines, dove assessed.  o Yes   Restraint Reevaluation     na    Family contact/MPOA: niece  Family updated: will contact and update after rounds     Palliative consult within 3 days of admission to ICU-  Ethics Guidance: 21 days      Daily Plans:   Aggressive lyte replacement   Start lantus    Wean vasopressors    Insert NGT, trickle feeds   Cont' q6 chemistry   Consult cardiology, echo pending       SUZANNE time 10 minutes        Mainor Haynes PA-C  03/25/22  Pulmonary, [address] [place] [address],[address] [facility]

## 2022-03-25 NOTE — PROGRESS NOTES
Results for Ling Lo (MRN [id number]) as of 3/25/2022 08:56   Ref. Range 3/25/2022 08:01   pH (POC) Latest Ref Range: 7.35 - 7.45   7.24 (LL)   pCO2 (POC) Latest Ref Range: 35.0 - 45.0 MMHG 30.1 (L)   pO2 (POC) Latest Ref Range: 80 - 100 MMHG 82   HCO3 (POC) Latest Ref Range: 22 - 26 MMOL/L 13.0 (L)   sO2 (POC) Latest Ref Range: 92 - 97 % 94.0   Base deficit (POC) Latest Units: mmol/L 13.0   FIO2 (POC) Latest Units: % 2   Specimen type (POC) Latest Units:   ARTERIAL   Site Latest Units:   DRAWN FROM ARTERIAL LINE   Total resp. rate Latest Units:   20   This is a improvement from last nights ABG, Bicarb drip is running and acid base is slowly coming back towards normal, Critical PH was recorded in chart, results given to Dr Romeo Joseph resident with pulmonary group in ICU, Jessica was used.

## 2022-03-26 NOTE — PROGRESS NOTES
RENAL PROGRESS NOTE        Alondra Burgos         Assessment/Plan:     1. GUIDO: nonoliguric. Resolved. Will sign off.   2. Sepsis/UTI: per IM. 3. HypoK: K high. Stop K & LR.   4. HyperNa: switch IVF to 0.45% Saline.                                                                                                                                   Subjective:  Patient complaints of: AMS    Patient Active Problem List   Diagnosis Code    UTI (urinary tract infection) N39.0    Pneumonia J18.9    Septic shock ([facility] [address] A41.9, R65.21       Current Facility-Administered Medications   Medication Dose Route Frequency Provider Last Rate Last Admin    pantoprazole (PROTONIX) 40 mg in 0.9% sodium chloride 10 mL injection  40 mg IntraVENous DAILY Kavita Garsia PA-C   40 mg at 03/26/22 6763    hydrALAZINE (APRESOLINE) 20 mg/mL injection 20 mg  20 mg IntraVENous Q6H PRN Miky Cloud PA-C   20 mg at 03/26/22 0826    [START ON 3/27/2022] insulin glargine (LANTUS) injection 10 Units  10 Units SubCUTAneous DAILY Thayer Duane, MD        vancomycin (VANCOCIN) 1,000 mg in 0.9% sodium chloride 250 mL (VIAL-MATE)  1,000 mg IntraVENous ONCE Thayer Duane, MD        metoprolol tartrate (LOPRESSOR) tablet 25 mg  25 mg Oral BID Mari CAIN PA-C        0.45% sodium chloride infusion  75 mL/hr IntraVENous CONTINUOUS Aleta Haynes MD        vasopressin (VASOSTRICT) 20 Units in 0.9% sodium chloride 100 mL infusion  0.04 Units/min IntraVENous CONTINUOUS Yong CAIN NP   Stopped at 03/25/22 1538    hydrocortisone Sod Succ (PF) (SOLU-CORTEF) injection 50 mg  50 mg IntraVENous Q6H Cynthia Montano NP   50 mg at 03/26/22 1210    nystatin (MYCOSTATIN) 100,000 unit/gram cream   Topical TID Deondre Contreras NP   Given at 03/26/22 0944    dextrose 10% infusion 0-250 mL  0-250 mL IntraVENous PRN Yong CAIN NP        albuterol-ipratropium (DUO-NEB) 2.5 MG-0.5 MG/3 ML  3 mL Nebulization Q4H PRN Deondre Contreras NP  meropenem (MERREM) 1 g in 0.9% sodium chloride (MBP/ADV) 50 mL MBP  1 g IntraVENous Q12H Cynthia Sprague NP 16.7 mL/hr at 03/26/22 0220 1 g at 03/26/22 0220    thiamine (B-1) 500 mg in 0.9% sodium chloride 50 mL IVPB  500 mg IntraVENous TID Agueda West PA-C 200 mL/hr at 03/26/22 0934 500 mg at 03/26/22 5165    Followed by   Danny Tai ON 3/27/2022] thiamine (B-1) 200 mg in 0.9% sodium chloride 50 mL IVPB  200 mg IntraVENous TID Agueda Alanis PA-C        [Held by provider] heparin (porcine) injection 5,000 Units  5,000 Units SubCUTAneous Q8H Agueda Alanis PA-C   5,000 Units at 03/25/22 2149    insulin lispro (HUMALOG) injection   SubCUTAneous Q6H Marichuy Harrison PA-C   9 Units at 03/26/22 1210    glucose chewable tablet 16 g  4 Tablet Oral PRN Leonardo Sawyer MD        glucagon (GLUCAGEN) injection 1 mg  1 mg IntraMUSCular PRN Leonardo Sawyer MD        NOREPINephrine (LEVOPHED) 8 mg in 5% dextrose 250mL (32 mcg/mL) infusion  0.5-50 mcg/min IntraVENous TITRATE Logan Ramirez MD   Stopped at 03/25/22 1225    acetaminophen (TYLENOL) tablet 650 mg  650 mg Oral Q6H PRN Linda Malone NP        Or    acetaminophen (TYLENOL) suppository 650 mg  650 mg Rectal Q6H PRN Linda Malone NP        polyethylene glycol (MIRALAX) packet 17 g  17 g Oral DAILY PRN Linda Malone NP        ondansetron (ZOFRAN ODT) tablet 4 mg  4 mg Oral Q8H PRN Linda Malone NP        Or    ondansetron [facility]) injection 4 mg  4 mg IntraVENous Q6H PRN Linda Malone NP        VANCOMYCIN INFORMATION NOTE   Other Rx Dosing/Monitoring Linda Malone NP           Objective  Vitals:    03/26/22 1115 03/26/22 1145 03/26/22 1200 03/26/22 1230   BP: (!) 145/38 (!) 148/79 (!) 149/53 (!) 143/41   Pulse: 68 71 68 67   Resp: 15 15 14 14   Temp:   98 °F (36.7 °C)    SpO2: 96% 93% 95% 95%   Weight:       Height:             Intake/Output Summary (Last 24 hours) at 3/26/2022 1317  Last data filed at 3/26/2022 1200  Gross per 24 hour   Intake 2349.27 ml   Output 1325 ml   Net 1024.27 ml           Admission weight: Weight: 58.1 kg (128 lb 1.4 oz) (03/25/22 0130)  Last Weight Metrics:  Weight Loss Metrics 3/25/2022 3/14/2022 10/22/2021 9/26/2019 12/11/2017 12/8/2017   Today's Wt 140 lb 155 lb 155 lb 160 lb 160 lb 7.9 oz -   BMI 28.28 kg/m2 31.31 kg/m2 31.31 kg/m2 32.32 kg/m2 - 32.42 kg/m2             Physical Assessment:     General: Unresponsive. Neck: No jvd. LUNGS: Clear to Auscultation, No rales, rhonchi or wheezes. CVS EXM: S1, S2  RRR, no murmurs/gallops/rubs. Abdomen: soft, non tender. Lower Extremities:  no edema. Lab    CBC w/Diff Recent Labs     03/26/22  0200 03/25/22  1008 03/24/22 1952   WBC 10.1 32.8* 21.2*   RBC 3.73* 4.46 5.01   HGB 10.8* 12.8 14.4   HCT 33.8* 40.9 45.2*   PLT 32* 81* 89*   GRANS 79* 89* 88*   LYMPH 17* 6* 8*   EOS 0 0 0        Chemistry Recent Labs     03/26/22  1140 03/26/22  0200 03/26/22  0200 03/25/22  1900 03/25/22  1900 03/25/22  0000 03/24/22 1952   *  --  229*  --  243*   < > 166*   *  --  148*  --  150*   < > 151*   K 5.5  --  2.7*  --  3.5   < > 4.0   *  --  115*  --  117*   < > 120*   CO2 26  --  26  --  24   < > 15*   BUN 31*  --  34*  --  41*   < > 57*   CREA 1.27  --  1.31*  --  1.59*   < > 2.38*   CA 8.2*  --  8.1*  --  8.0*   < > 6.5*   AGAP 4  --  7  --  9   < > 16   BUCR 24*  --  26*  --  26*   < > 24*   AP  --   --   --   --   --   --  76   TP  --   --   --   --   --   --  7.2   ALB  --   --   --   --   --   --  3.6   GLOB  --   --   --   --   --   --  3.6   AGRAT  --   --   --   --   --   --  1.0   PHOS 3.9   < > 2.3*   < > 3.0   < >  --     < > = values in this interval not displayed.          No results found for: IRON, FE, TIBC, IBCT, PSAT, FERR   Lab Results   Component Value Date/Time    Calcium 8.2 (L) 03/26/2022 11:40 AM    Phosphorus 3.9 03/26/2022 11:40 AM        Brent Floyd MD  [facility]  Pager: [phone]

## 2022-03-26 NOTE — PROGRESS NOTES
[facility]. Pulmonary, Critical Care, and Sleep Medicine    Name: Janna Love MRN: [id number]   : 1955 Hospital: [address] [place] [address] Dr   Date: 3/26/2022  Admission Date: 3/24/2022     Chart and notes reviewed. Data reviewed. I have evaluated all findings. [x]I have reviewed the flowsheet and previous days notes. [x]The patient is unable to give any meaningful history or review of systems because the patient is:  []Intubated [x]demented   []Unresponsive      [x]The patient is critically ill on      []Mechanical ventilation [x]Pressors   []BiPAP []         Interval HPI:  67/F presenting with hypotension likely related to septic shock. Sepsis due to UTI with prior cultures of Klebsiella and Serratia. DDx includes cholecystitis and aspiration pneumonia (CT with cholelithiasis and patchy infiltrates). History of TBI with superimposed septic vs metabolic encephalopathy. Severe metabolic derangements including metabolic acidosis, GUIDO, hypokalemia, hypomagnesemia, hypocalcemia. Elevated Troponin with non specific T wave changes. Echo shows RV dilatation and pulmonary hypertension. Subjective 22  Hospital Day: admitted 3/25  Vent Day: NA  Overnight events: pressor weaning down, appears in pain  Mentation/Activity: unchanged, occasionally screams uninteligibly  Respiratory/ Secretions: NA  Hemodynamics: improving  Urine output, bowel:   Diet:  Need for procedures:              ROS:Review of systems not obtained due to patient factors. Events and notes from last 24 hours reviewed.      Patient Active Problem List   Diagnosis Code    UTI (urinary tract infection) N39.0    Pneumonia J18.9    Septic shock ([facility] [address] A41.9, R65.21       Vital Signs:  Visit Vitals  BP (!) 173/87   Pulse 90   Temp 98.2 °F (36.8 °C)   Resp 22   Ht 4' 11\" (1.499 m)   Wt 63.5 kg (140 lb)   SpO2 97%   BMI 28.28 kg/m²       O2 Device: Nasal cannula   O2 Flow Rate (L/min): 2 l/min   Temp (24hrs), Av.9 °F (36.6 °C), Min:97.6 °F (36.4 °C), Max:98.2 °F (36.8 °C)       Intake/Output:   Last shift:      No intake/output data recorded. Last 3 shifts: 03/24 1901 - 03/26 0700  In: 3567.8 [I.V.:3567.8]  Out: 1900 [Urine:1900]    Intake/Output Summary (Last 24 hours) at 3/26/2022 1111  Last data filed at 3/26/2022 0600  Gross per 24 hour   Intake 2622.06 ml   Output 1300 ml   Net 1322.06 ml          Current Facility-Administered Medications   Medication Dose Route Frequency    lactated Ringers infusion  75 mL/hr IntraVENous CONTINUOUS    pantoprazole (PROTONIX) 40 mg in 0.9% sodium chloride 10 mL injection  40 mg IntraVENous DAILY    potassium phosphate 20 mmol in 0.9% sodium chloride 250 mL infusion   IntraVENous ONCE    vasopressin (VASOSTRICT) 20 Units in 0.9% sodium chloride 100 mL infusion  0.04 Units/min IntraVENous CONTINUOUS    hydrocortisone Sod Succ (PF) (SOLU-CORTEF) injection 50 mg  50 mg IntraVENous Q6H    nystatin (MYCOSTATIN) 100,000 unit/gram cream   Topical TID    meropenem (MERREM) 1 g in 0.9% sodium chloride (MBP/ADV) 50 mL MBP  1 g IntraVENous Q12H    thiamine (B-1) 500 mg in 0.9% sodium chloride 50 mL IVPB  500 mg IntraVENous TID    Followed by   Betty Byrnes ON 3/27/2022] thiamine (B-1) 200 mg in 0.9% sodium chloride 50 mL IVPB  200 mg IntraVENous TID    insulin glargine (LANTUS) injection 5 Units  5 Units SubCUTAneous DAILY    [Held by provider] heparin (porcine) injection 5,000 Units  5,000 Units SubCUTAneous Q8H    insulin lispro (HUMALOG) injection   SubCUTAneous Q6H    NOREPINephrine (LEVOPHED) 8 mg in 5% dextrose 250mL (32 mcg/mL) infusion  0.5-50 mcg/min IntraVENous TITRATE    VANCOMYCIN INFORMATION NOTE   Other Rx Dosing/Monitoring         Telemetry: []Sinus []A-flutter []Paced    []A-fib []Multiple PVCs                  Physical Exam:      General: Intubated/sedated;    HEENT:  Anicteric sclerae; pink palpebral conjunctivae; mucosa moist  Resp:  Symmetrical chest expansion, no accessory muscle use; good airway entry; no rales/ wheezing/ rhonchi noted  CV:  S1, S2 present; regular rate and rhythm  GI:  Abdomen soft, non-tender; (+) active bowel sounds  Extremities:  +2 pulses on all extremities; no edema/ cyanosis/ clubbing noted   Skin:  Warm; no rashes/ lesions noted, normal turgor/cap refill   Neurologic:  Non-focal  Devices:  No NGT/OGT, Central line/ PICC, ETT/tracheostomy, chest tube      DATA:  MAR reviewed and pertinent medications noted or modified as needed    Labs:  Recent Labs     03/26/22  0200 03/25/22  1008 03/24/22 1952   WBC 10.1 32.8* 21.2*   HGB 10.8* 12.8 14.4   HCT 33.8* 40.9 45.2*   PLT 32* 81* 89*     Recent Labs     03/26/22  0200 03/25/22  1900 03/25/22  1350 03/25/22  0000 03/24/22 1952   * 150* 151*   < > 151*   K 2.7* 3.5 3.7   < > 4.0   * 117* 120*   < > 120*   CO2 26 24 21   < > 15*   * 243* 210*   < > 166*   BUN 34* 41* 43*   < > 57*   CREA 1.31* 1.59* 1.62*   < > 2.38*   CA 8.1* 8.0* 8.3*   < > 6.5*   MG 2.1 2.2 2.3   < > 1.0*   PHOS 2.3* 3.0 3.3   < >  --    ALB  --   --   --   --  3.6   ALT  --   --   --   --  15   INR  --   --   --   --  1.4*    < > = values in this interval not displayed. No results for input(s): PH, PCO2, PO2, HCO3, FIO2 in the last 72 hours. Recent Labs     03/25/22  0801 03/25/22  0312   FIO2I 2 28   HCO3I 13.0* 8.0*   PCO2I 30.1* 25.2*   PHI 7.24* 7.11*   PO2I 82 97       Imaging:  [x]   I have personally reviewed the patients radiographs and reports  XR Results (most recent):  XR Results (most recent):  Results from Hospital Encounter encounter on 03/24/22    XR CHEST PORT    Narrative  EXAM: Chest X-Ray    INDICATION:  central line placement. TECHNIQUE: AP view of the chest 3/24/2022 at 2226 hours    COMPARISON: 3/24/2022 at 2000 hours, 3/14/2022, 10/19/2021    FINDINGS:  Tubes and Lines: Right IJ catheter is present with tip overlying the SVC. Pleura: No pneumothorax appreciated.   No effusions appreciated. Lungs:  Interstitial opacities noted bilaterally. This is progressed. Cardiac/Mediastinum/Aorta: It is unremarkable    Pulmonary Vascularity: The pulmonary vasculature is unremarkable. Chest Wall: No acute finding    Osseous Structures: Unremarkable    Upper Abdomen: No acute findings appreciated. Impression  1. New right IJ catheter without evidence complication. 2.  Diffuse bilateral airspace opacities. CT Results (most recent):  Results from Hospital Encounter encounter on 03/24/22    CT CHEST ABD PELV WO CONT    Narrative  CT Chest/Abdomen/Pelvis without contrast    HISTORY: Chest pain, abdominal pain and pain with urination. COMPARISON: None    TECHNIQUE: 5mm contiguous axial images from the lungs apices to the anal verge. .    All CT scans at this facility are performed using dose optimization technique as  appropriate to a performed exam, to include automated exposure control,  adjustment of the MA and/or kV according to patient size (including appropriate  matching for site-specific examinations) or use of  iterative reconstruction  technique. CHEST FINDINGS: Thyroid appears diffusely enlarged and heterogeneous. Right  internal jugular venous catheter ending at the SVC. No pericardial effusion. Aortic and coronary atherosclerotic calcifications. Nondistended esophagus. Prominent right paraesophageal lymph node measuring 1.2 cm. Right paratracheal  node measuring 0.9 cm. Loculated left pleural effusion with thin rind of soft tissue thickening and  pleural calcification. No pneumothorax. Extensive bilateral patchy groundglass  opacities in the upper and lower lobes. ABDOMEN FINDINGS: The liver, spleen, pancreas and adrenal glands are  unremarkable. Cholelithiasis. No renal calculi or hydronephrosis. No obstructive  bowel pattern. No pneumoperitoneum or ascites. PELVIC FINDINGS:  Negative urinary bladder. Hysterectomy. Moderate stool in the  rectum.  No acute osseous abnormality. Impression  1. Loculated left basilar pleural effusion with pleural calcification and  pleural thickening. Could be a chronic effusion with asbestos related pleural  disease. Infected collection such as empyema cannot be excluded. 2.  Patchy bilateral and diffuse groundglass opacities, nonspecific but could be  pulmonary edema or infection. 3.  Couple of prominent mediastinal lymph nodes, likely reactive to the process  above. 4.  Cholelithiasis. 5.  Moderate burden of rectal stool. 6.  Enlarged and heterogeneous multinodular thyroid. 03/24/22    ECHO ADULT FOLLOW-UP OR LIMITED 03/25/2022 3/25/2022    Interpretation Summary    Contrast used: Definity.   Technical qualifiers: Echo study was technically difficult due to patient's body habitus.   Patient was confused, and unable to be in left lateral decubitus.   Left Ventricle: Left ventricle size is normal. Moderate septal thickening. Mild posterior thickening. Normal wall motion. Normal left ventricular systolic function with a visually estimated EF of 55 - 60%.   Right Ventricle: Reduced systolic function.   Mitral Valve: Mild transvalvular regurgitation.   Pulmonary Arteries: Mild pulmonary hypertension present. The estimated pulmonary artery systolic pressure is 45 mmHg. Signed by: Kayla Cedillo MD on 3/25/2022  4:37 PM       IMPRESSION:   · Septic +/- obstructive shock with febrile, leukocytosis, hypotensive, tachycardic. Unknown etiology, likely in the setting of acute cystitis with hx of frequent UTI's growing Klebsiella pneumoniae and Serratia marcescens vs. abd etiology with noted dilated gallbladder on CT A/P vs. likely Atypical PNA.  No obstructive uropathy noted on CT A/P. WBC much improved with antibiotics  · R/o acute PE as cause of elevated Troponin and RV failure  · Acute on chronic encephalopathy- superimposed on TBI (non-verbal at baseline), in the setting of sepsis  · GUIDO- pre-renal vs. Ischemic ATN- likely in the setting of dehydration and shock, improving indices  · Anion Gap Metabolic Acidosis- in the setting of GUIDO, lactic acidosis. Lactate normalized, AG closed   · Electrolyte derangements- hyperchloremia, hypernatremia, hypomagnesemia, hypokalemia and hypocalcemia  · Thrombocytopenia worsening  · Distended gallbladder with cholelithiasis- no evidence of cholecystitis, MRI results pending  · Liver Mass, right lobe- infectious vs. Neoplastic. 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 Some fluid overload/pulmonary edema noted on xray   · Traumatic brain injury and intellectual disability   · Loculated, Chronic left pleural Effusion  · Multinodular thyroid- enlarged, heterogeneous, normal TSH    · Chronic LMCA infarct-Hx stroke with right-sided hemiplegia, and aphasia, dysarthria  · Right frontal lobe encephalomalacia- likely in the setting of chronic infarct vs. TBI   · Seizure disorder  · HTN  · DM II   · Obesity   · Moderate Pulmonary HTN- PASP 52 mmHg      Patient Active Problem List   Diagnosis Code    UTI (urinary tract infection) N39.0    Pneumonia J18.9    Septic shock ([facility] [address] A41.9, R65.21        RECOMMENDATIONS:   Neuro: monitor neuro status  Pulm: Aspiration precautions, HOB>30'. CVS:Monitor HD, MAP goal >65. Continue off pressors   GI: NPO pending swallow evaluation  Renal: Trend Cr, UOP. Hem/Onc: Trend H/H, monitor for s/o active bleeding. If positive for PE would start Argatroban and send HIT panel  I/D:Trend WBCs and temperature curve. Metabolic: Daily BMP, mag, phos. Trend lytes and replace per protocol. Endocrine: increase Lantus dose and continue correctional scale insulin. Q6 glucoses. SSI. Avoid hypoglycemia. Musc/Skin:   PT/OT/SLP  Full Code  Discussed in interdisciplinary rounds  Critical care time 45 minutes     Best practice :    Glycemic control  IHI ICU bundles: NA     Sress ulcer prophylaxis. Protonix  DVT prophylaxis.    hold due to thrombocytopenia  Need for Lines, dove assessed. Palliative care evaluation. Restraints need. Attending Non-violent Restraint Reevaluation     I have reevaluated the patient one hour after initiation of intervention. The patient is comfortable, uninjured, but continues to pose an imminent risk of injury to self to themselves and/or serious disruption of medical treatment required to keep patient stable. The patient's current medical and behavioral conditions that warrant the use intervention include danger to self and Interference with medical equipment or treatment. Restraint or seclusion will be discontinued at the earliest possible time, regardless of the scheduled expiration of the order. Based on my evaluation, restraints will be continued: NO         This care involved high complexity decision making to assess, manipulate, and support vital system functions, to treat this degreee vital organ system failure and to prevent further life threatening deterioration of the patients condition  The services I provided to this patient were to treat and/or prevent clinically significant deterioration that could result in the failure of one or more body systems and/or organ systems due to respiratory distress, hypoxia, cardiac dysrhythmia.        Osmel Morales MD   03/26/22  Pulmonary, Critical Care Medicine  [facility]

## 2022-03-26 NOTE — PROGRESS NOTES
Problem: Dysphagia (Adult)  Goal: *Acute Goals and Plan of Care (Insert Text)  Description: Patient will:  1. Tolerate PO trials with 0 s/s overt distress in 4/5 trials  2. Utilize compensatory swallow strategies/maneuvers (decrease bite/sip, size/rate, alt. liq/sol) with min cues in 4/5 trials  3. Perform oral-motor/laryngeal exercises to increase oropharyngeal swallow function with min cues  4. Complete an objective swallow study (i.e., MBSS) to assess swallow integrity, r/o aspiration, and determine of safest LRD, min A as indicated/ordered by MD     Recommend:   Puree, thin liquids  Meds crushed in puree or not orally  Aspiration precautions  HOB >45 degrees during all intake and for at least 30 min after po   Small bites/sips, slow rate of intake, alternating bites/sips  Oral care post meals      Outcome: Progressing Towards Goal       SPEECH LANGUAGE PATHOLOGY BEDSIDE SWALLOW   EVALUATION & TREATMENT     Patient: Yusef Bowens (68 y.o. female)  Date: 3/26/2022  Primary Diagnosis: [facility] [A41.9, R65.21]  Precautions: aspiration     PLOF: as per H&P    ASSESSMENT :  Based on the objective data described below, the patient presents with moderate oral dysphagia. Patient nonverbal. Patient asleep upon entry, would not open eyes to verbal or tactile cues. Patient spontaneously opened eyes ~2 minutes at a time. OME (oral mech exam) revealed no command following, unable to assess lingual or velum function. PO trials consumed: ice chip, thin via spoon and straw, and pudding. Patient demonstrated acceptance of bolus with tactile stimulus of spoon on labial surface and was able to demonstrate labial closure and sucking bolus off spoon. Able to pull thin liquid through straw into oral cavity. No s/sx of aspiration/penetration noted for all trials consumed. Adequate mastication of ice, functional laryngeal elevation upon palpation, adequate bolus manipulation and A-P transit noted.  Recommend puree diet with thin liquids, requires 1:1 feeding assistance. Patient must be at 90* in bed and alert. SLP following. TREATMENT :  Skilled therapy initiated; Educated pt and RN on aspiration precautions and importance of compensatory swallow techniques to decrease aspiration risk (decrease rate of intake & sip/bite size, upright @HOB for all po intake and ~30 minutes after po); verbalized comprehension, no evidence of learning with patient. SLP to follow as indicated. Patient will benefit from skilled intervention to address the above impairments. Patient's rehabilitation potential is considered to be Guarded  Factors which may influence rehabilitation potential include:   []            None noted  [x]            Mental ability/status  [x]            Medical condition  []            Home/family situation and support systems  [x]            Safety awareness  []            Pain tolerance/management  []            Other:      PLAN :  Recommendations and Planned Interventions:  See above  Frequency/Duration: Patient will be followed by speech-language pathology 1-2 times per day/3-5 days per week to address goals. Discharge Recommendations: To Be Determined     SUBJECTIVE:   Patient nonverbal.    OBJECTIVE:   No past medical history on file. No past surgical history on file.   Home Situation:   Home Situation  Home Environment: [address] [place] [address] Name: [address] [place] [address],[address]  One/Two Story Residence: Other (Comment) (PAU)  Living Alone: No  Support Systems: [place]  Patient Expects to be Discharged to[de-identified] Skilled nursing facility  Current DME Used/Available at Home: Other (comment) (lives at a facility)  Diet prior to admission: could not assess  Current Diet:  NPO upgrade to puree with thin liquids   Cognitive and Communication Status:  Neurologic State: Veryl Bliss do not open to any stimulus,Eyes open spontaneously  Orientation Level: Unable to verbalize  Cognition: Unable to assess (comment)  Oral Assessment:  Oral Assessment  Labial: No impairment  Dentition: Natural  Oral Hygiene: could not assess  Lingual: Other (comment) (could not assess)  Velum: Unable to visualize  Mandible: No impairment  P.O. Trials:  Patient Position: 45 at [facility]  Vocal quality prior to P.O.: Other (comment) (nonverbal)  Consistency Presented: Thin liquid;Pudding; Ice chips  How Presented: SLP-fed/presented;Straw;Spoon  Bolus Acceptance: Impaired  Bolus Formation/Control: No impairment  Propulsion: No impairment  Oral Residue: Other (comment) (could not assess)  Initiation of Swallow: No impairment  Laryngeal Elevation: Functional  Aspiration Signs/Symptoms: None  Pharyngeal Phase Characteristics: No impairment, issues, or problems   Effective Modifications: Straw;Spoon;Small sips and bites  Cues for Modifications: Maximal  Oral Phase Severity: Moderate  Pharyngeal Phase Severity : No impairment  PAIN:  Pain level pre-treatment: could not assess  Pain level post-treatment: could not assess  After treatment:   []            Patient left in no apparent distress sitting up in chair  [x]            Patient left in no apparent distress in bed  [x]            Call bell left within reach  [x]            Nursing notified  []            Family present  []            Caregiver present  []            Bed alarm activated    COMMUNICATION/EDUCATION:   [x]            Aspiration precautions; swallow safety; compensatory techniques. []            Patient/family have participated as able in goal setting and plan of care. []            Patient/family agree to work toward stated goals and plan of care. []            Patient understands intent and goals of therapy; neutral about participation. [x]            Patient unable to participate in goal setting/plan of care; educ ongoing with interdisciplinary staff  [x]         Posted safety precautions in patient's room.     Thank you for this referral.  Clarita Mason M.S., CFY-SLP  Speech-Language Pathologist

## 2022-03-26 NOTE — PROGRESS NOTES
Rec'd pt from [facility], [facility]. Was informed that pt had order for insertion of NGT from approx 11am.  Due to pts restlessness/ pulling at tubes and lines, she was unable to perform and confirmed MD/pulmonary , is aware. Pt resting in bed with family members at bedside. Appears comfortable. Updated family on process of using password code when calling for information on pt. Has remained off levo all day, current blood pressure 114/80. Pt has incomprehensible words most times and  moving mostly left side, (R sided hemiparesis from previous stroke). Tech from vascular studies is at bedside for duplex studies. 2130  Occasionally reaches up for feeling tubes, itching face or to remove dressings/all tape. Assited pt washing face / has slight reddness to left eye from where pt rubs occasionally. left eye appears clean and dry with slight pinkish to site. Will continue to monitor. 2230  Frequently checking pt activity, ensuring pt not removing drains or lines. Dressing change completed to right side IJ CVP which had dried blood and loose tape present. Pt tolerated without pulling away or swinging arms. 2300  Updated LATASHA Gonzalez , of pts current status/lab values/orders. Will continue to monitor.

## 2022-03-26 NOTE — PROGRESS NOTES
[facility]  -  Progress Note      Patient: Juve Hernandez MRN: [id number]  SSN: [id number]    YOB: 1955  Age: 74 y.o. Sex: female      Admit Date: 3/24/2022    Assessment:     Hospital Problems  Never Reviewed          Codes Class Noted POA    Septic shock ([facility] ICD-10-CM: A41.9, R65.21  ICD-9-CM: 038.9, 785.52, 995.92  3/24/2022 Unknown            - Metabolic Encephalopathy, with baseline dementia and dysarthria from previous CVA. - Septic shock, likely 2/2 UTI vs GI source, requiring pressor support. - GUIDO, slowly improving.    - NSTEMI, likely type 2. Suspect demand ischemia in setting of above. ECG showed LVH with secondary ST changes. - Loculated left basilar pleural effusion by CT.    - Acute Cystitis, no growth noted on recent cultures. Previous culture + for Klebsiella on 3/15.    - Possible Aspiration PNA. - Thrombocytopenia, plts 89 on admission.    - Cardiomyopathy, ECHO from 10/2021 EF 45-50%. - Moderate Pulmonary HTN by ECHO.   - DM2  - HTN, on Lisinopril, Coreg, Hydralazine and Lasix as outpatient. - Hx CVA, right side hemiparesis with dysarthria and baseline dementia.   -Seizure Disorder   - Peripheral arterial diease   - Moderate Pulmonary hypertension, PASP 52 mmHg   -Obesity      No Primary cardiologist    Plan:     Patient with presumed septic shock. Her echocardiogram on 3/25/2022 revealed normal EF of 55% with mild to moderate pulmonary hypertension, 45 mmHg. For the time being, would continue supportive measures. I would not proceed with aggressive evaluation or management. If she makes meaningful recovery neurologically, further therapeutic options can be considered. Subjective:     No new complaints.      Objective:      Patient Vitals for the past 8 hrs:   Temp Pulse Resp BP SpO2   03/26/22 1200 98 °F (36.7 °C) 68 14 (!) 149/53 95 %   03/26/22 1145  71 15 (!) 148/79 93 %   03/26/22 1115  68 15 (!) 145/38 96 %   03/26/22 1045  72 16 (!) 130/56 98 %   03/26/22 0947  90 22 (!) 173/87 97 %   03/26/22 0945  89 16 (!) 173/87 97 %   03/26/22 0830  85 19 (!) 214/91    03/26/22 0826 98.2 °F (36.8 °C) 84 18 (!) 144/132 97 %   03/26/22 0800  85 20 (!) 200/101    03/26/22 0745  83 17     03/26/22 0730  83 20 (!) 139/120    03/26/22 0700  83 19 (!) 139/94    03/26/22 0630  87 19     03/26/22 0600  81 15 120/77    03/26/22 0530  76 15     03/26/22 0500  81 15 134/77          Patient Vitals for the past 96 hrs:   Weight   03/25/22 1523 63.5 kg (140 lb)   03/25/22 1352 63.8 kg (140 lb 10.5 oz)   03/25/22 0130 58.1 kg (128 lb 1.4 oz)         Intake/Output Summary (Last 24 hours) at 3/26/2022 1243  Last data filed at 3/26/2022 1200  Gross per 24 hour   Intake 2349.27 ml   Output 1325 ml   Net 1024.27 ml       Physical Exam:  General:  confused  Neck:  no JVD  Lungs:  clear to auscultation bilaterally  Heart:  regular rate and rhythm  Abdomen:  no guarding or rigidity  Extremities:  no edema    Data Review:     Labs: Results:       Chemistry Recent Labs     03/26/22  0200 03/25/22  1900 03/25/22  1350 03/25/22  0000 03/24/22 1952   * 243* 210*   < > 166*   * 150* 151*   < > 151*   K 2.7* 3.5 3.7   < > 4.0   * 117* 120*   < > 120*   CO2 26 24 21   < > 15*   BUN 34* 41* 43*   < > 57*   CREA 1.31* 1.59* 1.62*   < > 2.38*   CA 8.1* 8.0* 8.3*   < > 6.5*   MG 2.1 2.2 2.3   < > 1.0*   PHOS 2.3* 3.0 3.3   < >  --    AGAP 7 9 10   < > 16   BUCR 26* 26* 27*   < > 24*   AP  --   --   --   --  76   TP  --   --   --   --  7.2   ALB  --   --   --   --  3.6   GLOB  --   --   --   --  3.6   AGRAT  --   --   --   --  1.0    < > = values in this interval not displayed.       CBC w/Diff Recent Labs     03/26/22  0200 03/25/22  1008 03/24/22 1952   WBC 10.1 32.8* 21.2*   RBC 3.73* 4.46 5.01   HGB 10.8* 12.8 14.4   HCT 33.8* 40.9 45.2*   PLT 32* 81* 89*   GRANS 79* 89* 88*   LYMPH 17* 6* 8*   EOS 0 0 0      Cardiac Enzymes Lab Results Component Value Date/Time    CPK 54 03/25/2022 07:00 PM      Coagulation Recent Labs     03/25/22  0000 03/24/22 1952   PTP  --  16.7*   INR  --  1.4*   APTT 32.3  --        Lipid Panel No results found for: CHOL, CHOLPOCT, CHOLX, CHLST, CHOLV, 627286, HDL, HDLP, LDL, LDLC, DLDLP, 404653, VLDLC, VLDL, TGLX, TRIGL, TRIGP, TGLPOCT, CHHD, CHHDX   BNP No results found for: BNP, BNPP, XBNPT   Liver Enzymes Recent Labs     03/24/22 1952   TP 7.2   ALB 3.6   AP 76      Digoxin    Thyroid Studies Lab Results   Component Value Date/Time    TSH 1.55 03/24/2022 07:52 PM

## 2022-03-26 NOTE — PROGRESS NOTES
[address] [facility] Pharmacokinetic Monitoring Service - Vancomycin    Indication: sepsis  Target Concentration: Dosing based on anticipated concentration < 20 mg/L due to renal impairment/insufficiency  Day of Therapy: 3  Additional Antimicrobials: meropenem    Pertinent Laboratory Values: Wt Readings from Last 1 Encounters:   03/25/22 63.5 kg (140 lb)     Temp Readings from Last 1 Encounters:   03/26/22 98 °F (36.7 °C)     No components found for: PROCAL  Estimated Creatinine Clearance: 38.1 mL/min (based on SCr of 1.27 mg/dL). Recent Labs     03/26/22  0200 03/25/22  1008   WBC 10.1 32.8*     Pertinent Cultures:  Culture Date Source Results   3/24 blood NGTD   MRSA Nasal Swab: was ordered by provider, awaiting results.     Assessment:  Date/Time Current Dose Concentration (mg/L) Timing of Concentration (h) AUC   3/24 1,500 mg x1 - - -   3/25 - - - -   3/26 1,000 mg x1 11.1 38 -   Note: Serum concentrations collected for AUC dosing may appear elevated if collected in close proximity to the dose administered, this is not necessarily an indication of toxicity    Plan:  GUIDO, SCr trending down - dose by level  Dose: 1 g x1  Ordered a level for 3/27 with AM labs  Pharmacy will continue to monitor patient and adjust therapy as indicated    Thank you for the consult,  JULIAN Parker  3/26/2022

## 2022-03-27 NOTE — PROGRESS NOTES
[facility]. Pulmonary, Critical Care, and Sleep Medicine    Name: Paul Rosas MRN: [id number]   : 1955 Hospital: [facility]   Date: 3/27/2022  Admission Date: 3/24/2022     Chart and notes reviewed. Data reviewed. I have evaluated all findings. [x]I have reviewed the flowsheet and previous days notes. [x]The patient is unable to give any meaningful history or review of systems because the patient is:  []Intubated [x]demented   []Unresponsive      [x]The patient is critically ill on      []Mechanical ventilation []Pressors   []BiPAP []         Interval HPI:  67/F presenting with hypotension likely related to septic shock. Sepsis due to UTI with prior cultures of Klebsiella and Serratia. DDx includes cholecystitis and aspiration pneumonia (CT with cholelithiasis and patchy infiltrates). History of TBI with superimposed septic vs metabolic encephalopathy. Severe metabolic derangements including metabolic acidosis, GUIDO, hypokalemia, hypomagnesemia, hypocalcemia improving. Elevated Troponin with non specific T wave changes, Echo with normal LVEF but shows RV dilatation and pulmonary hypertension. Subjective 22  Hospital Day: admitted 3/25  Vent Day: NA  Overnight events: weaned of Norepinephrine and Vasopressin  Mentation/Activity: asleep, received Dilaudid for pain  Respiratory/ Secretions: NA  Hemodynamics: stable  Urine output, bowel: see below  Diet:  Need for procedures:              ROS:Review of systems not obtained due to patient factors. Events and notes from last 24 hours reviewed.      Patient Active Problem List   Diagnosis Code    UTI (urinary tract infection) N39.0    Pneumonia J18.9    Septic shock ([facility] [address] A41.9, R65.21       Vital Signs:  Visit Vitals  BP (!) 153/74   Pulse 70   Temp 98.2 °F (36.8 °C)   Resp 8   Ht 4' 11\" (1.499 m)   Wt 63.5 kg (140 lb)   SpO2 99%   BMI 28.28 kg/m²       O2 Device: Nasal cannula   O2 Flow Rate (L/min): 2 l/min Temp (24hrs), Av °F (36.7 °C), Min:97.6 °F (36.4 °C), Max:98.2 °F (36.8 °C)       Intake/Output:   Last shift:       07 -  1900  In: 0   Out: 150 [Urine:150]  Last 3 shifts:  190 -  0700  In: 1795.3 [I.V.:1795.3]  Out: 1150 [Urine:1150]    Intake/Output Summary (Last 24 hours) at 3/27/2022 1143  Last data filed at 3/27/2022 1000  Gross per 24 hour   Intake 825 ml   Output 650 ml   Net 175 ml          Current Facility-Administered Medications   Medication Dose Route Frequency    HYDROmorphone (PF) (DILAUDID) 1 mg/mL injection        [Held by provider] insulin glargine (LANTUS) injection 5 Units  5 Units SubCUTAneous DAILY    dextrose 5 % - 0.45% NaCl infusion  50 mL/hr IntraVENous CONTINUOUS    metoprolol (LOPRESSOR) injection 5 mg  5 mg IntraVENous BID    pantoprazole (PROTONIX) 40 mg in 0.9% sodium chloride 10 mL injection  40 mg IntraVENous DAILY    0.45% sodium chloride infusion  75 mL/hr IntraVENous CONTINUOUS    vasopressin (VASOSTRICT) 20 Units in 0.9% sodium chloride 100 mL infusion  0.04 Units/min IntraVENous CONTINUOUS    hydrocortisone Sod Succ (PF) (SOLU-CORTEF) injection 50 mg  50 mg IntraVENous Q6H    nystatin (MYCOSTATIN) 100,000 unit/gram cream   Topical TID    meropenem (MERREM) 1 g in 0.9% sodium chloride (MBP/ADV) 50 mL MBP  1 g IntraVENous Q12H    thiamine (B-1) 200 mg in 0.9% sodium chloride 50 mL IVPB  200 mg IntraVENous TID    [Held by provider] heparin (porcine) injection 5,000 Units  5,000 Units SubCUTAneous Q8H    insulin lispro (HUMALOG) injection   SubCUTAneous Q6H    VANCOMYCIN INFORMATION NOTE   Other Rx Dosing/Monitoring         Telemetry: []Sinus []A-flutter []Paced    []A-fib []Multiple PVCs                  Physical Exam:      General: asleep, opens eyes but does not answer questions  HEENT:  Anicteric sclerae; pink palpebral conjunctivae; mucosa moist  Resp:  Symmetrical chest expansion, no accessory muscle use; good airway entry; no rales/ wheezing/ rhonchi noted  CV:  S1, S2 present; regular rate and rhythm  GI:  Abdomen soft, non-tender; (+) active bowel sounds  Extremities:  +2 pulses on all extremities; R knee swelling with mild bruising  Skin:  Warm; no rashes/ lesions noted, normal turgor/cap refill   Neurologic:  Non-focal  Devices:  No NGT/OGT. Pur wick      DATA:  MAR reviewed and pertinent medications noted or modified as needed    Labs:  Recent Labs     03/27/22  0306 03/26/22  0200 03/25/22  1008   WBC 14.5* 10.1 32.8*   HGB 10.7* 10.8* 12.8   HCT 34.5* 33.8* 40.9   PLT 35* 32* 81*     Recent Labs     03/27/22  0306 03/26/22  1140 03/26/22  0200 03/25/22  0000 03/24/22  1952   * 146* 148*   < > 151*   K 4.3 5.5 2.7*   < > 4.0   * 116* 115*   < > 120*   CO2 24 26 26   < > 15*   * 278* 229*   < > 166*   BUN 34* 31* 34*   < > 57*   CREA 1.37* 1.27 1.31*   < > 2.38*   CA 8.1* 8.2* 8.1*   < > 6.5*   MG 2.5 2.6 2.1   < > 1.0*   PHOS 3.2 3.9 2.3*   < >  --    ALB  --   --   --   --  3.6   ALT  --   --   --   --  15   INR  --   --   --   --  1.4*    < > = values in this interval not displayed. No results for input(s): PH, PCO2, PO2, HCO3, FIO2 in the last 72 hours. Recent Labs     03/25/22  0801 03/25/22  0312   FIO2I 2 28   HCO3I 13.0* 8.0*   PCO2I 30.1* 25.2*   PHI 7.24* 7.11*   PO2I 82 97       Imaging:  [x]   I have personally reviewed the patients radiographs and reports  XR Results (most recent):  XR Results (most recent):  Results from Hospital Encounter encounter on 03/24/22    XR CHEST PORT    Narrative  EXAM: Chest X-Ray    INDICATION:  central line placement. TECHNIQUE: AP view of the chest 3/24/2022 at 2226 hours    COMPARISON: 3/24/2022 at 2000 hours, 3/14/2022, 10/19/2021    FINDINGS:  Tubes and Lines: Right IJ catheter is present with tip overlying the SVC. Pleura: No pneumothorax appreciated. No effusions appreciated. Lungs:  Interstitial opacities noted bilaterally.  This is progressed. Cardiac/Mediastinum/Aorta: It is unremarkable    Pulmonary Vascularity: The pulmonary vasculature is unremarkable. Chest Wall: No acute finding    Osseous Structures: Unremarkable    Upper Abdomen: No acute findings appreciated. Impression  1. New right IJ catheter without evidence complication. 2.  Diffuse bilateral airspace opacities. CT Results (most recent):  Results from Hospital Encounter encounter on 03/24/22    CTA CHEST W OR W WO CONT    Addendum 3/26/2022  3:14 PM  Addendum: Addition to  impression:    6. Multifocal stenoses of the aortic branch vessels, including severe stenoses  of the bilateral subclavian arteries. Narrative  EXAM: CTA CHEST PULMONARY EMBOLISM    CLINICAL INDICATION/HISTORY: r/o PE. Septic shock. Lactic acidosis. Aspiration  pneumonia with hypoxia. Increased leukocytosis. COMPARISON:  CT chest/abdomen/pelvis 3/24/2022    TECHNIQUE: CTA of the chest was performed using timing optimized for pulmonary  embolism technique, with IV contrast.  To maximize sensitivity the sagittal and  coronal reconstructions were created using a 3D multislice MIP (maximal  intensity projection) methodology. CT scans at this facility are performed using dose optimization technique as  appropriate to the performed exam, to include automated exposure control,  adjustment of the mA and/or kV according to patient size (including appropriate  matching for site specific examinations), or use of iterative reconstruction  technique. FINDINGS:    Lung/Airway:  Stable appearance of a loculated left pleural collection with  pleural calcifications and thickening since at least 2017. There is again  diffuse patchy groundglass opacity bilaterally, not significantly changed from  recent prior study. Trace right pleural effusion or pleural thickening. Lower neck: Enlarged heterogeneous multinodular thyroid. Mediastinum:  Stable borderline mediastinal lymphadenopathy.     Pulmonary arteries (includes assessment of MIP images): Main pulmonary artery  measures less than 3 cm. No filling defect is seen in the main, lobar, or  visualized segmental pulmonary arteries. Aorta and other cardiovascular structures: No aortic aneurysm or dissection. Moderate coronary artery calcifications. Multifocal moderate to severe stenosis  of the right subclavian artery. Mild narrowing left proximal common carotid  artery. Severe focal stenosis of the left subclavian artery (2, 35). Left  axillary artery is patent, with more mild to moderate stenosis distally (56). Right IJ central line tip is in the SVC. Heart Strain assessment:  -  RV/LV ratio (normal <0.9): Mild increased. -  Dysfunction or bowing of interventricular septum: None  -  There is mild visualization of contrast reflux from the right heart into the  IVC/hepatic veins. Upper abdominal structures: Redemonstration of known hepatic mass. Chest wall: Unremarkable. Bones: No acute osseous abnormality. Impression  1. No acute pulmonary embolism. 2. Mild reflux of contrast from the right heart into the IVC and hepatic veins,  and mildly increased right ventricle size, suggests elevated right heart  pressure. 3. Stable loculated left pleural effusion since 2017. Pleural calcifications in  this region may be related to prior asbestos disease. Unable to exclude  superimposed infection by imaging. 4. Diffuse patchy groundglass opacities bilaterally, not significant changed  from recent prior chest CT, possibly pulmonary edema or infection. 5. Borderline mediastinal lymphadenopathy is likely reactive, also not  significantly changed from very recent prior study. 03/24/22    ECHO ADULT FOLLOW-UP OR LIMITED 03/25/2022 3/25/2022    Interpretation Summary    Contrast used: Definity.   Technical qualifiers: Echo study was technically difficult due to patient's body habitus.     Patient was confused, and unable to be in left lateral decubitus.   Left Ventricle: Left ventricle size is normal. Moderate septal thickening. Mild posterior thickening. Normal wall motion. Normal left ventricular systolic function with a visually estimated EF of 55 - 60%.   Right Ventricle: Reduced systolic function.   Mitral Valve: Mild transvalvular regurgitation.   Pulmonary Arteries: Mild pulmonary hypertension present. The estimated pulmonary artery systolic pressure is 45 mmHg. Signed by: Scar Trinidad MD on 3/25/2022  4:37 PM       IMPRESSION:   · Hypotension likely due to Septic shock with febrile, leukocytosis, hypotension, tachycardia. Likely in the setting of acute cystitis with hx of frequent UTI's growing Klebsiella pneumoniae and Serratia marcescens vs. abd etiology with noted dilated gallbladder on CT A/P vs. likely Atypical PNA. No obstructive uropathy noted on CT A/P. Clinically improved  · Acute PE ruled out by CTA    · Acute on chronic encephalopathy- superimposed on TBI (non-verbal at baseline), in the setting of sepsis  · GUIDO- pre-renal vs. Ischemic ATN- likely in the setting of dehydration and shock, improving indices  · Anion Gap Metabolic Acidosis- in the setting of GUIDO, lactic acidosis. resolved  · Electrolyte derangements- hyperchloremia, hypernatremia, hypomagnesemia, hypokalemia and hypocalcemia  · Thrombocytopenia   · Distended gallbladder with cholelithiasis- no evidence of cholecystitis, MRI results pending  · Liver Mass, right lobe- infectious vs. Neoplastic. Will need to follow once acute illness resolves  · Systolic HFmrEF- EF 27-03% .  Some fluid overload/pulmonary edema noted on xray   · Traumatic brain injury and intellectual disability   · Loculated, Chronic left pleural Effusion  · Multinodular thyroid- enlarged, heterogeneous, normal TSH    · Chronic LMCA infarct-Hx stroke with right-sided hemiplegia, and aphasia, dysarthria  · Right frontal lobe encephalomalacia- likely in the setting of chronic infarct vs. TBI   · Seizure disorder  · HTN  · DM II   · Obesity   · Mild Pulmonary HTN- PASP 48 mmHg      Patient Active Problem List   Diagnosis Code    UTI (urinary tract infection) N39.0    Pneumonia J18.9    Septic shock ([facility] [address] A41.9, R65.21        RECOMMENDATIONS:   Neuro: monitor neuro status. Minimize Dilaudid use as much as possible  Pulm: Aspiration precautions, HOB>30'. CVS:Monitor HD, MAP goal >65. GI: pt passed SLP swallow eval however poor po intake and hypoglycemia prompted D5 infusion. Renal: Trend Cr, UOP. Hem/Onc: Trend H/H, monitor for s/o active bleeding. HIT panel sent, no anticoagulation  I/D:Trend WBCs and temperature curve. Metabolic: Daily BMP, mag, phos. Trend lytes and replace per protocol. Endocrine: hold Lantus dose for hypoglycemia and poor po intake. Q6 glucoses. SSI. Avoid hypoglycemia. Musc/Skin:   PT/OT/SLP  Full Code  Discussed with nursing  Updated family at bedside  Critical care time 40 minutes     Best practice :    Glycemic control  IHI ICU bundles: NA     Sress ulcer prophylaxis. Protonix  DVT prophylaxis. hold due to thrombocytopenia  Need for Lines, dove assessed. Palliative care evaluation. Restraints need. Attending Non-violent Restraint Reevaluation     I have reevaluated the patient one hour after initiation of intervention. The patient is comfortable, uninjured, but continues to pose an imminent risk of injury to self to themselves and/or serious disruption of medical treatment required to keep patient stable. The patient's current medical and behavioral conditions that warrant the use intervention include danger to self and Interference with medical equipment or treatment. Restraint or seclusion will be discontinued at the earliest possible time, regardless of the scheduled expiration of the order.     Based on my evaluation, restraints will be continued: NO         This care involved high complexity decision making to assess, manipulate, and support vital system functions, to treat this degreee vital organ system failure and to prevent further life threatening deterioration of the patients condition  The services I provided to this patient were to treat and/or prevent clinically significant deterioration that could result in the failure of one or more body systems and/or organ systems due to respiratory distress, hypoxia, cardiac dysrhythmia.        Jesus Mills MD   03/27/22  Pulmonary, Critical Care Medicine  [facility]

## 2022-03-27 NOTE — PROGRESS NOTES
Bedside and Verbal shift change report given to Hilma Gaucher, RN  (oncoming nurse) by Birgit Duran  (offgoing nurse). Report included the following information SBAR, Kardex, Procedure Summary, MAR and Recent Results.

## 2022-03-27 NOTE — PROGRESS NOTES
Rec;d report offgoing RN, Irma.  Pt appear more awake, calm resting in bed. General weakness throughout. Currently pt on room air, sats 92%. Linen changed/ pt turned. 2200  Pt awake, frequently will grimace or moan when moved. Unable to point to where pain coming from - but explained if needed will bring pain med. 0230  Pt awake, appears unable to get comfortable. VSS  Will appear to have occasional short periods of apnea when sleeping. Pt 02 sats then 88-90 rannge. 02 placed onpt 2 liters N/C. Reminded/ reinserted  pt to not pull at any lines/tubes,     [id number]  Pt increasing verbally \"in pain-oww\". Updated Aron PAGAN of pts increasing restlessness . Grimacing, new order rec'd, pt rec'd dilaudid 1 milligram IV. Will continue to monitor. 0500  Pt resting, easily awakened though now has no signs of pain or restlessness.

## 2022-03-27 NOTE — PROGRESS NOTES
[address] [facility] Pharmacokinetic Monitoring Service - Vancomycin    Indication: sepsis  Target Concentration: Dosing based on anticipated concentration < 20 mg/L due to renal impairment/insufficiency  Day of Therapy: 4  Additional Antimicrobials: meropenem    Pertinent Laboratory Values: Wt Readings from Last 1 Encounters:   03/25/22 63.5 kg (140 lb)     Temp Readings from Last 1 Encounters:   03/27/22 97.6 °F (36.4 °C)     No components found for: PROCAL  Estimated Creatinine Clearance: 35.4 mL/min (A) (based on SCr of 1.37 mg/dL (H)). Recent Labs     03/27/22  0306 03/26/22  0200   WBC 14.5* 10.1     Pertinent Cultures:  Culture Date Source Results   3/24 blood NGTD   MRSA Nasal Swab: was ordered by provider, awaiting results.     Assessment:  Date/Time Current Dose Concentration (mg/L) Timing of Concentration (h) AUC   3/24 1,500 mg x1 - - -   3/25 - - - -   3/26 1,000 mg x1 11.1 38 -   3/27 - 23.9 13.5 -   Note: Serum concentrations collected for AUC dosing may appear elevated if collected in close proximity to the dose administered, this is not necessarily an indication of toxicity    Plan:  GUIDO, SCr appears to be stabilizing - dose by level  No dose today  Ordered a level for 3/28 with AM labs  Pharmacy will continue to monitor patient and adjust therapy as indicated    Thank you for the consult,  JULIAN Patterson  3/27/2022

## 2022-03-27 NOTE — PROGRESS NOTES
[facility]  -  Progress Note      Patient: Laurel Dean MRN: [id number]  SSN: [id number]    YOB: 1955  Age: 74 y.o. Sex: female      Admit Date: 3/24/2022    Assessment:     Hospital Problems  Never Reviewed          Codes Class Noted POA    Septic shock ([facility] [address] ICD-10-CM: A41.9, R65.21  ICD-9-CM: 038.9, 785.52, 995.92  3/24/2022 Unknown            - Metabolic Encephalopathy, with baseline dementia and dysarthria from previous CVA.    - Septic shock, likely 2/2 UTI vs GI source, requiring pressor support.    - GUIDO, slowly improving.    - NSTEMI, likely type 2. Suspect demand ischemia in setting of above. ECG showed LVH with secondary ST changes.    - Loculated left basilar pleural effusion by CT.    - Acute Cystitis, no growth noted on recent cultures. Previous culture + for Klebsiella on 3/15.    - Possible Aspiration PNA.    - Thrombocytopenia, plts 89 on admission.    - Cardiomyopathy, ECHO from 10/2021 EF 45-50%; echocardiogram 3/25/2022 revealed EF 55% with PA pressure of 45 mmHg.    - Moderate Pulmonary HTN by ECHO.   - DM2  - HTN, on Lisinopril, Coreg, Hydralazine and Lasix as outpatient.   - Hx CVA, right side hemiparesis with dysarthria and baseline dementia.   -Seizure Disorder   - Peripheral arterial diease   - Moderate Pulmonary hypertension, PASP 52 mmHg   -Obesity      No Primary cardiologist    Plan:     Patient remains hemodynamically and rhythmically stable. No new cardiac recommendations at this time; continue supportive measures. Her biggest issue appears to be neurologic at this point. I did discuss at length with daughter about her cardiac status. No new cardiac recommendations at this time. If she makes meaningful neurologic recovery, will reevaluate, as needed. Subjective:     Unresponsive.      Objective:      Patient Vitals for the past 8 hrs:   Temp Pulse Resp BP SpO2   03/27/22 0800 98.2 °F (36.8 °C) 71 14 (!) 185/61 94 %   03/27/22 0730  71 12 (!) 186/78 94 %   03/27/22 0700  67 11 (!) 178/52 92 %   03/27/22 0630 97.6 °F (36.4 °C) 75 12  94 %   03/27/22 0600  76 15 (!) 162/73 92 %   03/27/22 0530  66 9 139/62 91 %   03/27/22 0500  67 10 (!) 145/59 (!) 89 %   03/27/22 0430  67 11 (!) 137/57 90 %   03/27/22 0400  67 11 (!) 131/51 (!) 89 %   03/27/22 0330  66 12 (!) 154/49 90 %         Patient Vitals for the past 96 hrs:   Weight   03/25/22 1523 63.5 kg (140 lb)   03/25/22 1352 63.8 kg (140 lb 10.5 oz)   03/25/22 0130 58.1 kg (128 lb 1.4 oz)         Intake/Output Summary (Last 24 hours) at 3/27/2022 1117  Last data filed at 3/27/2022 0800  Gross per 24 hour   Intake 825 ml   Output 550 ml   Net 275 ml       Physical Exam:  General:  appears stated age, unresponsive to verbal questioning  Neck:  no JVD  Lungs:  clear to auscultation bilaterally  Heart:  regular rate and rhythm  Abdomen:  no guarding or rigidity  Extremities:  no edema    Data Review:     Labs: Results:       Chemistry Recent Labs     03/27/22  0306 03/26/22  1140 03/26/22  0200 03/25/22  0000 03/24/22 1952   * 278* 229*   < > 166*   * 146* 148*   < > 151*   K 4.3 5.5 2.7*   < > 4.0   * 116* 115*   < > 120*   CO2 24 26 26   < > 15*   BUN 34* 31* 34*   < > 57*   CREA 1.37* 1.27 1.31*   < > 2.38*   CA 8.1* 8.2* 8.1*   < > 6.5*   MG 2.5 2.6 2.1   < > 1.0*   PHOS 3.2 3.9 2.3*   < >  --    AGAP 5 4 7   < > 16   BUCR 25* 24* 26*   < > 24*   AP  --   --   --   --  76   TP  --   --   --   --  7.2   ALB  --   --   --   --  3.6   GLOB  --   --   --   --  3.6   AGRAT  --   --   --   --  1.0    < > = values in this interval not displayed.       CBC w/Diff Recent Labs     03/27/22  0306 03/26/22  0200 03/25/22  1008   WBC 14.5* 10.1 32.8*   RBC 3.74* 3.73* 4.46   HGB 10.7* 10.8* 12.8   HCT 34.5* 33.8* 40.9   PLT 35* 32* 81*   GRANS 84* 79* 89*   LYMPH 10* 17* 6*   EOS 0 0 0      Cardiac Enzymes No results found for: CPK, CK, CKMMB, CKMB, RCK3, CKMBT, CKNDX, CKND1, DOMINGO, TROPT, TROIQ, JULIETA, TROPT, TNIPOC, BNP, BNPP   Coagulation Recent Labs     03/25/22  0000 03/24/22 1952   PTP  --  16.7*   INR  --  1.4*   APTT 32.3  --        Lipid Panel No results found for: CHOL, CHOLPOCT, CHOLX, CHLST, CHOLV, 161358, HDL, HDLP, LDL, LDLC, DLDLP, 880612, VLDLC, VLDL, TGLX, TRIGL, TRIGP, TGLPOCT, CHHD, CHHDX   BNP No results found for: BNP, BNPP, XBNPT   Liver Enzymes Recent Labs     03/24/22 1952   TP 7.2   ALB 3.6   AP 76      Digoxin    Thyroid Studies Lab Results   Component Value Date/Time    TSH 1.55 03/24/2022 07:52 PM

## 2022-03-27 NOTE — PROGRESS NOTES
Bedside and Verbal shift change report given to Erika Loredo RN (oncoming nurse) by Niesha Landon RN (offgoing nurse). Report included the following information SBAR, Kardex, MAR and Recent Results.

## 2022-03-27 NOTE — PROGRESS NOTES
[facility]  (A Division of [address] [place] [address])    Follow-up Note      Date of Admission: 3/24/2022       Date of Note:  3/27/2022          Current Antimicrobials:    Prior Antimicrobials:  Vancomycin 3/24 to present  Meropenem 3/25- present      Assessment:         Septic shock on multiple pressors with lactic acidosis: Suspect related to  source given abnormal UA vs liver abscess? Vs pleural process  Urinary tract infection: UA with greater than 100 WBCs few bacteria large leukocyte esterase negative nitrite; recent cx + for Klebsiella on 3/15  Aspiration pneumonia with hypoxia  Leukocytosis: improving  Loculated left pleural effusion with calcification and pleural thickening as well as non-specific diffuse opacities -  Noted on CT chest from 3/24. ? Chronic   GUIDO: Creatinine 2.38 upon presentation now improving  Metabolic encephalopathy with baseline dementia/prior CVA/limited verbal interaction  Suspected Right liver abscess vs mass (new since 2017)-3.1 x 5.8 x 4.2 cm, infection vs malignancy. Noted on 3/14 CT scn but not mentioedn in 3/24 CT report. ?? source  Thrombocytopenia-  worsening    Plan:   Continue vancomycin and meropenem for now              - vanc trough 15-20  F/u blood cx    CBC, BMP in am   MRI liver with MRCP reviewed                   Discussed with Dr. Alex Lyman, ICU team and nursing at time of consult. Reviewed CT from 3/14 and 3/24 with radiology    DO Jeni [facility]  [address], [address] [place] [address] [facility] [address]  Office: [phone]  Mobile/Text: [phone]     Microbiology:  3/24 blood culture: No growth to date x2  3/25 RSV PCR negative  3/25 MRSA nasal swab negative  3/25 respiratory viral panel: Negative  3/25 C.diff: negative    Lines / Catheters:  A-line left wrist  Peripheral  Right IJ TLC  dove    Subjective:   Patent seen and examined. No fevers.    Minimally interactive, looks around but non-verbal  Pressors weaned  WBC improved. NO fevers documened. Objective:        Visit Vitals  BP (!) 185/61 (BP 1 Location: Left lower arm, BP Patient Position: At rest)   Pulse 71   Temp 98.2 °F (36.8 °C)   Resp 14   Ht 4' 11\" (1.499 m)   Wt 63.5 kg (140 lb)   SpO2 94%   BMI 28.28 kg/m²     Temp (24hrs), Av °F (36.7 °C), Min:97.6 °F (36.4 °C), Max:98.2 °F (36.8 °C)         General:  , Difficult to arouse, sleepy, nonverbal   Skin:   no rashes or skin lesions noted on limited exam, cool to touch   HEENT:  Normocephalic, atraumatic, PERRL, EOMI, no scleral icterus or pallor; no conjunctival hemmohage;  nasal and oral mucous are moist and without evidence of lesions. No thrush. Neck supple, no bruits. Lymph Nodes:   no cervical, axillary or inguinal adenopathy   Lungs:   non-labored, bilaterally clear to aspiration- no crackles wheezes rales or rhonchi   Heart:  RRR, s1 and s2; no murmurs rubs or gallops, no edema, + pedal pulses   Abdomen:  soft, non-distended, active bowel sounds, no hepatomegaly, no splenomegaly. Grunts with palpation to the lower abdomen. No mid-epigastric tenderness   Genitourinary: Fallon   Extremities:   no clubbing, cyanosis; no joint effusions or swelling; Full ROM of all large joints to the upper and lower extremities; muscle mass appropriate for age   Neurologic:  No gross focal sensory abnormalities; opens eyes and tracks, response to pain nonverbal, right upper extremity appears to be contracted, focal weakness on the right when compared to the left as left side has noted independent movement.    Psychiatric:   Calm        Lab results:    Chemistry  Recent Labs     22  0306 22  1140 22  0200 22  0000 22   * 278* 229*   < > 166*   * 146* 148*   < > 151*   K 4.3 5.5 2.7*   < > 4.0   * 116* 115*   < > 120*   CO2    < > 15*   BUN 34* 31* 34*   < > 57*   CREA 1.37* 1.27 1.31*   < > 2.38*   CA 8.1* 8.2* 8.1*   < > 6.5* AGAP 5 4 7   < > 16   BUCR 25* 24* 26*   < > 24*   AP  --   --   --   --  76   TP  --   --   --   --  7.2   ALB  --   --   --   --  3.6   GLOB  --   --   --   --  3.6   AGRAT  --   --   --   --  1.0    < > = values in this interval not displayed. CBC w/ Diff  Recent Labs     03/27/22  0306 03/26/22  0200 03/25/22  1008   WBC 14.5* 10.1 32.8*   RBC 3.74* 3.73* 4.46   HGB 10.7* 10.8* 12.8   HCT 34.5* 33.8* 40.9   PLT 35* 32* 81*   GRANS 84* 79* 89*   LYMPH 10* 17* 6*   EOS 0 0 0       Microbiology  All Micro Results     Procedure Component Value Units Date/Time    CULTURE, BLOOD [[id number]] Collected: 03/24/22 1945    Order Status: Completed Specimen: Blood Updated: 03/27/22 0645     Special Requests: NO SPECIAL REQUESTS        Culture result: NO GROWTH 3 DAYS       CULTURE, BLOOD [[id number]] Collected: 03/24/22 1952    Order Status: Completed Specimen: Blood Updated: 03/27/22 0645     Special Requests: NO SPECIAL REQUESTS        Culture result: NO GROWTH 3 DAYS       CULTURE, MRSA [[id number]] Collected: 03/25/22 0134    Order Status: Completed Specimen: Nasal from Nares Updated: 03/26/22 1151     Special Requests: NO SPECIAL REQUESTS        Culture result: MRSA NOT PRESENT               Screening of patient nares for MRSA is for surveillance purposes and, if positive, to facilitate isolation considerations in high risk settings. It is not intended for automatic decolonization interventions per se as regimens are not sufficiently effective to warrant routine use.           C. DIFFICILE AG & TOXIN A/B [[id number]] Collected: 03/25/22 1517    Order Status: Completed Specimen: Stool Updated: 03/26/22 1007     GDH ANTIGEN Negative        C. difficile toxin Negative        INTERPRETATION       NEGATIVE FOR TOXIGENIC C. DIFFICILE          RESPIRATORY VIRUS PANEL W/COVID-19, PCR [[id number]] Collected: 03/25/22 0030    Order Status: Completed Specimen: Nasopharyngeal Updated: 03/25/22 0205     Adenovirus Not detected Coronavirus 229E Not detected        Coronavirus HKU1 Not detected        Coronavirus CVNL63 Not detected        Coronavirus OC43 Not detected        SARS-CoV-2, PCR Not detected        Metapneumovirus Not detected        Rhinovirus and Enterovirus Not detected        Influenza A Not detected        Influenza B Not detected        Parainfluenza 1 Not detected        Parainfluenza 2 Not detected        Parainfluenza 3 Not detected        Parainfluenza virus 4 Not detected        RSV by PCR Not detected        B. parapertussis, PCR Not detected        Bordetella pertussis - PCR Not detected        Chlamydophila pneumoniae DNA, QL, PCR Not detected        Mycoplasma pneumoniae DNA, QL, PCR Not detected       CULTURE, RESPIRATORY/SPUTUM/BRONCH Renee Patel [[id number]] Collected: 03/24/22 2330    Order Status: Canceled Specimen: Sputum            Aretha Fox DO   3/27/2022

## 2022-03-28 NOTE — PROGRESS NOTES
Problem: Patient Education: Go to Patient Education Activity  Goal: Patient/Family Education  Outcome: Progressing Towards Goal     Problem: Non-Violent Restraints  Goal: Removal from restraints as soon as assessed to be safe  Outcome: Progressing Towards Goal  Goal: No harm/injury to patient while restraints in use  Outcome: Progressing Towards Goal  Goal: Patient's dignity will be maintained  Outcome: Progressing Towards Goal  Goal: Patient Interventions  Outcome: Progressing Towards Goal

## 2022-03-28 NOTE — PROGRESS NOTES
attended the interdisciplinary rounds for [facility], who is a 74 y.o.,female. Patients Primary Language is: Danish. According to the patients EMR Tenriism Affiliation is: No Catholic. The reason the Patient came to the hospital is:   Patient Active Problem List    Diagnosis Date Noted    Septic shock ([facility] [address] 03/24/2022    Pneumonia 10/17/2021    UTI (urinary tract infection) 12/08/2017      Plan:  Esther Soto will continue to follow and will provide pastoral care on an as needed/requested basis.  recommends bedside caregivers page  on duty if patient shows signs of acute spiritual or emotional distress.     [address] [facility]  Board Certified [address] [place] [address]   [phone]

## 2022-03-28 NOTE — PROGRESS NOTES
[facility]. Pulmonary, Critical Care, and Sleep Medicine    Name: Brant Sullivan MRN: [id number]   : 1955 Hospital: [facility] [place]   Date: 3/28/2022  Admission Date: 3/24/2022     Chart and notes reviewed. Data reviewed. I have evaluated all findings. [x]I have reviewed the flowsheet and previous days notes. []The patient is unable to give any meaningful history or review of systems because the patient is:  []Intubated []Sedated   []Unresponsive      []The patient is critically ill on      []Mechanical ventilation []Pressors   []BiPAP []         Interval HPI:  67/F presenting with hypotension likely related to septic shock. Sepsis due to UTI with prior cultures of Klebsiella and Serratia. DDx includes cholecystitis and aspiration pneumonia (CT with cholelithiasis and patchy infiltrates). History of TBI with superimposed septic vs metabolic encephalopathy. Severe metabolic derangements including metabolic acidosis, GUIDO, hypokalemia, hypomagnesemia, hypocalcemia improving. Elevated Troponin with non specific T wave changes, Echo with normal LVEF but shows RV dilatation and pulmonary hypertension. Subjective 22  Hospital Day:since 3/25  Vent Day:n/a  Overnight events:no acute events overnight. Mentation/Activity: non-verbal, moans and grimaces on pain, not following commands. Respiratory/ Secretions:stable  Hemodynamics: off vasopressors, hypertensive at this point  Urine output, bowel: minimal urine, straight cath PRN   Diet:NPO  Need for procedures:n/a              ROS:Pertinent items are noted in HPI. Events and notes from last 24 hours reviewed.      Patient Active Problem List   Diagnosis Code    UTI (urinary tract infection) N39.0    Pneumonia J18.9    Septic shock ([facility] [address] A41.9, R65.21       Vital Signs:  Visit Vitals  BP (!) 185/65   Pulse 65   Temp 97.4 °F (36.3 °C)   Resp 9   Ht 4' 11\" (1.499 m)   Wt 63.5 kg (140 lb)   SpO2 98%   BMI 28.28 kg/m² O2 Device: Humidifier,Nasal cannula   O2 Flow Rate (L/min): 2 l/min   Temp (24hrs), Av.7 °F (36.5 °C), Min:97.4 °F (36.3 °C), Max:98.2 °F (36.8 °C)       Intake/Output:   Last shift:      No intake/output data recorded.   Last 3 shifts:  1901 -  0700  In: 1190.8 [I.V.:1190.8]  Out: 675 [Urine:675]    Intake/Output Summary (Last 24 hours) at 3/28/2022 0224  Last data filed at 3/27/2022 1800  Gross per 24 hour   Intake 365.83 ml   Output 175 ml   Net 190.83 ml          Current Facility-Administered Medications   Medication Dose Route Frequency    vancomycin (VANCOCIN) 1,000 mg in 0.9% sodium chloride 250 mL (VIAL-MATE)  1,000 mg IntraVENous ONCE    [Held by provider] insulin glargine (LANTUS) injection 5 Units  5 Units SubCUTAneous DAILY    dextrose 5 % - 0.45% NaCl infusion  50 mL/hr IntraVENous CONTINUOUS    metoprolol (LOPRESSOR) injection 5 mg  5 mg IntraVENous Q6H    pantoprazole (PROTONIX) 40 mg in 0.9% sodium chloride 10 mL injection  40 mg IntraVENous DAILY    hydrocortisone Sod Succ (PF) (SOLU-CORTEF) injection 50 mg  50 mg IntraVENous Q6H    nystatin (MYCOSTATIN) 100,000 unit/gram cream   Topical TID    meropenem (MERREM) 1 g in 0.9% sodium chloride (MBP/ADV) 50 mL MBP  1 g IntraVENous Q12H    thiamine (B-1) 200 mg in 0.9% sodium chloride 50 mL IVPB  200 mg IntraVENous TID    [Held by provider] heparin (porcine) injection 5,000 Units  5,000 Units SubCUTAneous Q8H    insulin lispro (HUMALOG) injection   SubCUTAneous Q6H    VANCOMYCIN INFORMATION NOTE   Other Rx Dosing/Monitoring         Telemetry: []Sinus []A-flutter []Paced    []A-fib []Multiple PVCs                  Physical Exam:      General: opens eyes spontaneously, non- verbal, not following commands, no signs of distress, moans when right leg is touched  HEENT:  Anicteric sclerae; pink palpebral conjunctivae; mucosa moist  Resp:  Symmetrical chest expansion, no accessory muscle use; good airway entry; no rales/ wheezing/ rhonchi noted  CV:  S1, S2 present; regular rate and rhythm  GI:  Abdomen soft, non-tender; (+) active bowel sounds  Extremities:  +2 pulses on all extremities; R knee swelling with mild bruising, left hand contracted  Skin:  Warm; no rashes/ lesions noted, normal turgor/cap refill   Neurologic:  Non-focal, no command following  Devices:  RIJ TLC    DATA:  MAR reviewed and pertinent medications noted or modified as needed    Labs:  Recent Labs     03/28/22  0406 03/27/22  0306 03/26/22  0200   WBC 14.1* 14.5* 10.1   HGB 11.6* 10.7* 10.8*   HCT 37.6 34.5* 33.8*   PLT 30* 35* 32*     Recent Labs     03/28/22  0406 03/27/22  0306 03/26/22  1140   * 149* 146*   K 4.9 4.3 5.5   * 120* 116*   CO2 26 24 26   * 129* 278*   BUN 40* 34* 31*   CREA 1.38* 1.37* 1.27   CA 8.6 8.1* 8.2*   MG 2.5 2.5 2.6   PHOS 4.0 3.2 3.9     No results for input(s): PH, PCO2, PO2, HCO3, FIO2 in the last 72 hours. No results for input(s): FIO2I, IFO2, HCO3I, IHCO3, HCOPOC, PCO2I, PCOPOC, IPHI, PHI, PHPOC, PO2I, PO2POC in the last 72 hours. No lab exists for component: IPOC2    Imaging:  [x]   I have personally reviewed the patients radiographs and reports  XR Results (most recent):  XR Results (most recent):  Results from Hospital Encounter encounter on 03/24/22    XR KNEE RT MIN 4 V    Narrative  History: Swelling. COMPARISON: None. TECHNIQUE: 4 views right knee. FINDINGS:    Decreased bone mineral density noted. Advanced atherosclerosis. No definite  fracture or dislocation noted. Mild soft tissue edema. Impression  No acute bone findings. Mild soft tissue edema. Decreased bone mineral density. Atherosclerosis. CT Results (most recent):  Results from Hospital Encounter encounter on 03/24/22    CTA CHEST W OR W WO CONT    Addendum 3/26/2022  3:14 PM  Addendum: Addition to  impression:    6.  Multifocal stenoses of the aortic branch vessels, including severe stenoses  of the bilateral subclavian arteries. Narrative  EXAM: CTA CHEST PULMONARY EMBOLISM    CLINICAL INDICATION/HISTORY: r/o PE. Septic shock. Lactic acidosis. Aspiration  pneumonia with hypoxia. Increased leukocytosis. COMPARISON:  CT chest/abdomen/pelvis 3/24/2022    TECHNIQUE: CTA of the chest was performed using timing optimized for pulmonary  embolism technique, with IV contrast.  To maximize sensitivity the sagittal and  coronal reconstructions were created using a 3D multislice MIP (maximal  intensity projection) methodology. CT scans at this facility are performed using dose optimization technique as  appropriate to the performed exam, to include automated exposure control,  adjustment of the mA and/or kV according to patient size (including appropriate  matching for site specific examinations), or use of iterative reconstruction  technique. FINDINGS:    Lung/Airway:  Stable appearance of a loculated left pleural collection with  pleural calcifications and thickening since at least 2017. There is again  diffuse patchy groundglass opacity bilaterally, not significantly changed from  recent prior study. Trace right pleural effusion or pleural thickening. Lower neck: Enlarged heterogeneous multinodular thyroid. Mediastinum:  Stable borderline mediastinal lymphadenopathy. Pulmonary arteries (includes assessment of MIP images): Main pulmonary artery  measures less than 3 cm. No filling defect is seen in the main, lobar, or  visualized segmental pulmonary arteries. Aorta and other cardiovascular structures: No aortic aneurysm or dissection. Moderate coronary artery calcifications. Multifocal moderate to severe stenosis  of the right subclavian artery. Mild narrowing left proximal common carotid  artery. Severe focal stenosis of the left subclavian artery (2, 35). Left  axillary artery is patent, with more mild to moderate stenosis distally (56). Right IJ central line tip is in the SVC.   Heart Strain assessment:  -  RV/LV ratio (normal <0.9): Mild increased. -  Dysfunction or bowing of interventricular septum: None  -  There is mild visualization of contrast reflux from the right heart into the  IVC/hepatic veins. Upper abdominal structures: Redemonstration of known hepatic mass. Chest wall: Unremarkable. Bones: No acute osseous abnormality. Impression  1. No acute pulmonary embolism. 2. Mild reflux of contrast from the right heart into the IVC and hepatic veins,  and mildly increased right ventricle size, suggests elevated right heart  pressure. 3. Stable loculated left pleural effusion since 2017. Pleural calcifications in  this region may be related to prior asbestos disease. Unable to exclude  superimposed infection by imaging. 4. Diffuse patchy groundglass opacities bilaterally, not significant changed  from recent prior chest CT, possibly pulmonary edema or infection. 5. Borderline mediastinal lymphadenopathy is likely reactive, also not  significantly changed from very recent prior study. 03/24/22    ECHO ADULT FOLLOW-UP OR LIMITED 03/25/2022 3/25/2022    Interpretation Summary    Contrast used: Definity.   Technical qualifiers: Echo study was technically difficult due to patient's body habitus.   Patient was confused, and unable to be in left lateral decubitus.   Left Ventricle: Left ventricle size is normal. Moderate septal thickening. Mild posterior thickening. Normal wall motion. Normal left ventricular systolic function with a visually estimated EF of 55 - 60%.   Right Ventricle: Reduced systolic function.   Mitral Valve: Mild transvalvular regurgitation.   Pulmonary Arteries: Mild pulmonary hypertension present. The estimated pulmonary artery systolic pressure is 45 mmHg. Signed by: Jennifer Robles MD on 3/25/2022  4:37 PM       IMPRESSION:   · Hypotension likely due to Septic shock with febrile, leukocytosis, hypotension, tachycardia.  Likely in the setting of acute cystitis with hx of frequent UTI's growing Klebsiella pneumoniae and Serratia marcescens vs. abd etiology with noted dilated gallbladder on CT A/P vs. likely Atypical PNA. No obstructive uropathy noted on CT A/P. Clinically improved  · Acute PE ruled out by CTA    · Acute on chronic encephalopathy- superimposed on TBI (non-verbal at baseline), in the setting of sepsis  · GUIDO- pre-renal vs. Ischemic ATN- likely in the setting of dehydration and shock, improving indices  · Anion Gap Metabolic Acidosis- in the setting of GUIDO, lactic acidosis. resolved  · Electrolyte derangements- hyperchloremia, hypernatremia, hypomagnesemia, hypokalemia and hypocalcemia  · Thrombocytopenia   · Distended gallbladder with cholelithiasis- no evidence of cholecystitis, MRI results pending  · Liver Mass, right lobe- infectious vs. Neoplastic. Will need to follow once acute illness resolves  · Systolic HFmrEF- YZ 24-81% . Some fluid overload/pulmonary edema noted on xray   · Traumatic brain injury and intellectual disability   · Loculated, Chronic left pleural Effusion  · Multinodular thyroid- enlarged, heterogeneous, normal TSH    · Chronic LMCA infarct-Hx stroke with right-sided hemiplegia, and aphasia, dysarthria  · Right frontal lobe encephalomalacia- likely in the setting of chronic infarct vs. TBI   · PAD- Dopplers (3/28: right prox superficial femoral artery occluded with distal collateral followed by 75% stenosis, right posterior tibial artery occluded at the mid calf. Right prox anterior tibial artery with 50-75% stenosis  · Seizure disorder  · HTN  · DM II   · Obesity   · Mild Pulmonary HTN- PASP 48 mmHg             Patient Active Problem List   Diagnosis Code    UTI (urinary tract infection) N39.0    Pneumonia J18.9    Septic shock (HCC) A41.9, R65.21        RECOMMENDATIONS:   Neuro:Neuro checks per routine, avoid sedatives as much as possible  Pulm: Aspiration precautions, HOB>30'. CVS:Monitor HD, MAP goal >65.  Restart home HTN meds:Hydralazine, Coreg, Lisinopril, Hold for SBP <100. Consulted vascular for right femoral artery stenosis with collaterals distal to occlusion, right posterior tibial artery occlusion at the mid calf, and right anterior tibial artery with 50-75% stenosis. Stopped Hydrocortisone. GI: NPO. Poor PO intake, will insert NGT and start tube feeds, advance slowly. Monitor for refeeding syndrome  Renal: Trend Cr, UOP. Straight cath for bladder scan >300  Hem/Onc: Trend H/H, monitor for s/o active bleeding. Daily CBC. Holding [facility] for thrombocytopenia, HIT panel pending. I/D:Trend WBCs and temperature curve. Continue Ceftriaxone  Metabolic: Daily BMP, mag, phos. Trend lytes and replace per protocol. Thiamine supplementation  Endocrine:Q6 glucoses. SSI. Avoid hypoglycemia. Musc/Skin:  wound care, PT/OT/SLP  Full Code  Discussed in interdisciplinary rounds     Best practice :    Glycemic control  IHI ICU bundles:   Central Line Bundle Followed     Sress ulcer prophylaxis. not indicated  DVT prophylaxis. thrombocytopenia  Need for Lines, dove assessed. Palliative care evaluation. Restraints need. This care involved high complexity decision making to assess, manipulate, and support vital system functions, to treat this degreee vital organ system failure and to prevent further life threatening deterioration of the patients condition  The services I provided to this patient were to treat and/or prevent clinically significant deterioration that could result in the failure of one or more body systems and/or organ systems due to respiratory distress, hypoxia, cardiac dysrhythmia.        Chago Edmonds NP   03/28/22  Pulmonary, Critical Care Medicine  [facility]

## 2022-03-28 NOTE — PROGRESS NOTES
Nutrition Note      Pt was started on trickle tube feeds on 3/25; then was started on po diet on 3/26 and tube feeds were discontinued, NGT was removed. Seen by SLP this morning; recommend NPO. Plan for NGT reinsertion and restart tube feeds. Plan to start at 10 mL/hr, advancing slowly towards goal rate due to previous concern regarding possible refeeding. Currently, electrolytes WNL. Na remains high, 147 mmol/L today; pt was started on IVF on 3/27, D5 1/2NS at 50 mL/hr (60 gm dextrose, 204 kcal per day). Thiamine to start 3/29. BM 3/27. No progress towards nutrition goals. Nutrition Recommendations/Plan:  - Once NGT placement is verified by MARY ANNE and vicente to use, start tube feeds of Glucerna 1.5 at rate of 10 mL/hr, advancing as pt tolerates by 10 mL q 12 hours to goal rate of 45 mL/hr with water flushes of 150 mL q 4 hours.           (goal EN regimen provides: 1620 kcal, 89 gm protein, 820 mL free water, 100% RDIs)           - IVF per MD  - Monitor electrolytes for possible refeeding syndrome.       Electronically signed by Kath Nelson RD on 3/28/2022 at 12:41 PM    Contact: [phone]

## 2022-03-28 NOTE — DIABETES MGMT
Diabetes/ Glycemic Control Plan of Care  Recommendations:   Patient with hypoglycemia yesterday am.  Noted Lantus dose held. IVF of D5 1/2 NS infusing @ 50 ml/hr  Blood glucose trending up 165 mg/dl this am.  Steroids to be discontinued today. Failed speech evaluation. NGT to be placed. TF to be started, Glucerna at trickle rate. Will continue inpatient monitoring. Fasting/ Morning blood glucose:   Lab Results   Component Value Date/Time    Glucose 174 (H) 03/28/2022 04:06 AM    Glucose (POC) 165 (H) 03/28/2022 06:43 AM     IV Fluids containing dextrose:   IVF D5 1/2 NS @ 50 ml/hr    Rx Glucocorticoids (24h ago, onward)         Start     Dose Route Frequency Ordered Stop    03/25/22 0600  hydrocortisone Sod Succ (PF) (SOLU-CORTEF) injection 50 mg         50 mg IV EVERY 6 HOURS 03/25/22 0051 --            Blood glucose values:       Results for KOWALSKI, IRIS (MRN [id number]) as of 3/28/2022 10:39   Ref. Range 3/27/2022 11:13 3/27/2022 17:01 3/28/2022 00:42 3/28/2022 06:43   GLUCOSE,FAST - POC Latest Ref Range: 70 - 110 mg/dL 138 (H) 246 (H) 182 (H) 165 (H)     Within target range (70-180mg/dL):   Progressing towards goal  Current insulin orders:   lantus 5 units daily (on hold)  Lispro corrective insulin coverage every 6 hours. Total Daily Dose previous 24 hours =  17 units    Current A1c:   Lab Results   Component Value Date/Time    Hemoglobin A1c 5.6 03/25/2022 12:00 AM      equivalent  to ave Blood Glucose of  mg/dl for 2-3 months prior to admission  Adequate glycemic control PTA:   Yes   Nutrition/Diet:   Active Orders   Diet    DIET NPO      Meal Intake:  Patient Vitals for the past 168 hrs:   % Diet Eaten   03/27/22 0800 0%   03/26/22 1400 1 - 25%     Supplement Intake:  Patient Vitals for the past 168 hrs:   Supplement intake %   03/27/22 0800 0%   03/26/22 1400 0%       Home diabetes medications:   Key Antihyperglycemic Medications     Patient is on no antihyperglycemic meds.         Plan/Goals:   Blood glucose will be within target of 70 - 180 mg/dl within 72 hours  Reinforce dietary and medication compliance at home.        Education:  [] Refer to Diabetes Education Record                          [x] Education not indicated at this time     Misael Lee [facility] CDE  Ext [phone]

## 2022-03-28 NOTE — ROUTINE PROCESS
Bedside and Verbal shift change report given to [address] R[address] Núñez (oncoming nurse) by Jenifer Wilcox RN (offgoing nurse). Report included the following information SBAR, Kardex, MAR and Recent Results. SITUATION:    Code Status: Full Code   Reason for Admission: Septic shock ([facility] [address] [A41.9, R65.21]    Major Hospital day: 4   Problem List:       Hospital Problems  Never Reviewed          Codes Class Noted POA    Septic shock ([facility] [address] ICD-10-CM: A41.9, R65.21  ICD-9-CM: 038.9, 785.52, 995.92  3/24/2022 Unknown              BACKGROUND:    Past Medical History: No past medical history on file.       Patient taking anticoagulants no     ASSESSMENT:    Changes in Assessment Throughout Shift: No     Patient has Central Line: yes Reasons if yes: Critically Ill   Patient has Fallon Cath: no Reasons if yes: N/A      Last Vitals:     Vitals:    03/28/22 0051 03/28/22 0100 03/28/22 0130 03/28/22 0200   BP: (!) 182/57 (!) 156/143 (!) 174/73 (!) 185/65   Pulse: 71 67 66 65   Resp:  9 9 9   Temp:       SpO2:  96% 97% 98%   Weight:       Height:            IV and DRAINS (will only show if present)   Peripheral IV 03/24/22 Left Antecubital-Site Assessment: Clean, dry, & intact  Peripheral IV 03/24/22 Left;Posterior Hand-Site Assessment: Clean, dry, & intact  [REMOVED] Arterial Line 03/25/22 Left Radial artery-Site Assessment: Clean, dry, & intact  Triple Lumen 03/24/22 Right Internal jugular-Site Assessment: Intact,Drainage (comment)     WOUND (if present)   Wound Type:  none   Dressing present Dressing Present : Yes,Intact, not due to be changed   Wound Concerns/Notes:  none     PAIN    Pain Assessment    Pain Intensity 1: 0 (03/28/22 0000)    Pain Location 1: Abdomen    Pain Intervention(s) 1: Family Support    Patient Stated Pain Goal: 0  o Interventions for Pain:  See MAR  o Intervention effective: no  o Time of last intervention: See MAR   o Reassessment Completed: yes      Last 3 Weights:  Last 3 Recorded Weights in this Encounter    03/25/22 0130 03/25/22 1352 03/25/22 1523   Weight: 58.1 kg (128 lb 1.4 oz) 63.8 kg (140 lb 10.5 oz) 63.5 kg (140 lb)     Weight change:      INTAKE/OUPUT    Current Shift: No intake/output data recorded. Last three shifts: 03/26 1901 - 03/28 0700  In: 1190.8 [I.V.:1190.8]  Out: 675 [Urine:675]     LAB RESULTS     Recent Labs     03/28/22  0406 03/27/22  0306 03/26/22  0200   WBC 14.1* 14.5* 10.1   HGB 11.6* 10.7* 10.8*   HCT 37.6 34.5* 33.8*   PLT 30* 35* 32*        Recent Labs     03/28/22  0406 03/27/22  0306 03/26/22  1140   * 149* 146*   K 4.9 4.3 5.5   * 129* 278*   BUN 40* 34* 31*   CREA 1.38* 1.37* 1.27   CA 8.6 8.1* 8.2*   MG 2.5 2.5 2.6       RECOMMENDATIONS AND DISCHARGE PLANNING     1. Pending tests/procedures/ Plan of Care or Other Needs: See Provider Notes     2. Discharge plan for patient and Needs/Barriers: TBD    3. Estimated Discharge Date: TBD Posted on Whiteboard in Patients Room: no      4. The patient's care plan was reviewed with the oncoming nurse. \"HEALS\" SAFETY CHECK      Fall Risk    Total Score: 3    Safety Measures: Safety Measures: Bed/Chair alarm on,Bed/Chair-Wheels locked,Bed in low position,Call light within reach,Emergency bedside equipment,Fall prevention (comment),Gripper socks,Side rails X 3    A safety check occurred in the patient's room between off going nurse and oncoming nurse listed above.     The safety check included the below items  Area Items   H  High Alert Medications - Verify all high alert medication drips (heparin, PCA, etc.)   E  Equipment - Suction is set up for ALL patients (with yanker)  - Red plugs utilized for all equipment (IV pumps, etc.)  - WOWs wiped down at end of shift.  - Room stocked with oxygen, suction, and other unit-specific supplies   A  Alarms - Bed alarm is set for fall risk patients  - Ensure chair alarm is in place and activated if patient is up in a chair   L  Lines - Check IV for any infiltration  - Fallon bag is empty if patient has a Fallon   - Tubing and IV bags are labeled   S  Safety   - Room is clean, patient is clean, and equipment is clean. - Hallways are clear from equipment besides carts. - Fall bracelet on for fall risk patients  - Ensure room is clear and free of clutter  - Suction is set up for ALL patients (with yanker)  - Hallways are clear from equipment besides carts.    - Isolation precautions followed, supplies available outside room, sign posted     Samanta Trevizo RN

## 2022-03-28 NOTE — PROGRESS NOTES
[facility]  (A Division of [address] [place] [address])    Follow-up Note      Date of Admission: 3/24/2022       Date of Note:  3/28/2022          Current Antimicrobials:    Prior Antimicrobials:  Vancomycin 3/24 to present  Meropenem 3/25- present      Assessment:         Septic shock on multiple pressors with lactic acidosis: Suspect related to  source given abnormal UA vs liver abscess? Vs pleural process  Urinary tract infection: UA with greater than 100 WBCs few bacteria large leukocyte esterase negative nitrite; recent cx + for Klebsiella on 3/15  Aspiration pneumonia with hypoxia  Leukocytosis: improving  Loculated left pleural effusion with calcification and pleural thickening as well as non-specific diffuse opacities -  Noted on CT chest from 3/24. ? Chronic   GUIDO: Creatinine 2.38 upon presentation now improving  Metabolic encephalopathy with baseline dementia/prior CVA/limited verbal interaction  Suspected Right liver abscess vs mass (new since 2017)-3.1 x 5.8 x 4.2 cm, infection vs malignancy. Noted on 3/14 CT scn but not mentioedn in 3/24 CT report. ?? source  Thrombocytopenia-  worsening    Plan:   D/c meropenem  D/c vancomycin  Start Ceftriaxone 1gm IV daily           F/u blood cx - remains negative from 3/24    CBC, BMP in am   MRI liver with MRCP reviewed - biopsy at a later date               Reviewed CT from 3/14 and 3/24 with radiology    Stephany Abraham, [facility]  [address], [address] [address] [facility] [place] [facility] [address]  Office: [phone]  Mobile/Text: [phone]     Microbiology:  3/24 blood culture: No growth to date x2  3/25 RSV PCR negative  3/25 MRSA nasal swab negative  3/25 respiratory viral panel: Negative  3/25 C.diff: negative    Lines / Catheters:  A-line left wrist  Peripheral  Right IJ TLC  dove    Subjective:   Patent seen and examined. No fevers. Minimally interactive, looks around but non-verbal  WBC improved. NO fevers documened. Objective:        Visit Vitals  BP (!) 207/164   Pulse 68   Temp 97.4 °F (36.3 °C)   Resp 9   Ht 4' 11\" (1.499 m)   Wt 63.5 kg (140 lb)   SpO2 98%   BMI 28.28 kg/m²     Temp (24hrs), Av.7 °F (36.5 °C), Min:97.4 °F (36.3 °C), Max:98.2 °F (36.8 °C)         General:  , Difficult to arouse, sleepy, nonverbal   Skin:   no rashes or skin lesions noted on limited exam, cool to touch   HEENT:  Normocephalic, atraumatic, PERRL, EOMI, no scleral icterus or pallor; no conjunctival hemmohage;  nasal and oral mucous are moist and without evidence of lesions. No thrush. Neck supple, no bruits. Lymph Nodes:   no cervical, axillary or inguinal adenopathy   Lungs:   non-labored, bilaterally clear to aspiration- no crackles wheezes rales or rhonchi   Heart:  RRR, s1 and s2; no murmurs rubs or gallops, no edema, + pedal pulses   Abdomen:  soft, non-distended, active bowel sounds, no hepatomegaly, no splenomegaly. Grunts with palpation to the lower abdomen. No mid-epigastric tenderness   Genitourinary: Fallon   Extremities:   no clubbing, cyanosis; no joint effusions or swelling; Full ROM of all large joints to the upper and lower extremities; muscle mass appropriate for age   Neurologic:  No gross focal sensory abnormalities; opens eyes and tracks, response to pain nonverbal, right upper extremity appears to be contracted, focal weakness on the right when compared to the left as left side has noted independent movement.    Psychiatric:   Calm        Lab results:    Chemistry  Recent Labs     22  0406 22  0306 22  1140   * 129* 278*   * 149* 146*   K 4.9 4.3 5.5   * 120* 116*   CO2 26 24 26   BUN 40* 34* 31*   CREA 1.38* 1.37* 1.27   CA 8.6 8.1* 8.2*   AGAP 2* 5 4   BUCR 29* 25* 24*       CBC w/ Diff  Recent Labs     22  0406 22  0306 22  0200   WBC 14.1* 14.5* 10.1   RBC 4.01* 3.74* 3.73*   HGB 11.6* 10.7* 10.8*   HCT 37.6 34.5* 33.8*   PLT 30* 35* 32*   GRANS 85* 84* 79*   LYMPH 9* 10* 17*   EOS 0 0 0       Microbiology  All Micro Results     Procedure Component Value Units Date/Time    CULTURE, BLOOD [[id number]] Collected: 03/24/22 1945    Order Status: Completed Specimen: Blood Updated: 03/28/22 0720     Special Requests: NO SPECIAL REQUESTS        Culture result: NO GROWTH 4 DAYS       CULTURE, BLOOD [[id number]] Collected: 03/24/22 1952    Order Status: Completed Specimen: Blood Updated: 03/28/22 0720     Special Requests: NO SPECIAL REQUESTS        Culture result: NO GROWTH 4 DAYS       CULTURE, MRSA [[id number]] Collected: 03/25/22 0134    Order Status: Completed Specimen: Nasal from Nares Updated: 03/26/22 1151     Special Requests: NO SPECIAL REQUESTS        Culture result: MRSA NOT PRESENT               Screening of patient nares for MRSA is for surveillance purposes and, if positive, to facilitate isolation considerations in high risk settings. It is not intended for automatic decolonization interventions per se as regimens are not sufficiently effective to warrant routine use.           C. DIFFICILE AG & TOXIN A/B [[id number]] Collected: 03/25/22 1517    Order Status: Completed Specimen: Stool Updated: 03/26/22 1007     GDH ANTIGEN Negative        C. difficile toxin Negative        INTERPRETATION       NEGATIVE FOR TOXIGENIC C. DIFFICILE          RESPIRATORY VIRUS PANEL W/COVID-19, PCR [[id number]] Collected: 03/25/22 0030    Order Status: Completed Specimen: Nasopharyngeal Updated: 03/25/22 0205     Adenovirus Not detected        Coronavirus 229E Not detected        Coronavirus HKU1 Not detected        Coronavirus CVNL63 Not detected        Coronavirus OC43 Not detected        SARS-CoV-2, PCR Not detected        Metapneumovirus Not detected        Rhinovirus and Enterovirus Not detected        Influenza A Not detected        Influenza B Not detected        Parainfluenza 1 Not detected        Parainfluenza 2 Not detected        Parainfluenza 3 Not detected        Parainfluenza virus 4 Not detected        RSV by PCR Not detected        B. parapertussis, PCR Not detected        Bordetella pertussis - PCR Not detected        Chlamydophila pneumoniae DNA, QL, PCR Not detected        Mycoplasma pneumoniae DNA, QL, PCR Not detected       CULTURE, RESPIRATORY/SPUTUM/BRONCH Darcus Hua [[id number]] Collected: 03/24/22 5664    Order Status: Canceled Specimen: Sputum            Sherral Copping, DO   3/28/2022

## 2022-03-28 NOTE — PROGRESS NOTES
Problem: Pressure Injury - Risk of  Goal: *Prevention of pressure injury  Description: Document Ankit Scale and appropriate interventions in the flowsheet. Outcome: Progressing Towards Goal  Note: Pressure Injury Interventions:  Sensory Interventions: Avoid rigorous massage over bony prominences,Minimize linen layers,Monitor skin under medical devices,Pad between skin to skin,Pressure redistribution bed/mattress (bed type)    Moisture Interventions: Apply protective barrier, creams and emollients,Check for incontinence Q2 hours and as needed,Maintain skin hydration (lotion/cream),Minimize layers,Moisture barrier    Activity Interventions: Pressure redistribution bed/mattress(bed type)    Mobility Interventions: Pressure redistribution bed/mattress (bed type)    Nutrition Interventions: Document food/fluid/supplement intake    Friction and Shear Interventions: Foam dressings/transparent film/skin sealants,Minimize layers                Problem: Patient Education: Go to Patient Education Activity  Goal: Patient/Family Education  Outcome: Progressing Towards Goal     Problem: Falls - Risk of  Goal: *Absence of Falls  Description: Document Diana Fall Risk and appropriate interventions in the flowsheet.   Outcome: Progressing Towards Goal  Note: Fall Risk Interventions:       Mentation Interventions: Bed/chair exit alarm,Door open when patient unattended,More frequent rounding,Room close to nurse's station    Medication Interventions: Bed/chair exit alarm    Elimination Interventions: Bed/chair exit alarm,Call light in reach              Problem: Patient Education: Go to Patient Education Activity  Goal: Patient/Family Education  Outcome: Progressing Towards Goal     Problem: Nutrition Deficit  Goal: *Optimize nutritional status  Outcome: Progressing Towards Goal     Problem: Risk for Spread of Infection  Goal: Prevent transmission of infectious organism to others  Description: Prevent the transmission of infectious organisms to other patients, staff members, and visitors.   Outcome: Progressing Towards Goal     Problem: Patient Education:  Go to Education Activity  Goal: Patient/Family Education  Outcome: Progressing Towards Goal     Problem: Patient Education: Go to Patient Education Activity  Goal: Patient/Family Education  Outcome: Progressing Towards Goal

## 2022-03-28 NOTE — PROGRESS NOTES
Problem: Dysphagia (Adult)  Goal: *Acute Goals and Plan of Care (Insert Text)  Description: Patient will:  1. Tolerate PO trials with 0 s/s overt distress in 4/5 trials  2. Utilize compensatory swallow strategies/maneuvers (decrease bite/sip, size/rate, alt. liq/sol) with min cues in 4/5 trials  3. Perform oral-motor/laryngeal exercises to increase oropharyngeal swallow function with min cues  4. Complete an objective swallow study (i.e., MBSS) to assess swallow integrity, r/o aspiration, and determine of safest LRD, min A as indicated/ordered by MD     Recommend:   NPO; consider alternative nutrition/hydration source vs comfort feeds   Strict aspiration precautions  HOB >30 degrees at all times; oral care TID      Outcome: Progressing Towards Goal    SPEECH LANGUAGE PATHOLOGY DYSPHAGIA TREATMENT    Patient: Miguelangel Alvarez (68 y.o. female)  Date: 3/28/2022  Diagnosis: Septic shock ([facility] [address] [A41.9, R65.21]     Precautions: Aspiration     PLOF: As per H&P      ASSESSMENT:  Pt seen for follow up dysphagia treatment, minimally responsive despite max multi-modal stim. Pt tolerating oral care with no overt distress s/sx. Demo minimal bolus propulsion of ice chip requiring removal by SLP. X1 delayed cough noted post intake. Pt demo inadequate sensorimotor awareness/control for safe and efficient po intake. Recommend NPO; consider alternative nutrition/hydration source vs comfort feeds. D/w pt and RN. Progression toward goals:  []         Improving appropriately and progressing toward goals  []         Improving slowly and progressing toward goals  [x]         Not making progress toward goals and plan of care will be adjusted     PLAN:  Recommendations and Planned Interventions:  Patient continues to benefit from skilled intervention to address the above impairments. Continue treatment per established plan of care. Discharge Recommendations:   To Be Determined     SUBJECTIVE:   Patient nonverbala    OBJECTIVE:   Cognitive and Communication Status:  Neurologic State: Eyes open spontaneously,Confused  Orientation Level: Disoriented X4  Cognition: No command following,Unable to assess (comment)  Dysphagia Treatment:  See above     PAIN:  Start of Tx: unable to report   End of Tx: unable to report      After treatment:   []              Patient left in no apparent distress sitting up in chair  [x]              Patient left in no apparent distress in bed  [x]              Call bell left within reach  [x]              Nursing notified  []              Family present  []              Caregiver present  []              Bed alarm activated      COMMUNICATION/EDUCATION:   [x] Aspiration precautions; swallow safety; compensatory techniques  []        Patient/family able to participate in training and education   [x]  Patient unable to participate in training and education, education ongoing with staff   [] Patient understands goals and intent of therapy; neutral about participation     Victoria Ward M.S., [address] [facility]  Speech-Language Pathologist

## 2022-03-28 NOTE — CONSULTS
Vascular Surgery Consult      Reason for consultation: Diminished pulses in the right foot, abnormal right lower extremity arterial duplex findings, peripheral arterial disease    Patient Active Problem List   Diagnosis Code    UTI (urinary tract infection) N39.0    Pneumonia J18.9    Septic shock (HCC) A41.9, R65.21       HPI: Patient has history of dementia, previous stroke, and altered mentation, and relevant history has been obtained, from the medical personnel/caretakers and her medical notes    67/F from nursing home, with right-sided paralytic stroke, dementia, presenting with hypotension likely related to septic shock. Sepsis due to UTI with prior cultures of Klebsiella and Serratia. DDx includes cholecystitis and aspiration pneumonia (CT with cholelithiasis and patchy infiltrates). History of TBI with superimposed septic vs metabolic encephalopathy. Severe metabolic derangements including metabolic acidosis, GUIDO, hypokalemia, hypomagnesemia, hypocalcemia improving. Elevated Troponin with non specific T wave changes, Echo with normal LVEF but shows RV dilatation and pulmonary hypertension. She was noted to have decreased pulses in her lower extremities, and hence had vascular ultrasound on 3/25/2022, which revealed   The right proximal superficial femoral artery is occluded. The right anterior tibial artery demonstrates moderate (50-75%) stenosis. The right posterior tibial artery is occluded. Monophasic Doppler waveforms in the right distal superficial femoral, proximal popliteal, distal popliteal, anterior tibial, peroneal and dorsalis pedis artery. Biphasic Doppler waveforms in the right profunda femoris artery. Triphasic Doppler waveforms in the right distal common femoral artery. DPA 11.8 cm/s. CTA of the chest performed on 3/26/2022  Multifocal stenoses of the aortic branch vessels, including severe stenoses  of the bilateral subclavian arteries. .    Her blood pressure is being measured on the left lower extremity/thigh-she is hypertensive currently        Current problems include  - Metabolic Encephalopathy, with baseline dementia and dysarthria from previous CVA.    - Septic shock, likely 2/2 UTI vs GI source, requiring pressor support.    - GUIDO, slowly improving.    - NSTEMI, likely type 2. Suspect demand ischemia in setting of above. ECG showed LVH with secondary ST changes.    - Loculated left basilar pleural effusion by CT.    - Acute Cystitis, no growth noted on recent cultures. Previous culture + for Klebsiella on 3/15.    - Possible Aspiration PNA.    - Thrombocytopenia, plts 89 on admission.    - Cardiomyopathy, ECHO from 10/2021 EF 45-50%; echocardiogram 3/25/2022 revealed EF 55% with PA pressure of 45 mmHg.    - Moderate Pulmonary HTN by ECHO.   - DM2  - HTN, on Lisinopril, Coreg, Hydralazine and Lasix as outpatient.   - Hx CVA, right side hemiparesis with dysarthria and baseline dementia.   -Seizure Disorder   - Peripheral arterial diease   - Moderate Pulmonary hypertension, PASP 52 mmHg   -Obesity     Current inpatient medications have been reviewed. Please see MAR for complete list    Home Medications:     Prior to Admission medications    Medication Sig Start Date End Date Taking? Authorizing Provider   acetaminophen (Tylenol Extra Strength) 500 mg tablet Take 2 Tabs by mouth every six (6) hours as needed for Pain.  12/26/20   LATASHA Telles       Review of Systems:     Unable to obtain due to patient's mentation     Physical Assessment:   Temperature-97.4 °F  Heart rate 83/min regular  Blood pressure 220 / 88 mmHg: Left thigh  Respiratory rate: 14/min  Saturation: 99% on oxygen through nasal cannula        Constitutional:  Awake non communicable, groaning, not in acute distress   HENT:  Atraumatic, normocephalic, receiving NG tube feeds   Eyes:  No pallor or icterus   Neck:  Right internal jugular vein triple-lumen catheter noted site is clean   Cardiovascular:  Regular normal rate and rhythm, no murmurs   Pulmonary/Chest Wall:  Normal respiratory effort, decreased air entry at the bases, occasional wheezing   Abdominal:  Soft, nondistended, nontender, no obvious masses   Genitourinary/Anorectal:  Deferred, incontinent of urine-being straight cathed   Musculoskeletal:  Right upper and lower extremity paralysis, with flexion contracture of the right wrist   Neurological:  Unable to assess, due to patient's mentation however known chronic right side paralysis   Skin:  Chronic discoloration of the right leg on the shin and on the foot, indicative of resolving bruising from trauma. Superficial skin wounds on the right knee, which look traumatic/pressure related   Psych:  Unable to assess       Right:      Carotid       no bruit    Radial         monophasic Doppler        Dorsalis      monophasic Doppler    Post Tib     monophasic Doppler Left:        Carotid      no bruit    Radial         no Doppler signals  Ulnar artery   monophasic Doppler signals      Dorsalis      monophasic Doppler signals    Post Tib       monophasic Doppler signals   Left     All extremities are warm and adequately perfused, with no evidence of nonhealing wounds or major tissue loss     Basic Metabolic Profile  Lab Results   Component Value Date     (H) 03/28/2022    CO2 26 03/28/2022    BUN 40 (H) 03/28/2022       All relevant labs, imaging, and procedures reviewed prior to medical decision making    Impression:     Peripheral arterial disease asymptomatic, and patient with multiple medical comorbidities. She has bilateral severe subclavian arteries stenoses. She also has right superficial femoral artery occlusion. She does not have any major wounds or tissue loss. She is nonambulant, and has dementia, possibly with altered mental status changes and metabolic encephalopathy, and is not a candidate for any kind of vascular intervention even if she had any major wounds or tissue loss.       Plan:   No further vascular investigation or intervention planned or recommended.   Suggest aspirin 81 mg oral daily-if no medical contraindications, once her platelet counts improve to more than 100,000    Please call with questions

## 2022-03-29 NOTE — PROGRESS NOTES
Problem: Falls - Risk of  Goal: *Absence of Falls  Description: Document Ramin Pandey Fall Risk and appropriate interventions in the flowsheet.   Outcome: Progressing Towards Goal  Note: Fall Risk Interventions:       Mentation Interventions: Adequate sleep, hydration, pain control,Bed/chair exit alarm,Door open when patient unattended,Evaluate medications/consider consulting pharmacy,Increase mobility,More frequent rounding    Medication Interventions: Bed/chair exit alarm,Evaluate medications/consider consulting pharmacy    Elimination Interventions: Bed/chair exit alarm,Call light in reach              Problem: Non-Violent Restraints  Goal: Removal from restraints as soon as assessed to be safe  Outcome: Progressing Towards Goal  Goal: No harm/injury to patient while restraints in use  Outcome: Progressing Towards Goal  Goal: Patient's dignity will be maintained  Outcome: Progressing Towards Goal  Goal: Patient Interventions  Outcome: Progressing Towards Goal

## 2022-03-29 NOTE — PROGRESS NOTES
attended the interdisciplinary rounds for [facility], who is a 74 y.o.,female. Patients Primary Language is: Icelandic. According to the patients EMR Synagogue Affiliation is: No Rastafarian. The reason the Patient came to the hospital is:   Patient Active Problem List    Diagnosis Date Noted    Septic shock ([facility] [address] 03/24/2022    Pneumonia 10/17/2021    UTI (urinary tract infection) 12/08/2017      Plan:  Julianna Donahue will continue to follow and will provide pastoral care on an as needed/requested basis.  recommends bedside caregivers page  on duty if patient shows signs of acute spiritual or emotional distress.     [address] [facility]  Board Certified [address] [place] [address]   [phone]

## 2022-03-29 NOTE — PROGRESS NOTES
0700: Bedside and Verbal shift change report given to Peter Fuchs RN  (oncoming nurse) by Emerita Limon RN  (offgoing nurse). Report included the following information SBAR, Kardex, Procedure Summary, Intake/Output, MAR and Recent Results. 1115: Central line removed    1900: Bedside and Verbal shift change report given to Emerita Limon RN  (oncoming nurse) by Arnoldo Bass RN  (offgoing nurse). Report included the following information SBAR, Kardex, Intake/Output, MAR and Recent Results.

## 2022-03-29 NOTE — PROGRESS NOTES
Problem: Pressure Injury - Risk of  Goal: *Prevention of pressure injury  Description: Document Ankit Scale and appropriate interventions in the flowsheet. Outcome: Progressing Towards Goal  Note: Pressure Injury Interventions:  Sensory Interventions: Assess changes in LOC,Float heels,Keep linens dry and wrinkle-free    Moisture Interventions: Absorbent underpads,Limit adult briefs,Minimize layers,Moisture barrier,Offer toileting Q_hr    Activity Interventions: Assess need for specialty bed,Pressure redistribution bed/mattress(bed type)    Mobility Interventions: Assess need for specialty bed,Pressure redistribution bed/mattress (bed type),HOB 30 degrees or less    Nutrition Interventions: Document food/fluid/supplement intake    Friction and Shear Interventions: Apply protective barrier, creams and emollients,HOB 30 degrees or less,Lift sheet,Lift team/patient mobility team,Minimize layers                Problem: Patient Education: Go to Patient Education Activity  Goal: Patient/Family Education  Outcome: Progressing Towards Goal     Problem: Falls - Risk of  Goal: *Absence of Falls  Description: Document Diana Fall Risk and appropriate interventions in the flowsheet.   Outcome: Progressing Towards Goal  Note: Fall Risk Interventions:       Mentation Interventions: Adequate sleep, hydration, pain control,Bed/chair exit alarm,Door open when patient unattended    Medication Interventions: Bed/chair exit alarm,Evaluate medications/consider consulting pharmacy    Elimination Interventions: Bed/chair exit alarm,Call light in reach              Problem: Patient Education: Go to Patient Education Activity  Goal: Patient/Family Education  Outcome: Progressing Towards Goal     Problem: Non-Violent Restraints  Goal: Removal from restraints as soon as assessed to be safe  Outcome: Progressing Towards Goal  Goal: No harm/injury to patient while restraints in use  Outcome: Progressing Towards Goal  Goal: Patient's dignity will be maintained  Outcome: Progressing Towards Goal  Goal: Patient Interventions  Outcome: Progressing Towards Goal

## 2022-03-29 NOTE — PROGRESS NOTES
o Restraints evaluation  o   o Yes  o I have reevaluated the patient one hour after initiation of intervention. The patient is comfortable, uninjured, but continues to pose an imminent risk of injury to self to themselves and/or serious disruption of medical treatment required to keep patient stable. The patient's current medical and behavioral conditions that warrant the use intervention include danger to self and Interference with medical equipment or treatment. Restraint or seclusion will be discontinued at the earliest possible time, regardless of the scheduled expiration of the order.  Based on my evaluation, restraints will be continued: Blaire Gr NP

## 2022-03-29 NOTE — PROGRESS NOTES
Hospitalist Progress Note    Subjective:   Daily Progress Note: 3/29/2022     Patient cannot provide any history due to her altered mental status and keeps saying hi to all questions. No family member is at bedside. Patient has NG tube is in situ. Patient was seen in presence of nursing. Patient is tolerating tube feeding. She has 2 peripheral IV line, has been on 2 L oxygen and external female catheter.     Current Facility-Administered Medications   Medication Dose Route Frequency    QUEtiapine (SEROquel) tablet 50 mg  50 mg Oral QHS    melatonin tablet 10 mg  10 mg Oral QHS    hydrALAZINE (APRESOLINE) tablet 50 mg  50 mg Oral TID    oxyCODONE (ROXICODONE) 5 mg/5 mL oral solution 7.5 mg  7.5 mg Oral Q4H PRN    furosemide (LASIX) injection 40 mg  40 mg IntraVENous DAILY    cefTRIAXone (ROCEPHIN) 1 g in sterile water (preservative free) 10 mL IV syringe  1 g IntraVENous Q24H    carvediloL (COREG) tablet 12.5 mg  12.5 mg Oral Q12H    lisinopriL (PRINIVIL, ZESTRIL) tablet 10 mg  10 mg Oral DAILY    thiamine HCL (B-1) tablet 200 mg  200 mg Oral TID    nystatin (MYCOSTATIN) 100,000 unit/gram powder   Topical BID    [Held by provider] insulin glargine (LANTUS) injection 5 Units  5 Units SubCUTAneous DAILY    hydrALAZINE (APRESOLINE) 20 mg/mL injection 20 mg  20 mg IntraVENous Q6H PRN    dextrose 10% infusion 0-250 mL  0-250 mL IntraVENous PRN    albuterol-ipratropium (DUO-NEB) 2.5 MG-0.5 MG/3 ML  3 mL Nebulization Q4H PRN    [Held by provider] heparin (porcine) injection 5,000 Units  5,000 Units SubCUTAneous Q8H    insulin lispro (HUMALOG) injection   SubCUTAneous Q6H    glucose chewable tablet 16 g  4 Tablet Oral PRN    glucagon (GLUCAGEN) injection 1 mg  1 mg IntraMUSCular PRN    acetaminophen (TYLENOL) tablet 650 mg  650 mg Oral Q6H PRN    Or    acetaminophen (TYLENOL) suppository 650 mg  650 mg Rectal Q6H PRN    polyethylene glycol (MIRALAX) packet 17 g  17 g Oral DAILY PRN    ondansetron (ZOFRAN ODT) tablet 4 mg  4 mg Oral Q8H PRN    Or    ondansetron (ZOFRAN) injection 4 mg  4 mg IntraVENous Q6H PRN        Review of Systems  Review of systems not obtained due to patient factors. Objective:     Visit Vitals  BP (!) 168/48   Pulse 82   Temp 98.4 °F (36.9 °C)   Resp 22   Ht 4' 11\" (1.499 m)   Wt 63.5 kg (140 lb)   SpO2 99%   BMI 28.28 kg/m²    O2 Flow Rate (L/min): 3 l/min O2 Device: Nasal cannula    Temp (24hrs), Av °F (36.7 °C), Min:97.7 °F (36.5 °C), Max:98.4 °F (36.9 °C)       07 -  1900  In: 470   Out: 500 [Urine:500]   190 -  0700  In: 2242.5 [I.V.:1432.5]  Out: 0       General appearance -awake, ill-appearing, NG tube in situ, not in acute distress. Eyes - sclera anicteric, no pallor  Nose - no obvious nasal discharge. NG tube is in situ. Neck - supple, no JVD, trachea is midline  Chest -diminished air entry noted in bases, poor inspiratory efforts, no wheezes  Heart - S1 and S2 normal  Abdomen - soft, nontender, nondistended, Bowel sounds present  Neurological -awake, moves left upper extremity well and is under restraint without any wrist injury, not moving right side, sensation touch normal in left lower extremity. Musculoskeletal -right knee swelling and mild bruising present.   Extremities -  pedal edema noted      Data Review    Recent Results (from the past 24 hour(s))   PLATELET COUNT    Collection Time: 22  7:05 PM   Result Value Ref Range    PLATELET 28 (LL) 272 - 420 K/uL   GLUCOSE, POC    Collection Time: 22 11:50 PM   Result Value Ref Range    Glucose (POC) 231 (H) 70 - 110 mg/dL   EKG, 12 LEAD, SUBSEQUENT    Collection Time: 22  1:10 AM   Result Value Ref Range    Ventricular Rate 84 BPM    Atrial Rate 84 BPM    P-R Interval 158 ms    QRS Duration 72 ms    Q-T Interval 370 ms    QTC Calculation (Bezet) 437 ms    Calculated P Axis 74 degrees    Calculated R Axis -16 degrees    Calculated T Axis 96 degrees    Diagnosis       Normal sinus rhythm  Cannot rule out Anterior infarct , age undetermined  Abnormal ECG  When compared with ECG of 25-MAR-2022 09:25,  QT has shortened     CBC WITH AUTOMATED DIFF    Collection Time: 03/29/22  3:10 AM   Result Value Ref Range    WBC 11.2 4.6 - 13.2 K/uL    RBC 3.78 (L) 4.20 - 5.30 M/uL    HGB 10.8 (L) 12.0 - 16.0 g/dL    HCT 34.8 (L) 35.0 - 45.0 %    MCV 92.1 78.0 - 100.0 FL    MCH 28.6 24.0 - 34.0 PG    MCHC 31.0 31.0 - 37.0 g/dL    RDW 14.6 (H) 11.6 - 14.5 %    PLATELET 25 (LL) 728 - 420 K/uL    NRBC 0.0 0  WBC    ABSOLUTE NRBC 0.00 0.00 - 0.01 K/uL    NEUTROPHILS 69 40 - 73 %    LYMPHOCYTES 21 21 - 52 %    MONOCYTES 7 3 - 10 %    EOSINOPHILS 1 0 - 5 %    BASOPHILS 0 0 - 2 %    IMMATURE GRANULOCYTES 2 (H) 0.0 - 0.5 %    ABS. NEUTROPHILS 7.8 1.8 - 8.0 K/UL    ABS. LYMPHOCYTES 2.3 0.9 - 3.6 K/UL    ABS. MONOCYTES 0.8 0.05 - 1.2 K/UL    ABS. EOSINOPHILS 0.1 0.0 - 0.4 K/UL    ABS. BASOPHILS 0.0 0.0 - 0.1 K/UL    ABS. IMM.  GRANS. 0.2 (H) 0.00 - 0.04 K/UL    DF AUTOMATED     METABOLIC PANEL, BASIC    Collection Time: 03/29/22  3:10 AM   Result Value Ref Range    Sodium 148 (H) 136 - 145 mmol/L    Potassium 4.1 3.5 - 5.5 mmol/L    Chloride 119 (H) 100 - 111 mmol/L    CO2 27 21 - 32 mmol/L    Anion gap 2 (L) 3.0 - 18 mmol/L    Glucose 153 (H) 74 - 99 mg/dL    BUN 34 (H) 7.0 - 18 MG/DL    Creatinine 1.26 0.6 - 1.3 MG/DL    BUN/Creatinine ratio 27 (H) 12 - 20      GFR est AA 52 (L) >60 ml/min/1.73m2    GFR est non-AA 43 (L) >60 ml/min/1.73m2    Calcium 8.2 (L) 8.5 - 10.1 MG/DL   PHOSPHORUS    Collection Time: 03/29/22  3:10 AM   Result Value Ref Range    Phosphorus 2.7 2.5 - 4.9 MG/DL   MAGNESIUM    Collection Time: 03/29/22  3:10 AM   Result Value Ref Range    Magnesium 2.0 1.6 - 2.6 mg/dL   HIV 1/2 AG/AB, 4TH GENERATION,W RFLX CONFIRM    Collection Time: 03/29/22  3:10 AM   Result Value Ref Range    HIV 1/2 Interpretation NONREACTIVE NR      HIV 1/2 result comment SEE NOTE     CALCIUM, IONIZED Collection Time: 03/29/22  3:10 AM   Result Value Ref Range    Ionized Calcium 1.26 1.15 - 1.33 MMOL/L   RETICULOCYTE COUNT    Collection Time: 03/29/22  3:10 AM   Result Value Ref Range    Reticulocyte count 0.8 0.5 - 2.5 %   VITAMIN B12 & FOLATE    Collection Time: 03/29/22  3:10 AM   Result Value Ref Range    Vitamin B12 281 211 - 911 pg/mL    Folate 5.0 3.10 - 17.50 ng/mL   LD    Collection Time: 03/29/22  3:10 AM   Result Value Ref Range     81 - 234 U/L   HEPATIC FUNCTION PANEL    Collection Time: 03/29/22  3:10 AM   Result Value Ref Range    Protein, total 5.3 (L) 6.4 - 8.2 g/dL    Albumin 2.6 (L) 3.4 - 5.0 g/dL    Globulin 2.7 2.0 - 4.0 g/dL    A-G Ratio 1.0 0.8 - 1.7      Bilirubin, total 0.6 0.2 - 1.0 MG/DL    Bilirubin, direct 0.2 0.0 - 0.2 MG/DL    Alk. phosphatase 71 45 - 117 U/L    AST (SGOT) 26 10 - 38 U/L    ALT (SGPT) 18 13 - 56 U/L   GLUCOSE, POC    Collection Time: 03/29/22  6:00 AM   Result Value Ref Range    Glucose (POC) 143 (H) 70 - 110 mg/dL   GLUCOSE, POC    Collection Time: 03/29/22 11:49 AM   Result Value Ref Range    Glucose (POC) 173 (H) 70 - 110 mg/dL         Assessment/Plan:     Active Problems:    Septic shock ([facility] [address] (3/24/2022)      ASSESSMENT:    1. Septic shock, resolved currently. Off IV pressors  2. UTI s/p treatment  3. Aspiration pneumonia with hypoxia  4. Chronic loculated left pleural effusion  5. Acute on chronic encephalopathy, slowly improving  6. Chronic encephalopathy and right-sided hemiparesis due to chronic L MCA infarct as well as right frontal lobe encephalomalacia. 7.  Thrombocytopenia of unknown etiology  8. Leukocytosis, resolved  9. Acute renal failure, improving  10.  6 x 3 cm right hepatic mass  11. Seizure disorder  12. Peripheral arterial disease  13. Hypertension    PLAN:    Patient has been off IV pressor. Started on p.o. antihypertensive medications including hydralazine, lisinopril, Coreg.   We will place patient on DuoNebs and wean her off oxygen. Echocardiogram report reviewed. Patient is currently being treated with IV Lasix  CT head report reviewed. ID to follow and continue ceftriaxone  Hematology/oncology has been consulted for thrombocytopenia. Continue monitoring platelet  Patient currently has NG tube. She may need a PEG tube but her platelet needs to get better. We will have a palliative care consult. Advised nurses to use left hand mitten in place of soft restraint. As per ICU nursing: The team has discussed the plan of care with patient's daughter earlier today. She can be reached at [phone] as well as [phone]. I have evaluated the patient after initiation of intervention. The patient is comfortable, uninjured, but continues to pose an imminent risk of injury to self and or serious disruption of treatment. The patient's current medical and behavioral conditions that warrant the use intervention include danger to self and Interference with medical treatment. Restraint or seclusion will be discontinued at the earliest possible time, regardless of the scheduled expiration of the order. Total time to take care of this patient was 35 minutes and more than 50% of time was spent counseling and coordinating care. Disclaimer: Sections of this note are dictated using utilizing voice recognition software, which may have resulted in some phonetic based errors in grammar and contents. Even though attempts were made to correct all the mistakes, some may have been missed, and remained in the body of the document. If questions arise, please contact our department.

## 2022-03-29 NOTE — PROGRESS NOTES
[facility]. Pulmonary, Critical Care, and Sleep Medicine    Name: Paul Rosas MRN: [id number]   : 1955 Hospital: [facility]   Date: 3/29/2022  Admission Date: 3/24/2022     Chart and notes reviewed. Data reviewed. I have evaluated all findings. [x]I have reviewed the flowsheet and previous days notes. []The patient is unable to give any meaningful history or review of systems because the patient is:  []Intubated []Sedated   []Unresponsive      []The patient is critically ill on      []Mechanical ventilation []Pressors   []BiPAP []         Interval HPI:  67/F presenting with hypotension likely related to septic shock. Sepsis due to UTI with prior cultures of Klebsiella and Serratia. DDx includes cholecystitis and aspiration pneumonia (CT with cholelithiasis and patchy infiltrates). History of TBI with superimposed septic vs metabolic encephalopathy. Severe metabolic derangements including metabolic acidosis, GUIDO, hypokalemia, hypomagnesemia, hypocalcemia improving. Elevated Troponin with non specific T wave changes, Echo with normal LVEF but shows RV dilatation and pulmonary hypertension. Subjective 22  Hospital Day:since 3/25  Vent Day:n/a  Overnight events:Pt was agitated overnight, restless, medicated for pain. Hypertensive likely related to agitation. Mentation/Activity: Drowsy, at times able to say intelligible words. Respiratory/ Secretions:stable with nasal cannula  Hemodynamics: hypertensive on 3 HTN agents  Urine output, bowel: incontinent, voided twice yesterday, large amount per nursing staff  Diet:tolerating tube feeds per NGT  Need for procedures:n/a              ROS:Pertinent items are noted in HPI. Events and notes from last 24 hours reviewed.      Patient Active Problem List   Diagnosis Code    UTI (urinary tract infection) N39.0    Pneumonia J18.9    Septic shock (HCC) A41.9, R65.21       Vital Signs:  Visit Vitals  BP (!) 158/64 Pulse 78   Temp 97.9 °F (36.6 °C)   Resp 19   Ht 4' 11\" (1.499 m)   Wt 63.5 kg (140 lb)   SpO2 100%   BMI 28.28 kg/m²       O2 Device: Nasal cannula   O2 Flow Rate (L/min): 2 l/min   Temp (24hrs), Av.8 °F (36.6 °C), Min:97.5 °F (36.4 °C), Max:98 °F (36.7 °C)       Intake/Output:   Last shift:      No intake/output data recorded.   Last 3 shifts:  1901 -  0700  In: 2242.5 [I.V.:1432.5]  Out: 0     Intake/Output Summary (Last 24 hours) at 3/29/2022 3958  Last data filed at 3/29/2022 0600  Gross per 24 hour   Intake 2192.5 ml   Output 0 ml   Net 2192.5 ml          Current Facility-Administered Medications   Medication Dose Route Frequency    QUEtiapine (SEROquel) tablet 50 mg  50 mg Oral QHS    melatonin tablet 10 mg  10 mg Oral QHS    hydrALAZINE (APRESOLINE) tablet 50 mg  50 mg Oral TID    cefTRIAXone (ROCEPHIN) 1 g in sterile water (preservative free) 10 mL IV syringe  1 g IntraVENous Q24H    carvediloL (COREG) tablet 12.5 mg  12.5 mg Oral Q12H    lisinopriL (PRINIVIL, ZESTRIL) tablet 10 mg  10 mg Oral DAILY    thiamine HCL (B-1) tablet 200 mg  200 mg Oral TID    nystatin (MYCOSTATIN) 100,000 unit/gram powder   Topical BID    [Held by provider] insulin glargine (LANTUS) injection 5 Units  5 Units SubCUTAneous DAILY    [Held by provider] heparin (porcine) injection 5,000 Units  5,000 Units SubCUTAneous Q8H    insulin lispro (HUMALOG) injection   SubCUTAneous Q6H         Telemetry: []Sinus []A-flutter []Paced    []A-fib []Multiple PVCs                  Physical Exam:      General: opens eyes spontaneously,  following commands, no signs of distress  HEENT:  Anicteric sclerae; pink palpebral conjunctivae; mucosa moist  Resp:  Symmetrical chest expansion, no accessory muscle use; good airway entry; scattered crackles on both lung fields  CV:  S1, S2 present; regular rate and rhythm  GI:  Abdomen soft, non-tender; (+) active bowel sounds  Extremities:  +2 pulses on all extremities; R knee swelling with mild bruising, left hand contracted  Skin:  Warm; no rashes/ lesions noted, normal turgor/cap refill   Neurologic:  Non-focal, no command following  Devices:  RIJ TLC, NGT    DATA:  MAR reviewed and pertinent medications noted or modified as needed    Labs:  Recent Labs     03/29/22  0310 03/28/22  1905 03/28/22  1518 03/28/22  0406 03/28/22  0406   WBC 11.2  --  9.2  --  14.1*   HGB 10.8*  --  11.2*  --  11.6*   HCT 34.8*  --  36.1  --  37.6   PLT 25* 28* 22*   < > 30*    < > = values in this interval not displayed. Recent Labs     03/29/22  0310 03/28/22  0406 03/27/22  0306   * 147* 149*   K 4.1 4.9 4.3   * 119* 120*   CO2 27 26 24   * 174* 129*   BUN 34* 40* 34*   CREA 1.26 1.38* 1.37*   CA 8.2* 8.6 8.1*   MG 2.0 2.5 2.5   PHOS 2.7 4.0 3.2   ALB 2.6*  --   --    ALT 18  --   --      No results for input(s): PH, PCO2, PO2, HCO3, FIO2 in the last 72 hours. No results for input(s): FIO2I, IFO2, HCO3I, IHCO3, HCOPOC, PCO2I, PCOPOC, IPHI, PHI, PHPOC, PO2I, PO2POC in the last 72 hours. No lab exists for component: IPOC2    Imaging:  [x]   I have personally reviewed the patients radiographs and reports  XR Results (most recent):  XR Results (most recent):  Results from Hospital Encounter encounter on 03/24/22    XR ABD (KUB)    Narrative  History: NG tube placement. TECHNIQUE: Limited frontal view the abdomen. FINDINGS:    Enteric tube noted with tip over the gastroduodenal junction. Side hole over the  mid stomach. SVC CVL partially imaged. Airspace opacities partially evaluated. Visualized bowel appears nonobstructive. Impression  Enteric tube as discussed. CT Results (most recent):  Results from Hospital Encounter encounter on 03/24/22    CTA CHEST W OR W WO CONT    Addendum 3/26/2022  3:14 PM  Addendum: Addition to  impression:    6.  Multifocal stenoses of the aortic branch vessels, including severe stenoses  of the bilateral subclavian arteries. Narrative  EXAM: CTA CHEST PULMONARY EMBOLISM    CLINICAL INDICATION/HISTORY: r/o PE. Septic shock. Lactic acidosis. Aspiration  pneumonia with hypoxia. Increased leukocytosis. COMPARISON:  CT chest/abdomen/pelvis 3/24/2022    TECHNIQUE: CTA of the chest was performed using timing optimized for pulmonary  embolism technique, with IV contrast.  To maximize sensitivity the sagittal and  coronal reconstructions were created using a 3D multislice MIP (maximal  intensity projection) methodology. CT scans at this facility are performed using dose optimization technique as  appropriate to the performed exam, to include automated exposure control,  adjustment of the mA and/or kV according to patient size (including appropriate  matching for site specific examinations), or use of iterative reconstruction  technique. FINDINGS:    Lung/Airway:  Stable appearance of a loculated left pleural collection with  pleural calcifications and thickening since at least 2017. There is again  diffuse patchy groundglass opacity bilaterally, not significantly changed from  recent prior study. Trace right pleural effusion or pleural thickening. Lower neck: Enlarged heterogeneous multinodular thyroid. Mediastinum:  Stable borderline mediastinal lymphadenopathy. Pulmonary arteries (includes assessment of MIP images): Main pulmonary artery  measures less than 3 cm. No filling defect is seen in the main, lobar, or  visualized segmental pulmonary arteries. Aorta and other cardiovascular structures: No aortic aneurysm or dissection. Moderate coronary artery calcifications. Multifocal moderate to severe stenosis  of the right subclavian artery. Mild narrowing left proximal common carotid  artery. Severe focal stenosis of the left subclavian artery (2, 35). Left  axillary artery is patent, with more mild to moderate stenosis distally (56). Right IJ central line tip is in the SVC.   Heart Strain assessment:  -  RV/LV ratio (normal <0.9): Mild increased. -  Dysfunction or bowing of interventricular septum: None  -  There is mild visualization of contrast reflux from the right heart into the  IVC/hepatic veins. Upper abdominal structures: Redemonstration of known hepatic mass. Chest wall: Unremarkable. Bones: No acute osseous abnormality. Impression  1. No acute pulmonary embolism. 2. Mild reflux of contrast from the right heart into the IVC and hepatic veins,  and mildly increased right ventricle size, suggests elevated right heart  pressure. 3. Stable loculated left pleural effusion since 2017. Pleural calcifications in  this region may be related to prior asbestos disease. Unable to exclude  superimposed infection by imaging. 4. Diffuse patchy groundglass opacities bilaterally, not significant changed  from recent prior chest CT, possibly pulmonary edema or infection. 5. Borderline mediastinal lymphadenopathy is likely reactive, also not  significantly changed from very recent prior study. 03/24/22    ECHO ADULT FOLLOW-UP OR LIMITED 03/25/2022 3/25/2022    Interpretation Summary    Contrast used: Definity.   Technical qualifiers: Echo study was technically difficult due to patient's body habitus.   Patient was confused, and unable to be in left lateral decubitus.   Left Ventricle: Left ventricle size is normal. Moderate septal thickening. Mild posterior thickening. Normal wall motion. Normal left ventricular systolic function with a visually estimated EF of 55 - 60%.   Right Ventricle: Reduced systolic function.   Mitral Valve: Mild transvalvular regurgitation.   Pulmonary Arteries: Mild pulmonary hypertension present. The estimated pulmonary artery systolic pressure is 45 mmHg.     Signed by: Jennifer Robles MD on 3/25/2022  4:37 PM       IMPRESSION:   · Hypotension likely due to Septic shock with febrile, leukocytosis, hypotension, tachycardia. Likely in the setting of acute cystitis with hx of frequent UTI's growing Klebsiella pneumoniae and Serratia marcescens vs. abd etiology with noted dilated gallbladder on CT A/P vs. likely Atypical PNA. No obstructive uropathy noted on CT A/P. Clinically improved  · Acute PE ruled out by CTA    · Acute on chronic encephalopathy- superimposed on TBI (non-verbal at baseline), in the setting of sepsis  · GUIDO- pre-renal vs. Ischemic ATN- likely in the setting of dehydration and shock, improving indices  · Anion Gap Metabolic Acidosis- in the setting of GUIDO, lactic acidosis. resolved  · Electrolyte derangements- hyperchloremia, hypernatremia, hypomagnesemia, hypokalemia and hypocalcemia  · Thrombocytopenia   · Distended gallbladder with cholelithiasis- no evidence of cholecystitis, MRI results pending  · Liver Mass, right lobe- infectious vs. Neoplastic. Will need to follow once acute illness resolves  · Systolic HFmrEF- IO 38-10% . Some fluid overload/pulmonary edema noted on xray   · Traumatic brain injury and intellectual disability   · Loculated, Chronic left pleural Effusion  · Multinodular thyroid- enlarged, heterogeneous, normal TSH    · Chronic LMCA infarct-Hx stroke with right-sided hemiplegia, and aphasia, dysarthria  · Right frontal lobe encephalomalacia- likely in the setting of chronic infarct vs. TBI   · PAD- Dopplers (3/28: right prox superficial femoral artery occluded with distal collateral followed by 75% stenosis, right posterior tibial artery occluded at the mid calf. Right prox anterior tibial artery with 50-75% stenosis  · Thrombocytopenia- Plt: (3/24) 89 on initial presentation, now down to 25 (3/29). Work-up ongoing, peripheral smear pending.  No obvious signs of bleeding  · Seizure disorder  · HTN  · DM II   · Obesity   · Mild Pulmonary HTN- PASP 48 mmHg      Patient Active Problem List   Diagnosis Code    UTI (urinary tract infection) N39.0    Pneumonia J18.9    Septic shock ([facility] [address] A41.9, R65.21        RECOMMENDATIONS: Neuro:Neuro checks per routine, avoid sedatives as much as possible. Oxycodone PRN for pain  Pulm: Aspiration precautions, HOB>30'. CVS:Monitor HD, MAP goal >65. Restart home HTN meds:Hydralazine, Coreg, Lisinopril, Hold for SBP <100. Consulted vascular for PAD, will monitor for now with plans to initiate ASA when her plt count improves >100,000  GI: Tolerating tube feeds. Monitor for refeeding syndrome  Renal: Trend Cr, UOP. Added Lasix that pt takes outpatient  Hem/Onc: Trend H/H, monitor for s/o active bleeding. Daily CBC. Holding SQH for thrombocytopenia, HIT panel, peripheral smear, haptoglobin, copper, HIV pending. LFT unremarkable, Hepatitis panel negative, folate, Vitamin B12, fibrinogen, PTT negative. No obvious signs of bleeding. I/D:Trend WBCs and temperature curve. Continue Ceftriaxone  Metabolic: Daily BMP, mag, phos. Trend lytes and replace per protocol. Thiamine supplementation  Endocrine:Q6 glucoses. SSI. Avoid hypoglycemia. Musc/Skin:  wound care, PT/OT/SLP  Full Code  Discussed in interdisciplinary rounds  Stale for transfer to the floor         Best practice :    Glycemic control  IHI ICU bundles:   Central Line Bundle Followed     Sress ulcer prophylaxis. not indicated  DVT prophylaxis. Not indicated thrombocytopenia  Need for Lines, dove assessed. Palliative care evaluation. Restraints need. Attending Non-violent Restraint Reevaluation     I have reevaluated the patient one hour after initiation of intervention. The patient is comfortable, uninjured, but continues to pose an imminent risk of injury to self to themselves and/or serious disruption of medical treatment required to keep patient stable. The patient's current medical and behavioral conditions that warrant the use intervention include danger to self and Interference with medical equipment or treatment.   Restraint or seclusion will be discontinued at the earliest possible time, regardless of the scheduled expiration of the order. Based on my evaluation, restraints will be continued: YES         This care involved high complexity decision making to assess, manipulate, and support vital system functions, to treat this degreee vital organ system failure and to prevent further life threatening deterioration of the patients condition  The services I provided to this patient were to treat and/or prevent clinically significant deterioration that could result in the failure of one or more body systems and/or organ systems due to respiratory distress, hypoxia, cardiac dysrhythmia.        Pankaj Pedro NP   03/29/22  Pulmonary, Critical Care Medicine  [facility]

## 2022-03-29 NOTE — PROGRESS NOTES
[facility]  (A Division of [address] [place] [address])    Follow-up Note      Date of Admission: 3/24/2022       Date of Note:  3/29/2022          Current Antimicrobials:    Prior Antimicrobials:  Ceftriaxone 3/28- present Vancomycin 3/24 to 3/28  Meropenem 3/25- 3/28     Assessment:         Septic shock on multiple pressors with lactic acidosis: Suspect related to  source given abnormal UA vs liver abscess? Vs pleural process  Urinary tract infection: UA with greater than 100 WBCs few bacteria large leukocyte esterase negative nitrite; recent cx + for Klebsiella on 3/15  Aspiration pneumonia with hypoxia  Leukocytosis: improving  Loculated left pleural effusion with calcification and pleural thickening as well as non-specific diffuse opacities -  Noted on CT chest from 3/24. ? Chronic   GUIDO: Creatinine 2.38 upon presentation now improving  Metabolic encephalopathy with baseline dementia/prior CVA/limited verbal interaction  Suspected Right liver abscess vs mass (new since 2017)-3.1 x 5.8 x 4.2 cm, infection vs malignancy. Noted on 3/14 CT scn but not mentioedn in 3/24 CT report. ?? source  Thrombocytopenia-  worsening    Plan:   Continue Ceftriaxone 1gm IV daily   - tentative stop date 3/30           F/u blood cx - remains negative from 3/24  CBC, BMP in am   MRI liver with MRCP reviewed - biopsy at a later date               Reviewed CT from 3/14 and 3/24 with radiology    Rosario Barnard, [address] [facility]  [address], [address] [place], [place] [address]ado [facility]  Office: [phone]  Mobile/Text: [phone]     Microbiology:  3/24 blood culture: No growth to date x2  3/25 RSV PCR negative  3/25 MRSA nasal swab negative  3/25 respiratory viral panel: Negative  3/25 C.diff: negative    Lines / Catheters:  A-line left wrist  Peripheral  Right IJ TLC  dove    Subjective:   Patent seen and examined. No fevers.    Minimally interactive, looks around but non-verbal, yells  WBC improved. NO fevers documened. Nurse in room at time of my visit. Right IJ TLC still in place    Objective:        Visit Vitals  BP (!) 158/64   Pulse 78   Temp 97.9 °F (36.6 °C)   Resp 19   Ht 4' 11\" (1.499 m)   Wt 63.5 kg (140 lb)   SpO2 100%   BMI 28.28 kg/m²     Temp (24hrs), Av.8 °F (36.6 °C), Min:97.5 °F (36.4 °C), Max:98 °F (36.7 °C)         General:  , Difficult to arouse, sleepy, nonverbal   Skin:   no rashes or skin lesions noted on limited exam, cool to touch   HEENT:  Normocephalic, atraumatic, PERRL, EOMI, no scleral icterus or pallor; no conjunctival hemmohage;  nasal and oral mucous are moist and without evidence of lesions. No thrush. Neck supple, no bruits. Lymph Nodes:   no cervical, axillary or inguinal adenopathy   Lungs:   non-labored, bilaterally clear to aspiration- no crackles wheezes rales or rhonchi   Heart:  RRR, s1 and s2; no murmurs rubs or gallops, no edema, + pedal pulses   Abdomen:  soft, non-distended, active bowel sounds, no hepatomegaly, no splenomegaly. yells out with palpation to the lower abdomen. No mid-epigastric tenderness   Genitourinary: Fallon   Extremities:   no clubbing, cyanosis; no joint effusions or swelling; Full ROM of all large joints to the upper and lower extremities; muscle mass appropriate for age- left wrist restrained   Neurologic:  No gross focal sensory abnormalities; opens eyes and tracks, response to pain nonverbal, right upper extremity appears to be contracted, focal weakness on the right when compared to the left as left side has noted independent movement.    Psychiatric:   Calm        Lab results:    Chemistry  Recent Labs     22  0310 22  0406 22  0306   * 174* 129*   * 147* 149*   K 4.1 4.9 4.3   * 119* 120*   CO2 27 26 24   BUN 34* 40* 34*   CREA 1.26 1.38* 1.37*   CA 8.2* 8.6 8.1*   AGAP 2* 2* 5   BUCR 27* 29* 25*   AP 71  --   --    TP 5.3*  --   --    ALB 2.6*  --   -- GLOB 2.7  --   --    AGRAT 1.0  --   --        CBC w/ Diff  Recent Labs     03/29/22  0310 03/28/22  1905 03/28/22  1518 03/28/22  0406 03/28/22  0406   WBC 11.2  --  9.2  --  14.1*   RBC 3.78*  --  3.86*  --  4.01*   HGB 10.8*  --  11.2*  --  11.6*   HCT 34.8*  --  36.1  --  37.6   PLT 25* 28* 22*   < > 30*   GRANS 69  --  80*  --  85*   LYMPH 21  --  11*  --  9*   EOS 1  --  0  --  0    < > = values in this interval not displayed. Microbiology  All Micro Results     Procedure Component Value Units Date/Time    CULTURE, BLOOD [[id number]] Collected: 03/24/22 1945    Order Status: Completed Specimen: Blood Updated: 03/29/22 0612     Special Requests: NO SPECIAL REQUESTS        Culture result: NO GROWTH 5 DAYS       CULTURE, BLOOD [[id number]] Collected: 03/24/22 1952    Order Status: Completed Specimen: Blood Updated: 03/29/22 0612     Special Requests: NO SPECIAL REQUESTS        Culture result: NO GROWTH 5 DAYS       CULTURE, MRSA [[id number]] Collected: 03/25/22 0134    Order Status: Completed Specimen: Nasal from Nares Updated: 03/26/22 1151     Special Requests: NO SPECIAL REQUESTS        Culture result: MRSA NOT PRESENT               Screening of patient nares for MRSA is for surveillance purposes and, if positive, to facilitate isolation considerations in high risk settings. It is not intended for automatic decolonization interventions per se as regimens are not sufficiently effective to warrant routine use.           C. DIFFICILE AG & TOXIN A/B [[id number]] Collected: 03/25/22 1517    Order Status: Completed Specimen: Stool Updated: 03/26/22 1007     GDH ANTIGEN Negative        C. difficile toxin Negative        INTERPRETATION       NEGATIVE FOR TOXIGENIC C. DIFFICILE          RESPIRATORY VIRUS PANEL W/COVID-19, PCR [[id number]] Collected: 03/25/22 0030    Order Status: Completed Specimen: Nasopharyngeal Updated: 03/25/22 0205     Adenovirus Not detected        Coronavirus 229E Not detected Coronavirus HKU1 Not detected        Coronavirus CVNL63 Not detected        Coronavirus OC43 Not detected        SARS-CoV-2, PCR Not detected        Metapneumovirus Not detected        Rhinovirus and Enterovirus Not detected        Influenza A Not detected        Influenza B Not detected        Parainfluenza 1 Not detected        Parainfluenza 2 Not detected        Parainfluenza 3 Not detected        Parainfluenza virus 4 Not detected        RSV by PCR Not detected        B. parapertussis, PCR Not detected        Bordetella pertussis - PCR Not detected        Chlamydophila pneumoniae DNA, QL, PCR Not detected        Mycoplasma pneumoniae DNA, QL, PCR Not detected       CULTURE, RESPIRATORY/SPUTUM/BRONCH Sheral Given [[id number]] Collected: 03/24/22 2330    Order Status: Canceled Specimen: Sputum            Monse Adams DO   3/29/2022

## 2022-03-29 NOTE — INTERDISCIPLINARY ROUNDS
[facility]  Pulmonary, Critical Care, and Sleep Medicine  Interdisciplinary and Ventilator Weaning Rounds    Patient discussed in morning walking rounds and interdisciplinary rounds. ICU day: 3/25    Overnight events:    No acute events    Assessments and best practice:   Ventilator  - na   Sedation  o none   Other pertinent drips  o n/a   Lines noted  o IJ CVL   Critical labs assessed  o Yes   Antibiotics  o Vancomycin and Merrem   Medications reviewed  o Yes   Pending imaging  o echo   Pending send out labs  o No   Pending Procedures  o None   Glycemic control   Stress ulcer prophylaxis. o Pepcid   DVT prophylaxis. o none   Need for Lines, dove assessed.  o Yes   Restraint Reevaluation      o Yes  o I have reevaluated the patient one hour after initiation of intervention. The patient is comfortable, uninjured, but continues to pose an imminent risk of injury to self to themselves and/or serious disruption of medical treatment required to keep patient stable. The patient's current medical and behavioral conditions that warrant the use intervention include danger to self and Interference with medical equipment or treatment. Restraint or seclusion will be discontinued at the earliest possible time, regardless of the scheduled expiration of the order.  Based on my evaluation, restraints will be continued: YES         Family contact/MPOA: niece  Family updated: will contact and update after rounds     Palliative consult within 3 days of admission to ICU-  Ethics Guidance: 21 days      Daily Plans:   Transfer to the floor   Advance TF as tolerated   Discontinue central line   Increased Hydralazine dose   Peripheral smear pending    SUZANNE time 10 minutes        Akiko Murdock NP  03/29/22  Pulmonary, [address] [facility],[address] [facility]

## 2022-03-29 NOTE — DIABETES MGMT
Diabetes/ Glycemic Control Plan of Care  Recommendations:   Blood glucose within target range this am.  Steroids discontinued yesterday. Lantus dose remains held. Continue corrective insulin coverage as needed. Will continue inpatient monitoring. Fasting/ Morning blood glucose:   Lab Results   Component Value Date/Time    Glucose 153 (H) 03/29/2022 03:10 AM    Glucose (POC) 143 (H) 03/29/2022 06:00 AM     IV Fluids containing dextrose:   None   Steroids:  None      Blood glucose values:       Results for KOWALSKI, IRIS (MRN [id number]) as of 3/29/2022 09:46   Ref. Range 3/28/2022 12:55 3/28/2022 23:50 3/29/2022 06:00   GLUCOSE,FAST - POC Latest Ref Range: 70 - 110 mg/dL 161 (H) 231 (H) 143 (H)     Within target range (70-180mg/dL):   Progressing towards goal  Current insulin orders:   lantus 5 units daily (on hold)  Lispro corrective insulin coverage every 6 hours. Total Daily Dose previous 24 hours =  15 units    Current A1c:   Lab Results   Component Value Date/Time    Hemoglobin A1c 5.6 03/25/2022 12:00 AM      Equivalent  to average Blood Glucose of  mg/dl for 2-3 months prior to admission  Adequate glycemic control PTA:   Yes   Nutrition/Diet:   DIET NPO  ADULT TUBE FEEDING Nasogastric; Diabetic; Delivery Method: Continuous; Continuous Initial Rate (mL/hr): 10; Continuous Advance Tube Feeding: Yes; Advancement Volume (mL/hr): 10; Advancement Frequency: Q 12 hours; Continuous Goal Rate (mL/hr): 45; . .. Active Orders   Diet    DIET NPO      Meal Intake:  Patient Vitals for the past 168 hrs:   % Diet Eaten   03/27/22 0800 0%   03/26/22 1400 1 - 25%     Supplement Intake:  Patient Vitals for the past 168 hrs:   Supplement intake %   03/27/22 0800 0%   03/26/22 1400 0%       Home diabetes medications:   Key Antihyperglycemic Medications     Patient is on no antihyperglycemic meds.         Plan/Goals:   Blood glucose will be within target of 70 - 180 mg/dl within 72 hours  Reinforce dietary and medication compliance at home.        Education:  [] Refer to Diabetes Education Record                          [x] Education not indicated at this time     Nestor Lopez, [address] [facility] CDE  Ext [phone]

## 2022-03-30 NOTE — CONSULTS
[facility]: [phone]  [facility]: [phone]     Patient Name: Ermias Contreras  YOB: 1955    Date of Consult : 3/30/22  Reason for Consult: establish goals of care  Requesting Provider: Manpreet Johnson MD   Primary Care Physician: None      SUMMARY:   Ermias Contreras is a 74 y.o. female with a past history of TBI,HTN, DM 2, Obesity, dementia pulmonary HTN PASP 52 mmHg , Systolic HF EF 11-06%, who was admitted on 3/24/2022 from home with a diagnosis of Sepsis with AMS and  Thrombocytopenia  Current medical issues leading to Palliative Medicine involvement include: establish goals of care. CHIEF COMPLAINT: AMS     HPI/SUBJECTIVE:    Patient is a 62-year-old  female that lives long-term in the nursing facility ([facility]). She is non-English speaking. She was brought in through the emergency room room with low blood pressure, tachycardia, and wheezing. She also was running low-grade temp and altered mental status only responding to pain. The patient is:   [] Verbal and participatory  [x] Non-participatory due to: Altered mental status    GOALS OF CARE:  Patient/Health Care Proxy Stated Goals: Prolong life      TREATMENT PREFERENCES:   Code Status: Full Code         PALLIATIVE DIAGNOSES:   1. Goals of care/ACP  2. Sepsis  3. Thrombocytopenia  4. Altered mental status  5. Debility       PLAN:   1. Goals of care/ACP  This NP along with Cosem Sheppard LMSW and Helio Landaverde RN in to see patient at the bedside. Present were her 2 nieces and the patient's sister. The sister does not speak much English so her nieces were doing most of the translation. Initial conversation was information seeking about patient's functional status at baseline and family's understanding of her current situation and prognosis.   When we addressed CODE STATUS patient's sister immediately stated that she would want patient to remain full code with full interventions. Patient does not appear to be in any immediate risk of intubation and the family would like to see if she makes any meaningful recovery with antibiotics and treatment of her sepsis. We will continue to follow and update the family on any changes in the patient's condition. We meet for goals of care conversation we would like to bring in  service to ensure of proper understanding. Goals of Care:  Full code with Full interventions  2. Sepsis  Patient being seen by infectious disease who note the patient has septic shock related to  source versus liver abscess versus pleural process. UA grew out greater than 100,000 noted recent culture for Klebsiella on 3/15/2022  3. Thrombocytopenia  HIT panel negative. Recommend consider hematology evaluation. 4.  Altered mental status  At baseline patient is communicative per the family. She was active and participating in her life. This is a very drastic change from her current presentation. Likely due to #2. 5.  Debility  Pt has what appears to be a Griselda score of around 56% at baseline not able to do any work due to extensive disease, needing considerable assistance for functional ADLs and self-care but moving the bed to chair existence with some ability to interact with her-year-old despite her baseline dementia. At this time patient's palliative performance score is around 30% she is total care bedbound, and unable to eat orally on her own. She is being fed by nasogastric tube at this time. 6.  Initial consult note routed to primary continuity provider  7.   Communicated plan of care with: Palliative IDT      Advance Care Planning:  [] The [facility] Interdisciplinary Team has updated the ACP Navigator with [address] and Patient Capacity    Primary Decision Maker ([address]): Pt's sister is the [facility]     Medical Interventions: Full interventions   Other Instructions:   Artificially Administered Nutrition: Feeding tube long-term, if indicated     As far as possible, the palliative care team has discussed with patient / health care proxy about goals of care / treatment preferences for patient. HISTORY:     History obtained from: Chart review   Active Problems:    Septic shock ([address] (3/24/2022)      No past medical history on file. No past surgical history on file. No family history on file. History reviewed, no pertinent family history.   Social History     Tobacco Use    Smoking status: Former Smoker    Smokeless tobacco: Never Used   Substance Use Topics    Alcohol use: Not on file     No Known Allergies   Current Facility-Administered Medications   Medication Dose Route Frequency    nitroglycerin (NITROBID) 2 % ointment 1 Inch  1 Inch Topical Q6H PRN    hydrALAZINE (APRESOLINE) 20 mg/mL injection 20 mg  20 mg IntraVENous Q6H PRN    cyanocobalamin (VITAMIN B12) injection 1,000 mcg  1,000 mcg SubCUTAneous DAILY    carvediloL (COREG) tablet 12.5 mg  12.5 mg Oral Q12H    [START ON 3/31/2022] lisinopriL (PRINIVIL, ZESTRIL) tablet 20 mg  20 mg Oral DAILY    QUEtiapine (SEROquel) tablet 50 mg  50 mg Oral QHS    melatonin tablet 10 mg  10 mg Oral QHS    hydrALAZINE (APRESOLINE) tablet 50 mg  50 mg Oral TID    oxyCODONE (ROXICODONE) 5 mg/5 mL oral solution 7.5 mg  7.5 mg Oral Q4H PRN    furosemide (LASIX) injection 40 mg  40 mg IntraVENous DAILY    thiamine HCL (B-1) tablet 200 mg  200 mg Oral TID    nystatin (MYCOSTATIN) 100,000 unit/gram powder   Topical BID    [Held by provider] insulin glargine (LANTUS) injection 5 Units  5 Units SubCUTAneous DAILY    dextrose 10% infusion 0-250 mL  0-250 mL IntraVENous PRN    albuterol-ipratropium (DUO-NEB) 2.5 MG-0.5 MG/3 ML  3 mL Nebulization Q4H PRN    [Held by provider] heparin (porcine) injection 5,000 Units  5,000 Units SubCUTAneous Q8H    insulin lispro (HUMALOG) injection   SubCUTAneous Q6H    glucose chewable tablet 16 g  4 Tablet Oral PRN    glucagon (GLUCAGEN) injection 1 mg  1 mg IntraMUSCular PRN    acetaminophen (TYLENOL) tablet 650 mg  650 mg Oral Q6H PRN    Or    acetaminophen (TYLENOL) suppository 650 mg  650 mg Rectal Q6H PRN    polyethylene glycol (MIRALAX) packet 17 g  17 g Oral DAILY PRN    ondansetron (ZOFRAN ODT) tablet 4 mg  4 mg Oral Q8H PRN    Or    ondansetron (ZOFRAN) injection 4 mg  4 mg IntraVENous Q6H PRN          Clinical Pain Assessment (nonverbal scale for nonverbal patients): Activity (Movement): Lying quietly, normal position    Duration: for how long has pt been experiencing pain (e.g., 2 days, 1 month, years)  Frequency: how often pain is an issue (e.g., several times per day, once every few days, constant)     FUNCTIONAL ASSESSMENT:     Palliative Performance Scale (PPS):  PPS: 30    ECOG  ECOG Status : Completely disabled     PSYCHOSOCIAL/SPIRITUAL SCREENING:      Any spiritual / Yazdanism concerns:  [] Yes /  [x] No    Caregiver Burnout:  [] Yes /  [] No /  [x] No Caregiver Present      Anticipatory grief assessment:   [x] Normal  / [] Maladaptive        REVIEW OF SYSTEMS:     Systems: constitutional, ears/nose/mouth/throat, respiratory, gastrointestinal, genitourinary, musculoskeletal, integumentary, neurologic, psychiatric, endocrine. Positive findings noted below. Modified ESAS Completed by: provider                       Dyspnea: 0           Stool Occurrence(s): 1   Positive and pertinent negative findings in ROS are noted above in HPI. The following systems were [x] reviewed / [] unable to be reviewed as noted in HPI  Other findings are noted below.      PHYSICAL EXAM:     Constitutional: alert but minimally interactive  Eyes: pupils equal, anicteric  ENMT: no nasal discharge, moist mucous membranes  Cardiovascular: regular rhythm, distal pulses intact  Respiratory: breathing not labored, symmetric  Gastrointestinal: soft non-tender, +bowel sounds  Musculoskeletal: no deformity, no tenderness to palpation  Skin: warm, dry  Neurologic:not following commands, moving all extremities    Other: Wt Readings from Last 3 Encounters:   03/25/22 63.5 kg (140 lb)   03/14/22 70.3 kg (155 lb)   10/22/21 70.3 kg (155 lb)     Blood pressure (!) 171/76, pulse 87, temperature 97.5 °F (36.4 °C), resp. rate 19, height 4' 11\" (1.499 m), weight 63.5 kg (140 lb), SpO2 96 %. Pain:  Pain Scale 1: Adult Nonverbal Pain Scale  Pain Intensity 1: 0  Pain Onset 1:  (intermittent)  Pain Location 1: Abdomen  Pain Orientation 1: Mid     Pain Intervention(s) 1: Family Support       LAB AND IMAGING FINDINGS:     Lab Results   Component Value Date/Time    WBC 10.6 03/30/2022 01:59 AM    HGB 10.8 (L) 03/30/2022 01:59 AM    PLATELET 27 (LL) 49/75/4075 01:59 AM     Lab Results   Component Value Date/Time    Sodium 147 (H) 03/30/2022 01:59 AM    Potassium 4.1 03/30/2022 01:59 AM    Chloride 116 (H) 03/30/2022 01:59 AM    CO2 28 03/30/2022 01:59 AM    BUN 32 (H) 03/30/2022 01:59 AM    Creatinine 1.04 03/30/2022 01:59 AM    Calcium 8.1 (L) 03/30/2022 01:59 AM    Magnesium 1.6 03/30/2022 01:59 AM    Phosphorus 2.4 (L) 03/30/2022 01:59 AM      Lab Results   Component Value Date/Time    Alk.  phosphatase 71 03/29/2022 03:10 AM    Protein, total 5.3 (L) 03/29/2022 03:10 AM    Albumin 2.6 (L) 03/29/2022 03:10 AM    Globulin 2.7 03/29/2022 03:10 AM     Lab Results   Component Value Date/Time    INR 1.4 (H) 03/24/2022 07:52 PM    Prothrombin time 16.7 (H) 03/24/2022 07:52 PM    aPTT 32.3 03/25/2022 12:00 AM      No results found for: IRON, FE, TIBC, IBCT, PSAT, FERR   No results found for: PH, PCO2, PO2  No components found for: [place]   Lab Results   Component Value Date/Time    CK 54 03/25/2022 07:00 PM              Total time: 50 minutes   Counseling / coordination time, spent as noted above:   > 50% counseling / coordination:  Time spent in direct consultation with the patient, medical team, and family     Prolonged service was provided for  []30 min   []75 min in face to face time in the presence of the patient, spent as noted above. Time Start:   Time End:     Disclaimer: Sections of this note are dictated using utilizing voice recognition software, which may have resulted in some phonetic based errors in grammar and contents. Even though attempts were made to correct all the mistakes, some may have been missed, and remained in the body of the document. If questions arise, please contact our department.

## 2022-03-30 NOTE — PROGRESS NOTES
0700: Bedside and Verbal shift change report given to Peter Fuchs RN  (oncoming nurse) by Emerita Limon RN  (offgoing nurse). Report included the following information SBAR, Kardex, Intake/Output, MAR and Recent Results. 1500: full bath and linen change done      1900: Bedside and Verbal shift change report given to Emerita Limon RN  (oncoming nurse) by Peter Fuchs RN (offgoing nurse). Report included the following information SBAR, Procedure Summary, Intake/Output, MAR and Recent Results.

## 2022-03-30 NOTE — DIABETES MGMT
Diabetes/ Glycemic Control Plan of Care  Recommendations:   Blood glucose above target range this am, 189 mg/dl  Patient required 12 units of corrective insulin coverage yesterday  Recommend adding Lantus 4 units daily  Continue corrective insulin coverage as needed. Will continue inpatient monitoring. Fasting/ Morning blood glucose:   Lab Results   Component Value Date/Time    Glucose 161 (H) 03/30/2022 01:59 AM    Glucose (POC) 189 (H) 03/30/2022 05:38 AM     IV Fluids containing dextrose:   None   Steroids:  None      Blood glucose values:       Results for KOWALSKI, IRIS (MRN [id number]) as of 3/30/2022 09:51   Ref. Range 3/29/2022 11:49 3/29/2022 17:14 3/29/2022 23:20 3/30/2022 05:38   GLUCOSE,FAST - POC Latest Ref Range: 70 - 110 mg/dL 173 (H) 173 (H) 215 (H) 189 (H)     Within target range (70-180mg/dL):   No. Will recommend adding basal insulin. Current insulin orders:   lantus 5 units daily (on hold)  Lispro corrective insulin coverage every 6 hours. Total Daily Dose previous 24 hours =  12 units    Current A1c:   Lab Results   Component Value Date/Time    Hemoglobin A1c 5.6 03/25/2022 12:00 AM      Equivalent  to average Blood Glucose of  mg/dl for 2-3 months prior to admission  Adequate glycemic control PTA:   Yes   Nutrition/Diet:   DIET NPO  ADULT TUBE FEEDING Nasogastric; Diabetic; Delivery Method: Continuous; Continuous Initial Rate (mL/hr): 10; Continuous Advance Tube Feeding: Yes; Advancement Volume (mL/hr): 10; Advancement Frequency: Q 12 hours; Continuous Goal Rate (mL/hr): 45; . .. Active Orders   Diet    DIET NPO      Meal Intake:  Patient Vitals for the past 168 hrs:   % Diet Eaten   03/27/22 0800 0%   03/26/22 1400 1 - 25%     Supplement Intake:  Patient Vitals for the past 168 hrs:   Supplement intake %   03/27/22 0800 0%   03/26/22 1400 0%       Home diabetes medications:   Key Antihyperglycemic Medications     Patient is on no antihyperglycemic meds.         Plan/Goals:   Blood glucose will be within target of 70 - 180 mg/dl within 72 hours  Reinforce dietary and medication compliance at home.        Education:  [] Refer to Diabetes Education Record                          [x] Education not indicated at this time     Pillo Beasley, [facility] CDE  Ext [phone] Yes

## 2022-03-30 NOTE — PROGRESS NOTES
Problem: Pressure Injury - Risk of  Goal: *Prevention of pressure injury  Description: Document Ankit Scale and appropriate interventions in the flowsheet. Outcome: Progressing Towards Goal  Note: Pressure Injury Interventions:  Sensory Interventions: Assess changes in LOC,Assess need for specialty bed,Avoid rigorous massage over bony prominences,Discuss PT/OT consult with provider,Float heels,Keep linens dry and wrinkle-free,Minimize linen layers,Monitor skin under medical devices,Pad between skin to skin,Turn and reposition approx. every two hours (pillows and wedges if needed)    Moisture Interventions: Absorbent underpads,Apply protective barrier, creams and emollients,Check for incontinence Q2 hours and as needed,Internal/External urinary devices,Moisture barrier,Minimize layers    Activity Interventions: Assess need for specialty bed,Pressure redistribution bed/mattress(bed type),PT/OT evaluation    Mobility Interventions: Float heels,HOB 30 degrees or less,Pressure redistribution bed/mattress (bed type),Turn and reposition approx.  every two hours(pillow and wedges)    Nutrition Interventions: Document food/fluid/supplement intake,Discuss nutritional consult with provider    Friction and Shear Interventions: Feet elevated on foot rest,Foam dressings/transparent film/skin sealants,Minimize layers                Problem: Patient Education: Go to Patient Education Activity  Goal: Patient/Family Education  Outcome: Progressing Towards Goal     Problem: Non-Violent Restraints  Goal: Removal from restraints as soon as assessed to be safe  Outcome: Progressing Towards Goal  Goal: No harm/injury to patient while restraints in use  Outcome: Progressing Towards Goal  Goal: Patient's dignity will be maintained  Outcome: Progressing Towards Goal  Goal: Patient Interventions  Outcome: Progressing Towards Goal

## 2022-03-30 NOTE — PROGRESS NOTES
Problem: Pressure Injury - Risk of  Goal: *Prevention of pressure injury  Description: Document Ankit Scale and appropriate interventions in the flowsheet. Outcome: Progressing Towards Goal  Note: Pressure Injury Interventions:  Sensory Interventions: Assess changes in LOC,Assess need for specialty bed,Avoid rigorous massage over bony prominences,Float heels,Keep linens dry and wrinkle-free,Minimize linen layers    Moisture Interventions: Absorbent underpads,Apply protective barrier, creams and emollients,Assess need for specialty bed,Check for incontinence Q2 hours and as needed,Internal/External urinary devices,Limit adult briefs,Maintain skin hydration (lotion/cream),Minimize layers,Moisture barrier    Activity Interventions: Assess need for specialty bed,Pressure redistribution bed/mattress(bed type)    Mobility Interventions: Assess need for specialty bed,HOB 30 degrees or less,Float heels,Pressure redistribution bed/mattress (bed type)    Nutrition Interventions: Document food/fluid/supplement intake    Friction and Shear Interventions: Apply protective barrier, creams and emollients,HOB 30 degrees or less,Lift sheet                Problem: Falls - Risk of  Goal: *Absence of Falls  Description: Document Diana Fall Risk and appropriate interventions in the flowsheet.   Outcome: Progressing Towards Goal  Note: Fall Risk Interventions:       Mentation Interventions: Adequate sleep, hydration, pain control,Bed/chair exit alarm,Door open when patient unattended,Reorient patient,More frequent rounding,Room close to nurse's station    Medication Interventions: Evaluate medications/consider consulting pharmacy,Bed/chair exit alarm    Elimination Interventions: Bed/chair exit alarm,Call light in reach,Patient to call for help with toileting needs,Toileting schedule/hourly rounds              Problem: Patient Education: Go to Patient Education Activity  Goal: Patient/Family Education  Outcome: Progressing Towards Goal     Problem: Non-Violent Restraints  Goal: Removal from restraints as soon as assessed to be safe  Outcome: Progressing Towards Goal  Goal: No harm/injury to patient while restraints in use  Outcome: Progressing Towards Goal  Goal: Patient's dignity will be maintained  Outcome: Progressing Towards Goal  Goal: Patient Interventions  Outcome: Progressing Towards Goal

## 2022-03-30 NOTE — CONSULTS
Note dictated. Dx thrombocytopenia, this adm, progressive, anemia. Normal plt k 10/.2021  Unremarkable smear exam, HIT panel neg  Leukocytosis , improved  coagulopathy  Klebsiella urosepsis, septic shock  Aspiration pneumonia  Liver mass ?  Etiology, infectious vs neoplasm  Vit b12 def    D/x  Low plt likely sec to sepsis/ DIC   Also likely drug induced , meropenem- antibiotics  Supplement B12  Consider bone marrow biopsy if thrombocytopenia does not improve  Work up of liver lesion as feasible

## 2022-03-30 NOTE — PALLIATIVE CARE
[address] [phone]    Hardeep Baldwin NP, Juan Morales RN and this LMSW met with pt's nieces and sister at [facility]. Upon entry, pt was laying in bed with head elevated. NG tube and O2 via nasal canula, in place. Pt was awake and would moan periodically. She was listening to music. Pt's nieces, Refugio Shelton and Zoe Simental both speak [place]. Pt's sister, Claudia Munroe does not. Per Refugio Shelton, they are pt's only living relatives. Per Refugio Shelton, pt lives at [facility]. She reports prior to current medical event, pt was more interactive with family members and that as recently as last month, pt was taken out of facility for birthday celebration, during which she actively interacted with family and friends. Pt's inability to meaningfully interact with others is new. Per Refugio Shelton, pt does not have an AMD, identifying an MPoA. Pt's nieces verbalized they are aware of pt's medical condition and the concerns regarding her low Platelet count. The verbalize understanding that NG tube cannot remain in place long-term. NP explained, the next step would be to place PEG tube, surgically, for long-term tube feeds, and that with pt's low Platelet count, this is not currently an option. She further discussed burdens and benefits of PEG tube placement, should they be able to place one. Pt's nieces verbalized understanding. NP explained we will continue to follow pt, but that if things don't improve adequately, we may have to discuss transitioning pt to comfort measures, and discharging her with support of hospice. They verbalized understanding and willingness to continue on this course. NP addressed goals of care and discussed burdens and benefits of CPR/Intubation. Claudia Munroe asked them what we were discussing, and they explained. Claudia Munroe reports she would want pt to undergo resuscitation attempts.   At this point, LMSW explained, as pt's sister, Shae Sal will be legal surrogate decision maker, based on [place] of Decision Maker guidelines. Both nieces verbalized understanding of this. LMSW informed them, once we get a better idea of what's happening with pt, we will utilize  system to fully explain burdens and benefits of CPR/Intubation to Shae Sal, to ensure she has a full understanding of the decision she is making. They verbalized understanding and agreement with this. At this time, they understand, pt will remain a FULL CODE with full aggressive measures. No further questions or concerns reported at this time. Thank you for this referral to Palliative Care. The palliative care team remains available to provide support for patient and her family. Goals of care discussion has been initiated. Pt remains FULL CODE-FULL AGGRESSIVE MEASURES at this time.     Ray Wilde, [address] [facility]  Palliative Medicine Inpatient   [facility]  Palliative COPE Line: [phone] ([phone])

## 2022-03-30 NOTE — PROGRESS NOTES
CM called randal Cruz at [phone] and [phone]. Received voicemail on both lines. Left message requesting return call.  will continue to monitor and assist with transition of care needs.     SUNDAY Scott, RN  Care Management  Pager # [phone]

## 2022-03-30 NOTE — PROGRESS NOTES
[facility]  (A Division of [address] [place] [address])    Follow-up Note      Date of Admission: 3/24/2022       Date of Note:  3/30/2022          Current Antimicrobials:    Prior Antimicrobials:  Ceftriaxone 3/28- present Vancomycin 3/24 to 3/28  Meropenem 3/25- 3/28     Assessment:         Septic shock on multiple pressors with lactic acidosis: Suspect related to  source given abnormal UA vs liver abscess? Vs pleural process  Urinary tract infection: UA with greater than 100 WBCs few bacteria large leukocyte esterase negative nitrite; recent cx + for Klebsiella on 3/15  Aspiration pneumonia with hypoxia  Leukocytosis: improving  Loculated left pleural effusion with calcification and pleural thickening as well as non-specific diffuse opacities -  Noted on CT chest from 3/24. ? Chronic   GUIDO: Creatinine 2.38 upon presentation now improving  Metabolic encephalopathy with baseline dementia/prior CVA/limited verbal interaction  Suspected Right liver abscess vs mass (new since 2017)-3.1 x 5.8 x 4.2 cm, infection vs malignancy. Noted on 3/14 CT scn but not mentioedn in 3/24 CT report. ?? source  Thrombocytopenia-  worsening    Plan:   Continue Ceftriaxone 1gm IV daily   - tentative stop date 4/1           F/u blood cx - remains negative from 3/24  CBC, BMP in am   MRI liver with MRCP reviewed - biopsy at a later date. Heme/Onc following     Discussed with family- all questions answered. DO Jeni [facility]  [address] , [address] [facility]  Office: [phone]  Mobile/Text: [phone]     Microbiology:  3/24 blood culture: No growth to date x2  3/25 RSV PCR negative  3/25 MRSA nasal swab negative  3/25 respiratory viral panel: Negative  3/25 C.diff: negative    Lines / Catheters:  A-line left wrist  Peripheral  Right IJ TLC  dove    Subjective:   Patent seen and examined. No fevers.    Minimally interactive, looks around but non-verbal, yells  WBC improved. NO fevers documened. Multiple family memebers and Nurse in room at time of my visit. Right IJ TLC still in place    Objective:        Visit Vitals  BP (!) 162/51   Pulse 81   Temp 97.5 °F (36.4 °C)   Resp 19   Ht 4' 11\" (1.499 m)   Wt 63.5 kg (140 lb)   SpO2 99%   BMI 28.28 kg/m²     Temp (24hrs), Av.5 °F (36.9 °C), Min:97.5 °F (36.4 °C), Max:99.2 °F (37.3 °C)         General:  , Difficult to arouse, sleepy, nonverbal   Skin:   no rashes or skin lesions noted on limited exam, cool to touch   HEENT:  Normocephalic, atraumatic, PERRL, EOMI, no scleral icterus or pallor; no conjunctival hemmohage;  nasal and oral mucous are moist and without evidence of lesions. No thrush. Neck supple, no bruits. Lymph Nodes:   no cervical, axillary or inguinal adenopathy   Lungs:   non-labored, bilaterally clear to aspiration- no crackles wheezes rales or rhonchi   Heart:  RRR, s1 and s2; no murmurs rubs or gallops, no edema, + pedal pulses   Abdomen:  soft, non-distended, active bowel sounds, no hepatomegaly, no splenomegaly. yells out with palpation to the lower abdomen. No mid-epigastric tenderness   Genitourinary: Fallon   Extremities:   no clubbing, cyanosis; no joint effusions or swelling; Full ROM of all large joints to the upper and lower extremities; muscle mass appropriate for age- left wrist restrained   Neurologic:  No gross focal sensory abnormalities; opens eyes and tracks, response to pain nonverbal, right upper extremity appears to be contracted, focal weakness on the right when compared to the left as left side has noted independent movement.    Psychiatric:   Calm        Lab results:    Chemistry  Recent Labs     22  0159 22  0310 22  0406   * 153* 174*   * 148* 147*   K 4.1 4.1 4.9   * 119* 119*   CO2 28 27 26   BUN 32* 34* 40*   CREA 1.04 1.26 1.38*   CA 8.1* 8.2* 8.6   AGAP 3 2* 2*   BUCR 31* 27* 29*   AP  --  71  --    TP  -- 5.3*  --    ALB  --  2.6*  --    GLOB  --  2.7  --    AGRAT  --  1.0  --        CBC w/ Diff  Recent Labs     03/30/22  0159 03/29/22  0310 03/28/22  1905 03/28/22  1518 03/28/22  1518   WBC 10.6 11.2  --   --  9.2   RBC 3.79* 3.78*  --   --  3.86*   HGB 10.8* 10.8*  --   --  11.2*   HCT 34.5* 34.8*  --   --  36.1   PLT 27* 25* 28*   < > 22*   GRANS 73 69  --   --  80*   LYMPH 15* 21  --   --  11*   EOS 2 1  --   --  0    < > = values in this interval not displayed. Microbiology  All Micro Results     Procedure Component Value Units Date/Time    CULTURE, BLOOD [[id number]] Collected: 03/24/22 1945    Order Status: Completed Specimen: Blood Updated: 03/30/22 0639     Special Requests: NO SPECIAL REQUESTS        Culture result: NO GROWTH 6 DAYS       CULTURE, BLOOD [[id number]] Collected: 03/24/22 1952    Order Status: Completed Specimen: Blood Updated: 03/30/22 0639     Special Requests: NO SPECIAL REQUESTS        Culture result: NO GROWTH 6 DAYS       CULTURE, MRSA [[id number]] Collected: 03/25/22 0134    Order Status: Completed Specimen: Nasal from Nares Updated: 03/26/22 1151     Special Requests: NO SPECIAL REQUESTS        Culture result: MRSA NOT PRESENT               Screening of patient nares for MRSA is for surveillance purposes and, if positive, to facilitate isolation considerations in high risk settings. It is not intended for automatic decolonization interventions per se as regimens are not sufficiently effective to warrant routine use.           C. DIFFICILE AG & TOXIN A/B [[id number]] Collected: 03/25/22 1517    Order Status: Completed Specimen: Stool Updated: 03/26/22 1007     GDH ANTIGEN Negative        C. difficile toxin Negative        INTERPRETATION       NEGATIVE FOR TOXIGENIC C. DIFFICILE          RESPIRATORY VIRUS PANEL W/COVID-19, PCR [[id number]] Collected: 03/25/22 0030    Order Status: Completed Specimen: Nasopharyngeal Updated: 03/25/22 0205     Adenovirus Not detected        Coronavirus 229E Not detected        Coronavirus HKU1 Not detected        Coronavirus CVNL63 Not detected        Coronavirus OC43 Not detected        SARS-CoV-2, PCR Not detected        Metapneumovirus Not detected        Rhinovirus and Enterovirus Not detected        Influenza A Not detected        Influenza B Not detected        Parainfluenza 1 Not detected        Parainfluenza 2 Not detected        Parainfluenza 3 Not detected        Parainfluenza virus 4 Not detected        RSV by PCR Not detected        B. parapertussis, PCR Not detected        Bordetella pertussis - PCR Not detected        Chlamydophila pneumoniae DNA, QL, PCR Not detected        Mycoplasma pneumoniae DNA, QL, PCR Not detected       CULTURE, RESPIRATORY/SPUTUM/BRONCH [facility] [[id number]] Collected: 03/24/22 2330    Order Status: Canceled Specimen: Sputum            Blackburn Check, DO   3/30/2022

## 2022-03-30 NOTE — PROGRESS NOTES
Reason for Admission:  Septic shock ([facility] [address] [A41.9, R65.21]                 RUR Score:  16%         Plan for utilizing home health: no,LTC                    Likelihood of Readmission:   Moderate                         Do you (patient/family) have any concerns for transition/discharge?  no    Transition of Care Plan:       Initial assessment completed with randal Pineda    Cognitive status of patient: disoriented. Face sheet information confirmed:  yes. The patients NOK is randal Dhillon will participate in her discharge plan and to receive any needed information. This patient lives in a [address] [place] [address]     Patient is not able to navigate steps as needed. Prior to hospitalization, patient was considered to be independent with ADLs/IADLS : no . If not independent,  patient needs assist with : dressing, bathing, food preparation, cooking and toileting    Patient has a current ACP document on file: no      Healthcare Decision Maker: The patient will need BLS transport patient to LTC upon discharge. The patient already has LTC proviced DME as needed,     Patient is not currently active with home health. Patient has stayed in a skilled nursing facility or rehab. Was  stay within last 60 days : yes. LTC     This patient is on dialysis :no    Currently, the discharge plan is LTC. The patient states that she can obtain her medications from the pharmacy, and take her medications as directed. Patient's current insurance is [employer] Medicare Part A & B and [facility] Medicaid       Care Management Interventions  PCP Verified by CM:  Yes (LTC doctor)  Mode of Transport at Discharge: BLS  Transition of Care Consult (CM Consult): [facility]: Other Family Member(s)  Confirm Follow Up Transport: Other (see comment) (LTC BLS)  Discharge Location  Patient Expects to be Discharged to[de-identified] [address] [place] [address] will continue to monitor and assist with transition of care needs.    JOEL FongN, RN  Care Management  Pager # [phone]

## 2022-03-30 NOTE — CONSULTS
[address]    Name:  Jacobo Hernandez  MR#:   [id number]  :  1955  ACCOUNT #:  [de-identified]  DATE OF SERVICE:  2022    REFERRING PHYSICIAN:  Mohini Lynn MD    REASON FOR EVALUATION:  Thrombocytopenia. HISTORY OF PRESENT ILLNESS:  The patient is a 82-year-old woman admitted through the emergency room on 2022 with hypotension and a diagnosis of septic shock. She was diagnosed with Klebsiella urosepsis and treated with antibiotic Zosyn followed by vancomycin and meropenem. She was noted to have thrombocytopenia on admission, a platelet count of 68,565 on 2022. Her platelet count on  was 198,000. Her count progressively worsened and is up to 27,000. We have been asked to evaluate her. She had lab work with her coags noting a PTT of 32, INR 1.4, and a D-dimer of 2.2. Her blood smear is unremarkable for platelet clumping or abnormal cells or fragmented red cells. Isolated thrombocytopenia is noted. She did receive subcu heparin and at this time heparin has been on hold. Her HIT panel is negative. She currently remains on ceftriaxone. Review of her medical records indicate she received meropenem until 2022. The patient is unable to provide history. This morning, it was difficult to arouse, when aroused she remained nonverbal, there is baseline dementia as well as language difficulties. PAST MEDICAL HISTORY:  No details are available; however, review of the records indicate the patient has been bed bound and with altered cognition of unclear etiology. FAMILY HISTORY:  Details not available. SURGICAL HISTORY:  Details not available. REVIEW OF SYSTEMS:  Unable to obtain. The patient was very lethargic this morning, difficult to arouse, but on arousal she opened her eyes. There was no verbal response, did not appear to be in pain.     PHYSICAL EXAMINATION:  GENERAL:  Frail, chronically ill-appearing woman, looks much older than her stated age. VITAL SIGNS:  Heart rate 110, blood pressure 130/70, afebrile. HEENT:  Pupils reacting to light. No icterus noted. Oral mucosa dry. There is dried blood on her lips. SKIN:  Without ecchymosis. There is macular rash noted on her upper extremities. LUNGS:  Bilateral poor inspiratory effort. CARDIAC:  First and second heart sounds audible. ABDOMEN:  No masses palpable. NEUROLOGIC:  Lethargic but aroused on verbal commands, no verbal response from the patient. EXTREMITIES:  There is contracture noted in the right upper extremity. Generalized loss of muscle mass noted. LABORATORY DATA:  Hemoglobin 10.8, white count 10.7, platelets 62,346. Platelet count on 86/08/7916 was 89,000. On 03/14/2022, platelet count was 806,944. Peripheral smear:  No schistocytes, no platelet clumping, vitamin B12 281. HIT panel negative. IMPRESSION:  1. Thrombocytopenia. This admission, progressive baseline platelet count; normal 10/2021 and platelet count 815,685 on 03/14/2022.  2.  Unremarkable smear exam.  3.  Heparin-induced thrombocytopenia panel negative. 4.  Coagulopathy. 5.  Klebsiella urosepsis and septic shock. 6.  Liver mass, possibly infectious versus neoplasm. 7.  Vitamin B12 deficiency. RECOMMENDATIONS:  Above picture concerning for more acute drop in the platelet count which is likely secondary to sepsis, DIC, and possibly antibiotics including meropenem. She is currently off meropenem and being treated with ceftriaxone. Monitor the trend of improvement. Supplement vitamin B12 levels. If the count does not improve, she may need a bone marrow exam for further evaluation, will eventually also need workup for the liver mass. I will be discussing the case with the hospitalist.  Thank you Dr. Esme Montero for requesting my evaluation in the patient's care.       MD ROSE [email or web address]  D:  03/30/2022 8:51  T:  03/30/2022 10:02  JOB #:  [id number]  CC:

## 2022-03-30 NOTE — PROGRESS NOTES
SNF report sent to [address] [place] [address] via [place]. [address] with [facility] by phone. The patient can return to [address] [place] [address], [facility] when medically stable.  will continue to monitor and assist with transition of care needs.     SUNDAY Fung, RN  Care Management  Pager # [phone]

## 2022-03-31 NOTE — PROGRESS NOTES
Problem: Pressure Injury - Risk of  Goal: *Prevention of pressure injury  Description: Document Ankit Scale and appropriate interventions in the flowsheet.   Outcome: Progressing Towards Goal  Note: Pressure Injury Interventions:  Sensory Interventions: Assess changes in LOC,Assess need for specialty bed    Moisture Interventions: Absorbent underpads,Internal/External urinary devices    Activity Interventions: Assess need for specialty bed,Pressure redistribution bed/mattress(bed type)    Mobility Interventions: Assess need for specialty bed,HOB 30 degrees or less,Pressure redistribution bed/mattress (bed type)    Nutrition Interventions: Document food/fluid/supplement intake    Friction and Shear Interventions: Apply protective barrier, creams and emollients,HOB 30 degrees or less,Minimize layers                Problem: Patient Education: Go to Patient Education Activity  Goal: Patient/Family Education  Outcome: Progressing Towards Goal     Problem: Non-Violent Restraints  Goal: Removal from restraints as soon as assessed to be safe  Outcome: Progressing Towards Goal  Goal: No harm/injury to patient while restraints in use  Outcome: Progressing Towards Goal  Goal: Patient's dignity will be maintained  Outcome: Progressing Towards Goal  Goal: Patient Interventions  Outcome: Progressing Towards Goal

## 2022-03-31 NOTE — PROGRESS NOTES
Hospitalist Progress Note    Subjective:   Daily Progress Note: 3/31/2022     Patient cannot provide any history due to her altered mental status. More awake today. Some low blood pressures. Medication to be adjusted. Sister at bedside, spoke to other family member on phone while in room.        Current Facility-Administered Medications   Medication Dose Route Frequency    insulin glargine (LANTUS) injection 5 Units  5 Units SubCUTAneous DAILY    multivitamin-minerals-ferrous gluconate oral liquid 15 mL  15 mL Per NG tube DAILY    nitroglycerin (NITROBID) 2 % ointment 1 Inch  1 Inch Topical Q6H PRN    hydrALAZINE (APRESOLINE) 20 mg/mL injection 20 mg  20 mg IntraVENous Q6H PRN    cyanocobalamin (VITAMIN B12) injection 1,000 mcg  1,000 mcg SubCUTAneous DAILY    carvediloL (COREG) tablet 12.5 mg  12.5 mg Oral Q12H    [Held by provider] lisinopriL (PRINIVIL, ZESTRIL) tablet 20 mg  20 mg Oral DAILY    QUEtiapine (SEROquel) tablet 50 mg  50 mg Oral QHS    melatonin tablet 10 mg  10 mg Oral QHS    [Held by provider] hydrALAZINE (APRESOLINE) tablet 50 mg  50 mg Oral TID    oxyCODONE (ROXICODONE) 5 mg/5 mL oral solution 7.5 mg  7.5 mg Oral Q4H PRN    furosemide (LASIX) injection 40 mg  40 mg IntraVENous DAILY    thiamine HCL (B-1) tablet 200 mg  200 mg Oral TID    nystatin (MYCOSTATIN) 100,000 unit/gram powder   Topical BID    dextrose 10% infusion 0-250 mL  0-250 mL IntraVENous PRN    albuterol-ipratropium (DUO-NEB) 2.5 MG-0.5 MG/3 ML  3 mL Nebulization Q4H PRN    [Held by provider] heparin (porcine) injection 5,000 Units  5,000 Units SubCUTAneous Q8H    insulin lispro (HUMALOG) injection   SubCUTAneous Q6H    glucose chewable tablet 16 g  4 Tablet Oral PRN    glucagon (GLUCAGEN) injection 1 mg  1 mg IntraMUSCular PRN    acetaminophen (TYLENOL) tablet 650 mg  650 mg Oral Q6H PRN    Or    acetaminophen (TYLENOL) suppository 650 mg  650 mg Rectal Q6H PRN    polyethylene glycol (MIRALAX) packet 17 g  17 g Oral DAILY PRN    ondansetron (ZOFRAN ODT) tablet 4 mg  4 mg Oral Q8H PRN    Or    ondansetron (ZOFRAN) injection 4 mg  4 mg IntraVENous Q6H PRN        Review of Systems  Review of systems not obtained due to patient factors. Objective:     Visit Vitals  /83   Pulse 91   Temp 98.3 °F (36.8 °C)   Resp 27   Ht 4' 11\" (1.499 m)   Wt 63.5 kg (140 lb)   SpO2 94%   BMI 28.28 kg/m²    O2 Flow Rate (L/min): 2 l/min O2 Device: Nasal cannula    Temp (24hrs), Av.6 °F (37 °C), Min:98.1 °F (36.7 °C), Max:99.1 °F (37.3 °C)      701 - 1900  In: 7249 [I.V.:350]  Out: 2000 [Urine:2000]  1901 -  0700  In: 6022   Out: 0992 [Urine:2275]      General appearance -awake, ill-appearing, NG tube in situ, not in acute distress. Eyes - sclera anicteric, no pallor  Nose - no obvious nasal discharge. NG tube is in situ. Neck - supple, no JVD, trachea is midline  Chest -diminished air entry noted in bases, poor inspiratory efforts, no wheezes  Heart - S1 and S2 normal  Abdomen - soft, nontender, nondistended, Bowel sounds present  Neurological -awake, moves left upper extremity well and is under restraint without any wrist injury, not moving right side. Musculoskeletal -right knee swelling and mild bruising present.   Extremities -  pedal edema noted      Data Review    Recent Results (from the past 24 hour(s))   GLUCOSE, POC    Collection Time: 22 11:46 PM   Result Value Ref Range    Glucose (POC) 183 (H) 70 - 110 mg/dL   CBC WITH AUTOMATED DIFF    Collection Time: 22  3:02 AM   Result Value Ref Range    WBC 10.1 4.6 - 13.2 K/uL    RBC 3.60 (L) 4.20 - 5.30 M/uL    HGB 10.4 (L) 12.0 - 16.0 g/dL    HCT 32.3 (L) 35.0 - 45.0 %    MCV 89.7 78.0 - 100.0 FL    MCH 28.9 24.0 - 34.0 PG    MCHC 32.2 31.0 - 37.0 g/dL    RDW 14.2 11.6 - 14.5 %    PLATELET 20 (LL) 792 - 420 K/uL    MPV 9.9 9.2 - 11.8 FL    NRBC 0.0 0  WBC    ABSOLUTE NRBC 0.00 0.00 - 0.01 K/uL    NEUTROPHILS 71 40 - 73 % LYMPHOCYTES 14 (L) 21 - 52 %    MONOCYTES 12 (H) 3 - 10 %    EOSINOPHILS 2 0 - 5 %    BASOPHILS 0 0 - 2 %    IMMATURE GRANULOCYTES 1 (H) 0.0 - 0.5 %    ABS. NEUTROPHILS 7.2 1.8 - 8.0 K/UL    ABS. LYMPHOCYTES 1.4 0.9 - 3.6 K/UL    ABS. MONOCYTES 1.2 0.05 - 1.2 K/UL    ABS. EOSINOPHILS 0.2 0.0 - 0.4 K/UL    ABS. BASOPHILS 0.0 0.0 - 0.1 K/UL    ABS. IMM. GRANS. 0.1 (H) 0.00 - 0.04 K/UL    DF AUTOMATED     PHOSPHORUS    Collection Time: 03/31/22  3:02 AM   Result Value Ref Range    Phosphorus 2.4 (L) 2.5 - 4.9 MG/DL   MAGNESIUM    Collection Time: 03/31/22  3:02 AM   Result Value Ref Range    Magnesium 1.5 (L) 1.6 - 2.6 mg/dL   CALCIUM, IONIZED    Collection Time: 03/31/22  3:02 AM   Result Value Ref Range    Ionized Calcium 1.15 1.15 - 8.70 MMOL/L   METABOLIC PANEL, BASIC    Collection Time: 03/31/22  3:02 AM   Result Value Ref Range    Sodium 145 136 - 145 mmol/L    Potassium 3.6 3.5 - 5.5 mmol/L    Chloride 110 100 - 111 mmol/L    CO2 29 21 - 32 mmol/L    Anion gap 6 3.0 - 18 mmol/L    Glucose 183 (H) 74 - 99 mg/dL    BUN 34 (H) 7.0 - 18 MG/DL    Creatinine 0.94 0.6 - 1.3 MG/DL    BUN/Creatinine ratio 36 (H) 12 - 20      GFR est AA >60 >60 ml/min/1.73m2    GFR est non-AA 60 (L) >60 ml/min/1.73m2    Calcium 8.5 8.5 - 10.1 MG/DL   GLUCOSE, POC    Collection Time: 03/31/22  7:45 AM   Result Value Ref Range    Glucose (POC) 277 (H) 70 - 110 mg/dL   GLUCOSE, POC    Collection Time: 03/31/22 11:23 AM   Result Value Ref Range    Glucose (POC) 241 (H) 70 - 110 mg/dL   GLUCOSE, POC    Collection Time: 03/31/22  5:35 PM   Result Value Ref Range    Glucose (POC) 267 (H) 70 - 110 mg/dL         Assessment/Plan:     Active Problems:    Septic shock ([facility] [address] (3/24/2022)      ASSESSMENT:    1. Septic shock, resolved currently. Off IV pressors  2. UTI s/p treatment  3. Aspiration pneumonia with hypoxia  4. Chronic loculated left pleural effusion  5. Acute on chronic encephalopathy, slowly improving  6.   Chronic encephalopathy and right-sided hemiparesis due to chronic L MCA infarct as well as right frontal lobe encephalomalacia. 7.  Thrombocytopenia of unknown etiology  8. Leukocytosis, resolved  9. Acute renal failure, improving  10.  6 x 3 cm right hepatic mass  11. Seizure disorder  12. Peripheral arterial disease  13. Hypertension    PLAN:    Patient has been off IV pressor. Started on p.o. antihypertensive medications   Experiencing soft BPs 3/31 - Will hold hydralazine, decrease lisinopril from 20 mg daily to 10 mg daily, decreased coreg from 25 mg BID to 12.5 mg yesterday   We will place patient on DuoNebs and wean her off oxygen. Echocardiogram report reviewed. Patient is currently being treated with IV Lasix  CT head report reviewed. ID to follow and continue ceftriaxone  Hematology/oncology has been consulted for thrombocytopenia. Continue monitoring platelet  Patient currently has NG tube. She may need a PEG tube but her platelets need to get better. Daughter [phone]; [id number]. I have evaluated the patient after initiation of intervention. The patient is comfortable, uninjured, but continues to pose an imminent risk of injury to self and or serious disruption of treatment. The patient's current medical and behavioral conditions that warrant the use intervention include danger to self and Interference with medical treatment. Restraint or seclusion will be discontinued at the earliest possible time, regardless of the scheduled expiration of the order. Total time to take care of this patient was 35 minutes and more than 50% of time was spent counseling and coordinating care. Disclaimer: Sections of this note are dictated using utilizing voice recognition software, which may have resulted in some phonetic based errors in grammar and contents. Even though attempts were made to correct all the mistakes, some may have been missed, and remained in the body of the document.  If questions arise, please contact our department.

## 2022-03-31 NOTE — PROGRESS NOTES
Nutrition Assessment     Type and Reason for Visit: Reassess,Positive nutrition screen,Consult    Nutrition Recommendations/Plan:   - Continue tube feeding of Glucerna 1.5 at goal rate of 45 mL/hr with 150 mL q 4 hour water flushes (goal regimen to provide 1620 kcal, 89 gm protein, 820 mL free water, 100% RDIs). - Provide electrolyte replacement, 10 mmol K Phos and 3 gm Mg.  - Add daily multivitamin. Nutrition Assessment:  Patient tolerating tube feeding at goal via NGT (placed 3/28) with altered mentation and NPO per SLP s/p swallow evaluation 3/28. Hypernatremia improved on lasix with plan to replace electrolytes. Malnutrition Assessment:  Malnutrition Status: At risk for malnutrition (specify) (wt loss PTA per chart hx)     Estimated Daily Nutrient Needs:  Energy (kcal):  3804-3504  Protein (g):  46-58       Fluid (ml/day):  500 mL + total output    Nutrition Related Findings:  Loose stool 3/30. Pertinent Medications: cyanocobalamin, furosemide, lantus, SSI, thiamine      Current Nutrition Therapies:  DIET NPO  ADULT TUBE FEEDING Nasogastric; Diabetic; Delivery Method: Continuous; Continuous Initial Rate (mL/hr): 10; Continuous Advance Tube Feeding: Yes; Advancement Volume (mL/hr): 10; Advancement Frequency: Q 12 hours; Continuous Goal Rate (mL/hr): 45; . ..     Anthropometric Measures:  · Height:  4' 11\" (149.9 cm)  · Current Body Wt:  63.5 kg (139 lb 15.9 oz)  · BMI: 28.3    Nutrition Diagnosis:   · Inadequate oral intake related to cognitive or neurological impairment as evidenced by NPO or clear liquid status due to medical condition      Nutrition Intervention:  Food and/or Nutrient Delivery: Continue NPO,Continue tube feeding,Vitamin supplement,Mineral supplement  Nutrition Education and Counseling: No recommendations at this time,Education not indicated  Coordination of Nutrition Care: Continue to monitor while inpatient,Coordination of community care    Goals:  Nutritional needs will be met through adequate oral intake or nutrition support within the next follow up date       Nutrition Monitoring and Evaluation:   Behavioral-Environmental Outcomes: None identified  Food/Nutrient Intake Outcomes: Enteral nutrition intake/tolerance,Vitamin/mineral intake  Physical Signs/Symptoms Outcomes: Biochemical data,GI status,Nutrition focused physical findings    Discharge Planning:     Too soon to determine     Electronically signed by Dylon Vázquez RD, [address]  on 3/31/2022 at 11:17 AM    Contact Number: [phone]

## 2022-03-31 NOTE — PROGRESS NOTES
Assessed for PRN treatment, pt has mild expiratory grunting, unrelated to respiratory status. Breath sound clear and equal. No treatment needed at this time.

## 2022-03-31 NOTE — DIABETES MGMT
Diabetes/ Glycemic Control Plan of Care  Recommendations:   Blood glucose above target range this am, 277 mg/dl  Patient required 27 units of corrective insulin coverage yesterday  Recommend un-holding Lantus dose of 5 units daily  Continue corrective insulin coverage as needed. Will continue inpatient monitoring. Fasting/ Morning blood glucose:   Lab Results   Component Value Date/Time    Glucose 161 (H) 03/30/2022 01:59 AM    Glucose (POC) 277 (H) 03/31/2022 07:45 AM     IV Fluids containing dextrose:   None   Steroids:  None      Blood glucose values:       Results for KOWALSKI, IRIS (MRN [id number]) as of 3/31/2022 09:45   Ref. Range 3/30/2022 11:21 3/30/2022 17:17 3/30/2022 23:46 3/31/2022 07:45   GLUCOSE,FAST - POC Latest Ref Range: 70 - 110 mg/dL 258 (H) 305 (H) 183 (H) 277 (H)     Within target range (70-180mg/dL):   No. Will recommend adding basal insulin. Current insulin orders:   lantus 5 units daily (on hold)  Lispro corrective insulin coverage every 6 hours. Total Daily Dose previous 24 hours =  27 units    Current A1c:   Lab Results   Component Value Date/Time    Hemoglobin A1c 5.6 03/25/2022 12:00 AM      Equivalent  to average Blood Glucose of  mg/dl for 2-3 months prior to admission  Adequate glycemic control PTA:   Yes   Nutrition/Diet:   DIET NPO  ADULT TUBE FEEDING Nasogastric; Diabetic; Delivery Method: Continuous; Continuous Initial Rate (mL/hr): 10; Continuous Advance Tube Feeding: Yes; Advancement Volume (mL/hr): 10; Advancement Frequency: Q 12 hours; Continuous Goal Rate (mL/hr): 45; . ..     Active Orders   Diet    DIET NPO      Meal Intake:  Patient Vitals for the past 168 hrs:   % Diet Eaten   03/30/22 1800 1 - 25%   03/30/22 1200 1 - 25%   03/27/22 0800 0%   03/26/22 1400 1 - 25%     Supplement Intake:  Patient Vitals for the past 168 hrs:   Supplement intake %   03/30/22 0800 1 - 25%   03/27/22 0800 0%   03/26/22 1400 0%       Home diabetes medications:   Key Antihyperglycemic Medications     Patient is on no antihyperglycemic meds. Plan/Goals:   Blood glucose will be within target of 70 - 180 mg/dl within 72 hours  Reinforce dietary and medication compliance at home.        Education:  [] Refer to Diabetes Education Record                          [x] Education not indicated at this time     Jean-Claude Avila, [address] [facility] CDE  Ext [phone]

## 2022-03-31 NOTE — PROGRESS NOTES
0730am Bedside shift report received from Kimberly England RN  0915am BP 80/49. Paged Dr. Carlos Issa. BP-70/59. Reposition flat position. BP-99/79. Plt-20, Mg 1.5, Po4-2.4  1003am. Dr. Shandra Issa is aware & notified. 1130am Sister is at the bedside. 1400pm Family at the bedside  1900pm Bedside shift change report given to  by Jannet Maria RN. Report included the following information SBAR, Kardex, Intake/Output, MAR, Recent Results, Med Rec Status, Cardiac Rhythm NSR and Alarm Parameters .

## 2022-03-31 NOTE — PROGRESS NOTES
[facility]  (A Division of [address] [place] [address])    Follow-up Note      Date of Admission: 3/24/2022       Date of Note:  3/31/2022          Current Antimicrobials:    Prior Antimicrobials:  Ceftriaxone 3/28- present Vancomycin 3/24 to 3/28  Meropenem 3/25- 3/28     Assessment:         Septic shock on multiple pressors with lactic acidosis: Suspect related to  source given abnormal UA vs liver abscess? Vs pleural process  Urinary tract infection: UA with greater than 100 WBCs few bacteria large leukocyte esterase negative nitrite; recent cx + for Klebsiella on 3/15  Aspiration pneumonia with hypoxia  Leukocytosis: improving  Loculated left pleural effusion with calcification and pleural thickening as well as non-specific diffuse opacities -  Noted on CT chest from 3/24. ? Chronic   GUIDO: Creatinine 2.38 upon presentation now improving  Metabolic encephalopathy with baseline dementia/prior CVA/limited verbal interaction  Suspected Right liver abscess vs mass (new since 2017)-3.1 x 5.8 x 4.2 cm, infection vs malignancy. Noted on 3/14 CT scn but not mentioedn in 3/24 CT report. ?? source  Thrombocytopenia-  worsening    Plan:   Continue Ceftriaxone 1gm IV daily   - tentative stop date 4/1           F/u blood cx - remains negative from 3/24  CBC, BMP in am   MRI liver with MRCP reviewed - biopsy at a later date. Heme/Onc following     DO J[facility]  [address], [address] [facility]  Office: [phone]  Mobile/Text: [phone]     Microbiology:  3/24 blood culture: No growth to date x2  3/25 RSV PCR negative  3/25 MRSA nasal swab negative  3/25 respiratory viral panel: Negative  3/25 C.diff: negative    Lines / Catheters:  A-line left wrist  Peripheral  Right IJ TLC  dove    Subjective:   Patent seen and examined. No fevers. Looks a little more awake today.   Still not really talking or interacting much  WBC improved. NO fevers documened. No family at bedside during my visit today    Objective:        Visit Vitals  /68   Pulse 77   Temp 98.1 °F (36.7 °C)   Resp 16   Ht 4' 11\" (1.499 m)   Wt 63.5 kg (140 lb)   SpO2 99%   BMI 28.28 kg/m²     Temp (24hrs), Av.7 °F (37.1 °C), Min:98.1 °F (36.7 °C), Max:99.1 °F (37.3 °C)         General:   Awake and alert to the people around her mother she remains nonverbal   Skin:   no rashes or skin lesions noted on limited exam, cool to touch   HEENT:  Normocephalic, atraumatic, PERRL, EOMI, no scleral icterus or pallor; no conjunctival hemmohage;  nasal and oral mucous are moist and without evidence of lesions. No thrush. Neck supple, no bruits. NG in place   Lymph Nodes:   no cervical, axillary or inguinal adenopathy   Lungs:   non-labored, bilaterally clear to aspiration- no crackles wheezes rales or rhonchi   Heart:  RRR, s1 and s2; no murmurs rubs or gallops, no edema, + pedal pulses   Abdomen:  soft, non-distended, active bowel sounds, no hepatomegaly, no splenomegaly. yells out with palpation to the lower abdomen. No mid-epigastric tenderness   Genitourinary: Fallon   Extremities:   no clubbing, cyanosis; no joint effusions or swelling; Full ROM of all large joints to the upper and lower extremities; muscle mass appropriate for age- left wrist restrained   Neurologic:  No gross focal sensory abnormalities;  awake, tracks, vocalizes discomfort and frustration however is otherwise nonverbal, response to pain nonverbal, right upper extremity appears to be contracted, focal weakness on the right when compared to the left as left side has noted independent movement.    Psychiatric:   Calm        Lab results:    Chemistry  Recent Labs     22  0302 22  0159 22  0310   * 161* 153*    147* 148*   K 3.6 4.1 4.1    116* 119*   CO2 29 28 27   BUN 34* 32* 34*   CREA 0.94 1.04 1.26   CA 8.5 8.1* 8.2*   AGAP 6 3 2*   BUCR 36* 31* 27*   AP  -- --  71   TP  --   --  5.3*   ALB  --   --  2.6*   GLOB  --   --  2.7   AGRAT  --   --  1.0       CBC w/ Diff  Recent Labs     03/31/22  0302 03/30/22  0159 03/29/22  0310   WBC 10.1 10.6 11.2   RBC 3.60* 3.79* 3.78*   HGB 10.4* 10.8* 10.8*   HCT 32.3* 34.5* 34.8*   PLT 20* 27* 25*   GRANS 71 73 69   LYMPH 14* 15* 21   EOS 2 2 1       Microbiology  All Micro Results     Procedure Component Value Units Date/Time    CULTURE, BLOOD [[id number]] Collected: 03/24/22 1945    Order Status: Completed Specimen: Blood Updated: 03/30/22 0639     Special Requests: NO SPECIAL REQUESTS        Culture result: NO GROWTH 6 DAYS       CULTURE, BLOOD [[id number]] Collected: 03/24/22 1952    Order Status: Completed Specimen: Blood Updated: 03/30/22 0639     Special Requests: NO SPECIAL REQUESTS        Culture result: NO GROWTH 6 DAYS       CULTURE, MRSA [[id number]] Collected: 03/25/22 0134    Order Status: Completed Specimen: Nasal from Nares Updated: 03/26/22 1151     Special Requests: NO SPECIAL REQUESTS        Culture result: MRSA NOT PRESENT               Screening of patient nares for MRSA is for surveillance purposes and, if positive, to facilitate isolation considerations in high risk settings. It is not intended for automatic decolonization interventions per se as regimens are not sufficiently effective to warrant routine use.           C. DIFFICILE AG & TOXIN A/B [[id number]] Collected: 03/25/22 1517    Order Status: Completed Specimen: Stool Updated: 03/26/22 1007     GDH ANTIGEN Negative        C. difficile toxin Negative        INTERPRETATION       NEGATIVE FOR TOXIGENIC C. DIFFICILE          RESPIRATORY VIRUS PANEL W/COVID-19, PCR [[id number]] Collected: 03/25/22 0030    Order Status: Completed Specimen: Nasopharyngeal Updated: 03/25/22 0205     Adenovirus Not detected        Coronavirus 229E Not detected        Coronavirus HKU1 Not detected        Coronavirus CVNL63 Not detected        Coronavirus OC43 Not detected SARS-CoV-2, PCR Not detected        Metapneumovirus Not detected        Rhinovirus and Enterovirus Not detected        Influenza A Not detected        Influenza B Not detected        Parainfluenza 1 Not detected        Parainfluenza 2 Not detected        Parainfluenza 3 Not detected        Parainfluenza virus 4 Not detected        RSV by PCR Not detected        B. parapertussis, PCR Not detected        Bordetella pertussis - PCR Not detected        Chlamydophila pneumoniae DNA, QL, PCR Not detected        Mycoplasma pneumoniae DNA, QL, PCR Not detected       CULTURE, RESPIRATORY/SPUTUM/BRONCH Tressa Sophia [[id number]] Collected: 03/24/22 2330    Order Status: Canceled Specimen: Sputum            Emelina Peasant, DO   3/31/2022

## 2022-03-31 NOTE — PROGRESS NOTES
Problem: Falls - Risk of  Goal: *Absence of Falls  Description: Document Justino Bain Fall Risk and appropriate interventions in the flowsheet.   Outcome: Progressing Towards Goal  Note: Fall Risk Interventions:       Mentation Interventions: Door open when patient unattended,Evaluate medications/consider consulting pharmacy,More frequent rounding,Self-releasing belt,Toileting rounds    Medication Interventions: Evaluate medications/consider consulting pharmacy    Elimination Interventions: Patient to call for help with toileting needs    History of Falls Interventions: Evaluate medications/consider consulting pharmacy

## 2022-03-31 NOTE — PROGRESS NOTES
Hospitalist Progress Note    Subjective:   Daily Progress Note: 3/30/2022     Patient cannot provide any history due to her altered mental status. Daughter and niece at bedside. Patient has NG tube is in situ. Patient is tolerating tube feeding.     Current Facility-Administered Medications   Medication Dose Route Frequency    nitroglycerin (NITROBID) 2 % ointment 1 Inch  1 Inch Topical Q6H PRN    hydrALAZINE (APRESOLINE) 20 mg/mL injection 20 mg  20 mg IntraVENous Q6H PRN    cyanocobalamin (VITAMIN B12) injection 1,000 mcg  1,000 mcg SubCUTAneous DAILY    carvediloL (COREG) tablet 12.5 mg  12.5 mg Oral Q12H    [START ON 3/31/2022] lisinopriL (PRINIVIL, ZESTRIL) tablet 20 mg  20 mg Oral DAILY    QUEtiapine (SEROquel) tablet 50 mg  50 mg Oral QHS    melatonin tablet 10 mg  10 mg Oral QHS    hydrALAZINE (APRESOLINE) tablet 50 mg  50 mg Oral TID    oxyCODONE (ROXICODONE) 5 mg/5 mL oral solution 7.5 mg  7.5 mg Oral Q4H PRN    furosemide (LASIX) injection 40 mg  40 mg IntraVENous DAILY    thiamine HCL (B-1) tablet 200 mg  200 mg Oral TID    nystatin (MYCOSTATIN) 100,000 unit/gram powder   Topical BID    [Held by provider] insulin glargine (LANTUS) injection 5 Units  5 Units SubCUTAneous DAILY    dextrose 10% infusion 0-250 mL  0-250 mL IntraVENous PRN    albuterol-ipratropium (DUO-NEB) 2.5 MG-0.5 MG/3 ML  3 mL Nebulization Q4H PRN    [Held by provider] heparin (porcine) injection 5,000 Units  5,000 Units SubCUTAneous Q8H    insulin lispro (HUMALOG) injection   SubCUTAneous Q6H    glucose chewable tablet 16 g  4 Tablet Oral PRN    glucagon (GLUCAGEN) injection 1 mg  1 mg IntraMUSCular PRN    acetaminophen (TYLENOL) tablet 650 mg  650 mg Oral Q6H PRN    Or    acetaminophen (TYLENOL) suppository 650 mg  650 mg Rectal Q6H PRN    polyethylene glycol (MIRALAX) packet 17 g  17 g Oral DAILY PRN    ondansetron (ZOFRAN ODT) tablet 4 mg  4 mg Oral Q8H PRN    Or    ondansetron (ZOFRAN) injection 4 mg  4 mg IntraVENous Q6H PRN        Review of Systems  Review of systems not obtained due to patient factors. Objective:     Visit Vitals  /78   Pulse 85   Temp 99.1 °F (37.3 °C)   Resp 26   Ht 4' 11\" (1.499 m)   Wt 63.5 kg (140 lb)   SpO2 96%   BMI 28.28 kg/m²    O2 Flow Rate (L/min): 1.5 l/min O2 Device: Nasal cannula    Temp (24hrs), Av.5 °F (36.9 °C), Min:97.5 °F (36.4 °C), Max:99.1 °F (37.3 °C)      1901 -  0700  In: 225   Out: 300 [Urine:300]   07 -  1900  In: 2650   Out: 2200 [Urine:2200]      General appearance -awake, ill-appearing, NG tube in situ, not in acute distress. Eyes - sclera anicteric, no pallor  Nose - no obvious nasal discharge. NG tube is in situ. Neck - supple, no JVD, trachea is midline  Chest -diminished air entry noted in bases, poor inspiratory efforts, no wheezes  Heart - S1 and S2 normal  Abdomen - soft, nontender, nondistended, Bowel sounds present  Neurological -awake, moves left upper extremity well and is under restraint without any wrist injury, not moving right side, sensation touch normal in left lower extremity. Musculoskeletal -right knee swelling and mild bruising present. Extremities -  pedal edema noted      Data Review    Recent Results (from the past 24 hour(s))   CBC WITH AUTOMATED DIFF    Collection Time: 22  1:59 AM   Result Value Ref Range    WBC 10.6 4.6 - 13.2 K/uL    RBC 3.79 (L) 4.20 - 5.30 M/uL    HGB 10.8 (L) 12.0 - 16.0 g/dL    HCT 34.5 (L) 35.0 - 45.0 %    MCV 91.0 78.0 - 100.0 FL    MCH 28.5 24.0 - 34.0 PG    MCHC 31.3 31.0 - 37.0 g/dL    RDW 14.4 11.6 - 14.5 %    PLATELET 27 (LL) 057 - 420 K/uL    NRBC 0.0 0  WBC    ABSOLUTE NRBC 0.00 0.00 - 0.01 K/uL    NEUTROPHILS 73 40 - 73 %    LYMPHOCYTES 15 (L) 21 - 52 %    MONOCYTES 9 3 - 10 %    EOSINOPHILS 2 0 - 5 %    BASOPHILS 0 0 - 2 %    IMMATURE GRANULOCYTES 1 (H) 0.0 - 0.5 %    ABS. NEUTROPHILS 7.7 1.8 - 8.0 K/UL    ABS. LYMPHOCYTES 1.6 0.9 - 3.6 K/UL    ABS. MONOCYTES 1.0 0.05 - 1.2 K/UL    ABS. EOSINOPHILS 0.2 0.0 - 0.4 K/UL    ABS. BASOPHILS 0.0 0.0 - 0.1 K/UL    ABS. IMM. GRANS. 0.1 (H) 0.00 - 0.04 K/UL    DF AUTOMATED     METABOLIC PANEL, BASIC    Collection Time: 03/30/22  1:59 AM   Result Value Ref Range    Sodium 147 (H) 136 - 145 mmol/L    Potassium 4.1 3.5 - 5.5 mmol/L    Chloride 116 (H) 100 - 111 mmol/L    CO2 28 21 - 32 mmol/L    Anion gap 3 3.0 - 18 mmol/L    Glucose 161 (H) 74 - 99 mg/dL    BUN 32 (H) 7.0 - 18 MG/DL    Creatinine 1.04 0.6 - 1.3 MG/DL    BUN/Creatinine ratio 31 (H) 12 - 20      GFR est AA >60 >60 ml/min/1.73m2    GFR est non-AA 53 (L) >60 ml/min/1.73m2    Calcium 8.1 (L) 8.5 - 10.1 MG/DL   PHOSPHORUS    Collection Time: 03/30/22  1:59 AM   Result Value Ref Range    Phosphorus 2.4 (L) 2.5 - 4.9 MG/DL   MAGNESIUM    Collection Time: 03/30/22  1:59 AM   Result Value Ref Range    Magnesium 1.6 1.6 - 2.6 mg/dL   CALCIUM, IONIZED    Collection Time: 03/30/22  1:59 AM   Result Value Ref Range    Ionized Calcium 1.11 (L) 1.15 - 1.33 MMOL/L   GLUCOSE, POC    Collection Time: 03/30/22  5:38 AM   Result Value Ref Range    Glucose (POC) 189 (H) 70 - 110 mg/dL   GLUCOSE, POC    Collection Time: 03/30/22 11:21 AM   Result Value Ref Range    Glucose (POC) 258 (H) 70 - 110 mg/dL   GLUCOSE, POC    Collection Time: 03/30/22  5:17 PM   Result Value Ref Range    Glucose (POC) 305 (H) 70 - 110 mg/dL         Assessment/Plan:     Active Problems:    Septic shock (HCC) (3/24/2022)      ASSESSMENT:    1. Septic shock, resolved currently. Off IV pressors  2. UTI s/p treatment  3. Aspiration pneumonia with hypoxia  4. Chronic loculated left pleural effusion  5. Acute on chronic encephalopathy, slowly improving  6. Chronic encephalopathy and right-sided hemiparesis due to chronic L MCA infarct as well as right frontal lobe encephalomalacia. 7.  Thrombocytopenia of unknown etiology  8. Leukocytosis, resolved  9.   Acute renal failure, improving  10.  6 x 3 cm right hepatic mass  11. Seizure disorder  12. Peripheral arterial disease  13. Hypertension    PLAN:    Patient has been off IV pressor. Started on p.o. antihypertensive medications including hydralazine, lisinopril, Coreg. We will place patient on DuoNebs and wean her off oxygen. Echocardiogram report reviewed. Patient is currently being treated with IV Lasix  CT head report reviewed. ID to follow and continue ceftriaxone  Hematology/oncology has been consulted for thrombocytopenia. Continue monitoring platelet  Patient currently has NG tube. She may need a PEG tube but her platelets need to get better. We will have a palliative care consult. Daughter [phone]; [id number]. I have evaluated the patient after initiation of intervention. The patient is comfortable, uninjured, but continues to pose an imminent risk of injury to self and or serious disruption of treatment. The patient's current medical and behavioral conditions that warrant the use intervention include danger to self and Interference with medical treatment. Restraint or seclusion will be discontinued at the earliest possible time, regardless of the scheduled expiration of the order. Total time to take care of this patient was 35 minutes and more than 50% of time was spent counseling and coordinating care. Disclaimer: Sections of this note are dictated using utilizing voice recognition software, which may have resulted in some phonetic based errors in grammar and contents. Even though attempts were made to correct all the mistakes, some may have been missed, and remained in the body of the document. If questions arise, please contact our department.

## 2022-03-31 NOTE — PROGRESS NOTES
0730am Bedside shift report received from Neva Ramírez RN  0915am BP 80/49. Paged Dr. Florencio Vora. BP-70/59. Reposition flat position. BP-99/79. Plt-20, Mg 1.5, Po4-2.4  1003am. Dr. Sofia Vora is aware & notified. 1130am Sister is at the bedside. 1400pm Family at the bedside  1900pm Bedside shift change report given to Crystal Downey RN by Casa Ziegler RN. Report included the following information SBAR, Kardex, Intake/Output, MAR, Recent Results, Med Rec Status, Cardiac Rhythm NSR and Alarm Parameters .

## 2022-03-31 NOTE — PROGRESS NOTES
Problem: Pressure Injury - Risk of  Goal: *Prevention of pressure injury  Description: Document Ankit Scale and appropriate interventions in the flowsheet. Outcome: Progressing Towards Goal  Note: Pressure Injury Interventions:  Sensory Interventions: Assess changes in LOC,Assess need for specialty bed    Moisture Interventions: Absorbent underpads,Internal/External urinary devices    Activity Interventions: Assess need for specialty bed,Pressure redistribution bed/mattress(bed type)    Mobility Interventions: Assess need for specialty bed,HOB 30 degrees or less,Pressure redistribution bed/mattress (bed type)    Nutrition Interventions: Document food/fluid/supplement intake    Friction and Shear Interventions: Apply protective barrier, creams and emollients,HOB 30 degrees or less,Minimize layers                Problem: Patient Education: Go to Patient Education Activity  Goal: Patient/Family Education  Outcome: Progressing Towards Goal     Problem: Falls - Risk of  Goal: *Absence of Falls  Description: Document Diana Fall Risk and appropriate interventions in the flowsheet. Outcome: Progressing Towards Goal  Note: Fall Risk Interventions:       Mentation Interventions: Adequate sleep, hydration, pain control,Bed/chair exit alarm    Medication Interventions: Bed/chair exit alarm,Evaluate medications/consider consulting pharmacy    Elimination Interventions: Bed/chair exit alarm,Call light in reach              Problem: Patient Education: Go to Patient Education Activity  Goal: Patient/Family Education  Outcome: Progressing Towards Goal     Problem: Nutrition Deficit  Goal: *Optimize nutritional status  Outcome: Progressing Towards Goal     Problem: Risk for Spread of Infection  Goal: Prevent transmission of infectious organism to others  Description: Prevent the transmission of infectious organisms to other patients, staff members, and visitors.   Outcome: Progressing Towards Goal     Problem: Patient Education: Go to Education Activity  Goal: Patient/Family Education  Outcome: Progressing Towards Goal     Problem: Patient Education: Go to Patient Education Activity  Goal: Patient/Family Education  Outcome: Progressing Towards Goal     Problem: Non-Violent Restraints  Goal: Removal from restraints as soon as assessed to be safe  Outcome: Progressing Towards Goal  Goal: No harm/injury to patient while restraints in use  Outcome: Progressing Towards Goal  Goal: Patient's dignity will be maintained  Outcome: Progressing Towards Goal  Goal: Patient Interventions  Outcome: Progressing Towards Goal

## 2022-03-31 NOTE — PROGRESS NOTES
0730am Bedside shift report received from Emerita Limon RN  0915am BP 80/49. Paged Dr. Leni Phalen Dr. Abram Peat. BP-70/59.  1003am. Dr. Yovany Hilario is aware & notified. 1200pm Family at  the bedside  1900pm Bedside shift change report given to  by Rosendo Danielle RN Report included the following information SBAR, Kardex, Intake/Output, MAR, Recent Results, Med Rec Status, Cardiac Rhythm NSR and Alarm Parameters .

## 2022-04-01 NOTE — PROGRESS NOTES
[facility]: [phone]  [facility]: [phone]     Patient Name: Ermias Contreras  YOB: 1955    Date of progress report: 4/1/22  Reason for Consult: establish goals of care  Requesting Provider: Manpreet Johnson MD   Primary Care Physician: None      SUMMARY:   Ermias Contreras is a 74 y.o. female with a past history of TBI,HTN, DM 2, Obesity, dementia pulmonary HTN PASP 52 mmHg , Systolic HF EF 05-62%, who was admitted on 3/24/2022 from home with a diagnosis of Sepsis with AMS and  Thrombocytopenia  Current medical issues leading to Palliative Medicine involvement include: establish goals of care. CHIEF COMPLAINT: AMS     HPI/SUBJECTIVE:    Patient is a 72-year-old  female that lives long-term in the nursing facility ([facility]). She is non-English speaking. She was brought in through the emergency room room with low blood pressure, tachycardia, and wheezing. She also was running low-grade temp and altered mental status only responding to pain. 4/1/22: Patient remains with NG tube. Platelets today 24    The patient is:   [] Verbal and participatory  [x] Non-participatory due to: Altered mental status    GOALS OF CARE:  Patient/Health Care Proxy Stated Goals: Prolong life      TREATMENT PREFERENCES:   Code Status: Full Code         PALLIATIVE DIAGNOSES:   1. Goals of care/ACP  2. Sepsis  3. Thrombocytopenia  4. Altered mental status  5. Debility       PLAN:   4/1/22: This NP and Helio Landaverde RN in to see patient at the bedside. She is somewhat alert but minimally interactive. Her sister and niece were at bedside and reports that they have been medically updated by the medical team.  Niece verbalized understanding that patient still unable to get a long-term feeding tube due to low platelet count. She still remains n.p.o. due to aspiration. Continues to be seen by speech therapy.   Have informed them that next week we will have more information and will likely need to make further goals of care decisions. At this time patient remains a full code with full interventions  1. Goals of care/ACP  This NP along with Sarah Beth Chaudhari LMSW and Gabi Ramsay RN in to see patient at the bedside. Present were her 2 nieces and the patient's sister. The sister does not speak much English so her nieces were doing most of the translation. Initial conversation was information seeking about patient's functional status at baseline and family's understanding of her current situation and prognosis. When we addressed CODE STATUS patient's sister immediately stated that she would want patient to remain full code with full interventions. Patient does not appear to be in any immediate risk of intubation and the family would like to see if she makes any meaningful recovery with antibiotics and treatment of her sepsis. We will continue to follow and update the family on any changes in the patient's condition. We meet for goals of care conversation we would like to bring in  service to ensure of proper understanding. Goals of Care:  Full code with Full interventions  2. Sepsis  Patient being seen by infectious disease who note the patient has septic shock related to  source versus liver abscess versus pleural process. UA grew out greater than 100,000 noted recent culture for Klebsiella on 3/15/2022  3. Thrombocytopenia  HIT panel negative. Recommend consider hematology evaluation. 4.  Altered mental status  At baseline patient is communicative per the family. She was active and participating in her life. This is a very drastic change from her current presentation. Likely due to #2.   5.  Debility  Pt has what appears to be a Griselda score of around 56% at baseline not able to do any work due to extensive disease, needing considerable assistance for functional ADLs and self-care but moving the bed to chair existence with some ability to interact with her-year-old despite her baseline dementia. At this time patient's palliative performance score is around 30% she is total care bedbound, and unable to eat orally on her own. She is being fed by nasogastric tube at this time. 6.  Initial consult note routed to primary continuity provider  7. Communicated plan of care with: Palliative IDT      Advance Care Planning:  [] The [facility] Interdisciplinary Team has updated the ACP Navigator with [address] and Patient Capacity    Primary Decision Maker ([address]): Pt's sister is the [facility]     Medical Interventions: Full interventions   Other Instructions:   Artificially Administered Nutrition: Feeding tube long-term, if indicated     As far as possible, the palliative care team has discussed with patient / health care proxy about goals of care / treatment preferences for patient. HISTORY:     History obtained from: Chart review   Active Problems:    Septic shock ([address] (3/24/2022)      No past medical history on file. No past surgical history on file. No family history on file. History reviewed, no pertinent family history.   Social History     Tobacco Use    Smoking status: Former Smoker    Smokeless tobacco: Never Used   Substance Use Topics    Alcohol use: Not on file     No Known Allergies   Current Facility-Administered Medications   Medication Dose Route Frequency    [START ON 4/2/2022] insulin glargine (LANTUS) injection 10 Units  10 Units SubCUTAneous DAILY    multivitamin-minerals-ferrous gluconate oral liquid 15 mL  15 mL Per NG tube DAILY    lisinopriL (PRINIVIL, ZESTRIL) tablet 10 mg  10 mg Oral DAILY    nitroglycerin (NITROBID) 2 % ointment 1 Inch  1 Inch Topical Q6H PRN    hydrALAZINE (APRESOLINE) 20 mg/mL injection 20 mg  20 mg IntraVENous Q6H PRN    carvediloL (COREG) tablet 12.5 mg  12.5 mg Oral Q12H    QUEtiapine (SEROquel) tablet 50 mg  50 mg Oral QHS    melatonin tablet 10 mg  10 mg Oral QHS    [Held by provider] hydrALAZINE (APRESOLINE) tablet 50 mg  50 mg Oral TID    oxyCODONE (ROXICODONE) 5 mg/5 mL oral solution 7.5 mg  7.5 mg Oral Q4H PRN    furosemide (LASIX) injection 40 mg  40 mg IntraVENous DAILY    thiamine HCL (B-1) tablet 200 mg  200 mg Oral TID    nystatin (MYCOSTATIN) 100,000 unit/gram powder   Topical BID    dextrose 10% infusion 0-250 mL  0-250 mL IntraVENous PRN    albuterol-ipratropium (DUO-NEB) 2.5 MG-0.5 MG/3 ML  3 mL Nebulization Q4H PRN    [Held by provider] heparin (porcine) injection 5,000 Units  5,000 Units SubCUTAneous Q8H    insulin lispro (HUMALOG) injection   SubCUTAneous Q6H    glucose chewable tablet 16 g  4 Tablet Oral PRN    glucagon (GLUCAGEN) injection 1 mg  1 mg IntraMUSCular PRN    acetaminophen (TYLENOL) tablet 650 mg  650 mg Oral Q6H PRN    Or    acetaminophen (TYLENOL) suppository 650 mg  650 mg Rectal Q6H PRN    polyethylene glycol (MIRALAX) packet 17 g  17 g Oral DAILY PRN    ondansetron (ZOFRAN ODT) tablet 4 mg  4 mg Oral Q8H PRN    Or    ondansetron (ZOFRAN) injection 4 mg  4 mg IntraVENous Q6H PRN          Clinical Pain Assessment (nonverbal scale for nonverbal patients): Activity (Movement): Lying quietly, normal position    Duration: for how long has pt been experiencing pain (e.g., 2 days, 1 month, years)  Frequency: how often pain is an issue (e.g., several times per day, once every few days, constant)     FUNCTIONAL ASSESSMENT:     Palliative Performance Scale (PPS):  PPS: 30    ECOG  ECOG Status : Completely disabled     PSYCHOSOCIAL/SPIRITUAL SCREENING:      Any spiritual / Zoroastrian concerns:  [] Yes /  [x] No    Caregiver Burnout:  [] Yes /  [] No /  [x] No Caregiver Present      Anticipatory grief assessment:   [x] Normal  / [] Maladaptive        REVIEW OF SYSTEMS:     Systems: constitutional, ears/nose/mouth/throat, respiratory, gastrointestinal, genitourinary, musculoskeletal, integumentary, neurologic, psychiatric, endocrine. Positive findings noted below. Modified ESAS Completed by: provider                       Dyspnea: 0           Stool Occurrence(s): 1   Positive and pertinent negative findings in ROS are noted above in HPI. The following systems were [x] reviewed / [] unable to be reviewed as noted in HPI  Other findings are noted below. PHYSICAL EXAM:     Constitutional: alert but minimally interactive  Eyes: pupils equal, anicteric  ENMT: no nasal discharge, moist mucous membranes  Cardiovascular: regular rhythm, distal pulses intact  Respiratory: breathing not labored, symmetric  Gastrointestinal: soft non-tender, +bowel sounds  Musculoskeletal: no deformity, no tenderness to palpation  Skin: warm, dry  Neurologic:not following commands, moving all extremities    Other: Wt Readings from Last 3 Encounters:   03/25/22 63.5 kg (140 lb)   03/14/22 70.3 kg (155 lb)   10/22/21 70.3 kg (155 lb)     Blood pressure 109/76, pulse 85, temperature 99.3 °F (37.4 °C), resp. rate 20, height 4' 11\" (1.499 m), weight 63.5 kg (140 lb), SpO2 99 %. Pain:  Pain Scale 1: Numeric (0 - 10)  Pain Intensity 1: 0  Pain Onset 1:  (intermittent)  Pain Location 1: Abdomen  Pain Orientation 1: Mid     Pain Intervention(s) 1: Family Support       LAB AND IMAGING FINDINGS:     Lab Results   Component Value Date/Time    WBC 9.1 04/01/2022 03:30 AM    HGB 10.2 (L) 04/01/2022 03:30 AM    PLATELET 24 (LL) 24/27/7420 03:30 AM     Lab Results   Component Value Date/Time    Sodium 144 04/01/2022 03:30 AM    Potassium 3.7 04/01/2022 03:30 AM    Chloride 106 04/01/2022 03:30 AM    CO2 32 04/01/2022 03:30 AM    BUN 29 (H) 04/01/2022 03:30 AM    Creatinine 0.77 04/01/2022 03:30 AM    Calcium 8.2 (L) 04/01/2022 03:30 AM    Magnesium 2.2 04/01/2022 03:30 AM    Phosphorus 3.2 04/01/2022 03:30 AM      Lab Results   Component Value Date/Time    Alk.  phosphatase 71 03/29/2022 03:10 AM    Protein, total 5.3 (L) 03/29/2022 03:10 AM    Albumin 2.6 (L) 03/29/2022 03:10 AM Globulin 2.7 03/29/2022 03:10 AM     Lab Results   Component Value Date/Time    INR 1.4 (H) 03/24/2022 07:52 PM    Prothrombin time 16.7 (H) 03/24/2022 07:52 PM    aPTT 32.3 03/25/2022 12:00 AM      No results found for: IRON, FE, TIBC, IBCT, PSAT, FERR   No results found for: PH, PCO2, PO2  No components found for: [facility]   Lab Results   Component Value Date/Time    CK 54 03/25/2022 07:00 PM              Total time: 15 minutes   Counseling / coordination time, spent as noted above:   > 50% counseling / coordination:  Time spent in direct consultation with the patient, medical team, and family     Prolonged service was provided for  []30 min   []75 min in face to face time in the presence of the patient, spent as noted above. Time Start:   Time End:     Disclaimer: Sections of this note are dictated using utilizing voice recognition software, which may have resulted in some phonetic based errors in grammar and contents. Even though attempts were made to correct all the mistakes, some may have been missed, and remained in the body of the document. If questions arise, please contact our department.

## 2022-04-01 NOTE — PROGRESS NOTES
Hospitalist Progress Note    Subjective:   Daily Progress Note: 4/1/2022     No changes overnight. Patient seems more alert today. Restraints required renewal as patient continues to reach for lines when not restrained. DTI on heel discovered yesterday. Heel floats in place. Ceftriaxone completed today. Patient at high risk of aspiration - will need to be watchful. No family in room - will call tomorrow to update if they are not present during rounding.      Current Facility-Administered Medications   Medication Dose Route Frequency    [START ON 4/2/2022] insulin glargine (LANTUS) injection 10 Units  10 Units SubCUTAneous DAILY    multivitamin-minerals-ferrous gluconate oral liquid 15 mL  15 mL Per NG tube DAILY    lisinopriL (PRINIVIL, ZESTRIL) tablet 10 mg  10 mg Oral DAILY    nitroglycerin (NITROBID) 2 % ointment 1 Inch  1 Inch Topical Q6H PRN    hydrALAZINE (APRESOLINE) 20 mg/mL injection 20 mg  20 mg IntraVENous Q6H PRN    carvediloL (COREG) tablet 12.5 mg  12.5 mg Oral Q12H    QUEtiapine (SEROquel) tablet 50 mg  50 mg Oral QHS    melatonin tablet 10 mg  10 mg Oral QHS    [Held by provider] hydrALAZINE (APRESOLINE) tablet 50 mg  50 mg Oral TID    oxyCODONE (ROXICODONE) 5 mg/5 mL oral solution 7.5 mg  7.5 mg Oral Q4H PRN    furosemide (LASIX) injection 40 mg  40 mg IntraVENous DAILY    thiamine HCL (B-1) tablet 200 mg  200 mg Oral TID    nystatin (MYCOSTATIN) 100,000 unit/gram powder   Topical BID    dextrose 10% infusion 0-250 mL  0-250 mL IntraVENous PRN    albuterol-ipratropium (DUO-NEB) 2.5 MG-0.5 MG/3 ML  3 mL Nebulization Q4H PRN    [Held by provider] heparin (porcine) injection 5,000 Units  5,000 Units SubCUTAneous Q8H    insulin lispro (HUMALOG) injection   SubCUTAneous Q6H    glucose chewable tablet 16 g  4 Tablet Oral PRN    glucagon (GLUCAGEN) injection 1 mg  1 mg IntraMUSCular PRN    acetaminophen (TYLENOL) tablet 650 mg  650 mg Oral Q6H PRN    Or    acetaminophen (TYLENOL) suppository 650 mg  650 mg Rectal Q6H PRN    polyethylene glycol (MIRALAX) packet 17 g  17 g Oral DAILY PRN    ondansetron (ZOFRAN ODT) tablet 4 mg  4 mg Oral Q8H PRN    Or    ondansetron (ZOFRAN) injection 4 mg  4 mg IntraVENous Q6H PRN        Review of Systems  Review of systems not obtained due to patient factors. Objective:     Visit Vitals  /80   Pulse 80   Temp 99.1 °F (37.3 °C)   Resp 18   Ht 4' 11\" (1.499 m)   Wt 63.5 kg (140 lb)   SpO2 99%   BMI 28.28 kg/m²    O2 Flow Rate (L/min): 2 l/min O2 Device: Nasal cannula    Temp (24hrs), Av °F (37.2 °C), Min:97.8 °F (36.6 °C), Max:99.8 °F (37.7 °C)      701 -  1900  In: 2969   Out: 7661 [Urine:1650]  1901 -  0700  In: 6677 [I.V.:350]  Out: 3873 [Urine:2975]      General appearance -awake, ill-appearing, NG tube in situ, not in acute distress. Eyes - sclera anicteric, no pallor  Nose - no obvious nasal discharge. NG tube is in situ. Neck - supple, no JVD, trachea is midline  Chest -diminished air entry noted in bases, poor inspiratory efforts, no wheezes  Heart - S1 and S2 normal  Abdomen - soft, nontender, nondistended, Bowel sounds present  Neurological -awake, moves left upper extremity well and is under restraint without any wrist injury, not moving right side. Musculoskeletal -right knee swelling and mild bruising present. Extremities -  pedal edema noted. DTI present on right heel.      Data Review    Recent Results (from the past 24 hour(s))   GLUCOSE, POC    Collection Time: 22  5:35 PM   Result Value Ref Range    Glucose (POC) 267 (H) 70 - 110 mg/dL   GLUCOSE, POC    Collection Time: 22 11:21 PM   Result Value Ref Range    Glucose (POC) 238 (H) 70 - 110 mg/dL   CBC WITH AUTOMATED DIFF    Collection Time: 22  3:30 AM   Result Value Ref Range    WBC 9.1 4.6 - 13.2 K/uL    RBC 3.59 (L) 4.20 - 5.30 M/uL    HGB 10.2 (L) 12.0 - 16.0 g/dL    HCT 31.9 (L) 35.0 - 45.0 %    MCV 88.9 78.0 - 100.0 FL    MCH 28.4 24.0 - 34.0 PG    MCHC 32.0 31.0 - 37.0 g/dL    RDW 14.2 11.6 - 14.5 %    PLATELET 24 (LL) 633 - 420 K/uL    NRBC 0.0 0  WBC    ABSOLUTE NRBC 0.00 0.00 - 0.01 K/uL    NEUTROPHILS 72 40 - 73 %    LYMPHOCYTES 13 (L) 21 - 52 %    MONOCYTES 11 (H) 3 - 10 %    EOSINOPHILS 3 0 - 5 %    BASOPHILS 0 0 - 2 %    IMMATURE GRANULOCYTES 1 (H) 0.0 - 0.5 %    ABS. NEUTROPHILS 6.6 1.8 - 8.0 K/UL    ABS. LYMPHOCYTES 1.2 0.9 - 3.6 K/UL    ABS. MONOCYTES 1.0 0.05 - 1.2 K/UL    ABS. EOSINOPHILS 0.3 0.0 - 0.4 K/UL    ABS. BASOPHILS 0.0 0.0 - 0.1 K/UL    ABS. IMM. GRANS. 0.1 (H) 0.00 - 0.04 K/UL    DF AUTOMATED     PHOSPHORUS    Collection Time: 04/01/22  3:30 AM   Result Value Ref Range    Phosphorus 3.2 2.5 - 4.9 MG/DL   MAGNESIUM    Collection Time: 04/01/22  3:30 AM   Result Value Ref Range    Magnesium 2.2 1.6 - 2.6 mg/dL   CALCIUM, IONIZED    Collection Time: 04/01/22  3:30 AM   Result Value Ref Range    Ionized Calcium 1.07 (L) 1.15 - 4.59 MMOL/L   METABOLIC PANEL, BASIC    Collection Time: 04/01/22  3:30 AM   Result Value Ref Range    Sodium 144 136 - 145 mmol/L    Potassium 3.7 3.5 - 5.5 mmol/L    Chloride 106 100 - 111 mmol/L    CO2 32 21 - 32 mmol/L    Anion gap 6 3.0 - 18 mmol/L    Glucose 201 (H) 74 - 99 mg/dL    BUN 29 (H) 7.0 - 18 MG/DL    Creatinine 0.77 0.6 - 1.3 MG/DL    BUN/Creatinine ratio 38 (H) 12 - 20      GFR est AA >60 >60 ml/min/1.73m2    GFR est non-AA >60 >60 ml/min/1.73m2    Calcium 8.2 (L) 8.5 - 10.1 MG/DL   GLUCOSE, POC    Collection Time: 04/01/22  6:06 AM   Result Value Ref Range    Glucose (POC) 200 (H) 70 - 110 mg/dL   GLUCOSE, POC    Collection Time: 04/01/22 12:06 PM   Result Value Ref Range    Glucose (POC) 288 (H) 70 - 110 mg/dL         Assessment/Plan:     Active Problems:    Septic shock ([address] (3/24/2022)      ASSESSMENT:    1. Septic shock, resolved currently. Off IV pressors  2. UTI s/p treatment  3. Aspiration pneumonia with hypoxia  4.   Chronic loculated left pleural effusion  5. Acute on chronic encephalopathy, slowly improving  6. Chronic encephalopathy and right-sided hemiparesis due to chronic L MCA infarct as well as right frontal lobe encephalomalacia. 7.  Thrombocytopenia of unknown etiology  8. Leukocytosis, resolved  9. Acute renal failure, improving  10.  6 x 3 cm right hepatic mass  11. Seizure disorder  12. Peripheral arterial disease  13. Hypertension    PLAN:    Patient has been off IV pressor. Started on p.o. antihypertensive medications   Experiencing soft BPs 3/31 - Will hold hydralazine, decrease lisinopril from 20 mg daily to 10 mg daily, decreased coreg from 25 mg BID to 12.5 mg yesterday. 4/1 - Patient's current regimen of coreg 12.5 mg BID, lasix 40 mg every day, lisinopril is keeping her normotensive, less lows than yesterday. DuoNebs; continue to wean her off oxygen. Echocardiogram report reviewed. Patient is currently being treated with IV Lasix  CT head report reviewed. ID to follow and continue ceftriaxone  Hematology/oncology has been consulted for thrombocytopenia. Continue monitoring platelets; trended slightly up today. Patient currently has NG tube. She may need a PEG tube but her platelets need to get better. Daughter [phone]; [id number]. I have evaluated the patient after initiation of intervention. The patient is comfortable, uninjured, but continues to pose an imminent risk of injury to self and or serious disruption of treatment. The patient's current medical and behavioral conditions that warrant the use intervention include danger to self and Interference with medical treatment. Restraint or seclusion will be discontinued at the earliest possible time, regardless of the scheduled expiration of the order. Total time to take care of this patient was 35 minutes and more than 50% of time was spent counseling and coordinating care.      Disclaimer: Sections of this note are dictated using utilizing voice recognition software, which may have resulted in some phonetic based errors in grammar and contents. Even though attempts were made to correct all the mistakes, some may have been missed, and remained in the body of the document. If questions arise, please contact our department.

## 2022-04-01 NOTE — PROGRESS NOTES
conducted a Follow up consultation and Spiritual Assessment for [facility], who is a 74 y.o.,female. The  provided the following Interventions:  Continued the relationship of care and support. Patient is restless and moaning when I came into the room. Listened emphatically. Offered prayer and assurance of continued prayer on patient's behalf. Chart reviewed. The following outcomes were achieved:  Patient calmed down and fell asleep as prayer by the end of prayer. Assessment:  There are no further spiritual or Religion issues which require Spiritual Care Services interventions at this time. Plan:  Chaplains will continue to follow and will provide pastoral care on an as needed/requested basis.  recommends bedside caregivers page  on duty if patient shows signs of acute spiritual or emotional distress.      Amarilis Kingsley5   [phone]

## 2022-04-01 NOTE — PROGRESS NOTES
Problem: Dysphagia (Adult)  Goal: *Acute Goals and Plan of Care (Insert Text)  Description:     Patient will:  1. Tolerate PO trials with 0 s/s overt distress in 4/5 trials  2. Utilize compensatory swallow strategies/maneuvers (decrease bite/sip, size/rate, alt. liq/sol) with min cues in 4/5 trials  3. Perform oral-motor/laryngeal exercises to increase oropharyngeal swallow function with min cues  4. Complete an objective swallow study (i.e., MBSS) to assess swallow integrity, r/o aspiration, and determine of safest LRD, min A as indicated/ordered by MD     Recommend:   NPO with NG  Consider a more permanent alternative nutrition/hydration source  (? PEG) vs comfort feeds   Strict aspiration precautions  HOB >30 degrees at all times; oral care TID      Outcome: Not Progressing Towards Goal   SPEECH LANGUAGE PATHOLOGY DYSPHAGIA TREATMENT    Patient: Ta Jackson (68 y.o. female)  Date: 4/1/2022  Diagnosis: Septic shock ([facility] [A41.9, R65.21] <principal problem not specified>       Precautions: aspiration    PLOF: As per H&P      ASSESSMENT:  Pt was seen at bedside for follow up dysphagia management with NG in place. Pt demo wet cough s/p oral care. She was orally defensive to any PO this date despite max cues. Pt demo inadequate sensory/motor awareness/strength for safe and adequate nutritional intake. She continues to appear to be a high risk of aspiration, malnutrition, and dehydration with PO. Continue to rec NPO with NG, aspiration precautions, oral care TID, and meds via TF. ST to follow for further dysphagia management. Progression toward goals:  []         Improving appropriately and progressing toward goals  []         Improving slowly and progressing toward goals  [x]         Not making progress toward goals - continue to monitor     PLAN:  Recommendations and Planned Interventions:  See above  Patient continues to benefit from skilled intervention to address the above impairments.  Continue treatment per established plan of care. Discharge Recommendations:  Jaren Suárez and To Be Determined     SUBJECTIVE:   Patient nonverbal.     OBJECTIVE:   Cognitive and Communication Status:  Neurologic State: Confused  Orientation Level: Disoriented X4  Cognition: Decreased attention/concentration  Dysphagia Treatment:  Oral Assessment:  Oral Assessment  Labial: No impairment  Dentition: Natural  Oral Hygiene: could not assess  Lingual: Other (comment) (could not assess)  Velum: Unable to visualize  Mandible: No impairment  P.O. Trials:   Patient Position: 45 at [facility]   Vocal quality prior to P.O.: Other (comment) (nonverbal)   Consistency Presented:  Thin liquid,Pudding,Ice chips   How Presented: SLP-fed/presented,Straw,Spoon   Bolus Acceptance: Impaired   Bolus Formation/Control: No impairment   Propulsion: No impairment   Oral Residue: Other (comment) (could not assess)   Initiation of Swallow: No impairment   Laryngeal Elevation: Functional   Aspiration Signs/Symptoms: None   Pharyngeal Phase Characteristics: No impairment, issues, or problems    Effective Modifications: Straw,Spoon,Small sips and bites   Cues for Modifications: Maximal       Oral Phase Severity: Moderate   Pharyngeal Phase Severity : No impairment     PAIN:  Start of Tx: appeared to be in no pain/discomfort  End of Tx: appeared to be in no pain/discomfort     After treatment:   []              Patient left in no apparent distress sitting up in chair  [x]              Patient left in no apparent distress in bed  [x]              Call bell left within reach  [x]              Nursing notified  []              Family present  []              Caregiver present  []              Bed alarm activated    COMMUNICATION/EDUCATION:   [x] Aspiration precautions; swallow safety; compensatory techniques  []        Patient/family able to participate in training and education   [x]  Patient unable to participate in training and education, education ongoing with staff   [] Patient understands goals and intent of therapy; neutral about participation     Thank you for this referral.    Stewart Donovan M.S. CCC-SLP/L  Speech-Language Pathologist

## 2022-04-01 NOTE — PROGRESS NOTES
[facility]  (A Division of [address] [place] [address])    Follow-up Note      Date of Admission: 3/24/2022       Date of Note:  4/1/2022          Current Antimicrobials:    Prior Antimicrobials:  Ceftriaxone 3/28- present Vancomycin 3/24 to 3/28  Meropenem 3/25- 3/28     Assessment:         Septic shock on multiple pressors with lactic acidosis: Suspect related to  source given abnormal UA vs liver abscess? Vs pleural process  Urinary tract infection: UA with greater than 100 WBCs few bacteria large leukocyte esterase negative nitrite; recent cx + for Klebsiella on 3/15  Aspiration pneumonia with hypoxia  Leukocytosis: improving  Loculated left pleural effusion with calcification and pleural thickening as well as non-specific diffuse opacities -  Noted on CT chest from 3/24. ? Chronic   GUIDO: Creatinine 2.38 upon presentation now improving  Metabolic encephalopathy with baseline dementia/prior CVA/limited verbal interaction  Suspected Right liver abscess vs mass (new since 2017)-3.1 x 5.8 x 4.2 cm, infection vs malignancy. Noted on 3/14 CT scn but not mentioedn in 3/24 CT report. ?? source  Thrombocytopenia-  worsening    Plan:   Complete Ceftriaxone 1gm IV daily today           F/u blood cx - remains negative from 3/24  CBC, BMP in am   MRI liver with MRCP reviewed - biopsy at a later date. Heme/Onc following    Discussed with Dr. Juan Carlos Lozada. Vandana Mendoza DO  [facility]  [address] [place] [address], [address] [place], [place] [address]ado [facility] [address]  Office: [phone]  Mobile/Text: [phone]     Microbiology:  3/24 blood culture: No growth to date x2  3/25 RSV PCR negative  3/25 MRSA nasal swab negative  3/25 respiratory viral panel: Negative  3/25 C.diff: negative    Lines / Catheters:  A-line left wrist  Peripheral  Right IJ TLC  dove    Subjective:   Patent seen and examined. No fevers. Looks a little more awake today.   Still not really talking or interacting much  WBC improved. Tmax 99.9  No family at bedside during my visit today    Objective:        Visit Vitals  /76   Pulse 85   Temp 99.3 °F (37.4 °C)   Resp 20   Ht 4' 11\" (1.499 m)   Wt 63.5 kg (140 lb)   SpO2 99%   BMI 28.28 kg/m²     Temp (24hrs), Av.8 °F (37.1 °C), Min:97.8 °F (36.6 °C), Max:99.8 °F (37.7 °C)         General:   Awake and alert to the people around her mother she remains nonverbal   Skin:   no rashes or skin lesions noted on limited exam, cool to touch   HEENT:  Normocephalic, atraumatic, PERRL, EOMI, no scleral icterus or pallor; no conjunctival hemmohage;  nasal and oral mucous are moist and without evidence of lesions. No thrush. Neck supple, no bruits. NG in place   Lymph Nodes:   no cervical, axillary or inguinal adenopathy   Lungs:   non-labored, bilaterally clear to aspiration- no crackles wheezes rales or rhonchi   Heart:  RRR, s1 and s2; no murmurs rubs or gallops, no edema, + pedal pulses   Abdomen:  soft, non-distended, active bowel sounds, no hepatomegaly, no splenomegaly. yells out with palpation to the lower abdomen. No mid-epigastric tenderness   Genitourinary: Fallon   Extremities:   no clubbing, cyanosis; no joint effusions or swelling; Full ROM of all large joints to the upper and lower extremities; muscle mass appropriate for age- left wrist restrained   Neurologic:  No gross focal sensory abnormalities;  awake, tracks, vocalizes discomfort and frustration however is otherwise nonverbal, response to pain nonverbal, right upper extremity appears to be contracted, focal weakness on the right when compared to the left as left side has noted independent movement.    Psychiatric:   Calm        Lab results:    Chemistry  Recent Labs     22  0330 22  0302 22  0159   * 183* 161*    145 147*   K 3.7 3.6 4.1    110 116*   CO2 32 29 28   BUN 29* 34* 32*   CREA 0.77 0.94 1.04   CA 8.2* 8.5 8.1*   AGAP 6 6 3   BUCR 38* 36* 31*       CBC w/ Diff  Recent Labs     04/01/22  0330 03/31/22  0302 03/30/22  0159   WBC 9.1 10.1 10.6   RBC 3.59* 3.60* 3.79*   HGB 10.2* 10.4* 10.8*   HCT 31.9* 32.3* 34.5*   PLT 24* 20* 27*   GRANS 72 71 73   LYMPH 13* 14* 15*   EOS 3 2 2       Microbiology  All Micro Results     Procedure Component Value Units Date/Time    CULTURE, BLOOD [[id number]] Collected: 03/24/22 1945    Order Status: Completed Specimen: Blood Updated: 03/30/22 0639     Special Requests: NO SPECIAL REQUESTS        Culture result: NO GROWTH 6 DAYS       CULTURE, BLOOD [[id number]] Collected: 03/24/22 1952    Order Status: Completed Specimen: Blood Updated: 03/30/22 0639     Special Requests: NO SPECIAL REQUESTS        Culture result: NO GROWTH 6 DAYS       CULTURE, MRSA [[id number]] Collected: 03/25/22 0134    Order Status: Completed Specimen: Nasal from Nares Updated: 03/26/22 1151     Special Requests: NO SPECIAL REQUESTS        Culture result: MRSA NOT PRESENT               Screening of patient nares for MRSA is for surveillance purposes and, if positive, to facilitate isolation considerations in high risk settings. It is not intended for automatic decolonization interventions per se as regimens are not sufficiently effective to warrant routine use.           C. DIFFICILE AG & TOXIN A/B [[id number]] Collected: 03/25/22 1517    Order Status: Completed Specimen: Stool Updated: 03/26/22 1007     GDH ANTIGEN Negative        C. difficile toxin Negative        INTERPRETATION       NEGATIVE FOR TOXIGENIC C. DIFFICILE          RESPIRATORY VIRUS PANEL W/COVID-19, PCR [[id number]] Collected: 03/25/22 0030    Order Status: Completed Specimen: Nasopharyngeal Updated: 03/25/22 0205     Adenovirus Not detected        Coronavirus 229E Not detected        Coronavirus HKU1 Not detected        Coronavirus CVNL63 Not detected        Coronavirus OC43 Not detected        SARS-CoV-2, PCR Not detected        Metapneumovirus Not detected        Rhinovirus and Enterovirus Not detected        Influenza A Not detected        Influenza B Not detected        Parainfluenza 1 Not detected        Parainfluenza 2 Not detected        Parainfluenza 3 Not detected        Parainfluenza virus 4 Not detected        RSV by PCR Not detected        B. parapertussis, PCR Not detected        Bordetella pertussis - PCR Not detected        Chlamydophila pneumoniae DNA, QL, PCR Not detected        Mycoplasma pneumoniae DNA, QL, PCR Not detected       CULTURE, RESPIRATORY/SPUTUM/BRONCH Migdalia Alyx [[id number]] Collected: 03/24/22 6510    Order Status: Canceled Specimen: Sputum            Vandana Mendoza DO   4/1/2022

## 2022-04-01 NOTE — PROGRESS NOTES
0800- Report received from offgoing RN. Pt received lying in total care bed, attached to bedside cardiac monitor. Pt opens eyes spontaneously and tracks RN. Pt does not nod or follow commands in english or simple commands in Mongolian. Tube feeds infusing via NGT. Purewick to continuous suction per protocol. 1400- Family of pt at bedside. Updated on pt assessment and plan of care. All questions addressed. 36- Dr Juan Carlos Lozada at bedside to assess pt. Updated on pt assessment, VS trend, lab results, infusions, and output. No verbal orders received. 1700- Pt bathed in CHG wipes and bath pack. . Hair washed and combed. Gown and linens changed. Preventative sacral allevyn dressing changed. Purewick changed. Pt tolerated without incident. 1900- Bedside shift report endorsed to receiving RN. Pt assessment, VS trend, lab results, infusions, output, EMA and orders reviewed. Care relinquished.

## 2022-04-01 NOTE — DIABETES MGMT
Diabetes/ Glycemic Control Plan of Care  Recommendations:   Blood glucose above target range this am, 200 mg/dl. Improved from yesterday with the addition of Lantus. Patient required 30 units of corrective insulin coverage yesterday  Recommend increasing Lantus dose to 10 units daily  Continue corrective insulin coverage as needed. Will continue inpatient monitoring. Fasting/ Morning blood glucose:   Lab Results   Component Value Date/Time    Glucose 201 (H) 04/01/2022 03:30 AM    Glucose (POC) 200 (H) 04/01/2022 06:06 AM     IV Fluids containing dextrose:   None   Steroids:  None      Blood glucose values:       Results for KOWALSKI, IRIS (MRN [id number]) as of 4/1/2022 09:52   Ref. Range 3/31/2022 11:23 3/31/2022 17:35 3/31/2022 23:21 4/1/2022 06:06   GLUCOSE,FAST - POC Latest Ref Range: 70 - 110 mg/dL 241 (H) 267 (H) 238 (H) 200 (H)     Within target range (70-180mg/dL):   No. Will recommend increasing basal insulin. Current insulin orders:   lantus 5 units daily   Lispro corrective insulin coverage every 6 hours. Total Daily Dose previous 24 hours =  35 units    Current A1c:   Lab Results   Component Value Date/Time    Hemoglobin A1c 5.6 03/25/2022 12:00 AM      Equivalent  to average Blood Glucose of  mg/dl for 2-3 months prior to admission  Adequate glycemic control PTA:   Yes   Nutrition/Diet:   DIET NPO  ADULT TUBE FEEDING Nasogastric; Diabetic; Delivery Method: Continuous; Continuous Initial Rate (mL/hr): 10; Continuous Advance Tube Feeding: Yes; Advancement Volume (mL/hr): 10; Advancement Frequency: Q 12 hours; Continuous Goal Rate (mL/hr): 45; . ..     Active Orders   Diet    DIET NPO      Meal Intake:  Patient Vitals for the past 168 hrs:   % Diet Eaten   03/30/22 1800 1 - 25%   03/30/22 1200 1 - 25%   03/27/22 0800 0%   03/26/22 1400 1 - 25%     Supplement Intake:  Patient Vitals for the past 168 hrs:   Supplement intake %   03/30/22 0800 1 - 25%   03/27/22 0800 0%   03/26/22 1400 0%       Home diabetes medications:   Key Antihyperglycemic Medications     Patient is on no antihyperglycemic meds. Plan/Goals:   Blood glucose will be within target of 70 - 180 mg/dl within 72 hours  Reinforce dietary and medication compliance at home.        Education:  [] Refer to Diabetes Education Record                          [x] Education not indicated at this time     Katia Hyde, [facility] CDE  Ext [phone]

## 2022-04-01 NOTE — PROGRESS NOTES
INTERIM UPDATE - 0142 EST on 4/01/2022    Nursing Staff calls to report that Patient continues to pull at lines and interfere with treatment and is not following commands; Patient is not able to be reoriented or otherwise stopped from these actions by verbal prompting. Nursing Staff reports that Patient is only able to use her Left Arm to facilitate this actions. Plan:  LUE Soft Wrist Restraint. Will discontinue Restraints when Restraints impose greater risk of harm than they prevent or when Patient no longer presents a threat to themself or others (to include reliably following commands or being able to be reoriented away from harmful behaviors).

## 2022-04-01 NOTE — PROGRESS NOTES
68 Leana Warren accepted patient in [place], [facility]2 Remy Warren opened the chart for them to review.                Lori Rodriguez RN  Case Management [phone]

## 2022-04-02 NOTE — PROGRESS NOTES
[facility]  (A Division of [address] [place] [address])    Follow-up Note      Date of Admission: 3/24/2022       Date of Note:  4/2/2022          Current Antimicrobials:    Prior Antimicrobials:  Ceftriaxone 3/28- present Vancomycin 3/24 to 3/28  Meropenem 3/25- 3/28     Assessment:         Septic shock on multiple pressors with lactic acidosis: Suspect related to  source given abnormal UA vs liver abscess? Vs pleural process  Urinary tract infection: UA with greater than 100 WBCs few bacteria large leukocyte esterase negative nitrite; recent cx + for Klebsiella on 3/15  Aspiration pneumonia with hypoxia  Leukocytosis: improving  Loculated left pleural effusion with calcification and pleural thickening as well as non-specific diffuse opacities -  Noted on CT chest from 3/24. ? Chronic   GUIDO: Creatinine 2.38 upon presentation now improving  Metabolic encephalopathy with baseline dementia/prior CVA/limited verbal interaction  Suspected Right liver abscess vs mass (new since 2017)-3.1 x 5.8 x 4.2 cm, infection vs malignancy. Noted on 3/14 CT scn but not mentioedn in 3/24 CT report. ?? source  Thrombocytopenia-  worsening    Plan:   Completed Ceftriaxone on 4/1           F/u blood cx - remains negative from 3/24  CBC, BMP in am   MRI liver with MRCP reviewed - biopsy at a later date. Heme/Onc following    Discussed with Dr. Matheus Anand. Ellen Norman DO  [facility]  [address] [place] [address], [address] [place], [place] [address]piy[facility]barbara[facility] [address]  Office: [phone]  Mobile/Text: [phone]     Microbiology:  3/24 blood culture: No growth to date x2  3/25 RSV PCR negative  3/25 MRSA nasal swab negative  3/25 respiratory viral panel: Negative  3/25 C.diff: negative    Lines / Catheters:  A-line left wrist  Peripheral  Right IJ TLC  dove    Subjective:   Patent seen and examined. No fevers. Looks about the same today.   Still not verbalizing but does yell out to try to get attention or to display displeasure or discomfort. WBC improved. Tmax 99.3  No family at bedside during my visit today    Objective:        Visit Vitals  /70   Pulse 76   Temp 98.6 °F (37 °C)   Resp 21   Ht 4' 11\" (1.499 m)   Wt 63.5 kg (140 lb)   SpO2 97%   BMI 28.28 kg/m²     Temp (24hrs), Av.9 °F (37.2 °C), Min:98.6 °F (37 °C), Max:99.3 °F (37.4 °C)         General:   Awake and alert to the people around her mother she remains nonverbal   Skin:   no rashes or skin lesions noted on limited exam, cool to touch   HEENT:  Normocephalic, atraumatic, PERRL, EOMI, no scleral icterus or pallor; no conjunctival hemmohage;  nasal and oral mucous are moist and without evidence of lesions. No thrush. Neck supple, no bruits. NG in place   Lymph Nodes:   no cervical, axillary or inguinal adenopathy   Lungs:   non-labored, bilaterally clear to aspiration- no crackles wheezes rales or rhonchi   Heart:  RRR, s1 and s2; no murmurs rubs or gallops, no edema, + pedal pulses   Abdomen:  soft, non-distended, active bowel sounds, no hepatomegaly, no splenomegaly. yells out with palpation to the lower abdomen. No mid-epigastric tenderness   Genitourinary: Fallon   Extremities:   no clubbing, cyanosis; no joint effusions or swelling; Full ROM of all large joints to the upper and lower extremities; muscle mass appropriate for age- left wrist restrained   Neurologic:  No gross focal sensory abnormalities;  awake, tracks, vocalizes discomfort and frustration however is otherwise nonverbal, response to pain nonverbal, right upper extremity appears to be contracted, focal weakness on the right when compared to the left as left side has noted independent movement.    Psychiatric:   Calm        Lab results:    Chemistry  Recent Labs     22  0330 22  0302   * 183*    145   K 3.7 3.6    110   CO2 32 29   BUN 29* 34*   CREA 0.77 0.94   CA 8.2* 8.5   AGAP 6 6   BUCR 38* 36*       CBC w/ Diff  Recent Labs 04/01/22  0330 03/31/22  0302   WBC 9.1 10.1   RBC 3.59* 3.60*   HGB 10.2* 10.4*   HCT 31.9* 32.3*   PLT 24* 20*   GRANS 72 71   LYMPH 13* 14*   EOS 3 2       Microbiology  All Micro Results     Procedure Component Value Units Date/Time    CULTURE, BLOOD [[id number]] Collected: 03/24/22 1945    Order Status: Completed Specimen: Blood Updated: 03/30/22 0639     Special Requests: NO SPECIAL REQUESTS        Culture result: NO GROWTH 6 DAYS       CULTURE, BLOOD [[id number]] Collected: 03/24/22 1952    Order Status: Completed Specimen: Blood Updated: 03/30/22 0639     Special Requests: NO SPECIAL REQUESTS        Culture result: NO GROWTH 6 DAYS       CULTURE, MRSA [[id number]] Collected: 03/25/22 0134    Order Status: Completed Specimen: Nasal from Nares Updated: 03/26/22 1151     Special Requests: NO SPECIAL REQUESTS        Culture result: MRSA NOT PRESENT               Screening of patient nares for MRSA is for surveillance purposes and, if positive, to facilitate isolation considerations in high risk settings. It is not intended for automatic decolonization interventions per se as regimens are not sufficiently effective to warrant routine use.           C. DIFFICILE AG & TOXIN A/B [[id number]] Collected: 03/25/22 1517    Order Status: Completed Specimen: Stool Updated: 03/26/22 1007     GDH ANTIGEN Negative        C. difficile toxin Negative        INTERPRETATION       NEGATIVE FOR TOXIGENIC C. DIFFICILE          RESPIRATORY VIRUS PANEL W/COVID-19, PCR [[id number]] Collected: 03/25/22 0030    Order Status: Completed Specimen: Nasopharyngeal Updated: 03/25/22 0205     Adenovirus Not detected        Coronavirus 229E Not detected        Coronavirus HKU1 Not detected        Coronavirus CVNL63 Not detected        Coronavirus OC43 Not detected        SARS-CoV-2, PCR Not detected        Metapneumovirus Not detected        Rhinovirus and Enterovirus Not detected        Influenza A Not detected        Influenza B Not detected Parainfluenza 1 Not detected        Parainfluenza 2 Not detected        Parainfluenza 3 Not detected        Parainfluenza virus 4 Not detected        RSV by PCR Not detected        B. parapertussis, PCR Not detected        Bordetella pertussis - PCR Not detected        Chlamydophila pneumoniae DNA, QL, PCR Not detected        Mycoplasma pneumoniae DNA, QL, PCR Not detected       CULTURE, RESPIRATORY/SPUTUM/BRONCH Prateek Falcon [[id number]] Collected: 03/24/22 2330    Order Status: Canceled Specimen: Sputum            Soledad Martinez DO   4/2/2022

## 2022-04-03 NOTE — PROGRESS NOTES
[facility]  (A Division of [address] [place] [address])    Follow-up Note      Date of Admission: 3/24/2022       Date of Note:  4/3/2022          Current Antimicrobials:    Prior Antimicrobials:  Ceftriaxone 3/28- present Vancomycin 3/24 to 3/28  Meropenem 3/25- 3/28     Assessment:         Septic shock on multiple pressors with lactic acidosis: Suspect related to  source given abnormal UA vs liver abscess? Vs pleural process  Urinary tract infection: UA with greater than 100 WBCs few bacteria large leukocyte esterase negative nitrite; recent cx + for Klebsiella on 3/15  Aspiration pneumonia with hypoxia  Leukocytosis: improving  Loculated left pleural effusion with calcification and pleural thickening as well as non-specific diffuse opacities -  Noted on CT chest from 3/24. ? Chronic   GUIDO: Creatinine 2.38 upon presentation now improving  Metabolic encephalopathy with baseline dementia/prior CVA/limited verbal interaction  Suspected Right liver abscess vs mass (new since 2017)-3.1 x 5.8 x 4.2 cm, infection vs malignancy. Noted on 3/14 CT scn but not mentioedn in 3/24 CT report. ?? source  Thrombocytopenia-starting to recover    Plan:   Completed Ceftriaxone on 4/1  CBC, BMP in am   MRI liver with MRCP reviewed - biopsy at a later date. Heme/Onc following  Low threshold to restart antibiotics if there is ongoing concern for aspiration however as the patient is afebrile, her 4/2 chest x-ray did not have any new significant aspiration, and her platelets are now just starting to recover we will prefer to hold off on any additional antibiotics at this time may cloud her overall picture. Discussed with Dr. Nivia Banuelos.       Rosario Barnard DO  [facility]  [address], [address] [facility] [address]  Office: [phone]  Mobile/Text: [phone]     Microbiology:  3/24 blood culture: No growth to date x2  3/25 RSV PCR negative  3/25 MRSA nasal swab negative  3/25 respiratory viral panel: Negative  3/25 C.diff: negative    Lines / Catheters:  A-line left wrist  Peripheral  Right IJ TLC  dove    Subjective:   Patent seen and examined. Overnight events reviewed. Patient had an RRT called for hypotension and tachycardia. Low-grade temps to 99.5 without any fever spikes. There is no additional hypotensive episodes since early a.m. She does remain on 5 L of nasal cannula which is a change compared to her baseline. Of note  chest x-ray did not show any new aspiration events   No family at bedside during my visit today    Objective:        Visit Vitals  /80   Pulse 80   Temp 99.5 °F (37.5 °C)   Resp 16   Ht 4' 11\" (1.499 m)   Wt 63.5 kg (140 lb)   SpO2 100%   BMI 28.28 kg/m²     Temp (24hrs), Av.8 °F (37.1 °C), Min:97.9 °F (36.6 °C), Max:99.5 °F (37.5 °C)         General:   Awake and alert to the people around her mother she remains nonverbal   Skin:   no rashes or skin lesions noted on limited exam, cool to touch   HEENT:  Normocephalic, atraumatic, PERRL, EOMI, no scleral icterus or pallor; no conjunctival hemmohage;  nasal and oral mucous are moist and without evidence of lesions. No thrush. Neck supple, no bruits. NG in place   Lymph Nodes:   no cervical, axillary or inguinal adenopathy   Lungs:   non-labored, bilaterally clear to aspiration- no crackles wheezes rales or rhonchi   Heart:  RRR, s1 and s2; no murmurs rubs or gallops, no edema, + pedal pulses   Abdomen:  soft, non-distended, active bowel sounds, no hepatomegaly, no splenomegaly. yells out with palpation to the lower abdomen. No mid-epigastric tenderness   Genitourinary: Dove   Extremities:   no clubbing, cyanosis; no joint effusions or swelling;  Full ROM of all large joints to the upper and lower extremities; muscle mass appropriate for age- left wrist restrained   Neurologic:  No gross focal sensory abnormalities;  awake, tracks, vocalizes discomfort and frustration however is otherwise nonverbal, response to pain nonverbal, right upper extremity appears to be contracted, focal weakness on the right when compared to the left as left side has noted independent movement. Psychiatric:   Calm        Lab results:    Chemistry  Recent Labs     04/03/22 0200 04/02/22 1415 04/01/22  0330   * 358* 201*    140 144   K 3.9 3.5 3.7    97* 106   CO2 37* 39* 32   BUN 29* 29* 29*   CREA 0.84 0.91 0.77   CA 7.9* 8.4* 8.2*   AGAP 3 4 6   BUCR 35* 32* 38*       CBC w/ Diff  Recent Labs     04/03/22 0200 04/02/22 1415 04/01/22  0330   WBC 11.6 8.4 9.1   RBC 3.56* 3.35* 3.59*   HGB 10.2* 9.6* 10.2*   HCT 32.2* 29.8* 31.9*   PLT 59* 46* 24*   GRANS 74* 77* 72   LYMPH 12* 9* 13*   EOS 3 2 3       Microbiology  All Micro Results     Procedure Component Value Units Date/Time    CULTURE, BLOOD [[id number]] Collected: 03/24/22 1945    Order Status: Completed Specimen: Blood Updated: 03/30/22 0639     Special Requests: NO SPECIAL REQUESTS        Culture result: NO GROWTH 6 DAYS       CULTURE, BLOOD [[id number]] Collected: 03/24/22 1952    Order Status: Completed Specimen: Blood Updated: 03/30/22 0639     Special Requests: NO SPECIAL REQUESTS        Culture result: NO GROWTH 6 DAYS       CULTURE, MRSA [[id number]] Collected: 03/25/22 0134    Order Status: Completed Specimen: Nasal from Nares Updated: 03/26/22 1151     Special Requests: NO SPECIAL REQUESTS        Culture result: MRSA NOT PRESENT               Screening of patient nares for MRSA is for surveillance purposes and, if positive, to facilitate isolation considerations in high risk settings. It is not intended for automatic decolonization interventions per se as regimens are not sufficiently effective to warrant routine use.           C. DIFFICILE AG & TOXIN A/B [[id number]] Collected: 03/25/22 1517    Order Status: Completed Specimen: Stool Updated: 03/26/22 1007     GDH ANTIGEN Negative        C. difficile toxin Negative INTERPRETATION       NEGATIVE FOR TOXIGENIC C. DIFFICILE          RESPIRATORY VIRUS PANEL W/COVID-19, PCR [[id number]] Collected: 03/25/22 0030    Order Status: Completed Specimen: Nasopharyngeal Updated: 03/25/22 0205     Adenovirus Not detected        Coronavirus 229E Not detected        Coronavirus HKU1 Not detected        Coronavirus CVNL63 Not detected        Coronavirus OC43 Not detected        SARS-CoV-2, PCR Not detected        Metapneumovirus Not detected        Rhinovirus and Enterovirus Not detected        Influenza A Not detected        Influenza B Not detected        Parainfluenza 1 Not detected        Parainfluenza 2 Not detected        Parainfluenza 3 Not detected        Parainfluenza virus 4 Not detected        RSV by PCR Not detected        B. parapertussis, PCR Not detected        Bordetella pertussis - PCR Not detected        Chlamydophila pneumoniae DNA, QL, PCR Not detected        Mycoplasma pneumoniae DNA, QL, PCR Not detected       CULTURE, RESPIRATORY/SPUTUM/BRONCH Bonnye Mins [[id number]] Collected: 03/24/22 2330    Order Status: Canceled Specimen: Sputum            Monalisa Lawrence DO   4/3/2022

## 2022-04-03 NOTE — PROGRESS NOTES
Bedside and Verbal shift change report given to [facility] (oncoming nurse) by Junie Anthony (offgoing nurse). Report included the following information SBAR, Kardex and MAR.

## 2022-04-03 NOTE — PROGRESS NOTES
Hospitalist Progress Note    Subjective:   Daily Progress Note: 4/2/2022     No changes overnight. Patient seems more alert today. Restraints required renewal as patient continues to reach for lines when not restrained. DTI on heel discovered. Heel floats in place. Ceftriaxone completed today. Patient at high risk of aspiration - will need to be watchful. Multiple family members in room. All questions answered. Blood pressures improved today.       Current Facility-Administered Medications   Medication Dose Route Frequency    [Held by provider] carvediloL (COREG) tablet 12.5 mg  12.5 mg Oral Q12H    insulin glargine (LANTUS) injection 10 Units  10 Units SubCUTAneous DAILY    multivitamin-minerals-ferrous gluconate oral liquid 15 mL  15 mL Per NG tube DAILY    [Held by provider] lisinopriL (PRINIVIL, ZESTRIL) tablet 10 mg  10 mg Oral DAILY    nitroglycerin (NITROBID) 2 % ointment 1 Inch  1 Inch Topical Q6H PRN    hydrALAZINE (APRESOLINE) 20 mg/mL injection 20 mg  20 mg IntraVENous Q6H PRN    QUEtiapine (SEROquel) tablet 50 mg  50 mg Oral QHS    melatonin tablet 10 mg  10 mg Oral QHS    [Held by provider] hydrALAZINE (APRESOLINE) tablet 50 mg  50 mg Oral TID    [Held by provider] oxyCODONE (ROXICODONE) 5 mg/5 mL oral solution 7.5 mg  7.5 mg Oral Q4H PRN    furosemide (LASIX) injection 40 mg  40 mg IntraVENous DAILY    thiamine HCL (B-1) tablet 200 mg  200 mg Oral TID    nystatin (MYCOSTATIN) 100,000 unit/gram powder   Topical BID    dextrose 10% infusion 0-250 mL  0-250 mL IntraVENous PRN    albuterol-ipratropium (DUO-NEB) 2.5 MG-0.5 MG/3 ML  3 mL Nebulization Q4H PRN    [Held by provider] heparin (porcine) injection 5,000 Units  5,000 Units SubCUTAneous Q8H    insulin lispro (HUMALOG) injection   SubCUTAneous Q6H    glucose chewable tablet 16 g  4 Tablet Oral PRN    glucagon (GLUCAGEN) injection 1 mg  1 mg IntraMUSCular PRN    acetaminophen (TYLENOL) tablet 650 mg  650 mg Oral Q6H PRN Or    acetaminophen (TYLENOL) suppository 650 mg  650 mg Rectal Q6H PRN    polyethylene glycol (MIRALAX) packet 17 g  17 g Oral DAILY PRN    ondansetron (ZOFRAN ODT) tablet 4 mg  4 mg Oral Q8H PRN    Or    ondansetron (ZOFRAN) injection 4 mg  4 mg IntraVENous Q6H PRN        Review of Systems  Review of systems not obtained due to patient factors. Objective:     Visit Vitals  BP (!) 71/53   Pulse (!) 123   Temp 99 °F (37.2 °C)   Resp 17   Ht 4' 11\" (1.499 m)   Wt 63.5 kg (140 lb)   SpO2 99%   BMI 28.28 kg/m²    O2 Flow Rate (L/min): 2.5 l/min O2 Device: Nasal cannula    Temp (24hrs), Av.7 °F (37.1 °C), Min:98.5 °F (36.9 °C), Max:99 °F (37.2 °C)      No intake/output data recorded.  0701 -  1900  In: 2640   Out: 3300 [Urine:3300]      General appearance -awake, ill-appearing, NG tube in situ, not in acute distress. Eyes - sclera anicteric, no pallor  Nose - no obvious nasal discharge. NG tube is in situ. Neck - supple, no JVD, trachea is midline  Chest -diminished air entry noted in bases, poor inspiratory efforts, no wheezes  Heart - S1 and S2 normal  Abdomen - soft, nontender, nondistended, Bowel sounds present  Neurological -awake, moves left upper extremity well and is under restraint without any wrist injury, not moving right side. Musculoskeletal -right knee swelling and mild bruising present. Extremities -  pedal edema noted. DTI present on right heel.      Data Review    Recent Results (from the past 24 hour(s))   GLUCOSE, POC    Collection Time: 22 11:34 PM   Result Value Ref Range    Glucose (POC) 107 70 - 110 mg/dL   GLUCOSE, POC    Collection Time: 22  5:15 AM   Result Value Ref Range    Glucose (POC) 175 (H) 70 - 110 mg/dL   GLUCOSE, POC    Collection Time: 22 12:02 PM   Result Value Ref Range    Glucose (POC) 314 (H) 70 - 110 mg/dL   CBC WITH AUTOMATED DIFF    Collection Time: 22  2:15 PM   Result Value Ref Range    WBC 8.4 4.6 - 13.2 K/uL    RBC 3.35 (L) 4.20 - 5.30 M/uL    HGB 9.6 (L) 12.0 - 16.0 g/dL    HCT 29.8 (L) 35.0 - 45.0 %    MCV 89.0 78.0 - 100.0 FL    MCH 28.7 24.0 - 34.0 PG    MCHC 32.2 31.0 - 37.0 g/dL    RDW 14.0 11.6 - 14.5 %    PLATELET 46 (L) 604 - 420 K/uL    MPV 14.5 (H) 9.2 - 11.8 FL    NRBC 0.0 0  WBC    ABSOLUTE NRBC 0.00 0.00 - 0.01 K/uL    NEUTROPHILS 77 (H) 40 - 73 %    LYMPHOCYTES 9 (L) 21 - 52 %    MONOCYTES 11 (H) 3 - 10 %    EOSINOPHILS 2 0 - 5 %    BASOPHILS 0 0 - 2 %    IMMATURE GRANULOCYTES 1 (H) 0.0 - 0.5 %    ABS. NEUTROPHILS 6.5 1.8 - 8.0 K/UL    ABS. LYMPHOCYTES 0.8 (L) 0.9 - 3.6 K/UL    ABS. MONOCYTES 0.9 0.05 - 1.2 K/UL    ABS. EOSINOPHILS 0.1 0.0 - 0.4 K/UL    ABS. BASOPHILS 0.0 0.0 - 0.1 K/UL    ABS. IMM. GRANS. 0.1 (H) 0.00 - 0.04 K/UL    DF AUTOMATED     PHOSPHORUS    Collection Time: 04/02/22  2:15 PM   Result Value Ref Range    Phosphorus 3.0 2.5 - 4.9 MG/DL   MAGNESIUM    Collection Time: 04/02/22  2:15 PM   Result Value Ref Range    Magnesium 1.8 1.6 - 2.6 mg/dL   METABOLIC PANEL, BASIC    Collection Time: 04/02/22  2:15 PM   Result Value Ref Range    Sodium 140 136 - 145 mmol/L    Potassium 3.5 3.5 - 5.5 mmol/L    Chloride 97 (L) 100 - 111 mmol/L    CO2 39 (H) 21 - 32 mmol/L    Anion gap 4 3.0 - 18 mmol/L    Glucose 358 (H) 74 - 99 mg/dL    BUN 29 (H) 7.0 - 18 MG/DL    Creatinine 0.91 0.6 - 1.3 MG/DL    BUN/Creatinine ratio 32 (H) 12 - 20      GFR est AA >60 >60 ml/min/1.73m2    GFR est non-AA >60 >60 ml/min/1.73m2    Calcium 8.4 (L) 8.5 - 10.1 MG/DL   CALCIUM, IONIZED    Collection Time: 04/02/22  3:10 PM   Result Value Ref Range    Ionized Calcium 1.05 (L) 1.15 - 1.33 MMOL/L   GLUCOSE, POC    Collection Time: 04/02/22  5:17 PM   Result Value Ref Range    Glucose (POC) 254 (H) 70 - 110 mg/dL         Assessment/Plan:     Active Problems:    Septic shock ([address] (3/24/2022)      ASSESSMENT:    1. Septic shock, resolved currently. Off IV pressors  2. UTI s/p treatment  3. Aspiration pneumonia with hypoxia  4. Chronic loculated left pleural effusion  5. Acute on chronic encephalopathy, slowly improving  6. Chronic encephalopathy and right-sided hemiparesis due to chronic L MCA infarct as well as right frontal lobe encephalomalacia. 7.  Thrombocytopenia of unknown etiology  8. Leukocytosis, resolved  9. Acute renal failure, improving  10.  6 x 3 cm right hepatic mass  11. Seizure disorder  12. Peripheral arterial disease  13. Hypertension    PLAN:    Patient has been off IV pressor. Started on p.o. antihypertensive medications   Experiencing soft BPs 3/31 - Will hold hydralazine, decrease lisinopril from 20 mg daily to 10 mg daily, decreased coreg from 25 mg BID to 12.5 mg yesterday. 4/1 - Patient's current regimen of coreg 12.5 mg BID, lasix 40 mg every day, lisinopril is keeping her normotensive, less lows than yesterday. 4/2 - experiencing tachycardia and soft BPs again. DuoNebs; continue to wean her off oxygen. Echocardiogram report reviewed. Patient is currently being treated with IV Lasix  CT head report reviewed. ID to follow and continue ceftriaxone  Hematology/oncology has been consulted for thrombocytopenia. Continue monitoring platelets; continuing to trend upwards   Patient currently has NG tube. She may need a PEG tube but her platelets need to get better. Daughter [id number]; [id number]. I have evaluated the patient after initiation of intervention. The patient is comfortable, uninjured, but continues to pose an imminent risk of injury to self and or serious disruption of treatment. The patient's current medical and behavioral conditions that warrant the use intervention include danger to self and Interference with medical treatment. Restraint or seclusion will be discontinued at the earliest possible time, regardless of the scheduled expiration of the order. Total time to take care of this patient was 35 minutes and more than 50% of time was spent counseling and coordinating care. Disclaimer: Sections of this note are dictated using utilizing voice recognition software, which may have resulted in some phonetic based errors in grammar and contents. Even though attempts were made to correct all the mistakes, some may have been missed, and remained in the body of the document. If questions arise, please contact our department.

## 2022-04-03 NOTE — PROGRESS NOTES
Hospitalist Progress Note    Subjective:   Daily Progress Note: 4/3/2022     Patient with rapid response overnight for MAP of 59. Patient was normotensive most of the day yesterday. Hypotension seems to be coincided with 7.5 mg Roxicodone given. Patient's blood pressure was fluid responsive. Now normotensive with all anti-hypertensives held. Patient seems more alert today. Family members in room, she is making eye contact and appears more interactive. Restraints required renewal as patient continues to reach for lines when not restrained. DTI on heel discovered. Heel floats continued. Ceftriaxone completed. Patient at high risk of aspiration - will need to be watchful. Multiple family members in room. All questions answered.       Current Facility-Administered Medications   Medication Dose Route Frequency    [Held by provider] carvediloL (COREG) tablet 12.5 mg  12.5 mg Oral Q12H    lactated Ringers infusion  50 mL/hr IntraVENous CONTINUOUS    insulin glargine (LANTUS) injection 10 Units  10 Units SubCUTAneous DAILY    multivitamin-minerals-ferrous gluconate oral liquid 15 mL  15 mL Per NG tube DAILY    [Held by provider] lisinopriL (PRINIVIL, ZESTRIL) tablet 10 mg  10 mg Oral DAILY    nitroglycerin (NITROBID) 2 % ointment 1 Inch  1 Inch Topical Q6H PRN    hydrALAZINE (APRESOLINE) 20 mg/mL injection 20 mg  20 mg IntraVENous Q6H PRN    QUEtiapine (SEROquel) tablet 50 mg  50 mg Oral QHS    melatonin tablet 10 mg  10 mg Oral QHS    [Held by provider] hydrALAZINE (APRESOLINE) tablet 50 mg  50 mg Oral TID    [Held by provider] oxyCODONE (ROXICODONE) 5 mg/5 mL oral solution 7.5 mg  7.5 mg Oral Q4H PRN    [Held by provider] furosemide (LASIX) injection 40 mg  40 mg IntraVENous DAILY    thiamine HCL (B-1) tablet 200 mg  200 mg Oral TID    nystatin (MYCOSTATIN) 100,000 unit/gram powder   Topical BID    dextrose 10% infusion 0-250 mL  0-250 mL IntraVENous PRN    albuterol-ipratropium (DUO-NEB) 2.5 MG-0.5 MG/3 ML  3 mL Nebulization Q4H PRN    [Held by provider] heparin (porcine) injection 5,000 Units  5,000 Units SubCUTAneous Q8H    insulin lispro (HUMALOG) injection   SubCUTAneous Q6H    glucose chewable tablet 16 g  4 Tablet Oral PRN    glucagon (GLUCAGEN) injection 1 mg  1 mg IntraMUSCular PRN    acetaminophen (TYLENOL) tablet 650 mg  650 mg Oral Q6H PRN    Or    acetaminophen (TYLENOL) suppository 650 mg  650 mg Rectal Q6H PRN    polyethylene glycol (MIRALAX) packet 17 g  17 g Oral DAILY PRN    ondansetron (ZOFRAN ODT) tablet 4 mg  4 mg Oral Q8H PRN    Or    ondansetron (ZOFRAN) injection 4 mg  4 mg IntraVENous Q6H PRN        Review of Systems  Review of systems not obtained due to patient factors. Objective:     Visit Vitals  /70   Pulse 82   Temp 99 °F (37.2 °C)   Resp 17   Ht 4' 11\" (1.499 m)   Wt 63.5 kg (140 lb)   SpO2 95%   BMI 28.28 kg/m²    O2 Flow Rate (L/min): (S) 4 l/min (titrate by sats) O2 Device: Nasal cannula    Temp (24hrs), Av.8 °F (37.1 °C), Min:97.9 °F (36.6 °C), Max:99.5 °F (37.5 °C)       07 -  1900  In: 945   Out: 800 [Urine:800]  1901 -  0700  In: 2852.5 [I.V.:707.5]  Out: 2500 [Urine:2500]      General appearance -awake, ill-appearing, NG tube in situ, not in acute distress. Eyes - sclera anicteric, no pallor  Nose - no obvious nasal discharge. NG tube is in situ. Neck - supple, no JVD, trachea is midline  Chest -diminished air entry noted in bases, poor inspiratory efforts, no wheezes  Heart - S1 and S2 normal  Abdomen - soft, nontender, nondistended, Bowel sounds present  Neurological -awake, moves left upper extremity well and is under restraint without any wrist injury, not moving right side. Musculoskeletal -right knee swelling and mild bruising present. Extremities -  pedal edema noted. DTI present on right heel. Heel floats in place.      Data Review    Recent Results (from the past 24 hour(s))   EKG, 12 LEAD, INITIAL Collection Time: 04/02/22  8:15 PM   Result Value Ref Range    Ventricular Rate 81 BPM    Atrial Rate 81 BPM    P-R Interval 164 ms    QRS Duration 78 ms    Q-T Interval 422 ms    QTC Calculation (Bezet) 490 ms    Calculated P Axis 66 degrees    Calculated R Axis -12 degrees    Calculated T Axis 166 degrees    Diagnosis       Normal sinus rhythm  Left ventricular hypertrophy with repolarization abnormality ( R in aVL )  Abnormal ECG  When compared with ECG of 29-MAR-2022 01:10,  Nonspecific T wave abnormality, worse in Inferior leads  Inverted T waves have replaced nonspecific T wave abnormality in Lateral   leads  QT has lengthened     GLUCOSE, POC    Collection Time: 04/02/22  8:30 PM   Result Value Ref Range    Glucose (POC) 145 (H) 70 - 110 mg/dL   TROPONIN-HIGH SENSITIVITY    Collection Time: 04/02/22  8:32 PM   Result Value Ref Range    Troponin-High Sensitivity 81 (HH) 0 - 54 ng/L   PROCALCITONIN    Collection Time: 04/02/22  8:32 PM   Result Value Ref Range    Procalcitonin 0.30 ng/mL   CBC WITH AUTOMATED DIFF    Collection Time: 04/03/22  2:00 AM   Result Value Ref Range    WBC 11.6 4.6 - 13.2 K/uL    RBC 3.56 (L) 4.20 - 5.30 M/uL    HGB 10.2 (L) 12.0 - 16.0 g/dL    HCT 32.2 (L) 35.0 - 45.0 %    MCV 90.4 78.0 - 100.0 FL    MCH 28.7 24.0 - 34.0 PG    MCHC 31.7 31.0 - 37.0 g/dL    RDW 14.2 11.6 - 14.5 %    PLATELET 59 (L) 447 - 420 K/uL    NRBC 0.0 0  WBC    ABSOLUTE NRBC 0.00 0.00 - 0.01 K/uL    NEUTROPHILS 74 (H) 40 - 73 %    LYMPHOCYTES 12 (L) 21 - 52 %    MONOCYTES 10 3 - 10 %    EOSINOPHILS 3 0 - 5 %    BASOPHILS 0 0 - 2 %    IMMATURE GRANULOCYTES 1 (H) 0.0 - 0.5 %    ABS. NEUTROPHILS 8.5 (H) 1.8 - 8.0 K/UL    ABS. LYMPHOCYTES 1.4 0.9 - 3.6 K/UL    ABS. MONOCYTES 1.2 0.05 - 1.2 K/UL    ABS. EOSINOPHILS 0.3 0.0 - 0.4 K/UL    ABS. BASOPHILS 0.0 0.0 - 0.1 K/UL    ABS. IMM.  GRANS. 0.1 (H) 0.00 - 0.04 K/UL    DF AUTOMATED     PHOSPHORUS    Collection Time: 04/03/22  2:00 AM   Result Value Ref Range Phosphorus 2.9 2.5 - 4.9 MG/DL   MAGNESIUM    Collection Time: 04/03/22  2:00 AM   Result Value Ref Range    Magnesium 1.6 1.6 - 2.6 mg/dL   CALCIUM, IONIZED    Collection Time: 04/03/22  2:00 AM   Result Value Ref Range    Ionized Calcium 0.98 (L) 1.15 - 6.51 MMOL/L   METABOLIC PANEL, BASIC    Collection Time: 04/03/22  2:00 AM   Result Value Ref Range    Sodium 141 136 - 145 mmol/L    Potassium 3.9 3.5 - 5.5 mmol/L    Chloride 101 100 - 111 mmol/L    CO2 37 (H) 21 - 32 mmol/L    Anion gap 3 3.0 - 18 mmol/L    Glucose 201 (H) 74 - 99 mg/dL    BUN 29 (H) 7.0 - 18 MG/DL    Creatinine 0.84 0.6 - 1.3 MG/DL    BUN/Creatinine ratio 35 (H) 12 - 20      GFR est AA >60 >60 ml/min/1.73m2    GFR est non-AA >60 >60 ml/min/1.73m2    Calcium 7.9 (L) 8.5 - 10.1 MG/DL   LACTIC ACID    Collection Time: 04/03/22  2:00 AM   Result Value Ref Range    Lactic acid 1.3 0.4 - 2.0 MMOL/L   GLUCOSE, POC    Collection Time: 04/03/22  6:19 AM   Result Value Ref Range    Glucose (POC) 274 (H) 70 - 110 mg/dL   GLUCOSE, POC    Collection Time: 04/03/22 10:24 AM   Result Value Ref Range    Glucose (POC) 263 (H) 70 - 110 mg/dL   GLUCOSE, POC    Collection Time: 04/03/22  4:05 PM   Result Value Ref Range    Glucose (POC) 143 (H) 70 - 110 mg/dL         Assessment/Plan:     Active Problems:    Septic shock ([address] (3/24/2022)      ASSESSMENT:    1. Septic shock, resolved currently. Off IV pressors  2. UTI s/p treatment  3. Aspiration pneumonia with hypoxia  4. Chronic loculated left pleural effusion  5. Acute on chronic encephalopathy, slowly improving  6. Chronic encephalopathy and right-sided hemiparesis due to chronic L MCA infarct as well as right frontal lobe encephalomalacia. 7.  Thrombocytopenia of unknown etiology  8. Leukocytosis, resolved  9. Acute renal failure, improving  10.  6 x 3 cm right hepatic mass  11. Seizure disorder  12. Peripheral arterial disease  13. Hypertension    PLAN:    Patient has been off IV pressor. Started on p.o. antihypertensive medications   Experiencing soft BPs 3/31 - Will hold hydralazine, decrease lisinopril from 20 mg daily to 10 mg daily, decreased coreg from 25 mg BID to 12.5 mg yesterday. 4/1 - Patient's current regimen of coreg 12.5 mg BID, lasix 40 mg every day, lisinopril is keeping her normotensive, less lows than yesterday. 4/2 - experiencing tachycardia and soft BPs again after receiving Roxicodone. All antihypertensives held. 4/3 - antihypertensives held, normotensive. DuoNebs; continue to wean her off oxygen. Echocardiogram report reviewed. Patient is currently being treated with IV Lasix  CT head report reviewed. ID to follow. Ceftriaxone discontinued 4/2/22. Continue to be watchful of aspiration. Hematology/oncology has been consulted for thrombocytopenia. Platelets improving. Continue monitoring platelets; continuing to trend upwards   Patient currently has NG tube. She will need a PEG tube but her platelets need to get better. Daughter [phone]; [id number]. I have evaluated the patient after initiation of intervention. The patient is comfortable, uninjured, but continues to pose an imminent risk of injury to self and or serious disruption of treatment. The patient's current medical and behavioral conditions that warrant the use intervention include danger to self and Interference with medical treatment. Restraint or seclusion will be discontinued at the earliest possible time, regardless of the scheduled expiration of the order. Total time to take care of this patient was 35 minutes and more than 50% of time was spent counseling and coordinating care. Disclaimer: Sections of this note are dictated using utilizing voice recognition software, which may have resulted in some phonetic based errors in grammar and contents. Even though attempts were made to correct all the mistakes, some may have been missed, and remained in the body of the document.  If questions arise, please contact our department.

## 2022-04-03 NOTE — ROUTINE PROCESS
Moaning, unable to assess if it pain or anxiety, pt was given tylenol one hour ago, Pashto music has been playing in the room all shift.

## 2022-04-03 NOTE — PROGRESS NOTES
Rapid Response Note  [facility]    Patient: Juve Hernandez 74 y.o. female  [id number]  1955      Admit Date: 3/24/2022   Admission Diagnosis: Septic shock ([address] [A41.9, R65.21]    RAPID RESPONSE     Rapid response called for Hypotension with MAP to 59. Pt was initially admitted for septic shock now improved s/p abx. Nurse states that pt started to become hypotensive around 1700 with tachycardia to the 130s. Another nurse on the floor noted tachycardia with pt moaning and administered one time PRN dose 7.5mg roxycodone for pain. Pt has poor mentation and is Romansh speaking at baseline. At 1900 nurse noted resolution of tachycardia but persistent hypotension to MAP 59-61. On arrival pt was at baseline mentation. Hx of HFrEF however most recent echo demonstrated normal LV EF 13-84% with RV systolic dysfunction and pulmonary HTN. Pt had been received lasix 40mg every day and several antihypertensives for her HTN/CHF. Administered 1 L NS with improvement of MAPs to 67-71. HR 80s. VS remained stable. BG was 145. Temp was 98.9. Ordered EKG to r/o new MI with acute cardiogenic shock. EKG was NSR with new t wave inversions in lead II. No STEMI. Trops were collected. CXR appeared improved compared to prior on 3/24/22 though pending rad read. Started 1/2 mIVF LR 50mL/hr in addition to her already scheduled NGT flushes for a total of approximately 2500mL/day fluids. This is likely medication induced hypovolemia 2/2 to persistent lasix administration.  Ordered procal.     Plan:  -Held BP meds and PRN roxycodone for now  -Will need to f/u trops, CXR official read, procal  -started LR 50mL/hr, pt received 1L NS during rapid  -Transitioned care back to primary, defer to them for further management    Medications Reviewed      OBJECTIVE     Patient Vitals for the past 12 hrs:   Temp Pulse Resp BP SpO2   04/02/22 2000  77 15 (!) 76/55 99 %   04/02/22 1930  (!) 123 17  99 %   04/02/22 1917    (!) 71/53  04/02/22 1900  (!) 131 20 (!) 85/63 99 %   04/02/22 1854 99 °F (37.2 °C)       04/02/22 1813  (!) 129 19  95 %   04/02/22 1800  (!) 129 24 (!) 81/51 91 %   04/02/22 1700 98.6 °F (37 °C) 92 26 (!) 126/95    04/02/22 1631     100 %   04/02/22 1600  99 18  92 %   04/02/22 1500  86 18 (!) 116/93 95 %   04/02/22 1400  85 20 (!) 130/104 95 %   04/02/22 1351     98 %   04/02/22 1350 98.5 °F (36.9 °C)       04/02/22 1300  86 18 (!) 118/91 92 %   04/02/22 1200  76 19 115/80 97 %   04/02/22 1143     98 %   04/02/22 1100  76 21 110/70 97 %   04/02/22 1000  84 18 129/80 94 %   04/02/22 0946 98.6 °F (37 °C) 85 20 117/81 93 %   04/02/22 0900  87 19 117/81 96 %       PHYSICAL:  General:  NAD, no communication at baseline  CV:  RRR, no murmurs, rubs, or gallops. No visible pulsations or thrills. RESP:  Unlabored breathing. CTAB. ABD:  Soft, nontender, nondistended. Normoactive bowel sounds. No hepatosplenomegaly. No suprapubic tenderness. Neuro:  Unable to assess 2/2 to pt baseline poor mentation, hx CVAs    EKG: NSR    Labs: BG, procal, troponins      ASSESSMENT, PLAN & DISPOSITION   Alondra Townsend is a 74y.o. year old female admitted for Septic shock ([facility] [address] [A41.9, R65.21]. Rapid response called for Hypotension MAP < 65. Patient condition currently: stable. Medications Administered:     Labs ordered/Pending:     Disposition: ICU pending transfer to floor with tele    Attending Dr. Zaid Trent notified of rapid response. In agreement with plan. Primary team resuming care.      Senior resident Dr. Kedar Driscoll present during RRT and evaluation    Anton Lund MD, PGY-1   [facility] Medicine   April 2, 2022, 8:52 PM

## 2022-04-03 NOTE — PROGRESS NOTES
Bedside turnover given to [facility]. SBAR,MAR,ED summary given with updates R/T the night, a chance to ask questions was given. PT is on the cardiac tele monitor. Bed is in the lowest position with the wheels locked. Call bell and personal effects  are on the bedside table within reach. Double checked drips with oncoming RN.

## 2022-04-03 NOTE — PROGRESS NOTES
Patients niece visited and given update, she states pt talked at the nursing home, but ever since admission to the hospital she only moans as a way of communicating, she attempted to talk to pt in Turkish but pt just lion does not answer family, but appears to listen

## 2022-04-03 NOTE — PROGRESS NOTES
Bedside turnover given from [employer]. SBAR,MAR,ED summary given with updates R/T the day, a chance to ask questions was given. PT is on the cardiac tele monitor. Bed is in the lowest position with the wheels locked. Call bell and personal effects  are on the bedside table within reach. Patient hypotensive. Hospitalist paged. After an hour RRT was called to stabilize patient.

## 2022-04-04 NOTE — ROUTINE PROCESS
Bedside and Verbal shift change report given to Karin Vasquez RN (oncoming nurse) by [address], RN (offgoing nurse). Report included the following information SBAR, Kardex, Intake/Output, MAR and Cardiac Rhythm NSR.

## 2022-04-04 NOTE — DIABETES MGMT
Diabetes/ Glycemic Control Plan of Care  Recommendations:   Blood glucose above target range this am, 239 mg/dl. Patient required 27 units of corrective insulin coverage yesterday  Recommend increasing Lantus dose to 18 units daily  Continue corrective insulin coverage as needed. Will continue inpatient monitoring. Fasting/ Morning blood glucose:   Lab Results   Component Value Date/Time    Glucose 205 (H) 04/04/2022 04:00 AM    Glucose (POC) 239 (H) 04/04/2022 05:44 AM     IV Fluids containing dextrose:   None   Steroids:  None      Blood glucose values:       Results for KOWALSKI, IRIS (MRN [id number]) as of 4/4/2022 09:36   Ref. Range 4/3/2022 10:24 4/3/2022 16:05 4/3/2022 23:36 4/4/2022 05:44   GLUCOSE,FAST - POC Latest Ref Range: 70 - 110 mg/dL 263 (H) 143 (H) 277 (H) 239 (H)     Within target range (70-180mg/dL):   No. Will recommend increasing basal insulin. Current insulin orders:   lantus 10 units daily   Lispro corrective insulin coverage every 6 hours. Total Daily Dose previous 24 hours =  37 units    Current A1c:   Lab Results   Component Value Date/Time    Hemoglobin A1c 5.6 03/25/2022 12:00 AM      Equivalent  to average Blood Glucose of  mg/dl for 2-3 months prior to admission  Adequate glycemic control PTA:   Yes   Nutrition/Diet:   DIET NPO  ADULT TUBE FEEDING Nasogastric; Diabetic; Delivery Method: Continuous; Continuous Initial Rate (mL/hr): 10; Continuous Advance Tube Feeding: Yes; Advancement Volume (mL/hr): 10; Advancement Frequency: Q 12 hours; Continuous Goal Rate (mL/hr): 45; . ..     Active Orders   Diet    DIET NPO      Meal Intake:  Patient Vitals for the past 168 hrs:   % Diet Eaten   04/04/22 0400 0%   04/04/22 0000 0%   04/03/22 2000 0%   03/30/22 1800 1 - 25%   03/30/22 1200 1 - 25%     Supplement Intake:  Patient Vitals for the past 168 hrs:   Supplement intake %   04/04/22 0400 0%   04/04/22 0000 0%   04/03/22 2000 0%   03/30/22 0800 1 - 25%       Home diabetes medications:   Key Antihyperglycemic Medications     Patient is on no antihyperglycemic meds. Plan/Goals:   Blood glucose will be within target of 70 - 180 mg/dl within 72 hours  Reinforce dietary and medication compliance at home.        Education:  [] Refer to Diabetes Education Record                          [x] Education not indicated at this time     Tete Tijerina [facility] CDE  Ext [phone]

## 2022-04-04 NOTE — PROGRESS NOTES
Problem: Falls - Risk of  Goal: *Absence of Falls  Description: Document Surekha Perdomo Fall Risk and appropriate interventions in the flowsheet.   Outcome: Progressing Towards Goal  Note: Fall Risk Interventions:       Mentation Interventions: Adequate sleep, hydration, pain control,Bed/chair exit alarm,Reorient patient,More frequent rounding,Toileting rounds,Update white board    Medication Interventions: Bed/chair exit alarm    Elimination Interventions: Bed/chair exit alarm,Call light in reach,Toileting schedule/hourly rounds    History of Falls Interventions: Bed/chair exit alarm         Problem: Nutrition Deficit  Goal: *Optimize nutritional status  Outcome: Progressing Towards Goal     Problem: Non-Violent Restraints  Goal: No harm/injury to patient while restraints in use  Outcome: Progressing Towards Goal  Goal: Patient's dignity will be maintained  Outcome: Progressing Towards Goal

## 2022-04-04 NOTE — PROGRESS NOTES
[facility]  (A Division of [address] [place] [address])    Follow-up Note      Date of Admission: 3/24/2022       Date of Note:  4/4/2022          Current Antimicrobials:    Prior Antimicrobials:  Ceftriaxone 3/28- present Vancomycin 3/24 to 3/28  Meropenem 3/25- 3/28     Assessment:         Septic shock on multiple pressors with lactic acidosis: Suspect related to  source given abnormal UA vs liver abscess? Vs pleural process  Urinary tract infection: UA with greater than 100 WBCs few bacteria large leukocyte esterase negative nitrite; recent cx + for Klebsiella on 3/15  Aspiration pneumonia with hypoxia  Leukocytosis: improving  Loculated left pleural effusion with calcification and pleural thickening as well as non-specific diffuse opacities -  Noted on CT chest from 3/24. ? Chronic   GUIDO: Creatinine 2.38 upon presentation now improving  Metabolic encephalopathy with baseline dementia/prior CVA/limited verbal interaction  Suspected Right liver abscess vs mass (new since 2017)-3.1 x 5.8 x 4.2 cm, infection vs malignancy. Noted on 3/14 CT scn but not mentioedn in 3/24 CT report. ?? source  Thrombocytopenia-starting to recover    Plan:   Completed Ceftriaxone on 4/1  CBC, BMP in am   MRI liver with MRCP reviewed - biopsy at a later date. Heme/Onc following  Low threshold to restart antibiotics if there is ongoing concern for aspiration however as the patient is afebrile, her 4/2 chest x-ray did not have any new significant aspiration, and her platelets are now just starting to recover we will prefer to hold off on any additional antibiotics at this time may cloud her overall picture. Discussed with Dr. Suzette Vidales.       Monse Adams DO  [facility]  [address], [address] [facility]vizobarbara[facility]  Office: [phone]  Mobile/Text: [phone]     Microbiology:  3/24 blood culture: No growth to date x2  3/25 RSV PCR negative  3/25 MRSA nasal swab negative  3/25 respiratory viral panel: Negative  3/25 C.diff: negative    Lines / Catheters:  A-line left wrist  Peripheral  Right IJ TLC  dove    Subjective:   Patent seen and examined. Resting comfortably. No new events reported  Low-grade temps to 99.5 without any fever spikes. No family at bedside during my visit today    Objective:        Visit Vitals  BP (!) 150/90 (BP 1 Location: Right upper arm, BP Patient Position: At rest)   Pulse 93   Temp 98.4 °F (36.9 °C)   Resp 26   Ht 4' 11\" (1.499 m)   Wt 63.8 kg (140 lb 10.5 oz)   SpO2 94%   BMI 28.41 kg/m²     Temp (24hrs), Av.8 °F (37.1 °C), Min:97.8 °F (36.6 °C), Max:99.5 °F (37.5 °C)         General:   Awake and alert to the people around her,  she remains nonverbal   Skin:   no rashes or skin lesions noted on limited exam, cool to touch   HEENT:  Normocephalic, atraumatic, PERRL, EOMI, no scleral icterus or pallor; no conjunctival hemmohage;  nasal and oral mucous are moist and without evidence of lesions. No thrush. Neck supple, no bruits. NG in place   Lymph Nodes:   no cervical, axillary or inguinal adenopathy   Lungs:   non-labored, bilaterally clear to aspiration- no crackles wheezes rales or rhonchi   Heart:  RRR, s1 and s2; no murmurs rubs or gallops, no edema, + pedal pulses   Abdomen:  soft, non-distended, active bowel sounds, no hepatomegaly, no splenomegaly. yells out with palpation to the lower abdomen. No mid-epigastric tenderness   Genitourinary: Dove   Extremities:   no clubbing, cyanosis; no joint effusions or swelling;  Full ROM of all large joints to the upper and lower extremities; muscle mass appropriate for age- left wrist restrained   Neurologic:  No gross focal sensory abnormalities;  awake, tracks, vocalizes discomfort and frustration however is otherwise nonverbal, response to pain nonverbal, right upper extremity appears to be contracted, focal weakness on the right when compared to the left as left side has noted independent movement. Psychiatric:   Calm        Lab results:    Chemistry  Recent Labs     04/04/22  0400 04/03/22  0200 04/02/22  1415   * 201* 358*    141 140   K 3.8 3.9 3.5    101 97*   CO2 32 37* 39*   BUN 29* 29* 29*   CREA 0.77 0.84 0.91   CA 8.3* 7.9* 8.4*   AGAP 6 3 4   BUCR 38* 35* 32*       CBC w/ Diff  Recent Labs     04/04/22  0400 04/03/22  0200 04/02/22  1415   WBC 10.6 11.6 8.4   RBC 2.95* 3.56* 3.35*   HGB 8.3* 10.2* 9.6*   HCT 26.9* 32.2* 29.8*   PLT 92* 59* 46*   GRANS 78* 74* 77*   LYMPH 11* 12* 9*   EOS 2 3 2       Microbiology  All Micro Results     Procedure Component Value Units Date/Time    CULTURE, BLOOD [[id number]] Collected: 03/24/22 1945    Order Status: Completed Specimen: Blood Updated: 03/30/22 0639     Special Requests: NO SPECIAL REQUESTS        Culture result: NO GROWTH 6 DAYS       CULTURE, BLOOD [[id number]] Collected: 03/24/22 1952    Order Status: Completed Specimen: Blood Updated: 03/30/22 0639     Special Requests: NO SPECIAL REQUESTS        Culture result: NO GROWTH 6 DAYS       CULTURE, MRSA [[id number]] Collected: 03/25/22 0134    Order Status: Completed Specimen: Nasal from Nares Updated: 03/26/22 1151     Special Requests: NO SPECIAL REQUESTS        Culture result: MRSA NOT PRESENT               Screening of patient nares for MRSA is for surveillance purposes and, if positive, to facilitate isolation considerations in high risk settings. It is not intended for automatic decolonization interventions per se as regimens are not sufficiently effective to warrant routine use.           C. DIFFICILE AG & TOXIN A/B [[id number]] Collected: 03/25/22 1517    Order Status: Completed Specimen: Stool Updated: 03/26/22 1007     GDH ANTIGEN Negative        C. difficile toxin Negative        INTERPRETATION       NEGATIVE FOR TOXIGENIC C. DIFFICILE          RESPIRATORY VIRUS PANEL W/COVID-19, PCR [[id number]] Collected: 03/25/22 0030    Order Status: Completed Specimen: Nasopharyngeal Updated: 03/25/22 0205     Adenovirus Not detected        Coronavirus 229E Not detected        Coronavirus HKU1 Not detected        Coronavirus CVNL63 Not detected        Coronavirus OC43 Not detected        SARS-CoV-2, PCR Not detected        Metapneumovirus Not detected        Rhinovirus and Enterovirus Not detected        Influenza A Not detected        Influenza B Not detected        Parainfluenza 1 Not detected        Parainfluenza 2 Not detected        Parainfluenza 3 Not detected        Parainfluenza virus 4 Not detected        RSV by PCR Not detected        B. parapertussis, PCR Not detected        Bordetella pertussis - PCR Not detected        Chlamydophila pneumoniae DNA, QL, PCR Not detected        Mycoplasma pneumoniae DNA, QL, PCR Not detected       CULTURE, RESPIRATORY/SPUTUM/BRONCH Mariama Pitcher [[id number]] Collected: 03/24/22 2330    Order Status: Canceled Specimen: Sputum            [address] [facility]   4/4/2022

## 2022-04-04 NOTE — PROGRESS NOTES
Problem: Non-Violent Restraints  Goal: Removal from restraints as soon as assessed to be safe  Outcome: Not Progressing Towards Goal  Goal: No harm/injury to patient while restraints in use  Outcome: Not Progressing Towards Goal  Goal: Patient's dignity will be maintained  Outcome: Not Progressing Towards Goal  Goal: Patient Interventions  Outcome: Not Progressing Towards Goal     Problem: Breathing Pattern - Ineffective  Goal: *Absence of hypoxia  Outcome: Not Progressing Towards Goal  Goal: *Use of effective breathing techniques  Outcome: Not Progressing Towards Goal  Goal: *PALLIATIVE CARE:  Alleviation of Dyspnea  Outcome: Not Progressing Towards Goal     Problem: Patient Education: Go to Patient Education Activity  Goal: Patient/Family Education  Outcome: Not Progressing Towards Goal

## 2022-04-04 NOTE — PROGRESS NOTES
Hospitalist Progress Note    Subjective:   Daily Progress Note: 4/4/2022       Platelets improving to 92 today. Heme/onc with note. Patient now hypertensive. Will unhold antihypertensives. Restless ? Pain? Adding pack roxidcodone  Restraints required renewal as patient continues to reach for lines when not restrained. DTI on heel discovered. Heel floats continued. Ceftriaxone completed. Patient at high risk of aspiration - will need to be watchful. Multiple family members in room. All questions answered.       Current Facility-Administered Medications   Medication Dose Route Frequency    [Held by provider] carvediloL (COREG) tablet 12.5 mg  12.5 mg Oral Q12H    insulin glargine (LANTUS) injection 10 Units  10 Units SubCUTAneous DAILY    multivitamin-minerals-ferrous gluconate oral liquid 15 mL  15 mL Per NG tube DAILY    [Held by provider] lisinopriL (PRINIVIL, ZESTRIL) tablet 10 mg  10 mg Oral DAILY    nitroglycerin (NITROBID) 2 % ointment 1 Inch  1 Inch Topical Q6H PRN    hydrALAZINE (APRESOLINE) 20 mg/mL injection 20 mg  20 mg IntraVENous Q6H PRN    QUEtiapine (SEROquel) tablet 50 mg  50 mg Oral QHS    melatonin tablet 10 mg  10 mg Oral QHS    [Held by provider] hydrALAZINE (APRESOLINE) tablet 50 mg  50 mg Oral TID    [Held by provider] oxyCODONE (ROXICODONE) 5 mg/5 mL oral solution 7.5 mg  7.5 mg Oral Q4H PRN    [Held by provider] furosemide (LASIX) injection 40 mg  40 mg IntraVENous DAILY    thiamine HCL (B-1) tablet 200 mg  200 mg Oral TID    nystatin (MYCOSTATIN) 100,000 unit/gram powder   Topical BID    dextrose 10% infusion 0-250 mL  0-250 mL IntraVENous PRN    albuterol-ipratropium (DUO-NEB) 2.5 MG-0.5 MG/3 ML  3 mL Nebulization Q4H PRN    [Held by provider] heparin (porcine) injection 5,000 Units  5,000 Units SubCUTAneous Q8H    insulin lispro (HUMALOG) injection   SubCUTAneous Q6H    glucose chewable tablet 16 g  4 Tablet Oral PRN    glucagon (GLUCAGEN) injection 1 mg  1 mg IntraMUSCular PRN    acetaminophen (TYLENOL) tablet 650 mg  650 mg Oral Q6H PRN    Or    acetaminophen (TYLENOL) suppository 650 mg  650 mg Rectal Q6H PRN    polyethylene glycol (MIRALAX) packet 17 g  17 g Oral DAILY PRN    ondansetron (ZOFRAN ODT) tablet 4 mg  4 mg Oral Q8H PRN    Or    ondansetron (ZOFRAN) injection 4 mg  4 mg IntraVENous Q6H PRN        Review of Systems  Review of systems not obtained due to patient factors. Objective:     Visit Vitals  /75   Pulse 90   Temp 99.5 °F (37.5 °C)   Resp 23   Ht 4' 11\" (1.499 m)   Wt 63.8 kg (140 lb 10.5 oz)   SpO2 97%   BMI 28.41 kg/m²    O2 Flow Rate (L/min): 4 l/min O2 Device: Nasal cannula,Humidifier    Temp (24hrs), Av.2 °F (37.3 °C), Min:98.6 °F (37 °C), Max:99.5 °F (37.5 °C)      No intake/output data recorded.  1901 -  0700  In: 4432.5 [I.V.:1447.5]  Out: 2525 [Urine:2525]      General appearance -awake, ill-appearing, NG tube in situ, not in acute distress. Eyes - sclera anicteric, no pallor  Nose - no obvious nasal discharge. NG tube is in situ. Neck - supple, no JVD, trachea is midline  Chest -diminished air entry noted in bases, poor inspiratory efforts, no wheezes  Heart - S1 and S2 normal  Abdomen - soft, nontender, nondistended, Bowel sounds present  Neurological -awake, moves left upper extremity well and is under restraint without any wrist injury, not moving right side. Musculoskeletal -right knee swelling and mild bruising present. Extremities -  pedal edema noted. DTI present on right heel. Heel floats in place.      Data Review    Recent Results (from the past 24 hour(s))   GLUCOSE, POC    Collection Time: 22 10:24 AM   Result Value Ref Range    Glucose (POC) 263 (H) 70 - 110 mg/dL   GLUCOSE, POC    Collection Time: 22  4:05 PM   Result Value Ref Range    Glucose (POC) 143 (H) 70 - 110 mg/dL   GLUCOSE, POC    Collection Time: 22 11:36 PM   Result Value Ref Range    Glucose (POC) 277 (H) 70 - 110 mg/dL   CBC WITH AUTOMATED DIFF    Collection Time: 04/04/22  4:00 AM   Result Value Ref Range    WBC 10.6 4.6 - 13.2 K/uL    RBC 2.95 (L) 4.20 - 5.30 M/uL    HGB 8.3 (L) 12.0 - 16.0 g/dL    HCT 26.9 (L) 35.0 - 45.0 %    MCV 91.2 78.0 - 100.0 FL    MCH 28.1 24.0 - 34.0 PG    MCHC 30.9 (L) 31.0 - 37.0 g/dL    RDW 14.6 (H) 11.6 - 14.5 %    PLATELET 92 (L) 792 - 420 K/uL    NRBC 0.0 0  WBC    ABSOLUTE NRBC 0.00 0.00 - 0.01 K/uL    NEUTROPHILS 78 (H) 40 - 73 %    LYMPHOCYTES 11 (L) 21 - 52 %    MONOCYTES 9 3 - 10 %    EOSINOPHILS 2 0 - 5 %    BASOPHILS 0 0 - 2 %    IMMATURE GRANULOCYTES 1 (H) 0.0 - 0.5 %    ABS. NEUTROPHILS 8.2 (H) 1.8 - 8.0 K/UL    ABS. LYMPHOCYTES 1.1 0.9 - 3.6 K/UL    ABS. MONOCYTES 0.9 0.05 - 1.2 K/UL    ABS. EOSINOPHILS 0.2 0.0 - 0.4 K/UL    ABS. BASOPHILS 0.0 0.0 - 0.1 K/UL    ABS. IMM. GRANS. 0.1 (H) 0.00 - 0.04 K/UL    DF AUTOMATED     PHOSPHORUS    Collection Time: 04/04/22  4:00 AM   Result Value Ref Range    Phosphorus 2.3 (L) 2.5 - 4.9 MG/DL   MAGNESIUM    Collection Time: 04/04/22  4:00 AM   Result Value Ref Range    Magnesium 1.6 1.6 - 2.6 mg/dL   CALCIUM, IONIZED    Collection Time: 04/04/22  4:00 AM   Result Value Ref Range    Ionized Calcium 0.91 (L) 1.15 - 9.35 MMOL/L   METABOLIC PANEL, BASIC    Collection Time: 04/04/22  4:00 AM   Result Value Ref Range    Sodium 138 136 - 145 mmol/L    Potassium 3.8 3.5 - 5.5 mmol/L    Chloride 100 100 - 111 mmol/L    CO2 32 21 - 32 mmol/L    Anion gap 6 3.0 - 18 mmol/L    Glucose 205 (H) 74 - 99 mg/dL    BUN 29 (H) 7.0 - 18 MG/DL    Creatinine 0.77 0.6 - 1.3 MG/DL    BUN/Creatinine ratio 38 (H) 12 - 20      GFR est AA >60 >60 ml/min/1.73m2    GFR est non-AA >60 >60 ml/min/1.73m2    Calcium 8.3 (L) 8.5 - 10.1 MG/DL   GLUCOSE, POC    Collection Time: 04/04/22  5:44 AM   Result Value Ref Range    Glucose (POC) 239 (H) 70 - 110 mg/dL         Assessment/Plan:     Active Problems:    Septic shock ([address] (3/24/2022)      ASSESSMENT:    1.   Septic shock, resolved currently. Off IV pressors  2. UTI s/p treatment  3. Aspiration pneumonia with hypoxia  4. Chronic loculated left pleural effusion  5. Acute on chronic encephalopathy, slowly improving  6. Chronic encephalopathy and right-sided hemiparesis due to chronic L MCA infarct as well as right frontal lobe encephalomalacia. 7.  Thrombocytopenia of unknown etiology  8. Leukocytosis, resolved  9. Acute renal failure, improving  10.  6 x 3 cm right hepatic mass  11. Seizure disorder  12. Peripheral arterial disease  13. Hypertension    PLAN:    Patient has been off IV pressor. Started on p.o. antihypertensive medications   Experiencing soft BPs 3/31 - Will hold hydralazine, decrease lisinopril from 20 mg daily to 10 mg daily, decreased coreg from 25 mg BID to 12.5 mg yesterday. 4/1 - Patient's current regimen of coreg 12.5 mg BID, lasix 40 mg every day, lisinopril is keeping her normotensive, less lows than yesterday. 4/2 - experiencing tachycardia and soft BPs again after receiving Roxicodone. All antihypertensives held. 4/3 - antihypertensives held, normotensive. 4/4 - Pt hypertensive, will start adding back medications   DuoNebs; continue to wean her off oxygen. Echocardiogram report reviewed. Patient is currently being treated with IV Lasix  CT head report reviewed. ID to follow. Ceftriaxone discontinued 4/2/22. Continue to be watchful of aspiration. Hematology/oncology has been consulted for thrombocytopenia. Platelets improving. Continue monitoring platelets; continuing to trend upwards   Patient currently has NG tube. She will need a PEG tube but her platelets need to get better. Daughter [phone]; [id number]. I have evaluated the patient after initiation of intervention. The patient is comfortable, uninjured, but continues to pose an imminent risk of injury to self and or serious disruption of treatment.   The patient's current medical and behavioral conditions that warrant the use intervention include danger to self and Interference with medical treatment. Restraint or seclusion will be discontinued at the earliest possible time, regardless of the scheduled expiration of the order. Total time to take care of this patient was 35 minutes and more than 50% of time was spent counseling and coordinating care. Disclaimer: Sections of this note are dictated using utilizing voice recognition software, which may have resulted in some phonetic based errors in grammar and contents. Even though attempts were made to correct all the mistakes, some may have been missed, and remained in the body of the document. If questions arise, please contact our department.

## 2022-04-04 NOTE — ROUTINE PROCESS
Bedside and Verbal shift change report given to Kimberly England RN (oncoming nurse) by Andreea Streeter RN (offgoing nurse). Report included the following information SBAR, Kardex, Intake/Output, MAR, Recent Results and Cardiac Rhythm NSR.

## 2022-04-05 NOTE — PROGRESS NOTES
Problem: Dysphagia (Adult)  Goal: *Acute Goals and Plan of Care (Insert Text)  Description: Patient will:  1. Tolerate PO trials with 0 s/s overt distress in 4/5 trials  2. Utilize compensatory swallow strategies/maneuvers (decrease bite/sip, size/rate, alt. liq/sol) with min cues in 4/5 trials  3. Perform oral-motor/laryngeal exercises to increase oropharyngeal swallow function with min cues  4. Complete an objective swallow study (i.e., MBSS) to assess swallow integrity, r/o aspiration, and determine of safest LRD, min A as indicated/ordered by MD     Recommend:   NPO with NG  May want to consider a more permanent means of nutrition/hydration (? PEG) vs comfort   Strict aspiration precautions  HOB >30 degrees at all times; oral care TID      Outcome: Progressing Towards Goal   SPEECH LANGUAGE PATHOLOGY DYSPHAGIA TREATMENT    Patient: Yusef Bowens (68 y.o. female)  Date: 4/5/2022  Diagnosis: Septic shock ([facility] [A41.9, R65.21] <principal problem not specified>  Procedure(s) (LRB):  PERCUTANEOUS ENDOSCOPIC GASTROSTOMY TUBE INSERTION (N/A)    Precautions: aspiration    PLOF: As per H&P      ASSESSMENT:  Pt was seen at bedside for follow up dysphagia management with NGT in place. Pt tolerated ice chip and 1 tsp of thin liquids without any overt s/sx of aspiration. She was orally defensive to all further PO despite max cues/encouragement. Laryngeal elevation was functional to palpation. Pt demo inadequate sensory/motor awareness for safe and adequate nutritional intake. Recommend to continue NPO with NG, aspiration precautions, oral care TID, and meds as tolerated. ST will continue to follow.      Progression toward goals:  []         Improving appropriately and progressing toward goals  [x]         Improving slowly and progressing toward goals  []         Not making progress toward goals and plan of care will be adjusted     PLAN:  Recommendations and Planned Interventions: See above  Patient continues to benefit from skilled intervention to address the above impairments. Continue treatment per established plan of care. Discharge Recommendations:  Jaren Suárez and To Be Determined     SUBJECTIVE:   Patient nonverbal.    OBJECTIVE:   Cognitive and Communication Status:  Neurologic State: Alert  Orientation Level: Unable to verbalize  Cognition: No command following     Dysphagia Treatment:  Oral Assessment:  Oral Assessment  Labial: No impairment  Dentition: Natural  Oral Hygiene: adequate  Lingual: Other (comment) (could not fully assess)  Velum: Unable to visualize  Mandible: No impairment  P.O. Trials:   Patient Position: 45 at [facility]   Vocal quality prior to P.O.: Other (comment) (nonverbal)   Consistency Presented:  Thin liquid,Ice chips   How Presented: SLP-fed/presented,Straw,Spoon   Bolus Acceptance: Impaired   Bolus Formation/Control: No impairment   Propulsion: No impairment   Oral Residue: None   Initiation of Swallow: No impairment   Laryngeal Elevation: Functional   Aspiration Signs/Symptoms: None   Pharyngeal Phase Characteristics: No impairment, issues, or problems    Effective Modifications: Straw,Spoon,Small sips and bites   Cues for Modifications: Maximal       Oral Phase Severity: Moderate-severe   Pharyngeal Phase Severity : No impairment    PAIN:  Start of Tx: appeared to be in no pain/discomfort  End of Tx: appeared to be in pain/discomfort    After treatment:   []              Patient left in no apparent distress sitting up in chair  [x]              Patient left in no apparent distress in bed  [x]              Call bell left within reach  [x]              Nursing notified  []              Family present  []              Caregiver present  []              Bed alarm activated    COMMUNICATION/EDUCATION:   [x] Aspiration precautions; swallow safety; compensatory techniques  []        Patient/family able to participate in training and education   [x]  Patient unable to participate in training and education, education ongoing with staff   [] Patient understands goals and intent of therapy; neutral about participation     Thank you for this referral.    Kassie Walker M.S. CCC-SLP/L  Speech-Language Pathologist

## 2022-04-05 NOTE — PROGRESS NOTES
Problem: Pressure Injury - Risk of  Goal: *Prevention of pressure injury  Description: Document Ankit Scale and appropriate interventions in the flowsheet. Outcome: Progressing Towards Goal  Note: Pressure Injury Interventions:  Sensory Interventions: Assess changes in LOC,Avoid rigorous massage over bony prominences,Check visual cues for pain,Discuss PT/OT consult with provider,Keep linens dry and wrinkle-free,Minimize linen layers    Moisture Interventions: Absorbent underpads,Apply protective barrier, creams and emollients,Check for incontinence Q2 hours and as needed,Internal/External urinary devices    Activity Interventions: Pressure redistribution bed/mattress(bed type)    Mobility Interventions: HOB 30 degrees or less,Pressure redistribution bed/mattress (bed type)    Nutrition Interventions: Document food/fluid/supplement intake    Friction and Shear Interventions: Foam dressings/transparent film/skin sealants,HOB 30 degrees or less,Transferring/repositioning devices                Problem: Patient Education: Go to Patient Education Activity  Goal: Patient/Family Education  Outcome: Progressing Towards Goal     Problem: Falls - Risk of  Goal: *Absence of Falls  Description: Document Diana Fall Risk and appropriate interventions in the flowsheet.   Outcome: Progressing Towards Goal  Note: Fall Risk Interventions:       Mentation Interventions: Adequate sleep, hydration, pain control,Bed/chair exit alarm,Door open when patient unattended,Evaluate medications/consider consulting pharmacy    Medication Interventions: Bed/chair exit alarm,Evaluate medications/consider consulting pharmacy    Elimination Interventions: Bed/chair exit alarm,Call light in reach,Patient to call for help with toileting needs,Toilet paper/wipes in reach,Toileting schedule/hourly rounds    History of Falls Interventions: Bed/chair exit alarm,Consult care management for discharge planning,Door open when patient unattended,Evaluate medications/consider consulting pharmacy         Problem: Patient Education: Go to Patient Education Activity  Goal: Patient/Family Education  Outcome: Progressing Towards Goal     Problem: Nutrition Deficit  Goal: *Optimize nutritional status  Outcome: Progressing Towards Goal     Problem: Risk for Spread of Infection  Goal: Prevent transmission of infectious organism to others  Description: Prevent the transmission of infectious organisms to other patients, staff members, and visitors.   Outcome: Progressing Towards Goal     Problem: Patient Education:  Go to Education Activity  Goal: Patient/Family Education  Outcome: Progressing Towards Goal     Problem: Patient Education: Go to Patient Education Activity  Goal: Patient/Family Education  Outcome: Progressing Towards Goal     Problem: Non-Violent Restraints  Goal: Removal from restraints as soon as assessed to be safe  Outcome: Progressing Towards Goal  Goal: No harm/injury to patient while restraints in use  Outcome: Progressing Towards Goal  Goal: Patient's dignity will be maintained  Outcome: Progressing Towards Goal  Goal: Patient Interventions  Outcome: Progressing Towards Goal     Problem: Delirium Treatment  Goal: *Level of consciousness restored to baseline  Outcome: Progressing Towards Goal  Goal: *Level of environmental perceptions restored to baseline  Outcome: Progressing Towards Goal  Goal: *Sensory perception restored to baseline  Outcome: Progressing Towards Goal  Goal: *Emotional stability restored to baseline  Outcome: Progressing Towards Goal  Goal: *Functional assessment restored to baseline  Outcome: Progressing Towards Goal  Goal: *Absence of falls  Outcome: Progressing Towards Goal  Goal: *Will remain free of delirium, CAM Score negative  Outcome: Progressing Towards Goal  Goal: *Cognitive status will be restored to baseline  Outcome: Progressing Towards Goal  Goal: Interventions  Outcome: Progressing Towards Goal     Problem: Patient Education: Go to Patient Education Activity  Goal: Patient/Family Education  Outcome: Progressing Towards Goal     Problem: Breathing Pattern - Ineffective  Goal: *Absence of hypoxia  Outcome: Progressing Towards Goal  Goal: *Use of effective breathing techniques  Outcome: Progressing Towards Goal  Goal: *PALLIATIVE CARE:  Alleviation of Dyspnea  Outcome: Progressing Towards Goal     Problem: Patient Education: Go to Patient Education Activity  Goal: Patient/Family Education  Outcome: Progressing Towards Goal

## 2022-04-05 NOTE — ROUTINE PROCESS
[id number] - Bedside and Verbal shift change report given to [address] [place] [address] (oncoming nurse) by Ceola Hodgkins RN (offgoing nurse). Report included the following information SBAR, Kardex, Intake/Output, MAR and Recent Results. Patient is sleeping in bed. Nonviolent left wrist restraint appropriately in place. Bed in lowest position, bed alarm set, and call bell in reach.

## 2022-04-05 NOTE — PROGRESS NOTES
Problem: Pressure Injury - Risk of  Goal: *Prevention of pressure injury  Description: Document Ankit Scale and appropriate interventions in the flowsheet. Outcome: Progressing Towards Goal  Note: Pressure Injury Interventions:  Sensory Interventions: Assess changes in LOC,Assess need for specialty bed    Moisture Interventions: Absorbent underpads,Apply protective barrier, creams and emollients    Activity Interventions: Assess need for specialty bed,PT/OT evaluation,Pressure redistribution bed/mattress(bed type)    Mobility Interventions: Assess need for specialty bed,HOB 30 degrees or less,Pressure redistribution bed/mattress (bed type)    Nutrition Interventions: Document food/fluid/supplement intake    Friction and Shear Interventions: Apply protective barrier, creams and emollients,HOB 30 degrees or less,Lift sheet,Lift team/patient mobility team,Minimize layers                Problem: Patient Education: Go to Patient Education Activity  Goal: Patient/Family Education  Outcome: Progressing Towards Goal     Problem: Falls - Risk of  Goal: *Absence of Falls  Description: Document Diana Fall Risk and appropriate interventions in the flowsheet.   Outcome: Progressing Towards Goal  Note: Fall Risk Interventions:       Mentation Interventions: Adequate sleep, hydration, pain control,Bed/chair exit alarm    Medication Interventions: Bed/chair exit alarm,Evaluate medications/consider consulting pharmacy    Elimination Interventions: Bed/chair exit alarm,Call light in reach    History of Falls Interventions: Bed/chair exit alarm         Problem: Patient Education: Go to Patient Education Activity  Goal: Patient/Family Education  Outcome: Progressing Towards Goal     Problem: Nutrition Deficit  Goal: *Optimize nutritional status  Outcome: Progressing Towards Goal     Problem: Risk for Spread of Infection  Goal: Prevent transmission of infectious organism to others  Description: Prevent the transmission of infectious organisms to other patients, staff members, and visitors.   Outcome: Progressing Towards Goal     Problem: Patient Education:  Go to Education Activity  Goal: Patient/Family Education  Outcome: Progressing Towards Goal     Problem: Patient Education: Go to Patient Education Activity  Goal: Patient/Family Education  Outcome: Progressing Towards Goal     Problem: Non-Violent Restraints  Goal: Removal from restraints as soon as assessed to be safe  Outcome: Progressing Towards Goal  Goal: No harm/injury to patient while restraints in use  Outcome: Progressing Towards Goal  Goal: Patient's dignity will be maintained  Outcome: Progressing Towards Goal  Goal: Patient Interventions  Outcome: Progressing Towards Goal     Problem: Delirium Treatment  Goal: *Level of consciousness restored to baseline  Outcome: Progressing Towards Goal  Goal: *Level of environmental perceptions restored to baseline  Outcome: Progressing Towards Goal  Goal: *Sensory perception restored to baseline  Outcome: Progressing Towards Goal  Goal: *Emotional stability restored to baseline  Outcome: Progressing Towards Goal  Goal: *Functional assessment restored to baseline  Outcome: Progressing Towards Goal  Goal: *Absence of falls  Outcome: Progressing Towards Goal  Goal: *Will remain free of delirium, CAM Score negative  Outcome: Progressing Towards Goal  Goal: *Cognitive status will be restored to baseline  Outcome: Progressing Towards Goal  Goal: Interventions  Outcome: Progressing Towards Goal     Problem: Patient Education: Go to Patient Education Activity  Goal: Patient/Family Education  Outcome: Progressing Towards Goal     Problem: Breathing Pattern - Ineffective  Goal: *Absence of hypoxia  Outcome: Progressing Towards Goal  Goal: *Use of effective breathing techniques  Outcome: Progressing Towards Goal  Goal: *PALLIATIVE CARE:  Alleviation of Dyspnea  Outcome: Progressing Towards Goal     Problem: Patient Education: Go to Patient Education Activity  Goal: Patient/Family Education  Outcome: Progressing Towards Goal

## 2022-04-05 NOTE — PROGRESS NOTES
MRI Screening form needs to be filled out and faxed to [phone] MRI can be scheduled. If unable to obtain information from pt, [facility] needs to be contacted.  If pt is claustro or will need pain meds, please have ordered in advance in order to facilitate exam.

## 2022-04-05 NOTE — PROGRESS NOTES
Bedside shift report give to Dell Uribe RN from [facility]. Report including the following information SBAR, Kardex, recent results, MAR, intake/output and cardiac rhythm NSR.

## 2022-04-05 NOTE — CONSULTS
[email or web address]  [phone]    GASTROENTEROLOGY CONSULT      Impression:   1. Inability to tolerate PO - secondary to cognitive and neurological imparement currently tolerating NG tube feeds, failed swallow study w/ SLP 3/28. PT's sister Jeane Aldrich is NOK (per palliative care note), whom will be available to consent for PEG tomorrow w/  services. 2. Septic shock - resolved  3. UTI  4. Acute on chronic encephalopathy  5. Thrombocytopenia  6. GUIDO  7. Hepatic mass - new finding 3/14 w/ recommendations for MRI w/ and w/o contrast. AFP ordered, MRI to be performed as IP prior to d/c.  8. Seizure disorder  9. PAD  10. HTN  11. CVA      3/14 CT Abd Pelvis w/ IV contrast IMPRESSION:  1.  New mass or infectious process involving the right lobe of the liver near the IVC. Correlation with laboratory values as well as MRI with and without contrast.  2.  Irregular thickened and distal rectum. This may represent mild proctitis. However, the possibility of an underlying lesion is not excluded. Correlate with direct visualization. 3.  Groundglass opacities in the mid to lower lung fields which may be acute or chronic. 4.  Chronic complex left pleural effusion. 5.  Cholelithiasis without evidence of cholecystitis. Patient is Congolese speaking. Plan:     1. Tentatively plan on PEG placement 4/6. Hold tube feeds after midnight. Continue to hold anticoagulation. Procedure posted to OR board w/ pre-procedural abx ordered. 2. AFP ordered, MRI ordered. 3. Continue medical management per primary team.    Chief Complaint: dysphagia, hepatic mass. HPI:  Pauly Lynch is a 74 y.o. female w/ PMH CVA, HTN, HLD, PAD, and seizure disorder who I am being asked to see in consultation for an opinion regarding dysphagia secondary to cognitive and neurological impairment. Patient presented to the ED 3/24 with AMS and found to have significant hypotension and fever.  Patient's overall clinical status improved w/ resolved septic shock. However, patient has had persistent cognitive and neurological impairment, failing a swallow study 3/28. Patient is currently tolerating NG tube feeds. Patient is non-verbal and Gabonese speaking. PMH:   No past medical history on file. PSH:   No past surgical history on file. Social HX:   Social History     Socioeconomic History    Marital status: SINGLE     Spouse name: Not on file    Number of children: Not on file    Years of education: Not on file    Highest education level: Not on file   Occupational History    Not on file   Tobacco Use    Smoking status: Former Smoker    Smokeless tobacco: Never Used   Substance and Sexual Activity    Alcohol use: Not on file    Drug use: Not on file    Sexual activity: Not on file   Other Topics Concern    Not on file   Social History Narrative    Not on file     Social Determinants of Health     Financial Resource Strain:     Difficulty of Paying Living Expenses: Not on file   Food Insecurity:     Worried About Running Out of Food in the Last Year: Not on file    Dorian of Food in the Last Year: Not on file   Transportation Needs:     Lack of Transportation (Medical): Not on file    Lack of Transportation (Non-Medical):  Not on file   Physical Activity:     Days of Exercise per Week: Not on file    Minutes of Exercise per Session: Not on file   Stress:     Feeling of Stress : Not on file   Social Connections:     Frequency of Communication with Friends and Family: Not on file    Frequency of Social Gatherings with Friends and Family: Not on file    Attends Mosque Services: Not on file    Active Member of Clubs or Organizations: Not on file    Attends Club or Organization Meetings: Not on file    Marital Status: Not on file   Intimate Partner Violence:     Fear of Current or Ex-Partner: Not on file    Emotionally Abused: Not on file    Physically Abused: Not on file    Sexually Abused: Not on file   Housing Stability:  Unable to Pay for Housing in the Last Year: Not on file    Number of Places Lived in the Last Year: Not on file    Unstable Housing in the Last Year: Not on file       FHX:   No family history on file. Allergy:   No Known Allergies    Patient Active Problem List   Diagnosis Code    UTI (urinary tract infection) N39.0    Pneumonia J18.9    Septic shock ([facility]) A41.9, R65.21       Home Medications:     Medications Prior to Admission   Medication Sig    [] levoFLOXacin (Levaquin) 750 mg tablet Take 1 Tablet by mouth daily for 10 days.  acetaminophen (Tylenol Extra Strength) 500 mg tablet Take 2 Tabs by mouth every six (6) hours as needed for Pain. Review of Systems:     Non-verbal    Visit Vitals  /86 (BP 1 Location: Left upper arm)   Pulse 85   Temp 98.5 °F (36.9 °C)   Resp 17   Ht 4' 11\" (1.499 m)   Wt 63.8 kg (140 lb 10.5 oz)   SpO2 90%   BMI 28.41 kg/m²       Physical Assessment:     constitutional: well developed, well nourished, normal habitus, in no acute distress. skin: no rashes, ulcers, icterus or other lesions  eyes: normal conjunctivae and lids; no jaundice pupils: normal  HEENT: normocephalic, atraumatic  neck: supple, normal ROM   respiratory: normal chest excursion; no intercostal retraction or accessory muscle use; clear to ascultation bilaterally    cardiovascular: regular rate and rhythm, no murmur, rub or gallop.   abdominal: non-distended, active bowel sounds, soft, non-tender, non-acute, no palpable masses or hernias. liver/spleen: not palpable. rectal: hemoccult/guaiac: not performed. extremities: no significant deformity or contracture, no edema.  Gait not assessed   neurologic: cranial nerves: grossly normal.       Basic Metabolic Profile   Recent Labs     22  0700      K 3.7      CO2 35*   BUN 29*   *   CA 8.7   MG 2.1   PHOS 3.1         CBC w/Diff    Recent Labs     22  0700   WBC 9.1   RBC 2.85*   HGB 8.1*   HCT 25.8*   MCV 90.5   MCH 28.4   MCHC 31.4   RDW 14.6*   *    Recent Labs     04/05/22  0700   GRANS 78*   LYMPH 13*   EOS 2        Hepatic Function   No results for input(s): ALB, TP, TBILI, AP, AML, LPSE in the last 72 hours. No lab exists for component: DBILI, GPT, SGOT     Coags   No results for input(s): PTP, INR, APTT, INREXT in the last 72 hours. Yung Stoddard PA-C.   04/05/22, 12:25 PM   [facility]  [email or web address]  Phone: [phone]  Pager: [phone]

## 2022-04-05 NOTE — PROGRESS NOTES
TRANSFER - IN REPORT:    Verbal report received from United Calvo RN(name) on Alondra Burgos  being received from Gareth Mcallister  (unit) for routine progression of care      Report consisted of patients Situation, Background, Assessment and   Recommendations(SBAR). Information from the following report(s) SBAR, Kardex, Intake/Output, MAR and Cardiac Rhythm NSR was reviewed with the receiving nurse. Opportunity for questions and clarification was provided. Assessment completed upon patients arrival to unit and care assumed. Pt is non verbal, and pt is on restraint left wrist    Restraint assessment completed and documented. Pt's restraint was  Renewed at [phone], pt is alert and awake no apparent distress. Bedside shift change report given to Adriel Child (oncoming nurse) by [place], RN (offgoing nurse). Report included the following information SBAR, Kardex, Intake/Output, MAR and Cardiac Rhythm NSR.

## 2022-04-05 NOTE — ROUTINE PROCESS
Opened chart to check daughter's phone number(Grisela) to notify her regarding Unit and Room transfer, no answer, left message on phone number [phone].

## 2022-04-05 NOTE — PROGRESS NOTES
Problem: Pressure Injury - Risk of  Goal: *Prevention of pressure injury  Description: Document Ankit Scale and appropriate interventions in the flowsheet. Outcome: Progressing Towards Goal  Note: Pressure Injury Interventions:  Sensory Interventions: Assess changes in LOC,Assess need for specialty bed,Turn and reposition approx. every two hours (pillows and wedges if needed),Minimize linen layers,Maintain/enhance activity level    Moisture Interventions: Absorbent underpads    Activity Interventions: Assess need for specialty bed,Pressure redistribution bed/mattress(bed type),PT/OT evaluation    Mobility Interventions: Assess need for specialty bed,HOB 30 degrees or less,Pressure redistribution bed/mattress (bed type),PT/OT evaluation,Turn and reposition approx. every two hours(pillow and wedges)    Nutrition Interventions: Document food/fluid/supplement intake    Friction and Shear Interventions: Apply protective barrier, creams and emollients,HOB 30 degrees or less,Minimize layers                Problem: Patient Education: Go to Patient Education Activity  Goal: Patient/Family Education  Outcome: Progressing Towards Goal     Problem: Falls - Risk of  Goal: *Absence of Falls  Description: Document Diana Fall Risk and appropriate interventions in the flowsheet.   Outcome: Progressing Towards Goal  Note: Fall Risk Interventions:       Mentation Interventions: Bed/chair exit alarm,More frequent rounding,Toileting rounds,Evaluate medications/consider consulting pharmacy    Medication Interventions: Bed/chair exit alarm,Patient to call before getting OOB,Evaluate medications/consider consulting pharmacy    Elimination Interventions: Bed/chair exit alarm,Call light in reach,Elevated toilet seat,Patient to call for help with toileting needs,Toileting schedule/hourly rounds,Toilet paper/wipes in reach    History of Falls Interventions: Bed/chair exit alarm         Problem: Falls - Risk of  Goal: *Absence of Falls  Description: Document Julio C Fall Risk and appropriate interventions in the flowsheet. Outcome: Progressing Towards Goal  Note: Fall Risk Interventions:       Mentation Interventions: Bed/chair exit alarm,More frequent rounding,Toileting rounds,Evaluate medications/consider consulting pharmacy    Medication Interventions: Bed/chair exit alarm,Patient to call before getting OOB,Evaluate medications/consider consulting pharmacy    Elimination Interventions: Bed/chair exit alarm,Call light in reach,Elevated toilet seat,Patient to call for help with toileting needs,Toileting schedule/hourly rounds,Toilet paper/wipes in reach    History of Falls Interventions: Bed/chair exit alarm         Problem: Patient Education: Go to Patient Education Activity  Goal: Patient/Family Education  Outcome: Progressing Towards Goal     Problem: Risk for Spread of Infection  Goal: Prevent transmission of infectious organism to others  Description: Prevent the transmission of infectious organisms to other patients, staff members, and visitors.   Outcome: Progressing Towards Goal     Problem: Patient Education:  Go to Education Activity  Goal: Patient/Family Education  Outcome: Progressing Towards Goal     Problem: Patient Education:  Go to Education Activity  Goal: Patient/Family Education  Outcome: Progressing Towards Goal     Problem: Patient Education: Go to Patient Education Activity  Goal: Patient/Family Education  Outcome: Progressing Towards Goal     Problem: Non-Violent Restraints  Goal: Removal from restraints as soon as assessed to be safe  Outcome: Progressing Towards Goal  Goal: No harm/injury to patient while restraints in use  Outcome: Progressing Towards Goal  Goal: Patient's dignity will be maintained  Outcome: Progressing Towards Goal  Goal: Patient Interventions  Outcome: Progressing Towards Goal     Problem: Delirium Treatment  Goal: *Level of consciousness restored to baseline  Outcome: Progressing Towards Goal  Goal: *Level of environmental perceptions restored to baseline  Outcome: Progressing Towards Goal  Goal: *Sensory perception restored to baseline  Outcome: Progressing Towards Goal  Goal: *Emotional stability restored to baseline  Outcome: Progressing Towards Goal  Goal: *Functional assessment restored to baseline  Outcome: Progressing Towards Goal

## 2022-04-05 NOTE — PROGRESS NOTES
INTERIM UPDATE - 0060 EST on 4/05/2022    Patient continues to pull at lines and cannot be reoriented at this time. Patient is only capable of moving her Left Upper Extremity at this time. Plan:  Ordered LUE Wrist Restraint. Will discontinue Restraints when Restraints impose greater risk of harm than they prevent or when Patient no longer presents a threat to themself or others (to include reliably following commands or being able to be reoriented away from harmful behaviors). Erythromycin Counseling:  I discussed with the patient the risks of erythromycin including but not limited to GI upset, allergic reaction, drug rash, diarrhea, increase in liver enzymes, and yeast infections.

## 2022-04-05 NOTE — DIABETES MGMT
Diabetes/ Glycemic Control Plan of Care  Recommendations:   1. Suggest initiating nutritional insulin starting with 3 units lispro q 6 hours for 4 out of last 6 blood glucose readings above target range. Order obtained/entered. 2. Continue 18 units Lantus/day and corrective insulin coverage, very insulin resistant dosing q 6 hours as needed. 3. Will continue inpatient monitoring. Assessment:  Patient required 18 units of corrective insulin coverage yesterday  Continuous TF at goal rate. Fasting/ Morning blood glucose:   Lab Results   Component Value Date/Time    Glucose 172 (H) 04/05/2022 07:00 AM    Glucose (POC) 285 (H) 04/05/2022 11:13 AM     IV Fluids containing dextrose:   None   Steroids:  None      Blood glucose values:   4/5:  POC - 167, 285  4/4:  POC - 239, 254, 168, 251            Within target range (70-180mg/dL): No    Current insulin orders:   lantus 18 units daily   Lispro corrective insulin coverage, very insulin resistant dosing every 6 hours    Total Daily Dose previous 24 hours =  36 units  (18 Lantus, 18 corrective lispro)  Current A1c:   Lab Results   Component Value Date/Time    Hemoglobin A1c 5.6 03/25/2022 12:00 AM      Equivalent  to average Blood Glucose of  mg/dl for 2-3 months prior to admission  Adequate glycemic control PTA:   Yes   Nutrition/Diet:  Glucerna 1.5 at goal rate of 45 ml/hr    ADULT TUBE FEEDING Nasogastric; Diabetic; Delivery Method: Continuous; Continuous Initial Rate (mL/hr): 10; Continuous Advance Tube Feeding: Yes; Advancement Volume (mL/hr): 10; Advancement Frequency: Q 12 hours; Continuous Goal Rate (mL/hr): 45; . .. DIET NPO    Active Orders   Diet    DIET NPO        Home diabetes medications:   Key Antihyperglycemic Medications     Patient is on no antihyperglycemic meds. Plan/Goals:   Blood glucose will be within target of 70 - 180 mg/dl within 72 hours  Reinforce dietary and medication compliance at home.        Education:  [] Refer to Diabetes Education Record                          [x] Education not indicated at this time     Risa Richmond MS, RD, [facility]  Diabetes and Glycemic Control   [employer]

## 2022-04-05 NOTE — PROGRESS NOTES
CM Chart Review:    Patient will need to be out of Restraints for 24 hours to be able to return to [address] [place] [address]. Please document in notes, date and time Restraints are removed.                Griselda Turner RN  Case Management [phone]

## 2022-04-05 NOTE — PROGRESS NOTES
Hospitalist Progress Note    Subjective:   Daily Progress Note: 4/5/2022     Platelets improving to 112 today. Heme/onc with note. Patient with dip in O2 sats to 86%,  O2 turned up to 5L. Patient with mouth open and not breathing through nose at that point. Restraints required renewal as patient continues to reach for lines when not restrained. DTI on heel discovered. Heel floats continued. Ceftriaxone completed. Patient at high risk of aspiration - will need to be watchful.    Contacted GI for PEG placement     Current Facility-Administered Medications   Medication Dose Route Frequency    insulin glargine (LANTUS) injection 18 Units  18 Units SubCUTAneous DAILY    furosemide (LASIX) injection 20 mg  20 mg IntraVENous DAILY    oxyCODONE IR (ROXICODONE) tablet 5 mg  5 mg Oral Q6H PRN    carvediloL (COREG) tablet 12.5 mg  12.5 mg Oral Q12H    multivitamin-minerals-ferrous gluconate oral liquid 15 mL  15 mL Per NG tube DAILY    [Held by provider] lisinopriL (PRINIVIL, ZESTRIL) tablet 10 mg  10 mg Oral DAILY    nitroglycerin (NITROBID) 2 % ointment 1 Inch  1 Inch Topical Q6H PRN    hydrALAZINE (APRESOLINE) 20 mg/mL injection 20 mg  20 mg IntraVENous Q6H PRN    QUEtiapine (SEROquel) tablet 50 mg  50 mg Oral QHS    melatonin tablet 10 mg  10 mg Oral QHS    [Held by provider] hydrALAZINE (APRESOLINE) tablet 50 mg  50 mg Oral TID    [Held by provider] oxyCODONE (ROXICODONE) 5 mg/5 mL oral solution 7.5 mg  7.5 mg Oral Q4H PRN    thiamine HCL (B-1) tablet 200 mg  200 mg Oral TID    nystatin (MYCOSTATIN) 100,000 unit/gram powder   Topical BID    dextrose 10% infusion 0-250 mL  0-250 mL IntraVENous PRN    albuterol-ipratropium (DUO-NEB) 2.5 MG-0.5 MG/3 ML  3 mL Nebulization Q4H PRN    [Held by provider] heparin (porcine) injection 5,000 Units  5,000 Units SubCUTAneous Q8H    insulin lispro (HUMALOG) injection   SubCUTAneous Q6H    glucose chewable tablet 16 g  4 Tablet Oral PRN    glucagon (GLUCAGEN) injection 1 mg  1 mg IntraMUSCular PRN    acetaminophen (TYLENOL) tablet 650 mg  650 mg Oral Q6H PRN    Or    acetaminophen (TYLENOL) suppository 650 mg  650 mg Rectal Q6H PRN    polyethylene glycol (MIRALAX) packet 17 g  17 g Oral DAILY PRN    ondansetron (ZOFRAN ODT) tablet 4 mg  4 mg Oral Q8H PRN    Or    ondansetron (ZOFRAN) injection 4 mg  4 mg IntraVENous Q6H PRN        Review of Systems  Review of systems not obtained due to patient factors. Objective:     Visit Vitals  /86 (BP 1 Location: Left upper arm)   Pulse 85   Temp 98.5 °F (36.9 °C)   Resp 17   Ht 4' 11\" (1.499 m)   Wt 63.8 kg (140 lb 10.5 oz)   SpO2 90%   BMI 28.41 kg/m²    O2 Flow Rate (L/min): 4 l/min O2 Device: Nasal cannula,Humidifier    Temp (24hrs), Av.3 °F (36.8 °C), Min:98 °F (36.7 °C), Max:98.6 °F (37 °C)      No intake/output data recorded.  1901 -  0700  In: 3365 [I.V.:740]  Out: 1300 [Urine:1300]      General appearance -awake, ill-appearing, NG tube in situ. Will more interactive than day before. Eyes - sclera anicteric, no pallor  Nose - no obvious nasal discharge. NG tube is in situ. Neck - supple, no JVD, trachea is midline  Chest -diminished air entry noted in bases, poor inspiratory efforts, no wheezes  Heart - S1 and S2 normal  Abdomen - soft, nontender, nondistended, Bowel sounds present  Neurological -awake, moves left upper extremity well and is under restraint without any wrist injury, not moving right side. Musculoskeletal -right knee swelling and mild bruising present. Extremities -  pedal edema noted. DTI present on right heel. Heel floats in place.      Data Review    Recent Results (from the past 24 hour(s))   GLUCOSE, POC    Collection Time: 22 11:25 AM   Result Value Ref Range    Glucose (POC) 254 (H) 70 - 110 mg/dL   GLUCOSE, POC    Collection Time: 22  6:17 PM   Result Value Ref Range    Glucose (POC) 168 (H) 70 - 110 mg/dL   GLUCOSE, POC    Collection Time: 04/04/22 11:53 PM   Result Value Ref Range    Glucose (POC) 251 (H) 70 - 110 mg/dL   GLUCOSE, POC    Collection Time: 04/05/22  5:19 AM   Result Value Ref Range    Glucose (POC) 167 (H) 70 - 110 mg/dL   CBC WITH AUTOMATED DIFF    Collection Time: 04/05/22  7:00 AM   Result Value Ref Range    WBC 9.1 4.6 - 13.2 K/uL    RBC 2.85 (L) 4.20 - 5.30 M/uL    HGB 8.1 (L) 12.0 - 16.0 g/dL    HCT 25.8 (L) 35.0 - 45.0 %    MCV 90.5 78.0 - 100.0 FL    MCH 28.4 24.0 - 34.0 PG    MCHC 31.4 31.0 - 37.0 g/dL    RDW 14.6 (H) 11.6 - 14.5 %    PLATELET 515 (L) 266 - 420 K/uL    MPV 13.7 (H) 9.2 - 11.8 FL    NRBC 0.0 0  WBC    ABSOLUTE NRBC 0.00 0.00 - 0.01 K/uL    NEUTROPHILS 78 (H) 40 - 73 %    LYMPHOCYTES 13 (L) 21 - 52 %    MONOCYTES 7 3 - 10 %    EOSINOPHILS 2 0 - 5 %    BASOPHILS 0 0 - 2 %    IMMATURE GRANULOCYTES 1 (H) 0.0 - 0.5 %    ABS. NEUTROPHILS 7.1 1.8 - 8.0 K/UL    ABS. LYMPHOCYTES 1.2 0.9 - 3.6 K/UL    ABS. MONOCYTES 0.6 0.05 - 1.2 K/UL    ABS. EOSINOPHILS 0.2 0.0 - 0.4 K/UL    ABS. BASOPHILS 0.0 0.0 - 0.1 K/UL    ABS. IMM.  GRANS. 0.1 (H) 0.00 - 0.04 K/UL    DF AUTOMATED     PHOSPHORUS    Collection Time: 04/05/22  7:00 AM   Result Value Ref Range    Phosphorus 3.1 2.5 - 4.9 MG/DL   MAGNESIUM    Collection Time: 04/05/22  7:00 AM   Result Value Ref Range    Magnesium 2.1 1.6 - 2.6 mg/dL   METABOLIC PANEL, BASIC    Collection Time: 04/05/22  7:00 AM   Result Value Ref Range    Sodium 141 136 - 145 mmol/L    Potassium 3.7 3.5 - 5.5 mmol/L    Chloride 103 100 - 111 mmol/L    CO2 35 (H) 21 - 32 mmol/L    Anion gap 3 3.0 - 18 mmol/L    Glucose 172 (H) 74 - 99 mg/dL    BUN 29 (H) 7.0 - 18 MG/DL    Creatinine 0.78 0.6 - 1.3 MG/DL    BUN/Creatinine ratio 37 (H) 12 - 20      GFR est AA >60 >60 ml/min/1.73m2    GFR est non-AA >60 >60 ml/min/1.73m2    Calcium 8.7 8.5 - 10.1 MG/DL   CALCIUM, IONIZED    Collection Time: 04/05/22  7:00 AM   Result Value Ref Range    Ionized Calcium 1.16 1.15 - 1.33 MMOL/L Assessment/Plan:     Active Problems:    Septic shock ([facility] [address] (3/24/2022)      ASSESSMENT:    1. Septic shock, resolved currently. Off IV pressors  2. UTI s/p treatment  3. Aspiration pneumonia with hypoxia  4. Chronic loculated left pleural effusion  5. Acute on chronic encephalopathy, slowly improving  6. Chronic encephalopathy and right-sided hemiparesis due to chronic L MCA infarct as well as right frontal lobe encephalomalacia. 7.  Thrombocytopenia of unknown etiology  8. Leukocytosis, resolved  9. Acute renal failure, improving  10.  6 x 3 cm right hepatic mass  11. Seizure disorder  12. Peripheral arterial disease  13. Hypertension    PLAN:    Patient has been off IV pressor. Started on p.o. antihypertensive medications   Experiencing soft BPs 3/31 - Will hold hydralazine, decrease lisinopril from 20 mg daily to 10 mg daily, decreased coreg from 25 mg BID to 12.5 mg yesterday. 4/1 - Patient's current regimen of coreg 12.5 mg BID, lasix 40 mg every day, lisinopril is keeping her normotensive, less lows than yesterday. 4/2 - experiencing tachycardia and soft BPs again after receiving Roxicodone. All antihypertensives held. 4/3 - antihypertensives held, normotensive. 4/4 - Pt hypertensive, will start adding back medications. 4/5: BPs well controlled. DuoNebs; continue to wean her off oxygen. Echocardiogram report reviewed. Patient is currently being treated with IV Lasix  CT head report reviewed. ID to follow. Ceftriaxone discontinued 4/2/22. Continue to be watchful of aspiration. Hematology/oncology has been consulted for thrombocytopenia. Platelets improving. Continue monitoring platelets; continuing to trend upwards   GI consult for PEG placement 4/5/22. Rosalio [phone]; [phone]. I have evaluated the patient after initiation of intervention.   The patient is comfortable, uninjured, but continues to pose an imminent risk of injury to self and or serious disruption of treatment. The patient's current medical and behavioral conditions that warrant the use intervention include danger to self and Interference with medical treatment. Restraint or seclusion will be discontinued at the earliest possible time, regardless of the scheduled expiration of the order. Total time to take care of this patient was 35 minutes and more than 50% of time was spent counseling and coordinating care. Disclaimer: Sections of this note are dictated using utilizing voice recognition software, which may have resulted in some phonetic based errors in grammar and contents. Even though attempts were made to correct all the mistakes, some may have been missed, and remained in the body of the document. If questions arise, please contact our department.

## 2022-04-05 NOTE — PROGRESS NOTES
[facility]  (A Division of [address] [place] [address])    Follow-up Note      Date of Admission: 3/24/2022       Date of Note:  4/5/2022          Current Antimicrobials:    Prior Antimicrobials:  Ceftriaxone 3/28- present Vancomycin 3/24 to 3/28  Meropenem 3/25- 3/28     Assessment:         Septic shock on multiple pressors with lactic acidosis: Suspect related to  source given abnormal UA vs liver abscess? Vs pleural process  Urinary tract infection: UA with greater than 100 WBCs few bacteria large leukocyte esterase negative nitrite; recent cx + for Klebsiella on 3/15  Aspiration pneumonia with hypoxia  Leukocytosis: improving  Loculated left pleural effusion with calcification and pleural thickening as well as non-specific diffuse opacities -  Noted on CT chest from 3/24. ? Chronic   GUIDO: Creatinine 2.38 upon presentation now improving  Metabolic encephalopathy with baseline dementia/prior CVA/limited verbal interaction  Suspected Right liver abscess vs mass (new since 2017)-3.1 x 5.8 x 4.2 cm, infection vs malignancy. Noted on 3/14 CT scn but not mentioedn in 3/24 CT report. ?? source  Thrombocytopenia-starting to recover    Plan:   Completed Ceftriaxone on 4/1  CBC, BMP in am   MRI liver with MRCP reviewed - biopsy at a later date. Heme/Onc following  Low threshold to restart antibiotics if there is ongoing concern for aspiration however as the patient is afebrile, her 4/2 chest x-ray did not have any new significant aspiration, and her platelets are now just starting to recover we will prefer to hold off on any additional antibiotics at this time may cloud her overall picture. Discussed with Dr. Guerline Mueller.       Hernan Villaseñor DO  [facility]  [address] \[facility]\"" [facility]  Office: [phone]  Mobile/Text: [phone]     Microbiology:  3/24 blood culture: No growth to date x2  3/25 RSV PCR negative  3/25 MRSA nasal swab negative  3/25 respiratory viral panel: Negative  3/25 C.diff: negative    Lines / Catheters:  A-line left wrist  Peripheral  Right IJ TLC  dove    Subjective:   Patent seen and examined. Awake, non-verbal.  Resting comfortably. Still requires 4 L NC  Low-grade temps to 98.6 without any fever spikes. No family at bedside during my visit today    Objective:        Visit Vitals  /86 (BP 1 Location: Left upper arm)   Pulse 93   Temp 98 °F (36.7 °C)   Resp 22   Ht 4' 11\" (1.499 m)   Wt 63.8 kg (140 lb 10.5 oz)   SpO2 (!) 86%   BMI 28.41 kg/m²     Temp (24hrs), Av.3 °F (36.8 °C), Min:98 °F (36.7 °C), Max:98.6 °F (37 °C)         General:   Awake and alert to the people around her,  she remains nonverbal   Skin:   no rashes or skin lesions noted on limited exam, cool to touch   HEENT:  Normocephalic, atraumatic, PERRL, EOMI, no scleral icterus or pallor; no conjunctival hemmohage;  nasal and oral mucous are moist and without evidence of lesions. No thrush. Neck supple, no bruits. NG in place   Lymph Nodes:   no cervical, axillary or inguinal adenopathy   Lungs:   non-labored, bilaterally clear to aspiration- no crackles wheezes rales or rhonchi   Heart:  RRR, s1 and s2; no murmurs rubs or gallops, no edema, + pedal pulses   Abdomen:  soft, non-distended, active bowel sounds, no hepatomegaly, no splenomegaly. yells out with palpation to the lower abdomen. No mid-epigastric tenderness   Genitourinary: Dove   Extremities:   no clubbing, cyanosis; no joint effusions or swelling; Full ROM of all large joints to the upper and lower extremities; muscle mass appropriate for age- left wrist restrained   Neurologic:  No gross focal sensory abnormalities;  awake, tracks, vocalizes discomfort and frustration however is otherwise nonverbal, response to pain nonverbal, right upper extremity appears to be contracted, focal weakness on the right when compared to the left as left side has noted independent movement. Psychiatric:   Calm        Lab results:    Chemistry  Recent Labs     04/05/22  0700 04/04/22  0400 04/03/22  0200   * 205* 201*    138 141   K 3.7 3.8 3.9    100 101   CO2 35* 32 37*   BUN 29* 29* 29*   CREA 0.78 0.77 0.84   CA 8.7 8.3* 7.9*   AGAP 3 6 3   BUCR 37* 38* 35*       CBC w/ Diff  Recent Labs     04/05/22  0700 04/04/22  0400 04/03/22  0200   WBC 9.1 10.6 11.6   RBC 2.85* 2.95* 3.56*   HGB 8.1* 8.3* 10.2*   HCT 25.8* 26.9* 32.2*   * 92* 59*   GRANS 78* 78* 74*   LYMPH 13* 11* 12*   EOS 2 2 3       Microbiology  All Micro Results     Procedure Component Value Units Date/Time    CULTURE, BLOOD [[id number]] Collected: 03/24/22 1945    Order Status: Completed Specimen: Blood Updated: 03/30/22 0639     Special Requests: NO SPECIAL REQUESTS        Culture result: NO GROWTH 6 DAYS       CULTURE, BLOOD [[id number]] Collected: 03/24/22 1952    Order Status: Completed Specimen: Blood Updated: 03/30/22 0639     Special Requests: NO SPECIAL REQUESTS        Culture result: NO GROWTH 6 DAYS       CULTURE, MRSA [[id number]] Collected: 03/25/22 0134    Order Status: Completed Specimen: Nasal from Nares Updated: 03/26/22 1151     Special Requests: NO SPECIAL REQUESTS        Culture result: MRSA NOT PRESENT               Screening of patient nares for MRSA is for surveillance purposes and, if positive, to facilitate isolation considerations in high risk settings. It is not intended for automatic decolonization interventions per se as regimens are not sufficiently effective to warrant routine use.           C. DIFFICILE AG & TOXIN A/B [[id number]] Collected: 03/25/22 1517    Order Status: Completed Specimen: Stool Updated: 03/26/22 1007     GDH ANTIGEN Negative        C. difficile toxin Negative        INTERPRETATION       NEGATIVE FOR TOXIGENIC C. DIFFICILE          RESPIRATORY VIRUS PANEL W/COVID-19, PCR [[id number]] Collected: 03/25/22 0030    Order Status: Completed Specimen: Nasopharyngeal Updated: 03/25/22 0205     Adenovirus Not detected        Coronavirus 229E Not detected        Coronavirus HKU1 Not detected        Coronavirus CVNL63 Not detected        Coronavirus OC43 Not detected        SARS-CoV-2, PCR Not detected        Metapneumovirus Not detected        Rhinovirus and Enterovirus Not detected        Influenza A Not detected        Influenza B Not detected        Parainfluenza 1 Not detected        Parainfluenza 2 Not detected        Parainfluenza 3 Not detected        Parainfluenza virus 4 Not detected        RSV by PCR Not detected        B. parapertussis, PCR Not detected        Bordetella pertussis - PCR Not detected        Chlamydophila pneumoniae DNA, QL, PCR Not detected        Mycoplasma pneumoniae DNA, QL, PCR Not detected       CULTURE, RESPIRATORY/SPUTUM/BRONCH Cherylann Madison [[id number]] Collected: 03/24/22 2330    Order Status: Canceled Specimen: Sputum            Mary Ramos DO   4/5/2022

## 2022-04-05 NOTE — PROGRESS NOTES
Nutrition Assessment     Type and Reason for Visit: Reassess,Positive nutrition screen,Consult    Nutrition Recommendations/Plan:   - Continue tube feeding of Glucerna 1.5 at goal rate of 45 mL/hr with 150 mL q 4 hour water flushes and daily MVI         (goal regimen to provide 1620 kcal, 89 gm protein, 820 mL free water, 100% RDIs). Nutrition Assessment:  Patient tolerating tube feeding at goal via NGT (placed 3/28) with altered mentation and NPO per SLP s/p swallow evaluation 3/28. Electrolytes WNL. Per MD note, pt will need a PEG, but platelets need to improve first.        Malnutrition Assessment:  Malnutrition Status: At risk for malnutrition (specify) (wt loss PTA per chart hx)     Estimated Daily Nutrient Needs:  Energy (kcal):  3284-0833  Protein (g):  46-58       Fluid (ml/day):  500 mL + total output    Nutrition Related Findings:  BM 4/3. Pertinent Medications: lasix, lantus, SSI, MVI with iron, bowel regimen, thiamine. Current Nutrition Therapies:  DIET NPO  ADULT TUBE FEEDING Nasogastric; Diabetic; Delivery Method: Continuous; Continuous Initial Rate (mL/hr): 10; Continuous Advance Tube Feeding: Yes; Advancement Volume (mL/hr): 10; Advancement Frequency: Q 12 hours; Continuous Goal Rate (mL/hr): 45; . ..     Anthropometric Measures:  · Height:  4' 11\" (149.9 cm)  · Current Body Wt:  63.8 kg (140 lb 10.5 oz)  · BMI: 28.4    Nutrition Diagnosis:   · Inadequate oral intake related to cognitive or neurological impairment as evidenced by NPO or clear liquid status due to medical condition      Nutrition Intervention:  Food and/or Nutrient Delivery: Continue NPO,Mineral supplement,Vitamin supplement,Continue tube feeding  Nutrition Education and Counseling: No recommendations at this time,Education not indicated  Coordination of Nutrition Care: Continue to monitor while inpatient    Goals:  Nutritional needs will be met through adequate oral intake or nutrition support within the next follow up date       Nutrition Monitoring and Evaluation:   Behavioral-Environmental Outcomes: None identified  Food/Nutrient Intake Outcomes: Enteral nutrition intake/tolerance,Vitamin/mineral intake  Physical Signs/Symptoms Outcomes: Biochemical data,GI status,Nutrition focused physical findings    Discharge Planning:     Too soon to determine     Electronically signed by Lona Steel RD on 4/5/2022 at 10:23 AM    Contact Number: [phone]

## 2022-04-06 NOTE — DIABETES MGMT
Diabetes/ Glycemic Control Plan of Care    Assessment:   Had PEG placed today  TF resumed at goal rate  Receiving daily dose of basal insulin, corrective humalog and a nutritional dose of humalog. She has only received one dose of additional nutritional humalog. BG continue elevated - will continue to monitor on current regime before adjusting  Disposition is pending her return to [address] [place] [address]     DX:   1. Septic shock ([facility] [address]     2. Acute encephalopathy     3. Acute kidney injury (GUIDO) with acute tubular necrosis (ATN) (HCC)     4. Hypomagnesemia     5. Pneumonia due to infectious organism, unspecified laterality, unspecified part of lung     6. Acute cystitis without hematuria     7. High anion gap metabolic acidosis     8. History of cardioembolic cerebrovascular accident (CVA)     9. Thrombocytopenia ([facility] [address]     10. Hypocalcemia     11. Dehydration     12. Hypernatremia     13. [facility]     14. Goals of care, counseling/discussion     15. Debility        Fasting/ Morning blood glucose:   Lab Results   Component Value Date/Time    Glucose 151 (H) 04/06/2022 02:48 AM    Glucose (POC) 190 (H) 04/06/2022 01:23 PM     IV Fluids containing dextrose: none  Steroids: none      Blood glucose values: Within target range (70-180mg/dL):  no    Current insulin orders:  lantus 18 units daily  Corrective humalog, very insulin resistant, every 6 hours   Nutritional dose of humalog 3 units every 6 hours    Total Daily Dose previous 24 hours =  18 units lantus    Current A1c:   Lab Results   Component Value Date/Time    Hemoglobin A1c 5.6 03/25/2022 12:00 AM      Nutrition/Diet:    Glucerna 1.5 at goal rate of 45 mL/hr with 150 mL q 4 hour water flushes     Home diabetes medications:   Key Antihyperglycemic Medications     Patient is on no antihyperglycemic meds.         Plan/Goals:   Blood glucose will be within target of 70 - 180 mg/dl within 72 hours  Reinforce dietary and medication compliance at home.        Education:  [] Refer to Diabetes Education Record                       [x] Education not indicated at this time     Majel Burkitt [facility] RN [address]  Pager [phone]  Office [phone]

## 2022-04-06 NOTE — PROGRESS NOTES
Problem: Pressure Injury - Risk of  Goal: *Prevention of pressure injury  Description: Document Ankit Scale and appropriate interventions in the flowsheet. Outcome: Progressing Towards Goal  Note: Pressure Injury Interventions:  Sensory Interventions: Minimize linen layers    Moisture Interventions: Absorbent underpads,Internal/External urinary devices,Check for incontinence Q2 hours and as needed    Activity Interventions: Pressure redistribution bed/mattress(bed type)    Mobility Interventions: Pressure redistribution bed/mattress (bed type),Float heels    Nutrition Interventions: Document food/fluid/supplement intake,Offer support with meals,snacks and hydration    Friction and Shear Interventions: Foam dressings/transparent film/skin sealants,Minimize layers                Problem: Patient Education: Go to Patient Education Activity  Goal: Patient/Family Education  Outcome: Progressing Towards Goal     Problem: Falls - Risk of  Goal: *Absence of Falls  Description: Document Diana Fall Risk and appropriate interventions in the flowsheet.   Outcome: Progressing Towards Goal  Note: Fall Risk Interventions:       Mentation Interventions: Bed/chair exit alarm,Adequate sleep, hydration, pain control,Eyeglasses and hearing aids,Reorient patient,Update white board,Toileting rounds,Room close to nurse's station    Medication Interventions: Patient to call before getting OOB,Bed/chair exit alarm,Teach patient to arise slowly    Elimination Interventions: Bed/chair exit alarm,Call light in reach,Patient to call for help with toileting needs    History of Falls Interventions: Bed/chair exit alarm         Problem: Patient Education: Go to Patient Education Activity  Goal: Patient/Family Education  Outcome: Progressing Towards Goal     Problem: Nutrition Deficit  Goal: *Optimize nutritional status  Outcome: Progressing Towards Goal     Problem: Risk for Spread of Infection  Goal: Prevent transmission of infectious organism to others  Description: Prevent the transmission of infectious organisms to other patients, staff members, and visitors.   Outcome: Progressing Towards Goal     Problem: Patient Education:  Go to Education Activity  Goal: Patient/Family Education  Outcome: Progressing Towards Goal     Problem: Patient Education: Go to Patient Education Activity  Goal: Patient/Family Education  Outcome: Progressing Towards Goal     Problem: Non-Violent Restraints  Goal: Removal from restraints as soon as assessed to be safe  Outcome: Progressing Towards Goal  Goal: No harm/injury to patient while restraints in use  Outcome: Progressing Towards Goal  Goal: Patient's dignity will be maintained  Outcome: Progressing Towards Goal  Goal: Patient Interventions  Outcome: Progressing Towards Goal     Problem: Delirium Treatment  Goal: *Level of consciousness restored to baseline  Outcome: Progressing Towards Goal  Goal: *Level of environmental perceptions restored to baseline  Outcome: Progressing Towards Goal  Goal: *Sensory perception restored to baseline  Outcome: Progressing Towards Goal  Goal: *Emotional stability restored to baseline  Outcome: Progressing Towards Goal  Goal: *Functional assessment restored to baseline  Outcome: Progressing Towards Goal  Goal: *Absence of falls  Outcome: Progressing Towards Goal  Goal: *Will remain free of delirium, CAM Score negative  Outcome: Progressing Towards Goal  Goal: *Cognitive status will be restored to baseline  Outcome: Progressing Towards Goal  Goal: Interventions  Outcome: Progressing Towards Goal     Problem: Patient Education: Go to Patient Education Activity  Goal: Patient/Family Education  Outcome: Progressing Towards Goal     Problem: Breathing Pattern - Ineffective  Goal: *Absence of hypoxia  Outcome: Progressing Towards Goal  Goal: *Use of effective breathing techniques  Outcome: Progressing Towards Goal  Goal: *PALLIATIVE CARE:  Alleviation of Dyspnea  Outcome: Progressing Towards Goal     Problem: Patient Education: Go to Patient Education Activity  Goal: Patient/Family Education  Outcome: Progressing Towards Goal

## 2022-04-06 NOTE — PROGRESS NOTES
Date of Surgery Update:  Myrtle Kee was seen and examined. History and physical has been reviewed. The patient has been examined.  There have been no significant clinical changes since the completion of the originally dated History and Physical.    Signed By: Raz Key MD     April 6, 2022 11:22 AM

## 2022-04-06 NOTE — PROCEDURES
[email or web address]  [place], [address]        Percutaneous Endoscopic Gastroduodenoscopy Procedure Note    Jayson Serrano  1955  [id number]      Date of Procedure: 4/6/2022    Preoperative diagnosis: DIFFICULTY SWALLOWING    Postoperative diagnosis: Peg placement    Procedure: Procedure(s):  PERCUTANEOUS ENDOSCOPIC GASTROSTOMY TUBE INSERTION    Indication: Dysphagia    :  Dr. Morro Curry MD    Assistant(s): Endoscopy Technician-1: Melvin Byrnes  Endoscopy RN-1: Prosper Santiago RN    Anesthesia/Sedation:  MAC anesthesia Propofol      Procedure Details     After infomed consent was obtained for the procedure, with all risks and benefits of procedure explained the patient was taken to the endoscopy suite and placed in the left lateral decubitus position. Following sequential administration of sedation as per above, the endoscope was inserted into the mouth and advanced under direct vision to the second portion of the duodenum. The esohagus looked normal.  There were no diagnostic abnormalities of the body, fundus, antrum, cardia and iscisura of the stomach. The first and second portion of the duodenum appeared normal.      A site was selected on the anterior abdominal wall where the light shined and where one finger easily indented the anterior abdominal wall. Lidocaine analgesia was utilized (1%). The exploring needle easily indented the stomach and penetrated it. The needle-trocar device was then inserted into the skin after an incision. Once the needle trocar device entered the stomach the trocar was grasped by the snare. The needle was removed and a wire was placed thorough the trocar. The wire was grasped by the snare and removed at the mouth. A 20 Fr [employer] tube was positioned over the wire and pushed out of the anterior abdominal wall. The tube was grabbed.   The incision site was enlarged as necessary and the tube was pulled snug. A brief repeat endoscopy verified proper placement of the tube and no apparent complications. Complications:   None; patient tolerated the procedure well. EBL:  None.   Surgical assistants none  Implants peg            Impression:   Peg inserted      Recommendations:  Feeding, formula and rate per Nutrition    Tamiko Yeboah MD  4/6/2022  11:54 AM

## 2022-04-06 NOTE — PROGRESS NOTES
[facility]  (A Division of [address] [place] [address])    Follow-up Note      Date of Admission: 3/24/2022       Date of Note:  4/6/2022          Current Antimicrobials:    Prior Antimicrobials:  Ceftriaxone 3/28- present Vancomycin 3/24 to 3/28  Meropenem 3/25- 3/28     Assessment:         Septic shock on multiple pressors with lactic acidosis: Suspect related to  source given abnormal UA vs liver abscess? Vs pleural process  Urinary tract infection: UA with greater than 100 WBCs few bacteria large leukocyte esterase negative nitrite; recent cx + for Klebsiella on 3/15  Aspiration pneumonia with hypoxia  Leukocytosis: improving  Loculated left pleural effusion with calcification and pleural thickening as well as non-specific diffuse opacities -  Noted on CT chest from 3/24. ? Chronic   GUIDO: Creatinine 2.38 upon presentation now improving  Metabolic encephalopathy with baseline dementia/prior CVA/limited verbal interaction  Suspected Right liver abscess vs mass (new since 2017)-3.1 x 5.8 x 4.2 cm, infection vs malignancy. Noted on 3/14 CT scn but not mentioedn in 3/24 CT report. ?? source  Thrombocytopenia-starting to recover    Plan:   Completed Ceftriaxone on 4/1  CBC, BMP in am   4/5 MRI and MRI liver with MRCP reviewed - biopsy at a later date. Heme/Onc following    Low threshold to restart antibiotics if there is ongoing concern for aspiration however as the patient is afebrile, her 4/2 chest x-ray did not have any new significant aspiration, and her platelets are now just starting to recover we will prefer to hold off on any additional antibiotics at this time may cloud her overall picture. Discussed with Dr. Will.       Santiago Oliva DO  [facility]  [address], [address] [facility]izoB[facility] [address]  Office: [phone]  Mobile/Text: [phone]     Microbiology:  3/24 blood culture: No growth to date x2  3/25 RSV PCR negative  3/25 MRSA nasal swab negative  3/25 respiratory viral panel: Negative  3/25 C.diff: negative    Lines / Catheters:  A-line left wrist  Peripheral  Right IJ TLC  dove    Subjective:   Patent seen and examined. Awake, non-verbal.  Resting comfortably. Still requires 5 L NC  Had PEG placed earlier today- tolerated well per nursing report. No family at bedside during my visit today    Objective:        Visit Vitals  BP 97/71   Pulse 80   Temp 97.5 °F (36.4 °C)   Resp 30   Ht 4' 11\" (1.499 m)   Wt 62.4 kg (137 lb 9.1 oz)   SpO2 100%   BMI 27.79 kg/m²     Temp (24hrs), Av.7 °F (36.5 °C), Min:97.2 °F (36.2 °C), Max:98.1 °F (36.7 °C)         General:   Awake and alert to the people around her,  she remains nonverbal   Skin:   no rashes or skin lesions noted on limited exam, cool to touch   HEENT:  Normocephalic, atraumatic, PERRL, EOMI, no scleral icterus or pallor; no conjunctival hemmohage;  nasal and oral mucous are moist and without evidence of lesions. No thrush. Neck supple, no bruits. NG in place   Lymph Nodes:   no cervical, axillary or inguinal adenopathy   Lungs:   non-labored, bilaterally clear to aspiration- no crackles wheezes rales or rhonchi   Heart:  RRR, s1 and s2; no murmurs rubs or gallops, no edema, + pedal pulses   Abdomen:  soft, non-distended, active bowel sounds, no hepatomegaly, no splenomegaly. yells out with palpation to the lower abdomen.  + PEG   Genitourinary: Dove   Extremities:   no clubbing, cyanosis; no joint effusions or swelling; Full ROM of all large joints to the upper and lower extremities; muscle mass appropriate for age- left wrist restrained   Neurologic:  No gross focal sensory abnormalities;  awake, tracks, vocalizes discomfort and frustration however is otherwise nonverbal, response to pain nonverbal, right upper extremity appears to be contracted, focal weakness on the right when compared to the left as left side has noted independent movement.    Psychiatric:   Calm        Lab results:    Chemistry  Recent Labs     04/06/22  0248 04/05/22  0700 04/04/22  0400   * 172* 205*    141 138   K 3.8 3.7 3.8    103 100   CO2 36* 35* 32   BUN 29* 29* 29*   CREA 0.79 0.78 0.77   CA 8.6 8.7 8.3*   AGAP 1* 3 6   BUCR 37* 37* 38*       CBC w/ Diff  Recent Labs     04/06/22 0248 04/05/22  0700 04/04/22  0400   WBC 10.4 9.1 10.6   RBC 2.83* 2.85* 2.95*   HGB 7.9* 8.1* 8.3*   HCT 25.7* 25.8* 26.9*    112* 92*   GRANS 81* 78* 78*   LYMPH 11* 13* 11*   EOS 1 2 2       Microbiology  All Micro Results     Procedure Component Value Units Date/Time    CULTURE, BLOOD [[id number]] Collected: 03/24/22 1945    Order Status: Completed Specimen: Blood Updated: 03/30/22 0639     Special Requests: NO SPECIAL REQUESTS        Culture result: NO GROWTH 6 DAYS       CULTURE, BLOOD [[id number]] Collected: 03/24/22 1952    Order Status: Completed Specimen: Blood Updated: 03/30/22 0639     Special Requests: NO SPECIAL REQUESTS        Culture result: NO GROWTH 6 DAYS       CULTURE, MRSA [[id number]] Collected: 03/25/22 0134    Order Status: Completed Specimen: Nasal from Nares Updated: 03/26/22 1151     Special Requests: NO SPECIAL REQUESTS        Culture result: MRSA NOT PRESENT               Screening of patient nares for MRSA is for surveillance purposes and, if positive, to facilitate isolation considerations in high risk settings. It is not intended for automatic decolonization interventions per se as regimens are not sufficiently effective to warrant routine use.           C. DIFFICILE AG & TOXIN A/B [[id number]] Collected: 03/25/22 1517    Order Status: Completed Specimen: Stool Updated: 03/26/22 1007     GDH ANTIGEN Negative        C. difficile toxin Negative        INTERPRETATION       NEGATIVE FOR TOXIGENIC C. DIFFICILE          RESPIRATORY VIRUS PANEL W/COVID-19, PCR [[id number]] Collected: 03/25/22 0030    Order Status: Completed Specimen: Nasopharyngeal Updated: 03/25/22 0205     Adenovirus Not detected        Coronavirus 229E Not detected        Coronavirus HKU1 Not detected        Coronavirus CVNL63 Not detected        Coronavirus OC43 Not detected        SARS-CoV-2, PCR Not detected        Metapneumovirus Not detected        Rhinovirus and Enterovirus Not detected        Influenza A Not detected        Influenza B Not detected        Parainfluenza 1 Not detected        Parainfluenza 2 Not detected        Parainfluenza 3 Not detected        Parainfluenza virus 4 Not detected        RSV by PCR Not detected        B. parapertussis, PCR Not detected        Bordetella pertussis - PCR Not detected        Chlamydophila pneumoniae DNA, QL, PCR Not detected        Mycoplasma pneumoniae DNA, QL, PCR Not detected       CULTURE, RESPIRATORY/SPUTUM/BRONCH Puentes John [[id number]] Collected: 03/24/22 2330    Order Status: Canceled Specimen: Sputum            Laney Kraft DO   4/6/2022

## 2022-04-06 NOTE — PROGRESS NOTES
[facility]: [phone] ([phone])  [facility]: [phone]    Goals of care defined. Ms. Lennie Coffey is scheduled for PEG tube placement. Palliative team will sign off. Please consult team as needed, if appropriate. Thank you for the Palliative Medicine consult and allowing us to participate in the care of Ms. Burgos          CODE STATUS  FULL CODE WITH FULL INTERVENTIONS. Ok with PEG tube placement.          Jose Manuel MALDONADON, RN, [facility]  Palliative Medicine Inpatient RN  [facility] [address]: [phone]

## 2022-04-06 NOTE — ROUTINE PROCESS
TRANSFER - IN REPORT:    Verbal report received from Valeria Archer RN(name) on Iris Burgos  being received from [facility](unit) for ordered procedure      Report consisted of patients Situation, Background, Assessment and   Recommendations(SBAR). Information from the following report(s) SBAR and Kardex was reviewed with the receiving nurse. Opportunity for questions and clarification was provided. Assessment completed upon patients arrival to unit and care assumed.

## 2022-04-06 NOTE — PROGRESS NOTES
[facility] Jojo POLO to includes SBAR, Kardex, recent results, MAR, intake/output.

## 2022-04-06 NOTE — ROUTINE PROCESS
TRANSFER - IN REPORT:    Verbal report received from BECCA CIFUENTES RN on Iris Burgos  being received from [facility] for ordered procedure      Report consisted of patients Situation, Background, Assessment and   Recommendations(SBAR). Information from the following report(s) SBAR and Kardex was reviewed with the receiving nurse. Opportunity for questions and clarification was provided. Assessment completed upon patients arrival to unit and care assumed.

## 2022-04-06 NOTE — PROGRESS NOTES
Hospitalist Progress Note    Subjective:   Daily Progress Note: 4/6/2022     Platelets improving to 151 today. Heme/onc with note. Patient continues on 5L/min. Restraints required renewal as patient continues to reach for lines when not restrained. DTI on heel discovered. Heel floats continued. Ceftriaxone completed. Patient at high risk of aspiration - will need to be watchful. Patient s/p PEG placement 4/6/22.      Current Facility-Administered Medications   Medication Dose Route Frequency    bacitracin zinc-polymyxin b (POLYSPORIN) 500-10,000 unit/gram ointment   Topical BID    furosemide (LASIX) injection 20 mg  20 mg IntraVENous DAILY    insulin lispro (HUMALOG) injection 3 Units  3 Units SubCUTAneous Q6H    insulin glargine (LANTUS) injection 18 Units  18 Units SubCUTAneous DAILY    oxyCODONE IR (ROXICODONE) tablet 5 mg  5 mg Oral Q6H PRN    carvediloL (COREG) tablet 12.5 mg  12.5 mg Oral Q12H    multivitamin-minerals-ferrous gluconate oral liquid 15 mL  15 mL Per NG tube DAILY    [Held by provider] lisinopriL (PRINIVIL, ZESTRIL) tablet 10 mg  10 mg Oral DAILY    nitroglycerin (NITROBID) 2 % ointment 1 Inch  1 Inch Topical Q6H PRN    hydrALAZINE (APRESOLINE) 20 mg/mL injection 20 mg  20 mg IntraVENous Q6H PRN    QUEtiapine (SEROquel) tablet 50 mg  50 mg Oral QHS    melatonin tablet 10 mg  10 mg Oral QHS    [Held by provider] hydrALAZINE (APRESOLINE) tablet 50 mg  50 mg Oral TID    [Held by provider] oxyCODONE (ROXICODONE) 5 mg/5 mL oral solution 7.5 mg  7.5 mg Oral Q4H PRN    thiamine HCL (B-1) tablet 200 mg  200 mg Oral TID    nystatin (MYCOSTATIN) 100,000 unit/gram powder   Topical BID    dextrose 10% infusion 0-250 mL  0-250 mL IntraVENous PRN    albuterol-ipratropium (DUO-NEB) 2.5 MG-0.5 MG/3 ML  3 mL Nebulization Q4H PRN    [Held by provider] heparin (porcine) injection 5,000 Units  5,000 Units SubCUTAneous Q8H    insulin lispro (HUMALOG) injection   SubCUTAneous Q6H    glucose chewable tablet 16 g  4 Tablet Oral PRN    glucagon (GLUCAGEN) injection 1 mg  1 mg IntraMUSCular PRN    acetaminophen (TYLENOL) tablet 650 mg  650 mg Oral Q6H PRN    Or    acetaminophen (TYLENOL) suppository 650 mg  650 mg Rectal Q6H PRN    polyethylene glycol (MIRALAX) packet 17 g  17 g Oral DAILY PRN    ondansetron (ZOFRAN ODT) tablet 4 mg  4 mg Oral Q8H PRN    Or    ondansetron (ZOFRAN) injection 4 mg  4 mg IntraVENous Q6H PRN        Review of Systems  Review of systems not obtained due to patient factors. Objective:     Visit Vitals  /79 (BP 1 Location: Right upper arm, BP Patient Position: At rest)   Pulse 89   Temp 97.8 °F (36.6 °C)   Resp 22   Ht 4' 11\" (1.499 m)   Wt 62.4 kg (137 lb 9.1 oz)   SpO2 92%   BMI 27.79 kg/m²    O2 Flow Rate (L/min): 5 l/min O2 Device: Oxygen mask    Temp (24hrs), Av.7 °F (36.5 °C), Min:97.2 °F (36.2 °C), Max:98.1 °F (36.7 °C)       07 -  1900  In: 120 [I.V.:120]  Out: -    1901 -  0700  In: 7110   Out: 1500 [Urine:1500]      General appearance -awake, ill-appearing, NG tube in situ. Will more interactive than day before. Eyes - sclera anicteric, no pallor  Nose - no obvious nasal discharge. NG tube is in situ. Neck - supple, no JVD, trachea is midline  Chest -diminished air entry noted in bases, poor inspiratory efforts, no wheezes  Heart - S1 and S2 normal  Abdomen - soft, nontender, nondistended, Bowel sounds present  Neurological -awake, moves left upper extremity well and is under restraint without any wrist injury, not moving right side. Musculoskeletal -right knee swelling and mild bruising present. Extremities -  pedal edema noted. DTI present on right heel. Heel floats in place.      Data Review    Recent Results (from the past 24 hour(s))   GLUCOSE, POC    Collection Time: 22  6:17 PM   Result Value Ref Range    Glucose (POC) 234 (H) 70 - 110 mg/dL   GLUCOSE, POC    Collection Time: 22 12:15 AM   Result Value Ref Range    Glucose (POC) 123 (H) 70 - 110 mg/dL   CBC WITH AUTOMATED DIFF    Collection Time: 04/06/22  2:48 AM   Result Value Ref Range    WBC 10.4 4.6 - 13.2 K/uL    RBC 2.83 (L) 4.20 - 5.30 M/uL    HGB 7.9 (L) 12.0 - 16.0 g/dL    HCT 25.7 (L) 35.0 - 45.0 %    MCV 90.8 78.0 - 100.0 FL    MCH 27.9 24.0 - 34.0 PG    MCHC 30.7 (L) 31.0 - 37.0 g/dL    RDW 14.8 (H) 11.6 - 14.5 %    PLATELET 404 676 - 561 K/uL    MPV 13.9 (H) 9.2 - 11.8 FL    NRBC 0.0 0  WBC    ABSOLUTE NRBC 0.00 0.00 - 0.01 K/uL    NEUTROPHILS 81 (H) 40 - 73 %    LYMPHOCYTES 11 (L) 21 - 52 %    MONOCYTES 6 3 - 10 %    EOSINOPHILS 1 0 - 5 %    BASOPHILS 0 0 - 2 %    IMMATURE GRANULOCYTES 1 (H) 0.0 - 0.5 %    ABS. NEUTROPHILS 8.4 (H) 1.8 - 8.0 K/UL    ABS. LYMPHOCYTES 1.1 0.9 - 3.6 K/UL    ABS. MONOCYTES 0.6 0.05 - 1.2 K/UL    ABS. EOSINOPHILS 0.1 0.0 - 0.4 K/UL    ABS. BASOPHILS 0.0 0.0 - 0.1 K/UL    ABS. IMM.  GRANS. 0.1 (H) 0.00 - 0.04 K/UL    DF AUTOMATED     PHOSPHORUS    Collection Time: 04/06/22  2:48 AM   Result Value Ref Range    Phosphorus 3.6 2.5 - 4.9 MG/DL   MAGNESIUM    Collection Time: 04/06/22  2:48 AM   Result Value Ref Range    Magnesium 2.0 1.6 - 2.6 mg/dL   CALCIUM, IONIZED    Collection Time: 04/06/22  2:48 AM   Result Value Ref Range    Ionized Calcium 1.14 (L) 1.15 - 9.64 MMOL/L   METABOLIC PANEL, BASIC    Collection Time: 04/06/22  2:48 AM   Result Value Ref Range    Sodium 139 136 - 145 mmol/L    Potassium 3.8 3.5 - 5.5 mmol/L    Chloride 102 100 - 111 mmol/L    CO2 36 (H) 21 - 32 mmol/L    Anion gap 1 (L) 3.0 - 18 mmol/L    Glucose 151 (H) 74 - 99 mg/dL    BUN 29 (H) 7.0 - 18 MG/DL    Creatinine 0.79 0.6 - 1.3 MG/DL    BUN/Creatinine ratio 37 (H) 12 - 20      GFR est AA >60 >60 ml/min/1.73m2    GFR est non-AA >60 >60 ml/min/1.73m2    Calcium 8.6 8.5 - 10.1 MG/DL   GLUCOSE, POC    Collection Time: 04/06/22  6:11 AM   Result Value Ref Range    Glucose (POC) 213 (H) 70 - 110 mg/dL   GLUCOSE, POC    Collection Time: 04/06/22 11:03 AM   Result Value Ref Range    Glucose (POC) 271 (H) 70 - 110 mg/dL   GLUCOSE, POC    Collection Time: 04/06/22 12:07 PM   Result Value Ref Range    Glucose (POC) 261 (H) 70 - 110 mg/dL   GLUCOSE, POC    Collection Time: 04/06/22  1:23 PM   Result Value Ref Range    Glucose (POC) 190 (H) 70 - 110 mg/dL         Assessment/Plan:     Active Problems:    Septic shock ([address] (3/24/2022)      ASSESSMENT:    1. Septic shock, resolved currently. Off IV pressors  2. UTI s/p treatment  3. Aspiration pneumonia with hypoxia  4. Chronic loculated left pleural effusion  5. Acute on chronic encephalopathy, slowly improving  6. Chronic encephalopathy and right-sided hemiparesis due to chronic L MCA infarct as well as right frontal lobe encephalomalacia. 7.  Thrombocytopenia of unknown etiology  8. Leukocytosis, resolved  9. Acute renal failure, improving  10.  6 x 3 cm right hepatic mass  11. Seizure disorder  12. Peripheral arterial disease  13. Hypertension    PLAN:    Patient has been off IV pressor. Started on p.o. antihypertensive medications   Experiencing soft BPs 3/31 - Will hold hydralazine, decrease lisinopril from 20 mg daily to 10 mg daily, decreased coreg from 25 mg BID to 12.5 mg yesterday. 4/1 - Patient's current regimen of coreg 12.5 mg BID, lasix 40 mg every day, lisinopril is keeping her normotensive, less lows than yesterday. 4/2 - experiencing tachycardia and soft BPs again after receiving Roxicodone. All antihypertensives held. 4/3 - antihypertensives held, normotensive. 4/4 - Pt hypertensive, will start adding back medications. 4/5: BPs well controlled. 4/6 - Patient slightly hypertensive. DuoNebs; continue to wean her off oxygen. Echocardiogram report reviewed. Patient is currently being treated with IV Lasix  CT head report reviewed. ID to follow. Ceftriaxone discontinued 4/2/22. Continue to be watchful of aspiration.    Hematology/oncology has been consulted for thrombocytopenia. Platelets improving. Continue monitoring platelets; continuing to trend upwards   PEG placement on 4/6/22. Niece [phone]; [phone]. I have evaluated the patient after initiation of intervention. The patient is comfortable, uninjured, but continues to pose an imminent risk of injury to self and or serious disruption of treatment. The patient's current medical and behavioral conditions that warrant the use intervention include danger to self and Interference with medical treatment. Restraint or seclusion will be discontinued at the earliest possible time, regardless of the scheduled expiration of the order. Total time to take care of this patient was 35 minutes and more than 50% of time was spent counseling and coordinating care. Disclaimer: Sections of this note are dictated using utilizing voice recognition software, which may have resulted in some phonetic based errors in grammar and contents. Even though attempts were made to correct all the mistakes, some may have been missed, and remained in the body of the document. If questions arise, please contact our department.

## 2022-04-06 NOTE — PROGRESS NOTES
SLP Note:    Pt NPO for PEG placement; will hold dysphagia management and continue to follow per POC.      Anali Reveles M.S., [address] [facility]  Speech-Language Pathologist

## 2022-04-06 NOTE — ANESTHESIA PREPROCEDURE EVALUATION
Anesthetic History               Review of Systems / Medical History  Patient summary reviewed, nursing notes reviewed and pertinent labs reviewed    Pulmonary                   Neuro/Psych       CVA (right-sided hemiparesis )  Dementia     Cardiovascular    Hypertension  Valvular problems/murmurs (mild): mitral insufficiency        Past MI (NSTEMI, likely type 2 this admission) and CAD      Comments: 03/2022 ECHO  estimated EF of 55 - 60%. Mitral Valve: Mild transvalvular regurgitation.   Mild pulmonary hypertension present     GI/Hepatic/Renal                Endo/Other    Diabetes    Anemia     Other Findings   Comments: septic shock this admission           Physical Exam    Airway  Mallampati: III  TM Distance: 4 - 6 cm  Neck ROM: decreased range of motion   Mouth opening: Diminished (comment)     Cardiovascular    Rhythm: regular  Rate: normal         Dental    Dentition: Poor dentition     Pulmonary  Breath sounds clear to auscultation               Abdominal  GI exam deferred       Other Findings            Anesthetic Plan    ASA: 4  Anesthesia type: MAC            Anesthetic plan and risks discussed with: Healthcare power of , Son / Daughter and Sibling

## 2022-04-06 NOTE — PROGRESS NOTES
Nutrition Note      Pt being seen per MD request. Pt had been tolerating tube feeds of Glucerna 1.5 at goal rate during previous days. Made NPO at midnight for PEG placement today. S/p placement and okay to use PEG per GI order. Discussed with RN this morning, okay to resume tube feeds at goal rate when cleared by GI. Noted plan for pt to return to [facility] when discharged. BM 4/6. Nutrition goals were being met. Nutrition Recommendations/Plan:   - Resume tube feeds of Glucerna 1.5 at goal rate of 45 mL/hr with 150 mL q 4 hour water flushes and daily MVI         (goal regimen to provide 1620 kcal, 89 gm protein, 820 mL free water, 100% RDIs).         Electronically signed by Taj Sotelo RD on 4/6/2022 at 1:18 PM    Contact: [phone]

## 2022-04-06 NOTE — ROUTINE PROCESS
TRANSFER - OUT REPORT:    Verbal report given to CHRIST Garcia on [employer]  being transferred to [facility] for ordered procedure       Report consisted of patients Situation, Background, Assessment and   Recommendations(SBAR). Information from the following report(s) SBAR and Kardex was reviewed with the receiving nurse. Lines:   Peripheral IV 03/31/22 Right Antecubital (Active)   Site Assessment Clean, dry, & intact 04/06/22 0831   Phlebitis Assessment 0 04/06/22 0831   Infiltration Assessment 0 04/06/22 0831   Dressing Status Clean, dry, & intact 04/06/22 0831   Dressing Type Tape;Transparent 04/06/22 0831   Hub Color/Line Status Flushed 04/06/22 0831   Action Taken Open ports on tubing capped 04/06/22 0831   Alcohol Cap Used Yes 04/06/22 0831       Peripheral IV 04/02/22 Anterior;Distal;Right Forearm (Active)   Site Assessment Clean, dry, & intact 04/06/22 0831   Phlebitis Assessment 0 04/06/22 0831   Infiltration Assessment 0 04/06/22 0831   Dressing Status Clean, dry, & intact 04/06/22 0831   Dressing Type Tape;Transparent 04/06/22 0831   Hub Color/Line Status Flushed 04/06/22 0831   Action Taken Open ports on tubing capped 04/06/22 0831   Alcohol Cap Used Yes 04/06/22 0831        Opportunity for questions and clarification was provided.       Patient transported with:   O2 @ 5 liters  Tech

## 2022-04-06 NOTE — PROGRESS NOTES
Bedside shift report give to [facility]. Jojo POLO from [address] . Report including the following information SBAR, Kardex, recent results, MAR, intake/output and cardiac rhythm NSR.

## 2022-04-06 NOTE — ANESTHESIA POSTPROCEDURE EVALUATION
Procedure(s):  PERCUTANEOUS ENDOSCOPIC GASTROSTOMY TUBE INSERTION. MAC    Anesthesia Post Evaluation      Multimodal analgesia: multimodal analgesia used between 6 hours prior to anesthesia start to PACU discharge  Patient location during evaluation: PACU  Patient participation: complete - patient participated  Level of consciousness: awake and alert  Pain management: adequate  Airway patency: patent  Anesthetic complications: no  Cardiovascular status: acceptable and hemodynamically stable  Respiratory status: acceptable  Hydration status: acceptable  Post anesthesia nausea and vomiting:  controlled      INITIAL Post-op Vital signs:   Vitals Value Taken Time   /48 04/06/22 1238   Temp 36.4 °C (97.5 °F) 04/06/22 1202   Pulse 82 04/06/22 1240   Resp 19 04/06/22 1240   SpO2 99 % 04/06/22 1240   Vitals shown include unvalidated device data.

## 2022-04-06 NOTE — PERIOP NOTES
Peg tube dressing site 4x4 and medipore tape clean, dry and intact. 1245  TRANSFER - OUT REPORT:    Verbal report given to CHRIST Lal (name) on Alondra Burgos  being transferred to [address] [place] [address] Drive (unit) for routine post - op       Report consisted of patients Situation, Background, Assessment and   Recommendations(SBAR). Information from the following report(s) SBAR, OR Summary, Recent Results and Cardiac Rhythm sinus rhythm was reviewed with the receiving nurse. Lines:   Peripheral IV 03/31/22 Right Antecubital (Active)   Site Assessment Clean, dry, & intact 04/06/22 0831   Phlebitis Assessment 0 04/06/22 0831   Infiltration Assessment 0 04/06/22 0831   Dressing Status Clean, dry, & intact 04/06/22 0831   Dressing Type Tape;Transparent 04/06/22 0831   Hub Color/Line Status Flushed 04/06/22 0831   Action Taken Open ports on tubing capped 04/06/22 0831   Alcohol Cap Used Yes 04/06/22 0831       Peripheral IV 04/02/22 Anterior;Distal;Right Forearm (Active)   Site Assessment Clean, dry, & intact 04/06/22 0831   Phlebitis Assessment 0 04/06/22 0831   Infiltration Assessment 0 04/06/22 0831   Dressing Status Clean, dry, & intact 04/06/22 0831   Dressing Type Tape;Transparent 04/06/22 0831   Hub Color/Line Status Flushed 04/06/22 0831   Action Taken Open ports on tubing capped 04/06/22 0831   Alcohol Cap Used Yes 04/06/22 0831        Opportunity for questions and clarification was provided.       Patient transported with:   O2 @ 5 liters  Tech

## 2022-04-06 NOTE — PROGRESS NOTES
DEEP spoke with Toni Canales with [facility] [address], updated her that patient is getting a Peg Tube today, and is a possible discharge for Saturday. Toni Canales said they could accept patient back on Saturday if medically clear for discharge.                Manuel Bowie, RN  Case Management [phone]

## 2022-04-07 PROBLEM — N17.9 AKI (ACUTE KIDNEY INJURY) (HCC): Status: ACTIVE | Noted: 2022-01-01

## 2022-04-07 PROBLEM — J69.0 ASPIRATION PNEUMONIA (HCC): Status: ACTIVE | Noted: 2021-01-01

## 2022-04-07 PROBLEM — R13.13 PHARYNGEAL DYSPHAGIA: Status: ACTIVE | Noted: 2022-01-01

## 2022-04-07 PROBLEM — J90 PLEURAL EFFUSION: Status: ACTIVE | Noted: 2022-01-01

## 2022-04-07 PROBLEM — G93.49 ENCEPHALOPATHY CHRONIC: Status: ACTIVE | Noted: 2022-01-01

## 2022-04-07 NOTE — PROGRESS NOTES
Problem: Dysphagia (Adult)  Goal: *Acute Goals and Plan of Care (Insert Text)  Description: Patient will:  1. Tolerate PO trials with 0 s/s overt distress in 4/5 trials-not met   2. Utilize compensatory swallow strategies/maneuvers (decrease bite/sip, size/rate, alt. liq/sol) with min cues in 4/5 trials-not met   3. Perform oral-motor/laryngeal exercises to increase oropharyngeal swallow function with min cues-not met   4. Complete an objective swallow study (i.e., MBSS) to assess swallow integrity, r/o aspiration, and determine of safest LRD, min A as indicated/ordered by MD-not met     Recommend:   NPO with PEG  Strict aspiration precautions  HOB >30 degrees at all times; oral care TID      Outcome: Resolved/Not Met     SPEECH LANGUAGE PATHOLOGY DYSPHAGIA TREATMENT & DISCHARGE    Patient: Juve Hernandez (68 y.o. female)  Date: 4/7/2022  Diagnosis: Septic shock ([facility] [A41.9, R65.21] Procedure(s) (LRB):  PERCUTANEOUS ENDOSCOPIC GASTROSTOMY TUBE INSERTION (N/A) 1 Day Post-Op  Precautions: Aspiration     PLOF: As per H&P     ASSESSMENT:  Pt seen for follow up dysphagia treatment s/p PEG placement 4/6/22. Pt with eyes opened,nonverbal and unable to follow commands. Pt orally defensive to oral care and ice chip presentation. Eventually able to facilitate bolus acceptance of ice chip given max tactile stim with pt demo immediate ejection. Pt has not shown functional progress towards safe and efficient po intake, now with PEG in place. Will sign off. Available for re-consult as indicated. Progression toward goals:  [x]         Not making progress/Not appropriate - will d/c POC     PLAN:  Recommendations and Planned Interventions:  Maximum therapeutic gains met; safest, least restrictive diet achieved. D/C ST intervention at this time. Discharge Recommendations:   May benefit from SLP follow up upon discharge      SUBJECTIVE:   Patient nonverbal    OBJECTIVE:   Cognitive and Communication Status:  Neurologic State: Alert  Orientation Level: Unable to verbalize  Cognition: No command following  Dysphagia Treatment:  Oral Assessment:  Oral Assessment  Labial: No impairment  Dentition: Natural  Oral Hygiene: could not assess  Lingual: Other (comment) (could not assess)  Velum: Unable to visualize  Mandible: No impairment  P.O.  Trials:   See above     PAIN:  Start of Tx: unable to report   End of Tx: unable to report      After treatment:   []              Patient left in no apparent distress sitting up in chair  [x]              Patient left in no apparent distress in bed  [x]              Call bell left within reach  [x]              Nursing notified  []              Caregiver present  []              Bed alarm activated      COMMUNICATION/EDUCATION:   [x] Aspiration precautions; swallow safety; compensatory techniques  []        Patient/family able to participate in training and education   [x]  Patient unable to participate in education; education ongoing with staff  [] Patient understands goals/intent of therapy; neutral about participation     Thank you for this referral,  Emelina Oneil M.S., [address] [facility]  Speech-Language Pathologist

## 2022-04-07 NOTE — PROGRESS NOTES
[facility], [address]  [phone]  [email or web address]    Gastroenterology follow up-Progress note    Impression:  1. Oropharyngeal dysphagia -s/p PEG insertion of 20 Fr [employer] PEG 4/6/2022 by Dr Ebonie Lay. Plan:  1. Continue PEG tube flushes after each use and stoma wound care. 2. Formula and rate per nutrition. 3. Additional recommendations as outlined in progress note attested by Dr Ebonie Lay 4/5/2022. 4. Medical management per primary team.      Will sign off but available as needed. Thank you for this consultation and the opportunity to participate in the care of this patient. Please do not hesitate to call with any questions or concerns, or should event occur that may necessitate additional GI evaluation. Subjective: no acute GI events overnight. Feeding in progress.     ROS: Unable to obtain due to patient factors    General: awake, no distress   Neck: supple and trachea normal   Cardiovascular: normal rate     Pulmonary/Chest Wall: unlabored respiratory effort   Abdominal: appearance normal, bowel sounds normal and soft, non-acute, non-tender, stoma without complications; feeding tube patent     Patient Active Problem List   Diagnosis Code    UTI (urinary tract infection) N39.0    Pneumonia J18.9    Septic shock (HCC) A41.9, R65.21         Visit Vitals  /83   Pulse 63   Temp 98.3 °F (36.8 °C)   Resp 18   Ht 4' 11\" (1.499 m)   Wt 62.4 kg (137 lb 9.1 oz)   SpO2 96%   BMI 27.79 kg/m²           Intake/Output Summary (Last 24 hours) at 4/7/2022 0847  Last data filed at 4/7/2022 5527  Gross per 24 hour   Intake 972 ml   Output 500 ml   Net 472 ml       CBC w/Diff    Lab Results   Component Value Date/Time    WBC 9.4 04/07/2022 01:34 AM    RBC 2.56 (L) 04/07/2022 01:34 AM    HGB 7.2 (L) 04/07/2022 01:34 AM    HCT 23.2 (L) 04/07/2022 01:34 AM    MCV 90.6 04/07/2022 01:34 AM    MCH 28.1 04/07/2022 01:34 AM    MCHC 31.0 04/07/2022 01:34 AM    RDW 15.1 (H) 04/07/2022 01:34 AM     04/07/2022 01:34 AM    Lab Results   Component Value Date/Time    GRANS 81 (H) 04/07/2022 01:34 AM    LYMPH 12 (L) 04/07/2022 01:34 AM    EOS 2 04/07/2022 01:34 AM    BASOS 0 04/07/2022 01:34 AM      Basic Metabolic Profile   Recent Labs     04/07/22  0134      K 3.3*      CO2 33*   BUN 28*   CA 8.3*   MG 1.9   PHOS 4.0        Hepatic Function    Lab Results   Component Value Date/Time    ALB 2.6 (L) 03/29/2022 03:10 AM    TP 5.3 (L) 03/29/2022 03:10 AM    AP 71 03/29/2022 03:10 AM    No results found for: TBIL       Coags   No results for input(s): PTP, INR, APTT, INREXT in the last 72 hours. Radiology  No results found. LATASHA Bowen    [facility].   [email or web address]  Phone: [phone]  Pager: [phone]

## 2022-04-07 NOTE — PROGRESS NOTES
Beside report give to [address]  from [place]. Jojo POLO to includes SBAR, Kardex, recent results, MAR, intake/output.

## 2022-04-07 NOTE — PROGRESS NOTES
Bedside and Verbal shift change report given to Madelin RN (oncoming nurse) by Nicole Armstrong RN (offgoing nurse). Report included the following information SBAR, Kardex, Intake/Output, MAR and Recent Results.

## 2022-04-07 NOTE — PROGRESS NOTES
Per Roxanne Fernandez, called [employer] transportation at  [phone] rescheduled transport from today to [address] [place] [address] until tomorrow (Friday, April 8, 2022) at 10:00 am with Shawnee Saleh and received confirmation number [id number]. Informed Roxanne Fernandez of transportation conversation and arrangements.

## 2022-04-07 NOTE — PROGRESS NOTES
Hospitalist Progress Note    Subjective:   Daily Progress Note: 4/7/2022     Platelets improving to 185 today. Patient weaned to 3L NC. Not requiring any restraints. DTI on heel discovered. Heel floats continued. Ceftriaxone completed. Patient at high risk of aspiration - will need to be watchful. Patient s/p PEG placement 4/6/22. Patient to be discharged back to SNF when transport can be arranged. Called Rosalio Jeannettepablo Miller and updated her this morning.      Current Facility-Administered Medications   Medication Dose Route Frequency    bacitracin zinc-polymyxin b (POLYSPORIN) 500-10,000 unit/gram ointment   Topical BID    furosemide (LASIX) injection 20 mg  20 mg IntraVENous DAILY    insulin lispro (HUMALOG) injection 3 Units  3 Units SubCUTAneous Q6H    insulin glargine (LANTUS) injection 18 Units  18 Units SubCUTAneous DAILY    oxyCODONE IR (ROXICODONE) tablet 5 mg  5 mg Oral Q6H PRN    carvediloL (COREG) tablet 12.5 mg  12.5 mg Oral Q12H    multivitamin-minerals-ferrous gluconate oral liquid 15 mL  15 mL Per NG tube DAILY    [Held by provider] lisinopriL (PRINIVIL, ZESTRIL) tablet 10 mg  10 mg Oral DAILY    nitroglycerin (NITROBID) 2 % ointment 1 Inch  1 Inch Topical Q6H PRN    hydrALAZINE (APRESOLINE) 20 mg/mL injection 20 mg  20 mg IntraVENous Q6H PRN    QUEtiapine (SEROquel) tablet 50 mg  50 mg Oral QHS    melatonin tablet 10 mg  10 mg Oral QHS    [Held by provider] hydrALAZINE (APRESOLINE) tablet 50 mg  50 mg Oral TID    [Held by provider] oxyCODONE (ROXICODONE) 5 mg/5 mL oral solution 7.5 mg  7.5 mg Oral Q4H PRN    thiamine HCL (B-1) tablet 200 mg  200 mg Oral TID    nystatin (MYCOSTATIN) 100,000 unit/gram powder   Topical BID    dextrose 10% infusion 0-250 mL  0-250 mL IntraVENous PRN    albuterol-ipratropium (DUO-NEB) 2.5 MG-0.5 MG/3 ML  3 mL Nebulization Q4H PRN    [Held by provider] heparin (porcine) injection 5,000 Units  5,000 Units SubCUTAneous Q8H    insulin lispro (HUMALOG) injection   SubCUTAneous Q6H    glucose chewable tablet 16 g  4 Tablet Oral PRN    glucagon (GLUCAGEN) injection 1 mg  1 mg IntraMUSCular PRN    acetaminophen (TYLENOL) tablet 650 mg  650 mg Oral Q6H PRN    Or    acetaminophen (TYLENOL) suppository 650 mg  650 mg Rectal Q6H PRN    polyethylene glycol (MIRALAX) packet 17 g  17 g Oral DAILY PRN    ondansetron (ZOFRAN ODT) tablet 4 mg  4 mg Oral Q8H PRN    Or    ondansetron (ZOFRAN) injection 4 mg  4 mg IntraVENous Q6H PRN        Review of Systems  Review of systems not obtained due to patient factors. Objective:     Visit Vitals  /76 (BP 1 Location: Right arm, BP Patient Position: Semi fowlers)   Pulse 63   Temp 97.4 °F (36.3 °C)   Resp 18   Ht 4' 11\" (1.499 m)   Wt 62.4 kg (137 lb 9.1 oz)   SpO2 98%   BMI 27.79 kg/m²    O2 Flow Rate (L/min):  (5l usually) O2 Device: None (Room air)    Temp (24hrs), Av °F (36.7 °C), Min:97.4 °F (36.3 °C), Max:99.1 °F (37.3 °C)      No intake/output data recorded.  1901 -  0700  In: 2281 [I.V.:120]  Out: 1300 [Urine:1300]      General appearance -awake, interactive. Eyes - sclera anicteric, no pallor  Nose - no obvious nasal discharge. Neck - supple, no JVD, trachea is midline  Chest -diminished air entry noted in bases, poor inspiratory efforts, no wheezes  Heart - S1 and S2 normal  Abdomen - soft, nontender, nondistended, PEG tube in place. Neurological -awake, moves left upper extremity well. No restraints in place. Musculoskeletal -right knee swelling and mild bruising present. Extremities -  pedal edema noted. DTI present on right heel. Heel floats in place.      Data Review    Recent Results (from the past 24 hour(s))   GLUCOSE, POC    Collection Time: 22 11:03 AM   Result Value Ref Range    Glucose (POC) 271 (H) 70 - 110 mg/dL   GLUCOSE, POC    Collection Time: 22 12:07 PM   Result Value Ref Range    Glucose (POC) 261 (H) 70 - 110 mg/dL   GLUCOSE, POC    Collection Time: 04/06/22  1:23 PM   Result Value Ref Range    Glucose (POC) 190 (H) 70 - 110 mg/dL   GLUCOSE, POC    Collection Time: 04/06/22  5:52 PM   Result Value Ref Range    Glucose (POC) 135 (H) 70 - 110 mg/dL   GLUCOSE, POC    Collection Time: 04/07/22 12:15 AM   Result Value Ref Range    Glucose (POC) 227 (H) 70 - 110 mg/dL   CBC WITH AUTOMATED DIFF    Collection Time: 04/07/22  1:34 AM   Result Value Ref Range    WBC 9.4 4.6 - 13.2 K/uL    RBC 2.56 (L) 4.20 - 5.30 M/uL    HGB 7.2 (L) 12.0 - 16.0 g/dL    HCT 23.2 (L) 35.0 - 45.0 %    MCV 90.6 78.0 - 100.0 FL    MCH 28.1 24.0 - 34.0 PG    MCHC 31.0 31.0 - 37.0 g/dL    RDW 15.1 (H) 11.6 - 14.5 %    PLATELET 639 565 - 040 K/uL    MPV 13.3 (H) 9.2 - 11.8 FL    NRBC 0.0 0  WBC    ABSOLUTE NRBC 0.00 0.00 - 0.01 K/uL    NEUTROPHILS 81 (H) 40 - 73 %    LYMPHOCYTES 12 (L) 21 - 52 %    MONOCYTES 5 3 - 10 %    EOSINOPHILS 2 0 - 5 %    BASOPHILS 0 0 - 2 %    IMMATURE GRANULOCYTES 1 (H) 0.0 - 0.5 %    ABS. NEUTROPHILS 7.6 1.8 - 8.0 K/UL    ABS. LYMPHOCYTES 1.1 0.9 - 3.6 K/UL    ABS. MONOCYTES 0.5 0.05 - 1.2 K/UL    ABS. EOSINOPHILS 0.1 0.0 - 0.4 K/UL    ABS. BASOPHILS 0.0 0.0 - 0.1 K/UL    ABS. IMM.  GRANS. 0.1 (H) 0.00 - 0.04 K/UL    DF AUTOMATED     PHOSPHORUS    Collection Time: 04/07/22  1:34 AM   Result Value Ref Range    Phosphorus 4.0 2.5 - 4.9 MG/DL   MAGNESIUM    Collection Time: 04/07/22  1:34 AM   Result Value Ref Range    Magnesium 1.9 1.6 - 2.6 mg/dL   CALCIUM, IONIZED    Collection Time: 04/07/22  1:34 AM   Result Value Ref Range    Ionized Calcium 1.15 1.15 - 6.71 MMOL/L   METABOLIC PANEL, BASIC    Collection Time: 04/07/22  1:34 AM   Result Value Ref Range    Sodium 142 136 - 145 mmol/L    Potassium 3.3 (L) 3.5 - 5.5 mmol/L    Chloride 104 100 - 111 mmol/L    CO2 33 (H) 21 - 32 mmol/L    Anion gap 5 3.0 - 18 mmol/L    Glucose 157 (H) 74 - 99 mg/dL    BUN 28 (H) 7.0 - 18 MG/DL    Creatinine 0.94 0.6 - 1.3 MG/DL    BUN/Creatinine ratio 30 (H) 12 - 20      GFR est AA >60 >60 ml/min/1.73m2    GFR est non-AA 60 (L) >60 ml/min/1.73m2    Calcium 8.3 (L) 8.5 - 10.1 MG/DL   GLUCOSE, POC    Collection Time: 04/07/22  6:07 AM   Result Value Ref Range    Glucose (POC) 162 (H) 70 - 110 mg/dL         Assessment/Plan:     Active Problems:    Septic shock ([address] (3/24/2022)      ASSESSMENT:    1. Septic shock, resolved currently. Off IV pressors  2. UTI s/p treatment  3. Aspiration pneumonia with hypoxia  4. Chronic loculated left pleural effusion  5. Acute on chronic encephalopathy, slowly improving  6. Chronic encephalopathy and right-sided hemiparesis due to chronic L MCA infarct as well as right frontal lobe encephalomalacia. 7.  Thrombocytopenia of unknown etiology  8. Leukocytosis, resolved  9. Acute renal failure, improving  10.  6 x 3 cm right hepatic mass  11. Seizure disorder  12. Peripheral arterial disease  13. Hypertension    PLAN:    Patient has been off IV pressor. Started on p.o. antihypertensive medications   Experiencing soft BPs 3/31 - Will hold hydralazine, decrease lisinopril from 20 mg daily to 10 mg daily, decreased coreg from 25 mg BID to 12.5 mg yesterday. 4/1 - Patient's current regimen of coreg 12.5 mg BID, lasix 40 mg every day, lisinopril is keeping her normotensive, less lows than yesterday. 4/2 - experiencing tachycardia and soft BPs again after receiving Roxicodone. All antihypertensives held. 4/3 - antihypertensives held, normotensive. 4/4 - Pt hypertensive, will start adding back medications. 4/5: BPs well controlled. 4/6 - Patient slightly hypertensive. 4/7 - BPs normotensive. DuoNebs; continue to wean her off oxygen. Currently on 3L NC. Echocardiogram report reviewed. Patient is currently being treated with IV Lasix  CT head report reviewed. ID to follow. Ceftriaxone discontinued 4/2/22. Continue to be watchful of aspiration. Hematology/oncology has been consulted for thrombocytopenia.  Platelets now normal.   Continue monitoring platelets; continuing to trend upwards   PEG placement on 4/6/22. Feeds are at goal.     Rosalio [phone]; [phone]. I have evaluated the patient after initiation of intervention. The patient is comfortable, uninjured, but continues to pose an imminent risk of injury to self and or serious disruption of treatment. The patient's current medical and behavioral conditions that warrant the use intervention include danger to self and Interference with medical treatment. Restraint or seclusion will be discontinued at the earliest possible time, regardless of the scheduled expiration of the order. Total time to take care of this patient was 35 minutes and more than 50% of time was spent counseling and coordinating care. Disclaimer: Sections of this note are dictated using utilizing voice recognition software, which may have resulted in some phonetic based errors in grammar and contents. Even though attempts were made to correct all the mistakes, some may have been missed, and remained in the body of the document. If questions arise, please contact our department.

## 2022-04-07 NOTE — PROGRESS NOTES
Patient was medically cleared by Dr. Nivia Banuelos to go back to LTC at [address] [place] [address]. [employer] Transport showed up, without Oxygen for patient, said they were not told patient needed Oxygen, said they are too busy to go to [place] to get Oxygen and come back, and they fit patient in for transport today. Mariza Banuelos, and updated that patient cannot discharge today. CM Perfect Arturo Belcher CM Specialist and [facility], and asked that they schedule transport for 10 am tomorrow to [address] for patient. DEEP called and spoke with Leroy Josesito with [address] [place] [address], [address] [place] [address], updated her, and let her know that CM asked for a 10am Medical transport for tomorrow to SNF. CM called and spoke with patient's niece Norman Carver [phone], updated her that patient will not be discharging today, but will discharge tomorrow, requested 10am medical transport back to [address] [place] [address]. Patient's niece is agreeable to the discharge plan for tomorrow.            Nav Ruiz, RN  Case Management [phone]

## 2022-04-07 NOTE — DIABETES MGMT
Diabetes/ Glycemic Control Plan of Care  Recommendation(s):  1. Suggest increasing nutritional insulin from 3 units lispro q 6 hours to 4 units lispro q 6 hours for BG today of 227, 162, 235 mg/dL  2. Continue 18 units Lantus/day and corrective lispro, very insulin resistant dosing q 6 hours. 3. Monitor    1258  Addendum:  Order obtained/entered for 4 units nutritional lispro q 6 hours. Assessment:   PEG placed 4/6/22. TF resumed  4/6/22 at goal rate  Receiving daily dose of basal insulin, corrective humalog and a nutritional dose of humalog. Has thus far received 3 consecutive doses of nutritional lispro (q 6 hours) and BG continue to be elevated. Disposition is pending her return to [address] [place] [address]      Fasting/ Morning blood glucose:   Lab Results   Component Value Date/Time    Glucose 157 (H) 04/07/2022 01:34 AM    Glucose (POC) 235 (H) 04/07/2022 11:32 AM     IV Fluids containing dextrose: none  Steroids: none      Blood glucose values:  4/7:  POC - 227, 162, 235  4/6:  POC - 123, 213, 271, 261, 190, 135      Within target range (70-180mg/dL):  no    Current insulin orders:  lantus 18 units daily  Nutritional dose of humalog 3 units every 6 hours  Corrective humalog, very insulin resistant, every 6 hours     Total Daily Dose previous 24 hours = 30 units (18 Lantus, 3 nutritional lispro, 9 corrective lispro)    Current A1c:   Lab Results   Component Value Date/Time    Hemoglobin A1c 5.6 03/25/2022 12:00 AM      Nutrition/Diet:    Glucerna 1.5 at goal rate of 45 mL/hr with 150 mL q 4 hour water flushes   ADULT TUBE FEEDING PEG; Diabetic; Delivery Method: Continuous; Continuous Initial Rate (mL/hr): 45; Continuous Advance Tube Feeding: No; Water Flush Volume (mL): 150; Water Flush Frequency: Q 4 hours  Home diabetes medications:   Key Antihyperglycemic Medications     Patient is on no antihyperglycemic meds.         Plan/Goals:   Blood glucose will be within target of 70 - 180 mg/dl within 72 hours  Reinforce dietary and medication compliance at home.         Education:  [] Refer to Diabetes Education Record                       [x] Education not indicated at this time     Arlene Ohara MS, RD, CDCES  Diabetes and Glycemic Control   [employer]

## 2022-04-07 NOTE — PROGRESS NOTES
Requested Case Management office to assist with transportation to: [facility]       Address is:      [address]  [place], [address]           and phone number is:    [phone]      Patient will require BLS transport. Pt requires Stretcher If stretcher, reason: New Peg tube, Septic Shock, Seizure Disorder, Impaired Mobility  Patient is currently requiring oxygen Yes  3 Liters  Height:  4\"11  Weight:  137 lbs  Pt is on isolation: No    Is the pt ready now? yes  Requested time: Next Available ( Will need to be at SNF before 7:30pm, if not transport will need to be setup in AM tomorrow). PCS Faxed: no  Insurance verified on face sheet: yes  Auth needed for transport: yes  CM completed PCS/ Envelope and placed on chart.

## 2022-04-07 NOTE — DISCHARGE SUMMARY
Discharge Summary    Patient: Dana Baum MRN: [id number]  CSN: [id number]    YOB: 1955  Age: 74 y.o. Sex: female    DOA: 3/24/2022 LOS:  LOS: 14 days   Discharge Date:      Admission Diagnosis: Septic shock ([facility] [A41.9, R65.21]    Discharge Diagnosis:    Hospital Problems  Date Reviewed: 4/5/2022          Codes Class Noted POA    Pharyngeal dysphagia ICD-10-CM: R13.13  ICD-9-CM: 787.23  4/7/2022 Unknown        Pleural effusion ICD-10-CM: J90  ICD-9-CM: 511.9  4/7/2022 Unknown        Encephalopathy chronic ICD-10-CM: G93.49  ICD-9-CM: 348.39  4/7/2022 Unknown        GUIDO (acute kidney injury) ([facility] ICD-10-CM: N17.9  ICD-9-CM: 584.9  4/7/2022 Unknown        Septic shock ([facility] ICD-10-CM: A41.9, R65.21  ICD-9-CM: 038.9, 785.52, 995.92  3/24/2022 Unknown        Aspiration pneumonia ([facility] ICD-10-CM: J69.0  ICD-9-CM: 507.0  10/17/2021 Unknown        UTI (urinary tract infection) ICD-10-CM: N39.0  ICD-9-CM: 599.0  12/8/2017 Yes              Discharge Condition: Stable, at high risk of aspiration    PHYSICAL EXAM  Visit Vitals  BP (!) 146/62 (BP 1 Location: Left arm, BP Patient Position: Semi fowlers)   Pulse 85   Temp 97.9 °F (36.6 °C)   Resp 24   Ht 4' 11\" (1.499 m)   Wt 62.4 kg (137 lb 9.1 oz)   SpO2 93%   BMI 27.79 kg/m²       General: Alert, cooperative, no acute distress    HEENT: NC, Atraumatic. PERRLA, EOMI. Anicteric sclerae. Lungs:  Non-labored. Clear to ascultation. No wheezes, crackles, rales. On 5L NC. Heart:  Regular  rhythm,  No murmur, No Rubs, No Gallops  Abdomen: Soft, Non distended, Non tender. +Bowel sounds, PEG intact with no leaking. Extremities: Deep tissue injuries to bilateral heels. Heel floats in place. Reaches with right hand and holds hand of writer. Left hand with contracture, limp. Psych:   Nonverbal. Vocalizes discomfort, frustration. Neurologic:  No acute neurological deficits.      Hospital Course:    Patient admitted on 3/25/22 to ICU for septic shock secondary to UTI and aspiration pneumonia. Patient also with history of prior CVA, baseline dementia. Currently with superimposed metabolic encephalopathy. Patient initially required pressors for blood pressure support. Was started on broad spectrum antibiotics initially by Infectious Disease and then transitioned to ceftriaxone. She completed ceftriaxone on 4/1/22. Patient required NG tube initially for nutrition as she is at a significant aspiration risk given history of CVA, dementia and encephalopathy. PEG tube (20 Fr) by Dr. Dyana Villarreal on 4/56/22. Strict aspiration precautions are required to be continued for patient's safety. Patient had significant thrombocytopenia during hospital stay secondary to her septic shock. As she recovered, her platelets improved. Platelets were as low as 20 but improved to 205 on day of discharge. Hematology was consulted with no further recommendations but watchful waiting. Patient's oxygen continues to be weaned. Patient primarily breathes through her mouth and vocalizes often making NC less effective and requiring more O2. When patient is calm, oxygen sats maintaining above 93% on 2-3L, when patient is vocalizing may need up to 5L NC to maintain sats. Patient with finding of mass in medial right hepatic lobe (see MRI abd below). Attempts were made for repeat imaging but patient unable to participate during exam. Biopsy can be considered at later date if desired by family. Consults:   ID  Cardiology   Nephrology   Vascular  Heme Onc     Significant Diagnostic Studies:  CTA chest (3/26):   1. No acute pulmonary embolism.     2. Mild reflux of contrast from the right heart into the IVC and hepatic veins,  and mildly increased right ventricle size, suggests elevated right heart  pressure.     3. Stable loculated left pleural effusion since 2017. Pleural calcifications in  this region may be related to prior asbestos disease. Unable to exclude  superimposed infection by imaging.     4. Diffuse patchy groundglass opacities bilaterally, not significant changed  from recent prior chest CT, possibly pulmonary edema or infection.     5. Borderline mediastinal lymphadenopathy is likely reactive, also not  significantly changed from very recent prior study. MRI abd w MRCP (3/25)  IMPRESSION     6 x 3 cm mass in the medial right hepatic lobe, not significantly changed from  recent prior CT 3/14/2022, but new from remote prior 2017 study. This lesion  remains indeterminate in the absence of intravenous contrast. Recommend  follow-up with contrast-enhanced liver protocol CT or MRI when patient's renal  function allows. Biopsy could also be considered. There is no drainable fluid  within this lesion to suggest abscess.     Cholelithiasis. No biliary ductal dilatation or choledocholithiasis.     Small volume ascites.     Redemonstration of complex left pleural effusion. Lung bases better evaluated on  recent prior CT. MRI abd (4/5/22)  FINDINGS:  Technically limited due to patient motion during the examination (patient  screaming during the examination; breath-holding was not possible). As result  T2-weighted images, longer sequences, were nondiagnostic.     Mildly increased T2 lesion in the medial right lobe is again noted about 4 x 4  by 6 cm in diameter. No additional lesions are apparent. The biliary tree is not  dilated. The dependent low signal material is present in the gallbladder-sludge  versus calculi.     Adrenals, kidneys, pancreas, spleen appear unremarkable and unchanged. Parenchyma pleural densities at the left lung base are not well demonstrated but  grossly unchanged     IMPRESSION  Increased T2 hepatic lesion as noted previously. No new abnormalities are identified.   The study is severely limited due to patient's mental status and inability to  Cooperate      Discharge Medications:     Current Discharge Medication List      START taking these medications    Details albuterol-ipratropium (DUO-NEB) 2.5 mg-0.5 mg/3 ml nebu 3 mL by Nebulization route every four (4) hours as needed for Wheezing. Qty: 30 Nebule, Refills: 0  Start date: 4/7/2022      carvediloL (COREG) 12.5 mg tablet 1 Tablet by Per G Tube route every twelve (12) hours. Qty: 60 Tablet, Refills: 0  Start date: 4/7/2022      insulin glargine (LANTUS) 100 unit/mL injection Inject 18 units under skin once daily  Qty: 1 mL, Refills: 0  Start date: 4/8/2022      insulin lispro (HUMALOG) 100 unit/mL injection Inject 4 units under skin every 6 hours  Qty: 1 Each, Refills: 0  Start date: 4/7/2022      melatonin 10 mg tab 10 mg by Per J Tube route nightly. Qty: 30 Tablet, Refills: 0  Start date: 4/7/2022      lisinopriL (PRINIVIL, ZESTRIL) 10 mg tablet 1 Tablet by Per G Tube route daily. Qty: 30 Tablet, Refills: 0  Start date: 4/7/2022      multivitamin-minerals-ferrous gluconate 9 mg iron/15 mL oral liquid 15 mL by Per G Tube route daily. Qty: 60 mL, Refills: 0  Start date: 4/8/2022      nystatin (MYCOSTATIN) powder Apply  to affected area two (2) times a day for 30 days. Qty: 30 g, Refills: 0  Start date: 4/7/2022, End date: 5/7/2022      QUEtiapine (SEROquel) 50 mg tablet 1 Tablet by Per G Tube route nightly. Qty: 30 Tablet, Refills: 0  Start date: 4/7/2022      thiamine HCL (B-1) 100 mg tablet 2 Tablets by Per G Tube route three (3) times daily. Qty: 90 Tablet, Refills: 0  Start date: 4/7/2022         CONTINUE these medications which have CHANGED    Details   acetaminophen (Tylenol Extra Strength) 500 mg tablet 2 Tablets by Per G Tube route every six (6) hours as needed for Pain.   Qty: 20 Tablet, Refills: 0  Start date: 4/7/2022         STOP taking these medications       levoFLOXacin (Levaquin) 750 mg tablet Comments:   Reason for Stopping:               Activity: activity as tolerated    Diet:  tube feeds of Glucerna 1.5 at goal rate of 45 mL/hr with 150 mL q 4 hour water flushes and daily MVI         (goal regimen to provide 1620 kcal, 89 gm protein, 820 mL free water, 100% RDIs). Wound Care: Please turn frequently. Patient with bilateral DTI to heels.      Minutes spent on discharge: >30 minutes spent coordinating this discharge (review instructions/follow-up, prescriptions, preparing report for sign off)    Luwanna Bence, DO

## 2022-04-07 NOTE — PROGRESS NOTES
Minal Gotti RN said Kim called her, and said they have to change transport to SNF to 1pm tomorrow.            Jimi Lewis RN  Case Management [phone]

## 2022-04-07 NOTE — PROGRESS NOTES
DEEP called and spoke with Ashley Banda with [facility], [address], updated her that patient is medically ready for discharge today, patient met Tube feeding goals. Ashley Banda said they could accept patient back today, but needs to be at SNF by 7pm-7:30pm, or transport will need to be setup for tomorrow in morning. Trey Vidales, and updated.              Kvng Craig, RN  Case Management [phone]

## 2022-04-07 NOTE — PROGRESS NOTES
Problem: Pressure Injury - Risk of  Goal: *Prevention of pressure injury  Description: Document Ankit Scale and appropriate interventions in the flowsheet. Outcome: Progressing Towards Goal  Note: Pressure Injury Interventions:  Sensory Interventions: Assess changes in LOC,Keep linens dry and wrinkle-free,Maintain/enhance activity level,Minimize linen layers    Moisture Interventions: Check for incontinence Q2 hours and as needed,Absorbent underpads    Activity Interventions: Pressure redistribution bed/mattress(bed type)    Mobility Interventions: HOB 30 degrees or less,Pressure redistribution bed/mattress (bed type)    Nutrition Interventions: Document food/fluid/supplement intake    Friction and Shear Interventions: HOB 30 degrees or less,Minimize layers      Problem: Falls - Risk of  Goal: *Absence of Falls  Description: Document Diana Fall Risk and appropriate interventions in the flowsheet.   Outcome: Progressing Towards Goal  Note: Fall Risk Interventions:     Mentation Interventions: Bed/chair exit alarm,Door open when patient unattended,Adequate sleep, hydration, pain control    Medication Interventions: Bed/chair exit alarm    Elimination Interventions: Bed/chair exit alarm,Toileting schedule/hourly rounds    History of Falls Interventions: Door open when patient unattended,Bed/chair exit alarm      Problem: Breathing Pattern - Ineffective  Goal: *Absence of hypoxia  Outcome: Progressing Towards Goal

## 2022-04-07 NOTE — PROGRESS NOTES
Problem: Pressure Injury - Risk of  Goal: *Prevention of pressure injury  Description: Document Ankit Scale and appropriate interventions in the flowsheet. Outcome: Progressing Towards Goal  Note: Pressure Injury Interventions:  Sensory Interventions: Minimize linen layers,Pressure redistribution bed/mattress (bed type)    Moisture Interventions: Internal/External urinary devices,Absorbent underpads    Activity Interventions: Pressure redistribution bed/mattress(bed type)    Mobility Interventions: Pressure redistribution bed/mattress (bed type)    Nutrition Interventions: Document food/fluid/supplement intake    Friction and Shear Interventions: Minimize layers                Problem: Patient Education: Go to Patient Education Activity  Goal: Patient/Family Education  Outcome: Progressing Towards Goal     Problem: Falls - Risk of  Goal: *Absence of Falls  Description: Document Diana Fall Risk and appropriate interventions in the flowsheet.   Outcome: Progressing Towards Goal  Note: Fall Risk Interventions:       Mentation Interventions: Bed/chair exit alarm,Door open when patient unattended,More frequent rounding,Room close to nurse's station,Toileting rounds,Update white board,Eyeglasses and hearing aids    Medication Interventions: Bed/chair exit alarm    Elimination Interventions: Bed/chair exit alarm,Call light in reach,Stay With Me (per policy)    History of Falls Interventions: Bed/chair exit alarm,Door open when patient unattended,Room close to nurse's station         Problem: Patient Education: Go to Patient Education Activity  Goal: Patient/Family Education  Outcome: Progressing Towards Goal     Problem: Nutrition Deficit  Goal: *Optimize nutritional status  Outcome: Progressing Towards Goal     Problem: Risk for Spread of Infection  Goal: Prevent transmission of infectious organism to others  Description: Prevent the transmission of infectious organisms to other patients, staff members, and visitors.   Outcome: Progressing Towards Goal     Problem: Patient Education:  Go to Education Activity  Goal: Patient/Family Education  Outcome: Progressing Towards Goal     Problem: Patient Education: Go to Patient Education Activity  Goal: Patient/Family Education  Outcome: Progressing Towards Goal     Problem: Non-Violent Restraints  Goal: Removal from restraints as soon as assessed to be safe  Outcome: Progressing Towards Goal  Goal: No harm/injury to patient while restraints in use  Outcome: Progressing Towards Goal  Goal: Patient's dignity will be maintained  Outcome: Progressing Towards Goal  Goal: Patient Interventions  Outcome: Progressing Towards Goal     Problem: Delirium Treatment  Goal: *Level of consciousness restored to baseline  Outcome: Progressing Towards Goal  Goal: *Level of environmental perceptions restored to baseline  Outcome: Progressing Towards Goal  Goal: *Sensory perception restored to baseline  Outcome: Progressing Towards Goal  Goal: *Emotional stability restored to baseline  Outcome: Progressing Towards Goal  Goal: *Functional assessment restored to baseline  Outcome: Progressing Towards Goal  Goal: *Absence of falls  Outcome: Progressing Towards Goal  Goal: *Will remain free of delirium, CAM Score negative  Outcome: Progressing Towards Goal  Goal: *Cognitive status will be restored to baseline  Outcome: Progressing Towards Goal  Goal: Interventions  Outcome: Progressing Towards Goal     Problem: Patient Education: Go to Patient Education Activity  Goal: Patient/Family Education  Outcome: Progressing Towards Goal     Problem: Breathing Pattern - Ineffective  Goal: *Absence of hypoxia  Outcome: Progressing Towards Goal  Goal: *Use of effective breathing techniques  Outcome: Progressing Towards Goal  Goal: *PALLIATIVE CARE:  Alleviation of Dyspnea  Outcome: Progressing Towards Goal     Problem: Patient Education: Go to Patient Education Activity  Goal: Patient/Family Education  Outcome: Progressing Towards Goal

## 2022-04-07 NOTE — DISCHARGE INSTRUCTIONS
Patient armband removed and shredded    DISCHARGE SUMMARY from Nurse    PATIENT INSTRUCTIONS:    After general anesthesia or intravenous sedation, for 24 hours or while taking prescription Narcotics:  · Limit your activities  · Do not drive and operate hazardous machinery  · Do not make important personal or business decisions  · Do  not drink alcoholic beverages  · If you have not urinated within 8 hours after discharge, please contact your surgeon on call. Report the following to your surgeon:  · Excessive pain, swelling, redness or odor of or around the surgical area  · Temperature over 100.5  · Nausea and vomiting lasting longer than 4 hours or if unable to take medications  · Any signs of decreased circulation or nerve impairment to extremity: change in color, persistent  numbness, tingling, coldness or increase pain  · Any questions    What to do at Home:  Recommended activity: Activity as tolerated,     If you experience any of the following symptoms altered mental status, shortness of breath, chest pain, please follow up with primary care physician or ED. *  Please give a list of your current medications to your Primary Care Provider. *  Please update this list whenever your medications are discontinued, doses are      changed, or new medications (including over-the-counter products) are added. *  Please carry medication information at all times in case of emergency situations. These are general instructions for a healthy lifestyle:    No smoking/ No tobacco products/ Avoid exposure to second hand smoke  Surgeon General's Warning:  Quitting smoking now greatly reduces serious risk to your health.     Obesity, smoking, and sedentary lifestyle greatly increases your risk for illness    A healthy diet, regular physical exercise & weight monitoring are important for maintaining a healthy lifestyle    You may be retaining fluid if you have a history of heart failure or if you experience any of the following symptoms:  Weight gain of 3 pounds or more overnight or 5 pounds in a week, increased swelling in our hands or feet or shortness of breath while lying flat in bed. Please call your doctor as soon as you notice any of these symptoms; do not wait until your next office visit. The discharge information has been reviewed with the caregiver at [address] [place] [address]. The caregiver verbalized understanding. Discharge medications reviewed with the caregiver and appropriate educational materials and side effects teaching were provided.   ___________________________________________________________________________________________________________________________________

## 2022-04-07 NOTE — PROGRESS NOTES
Call made to [facility] [phone], dispatch will call nurses station with ETA, there will be a 3 hour  window.   Trip # [id number]

## 2022-04-08 NOTE — PROGRESS NOTES
Discharge order noted for today. Patient has been accepted to [address] [place] [address] nursing [facility]. Confirmed with Peggy Huertas that bed is available today. Spoke with patient's niece and she is agreeable to the transition plan today. Transport to facility has been arranged through Medicaid at 1pm time. Patient's discharge summary has been forwarded to skilled nursing facility via [facility], and was placed in Transport Envelope to go with patient to SNF. Bedside RN, Magdalena Sheets, has been updated to the transition plan. Discharge information has been updated on the AVS.  Please call report to [phone].               Brian Mendoza RN  Case Management [phone]

## 2022-04-08 NOTE — PROGRESS NOTES
Transition of Care Plan to SNF/Rehab    SNF/Rehab Transition:  Patient has been accepted to [address] [place] [address] and meets criteria for admission. Patient will transported by [facility] and expected to leave at 1pm.    Communication to Patient/Family:  Spoke with patient's niece(identified care giver) and she is agreeable to the transition plan. Communication to SNF/Rehab:  Bedside RN, Donal Delarosa, has been notified to update the transition plan to the facility and call report (phone number [phone]). Discharge information has been updated on the AVS.     Discharge Summary available to SNF via [address], and was placed in Transport Envelope to go with patient to SNF. Nursing Please include all hard scripts for controlled substances, med rec and dc summary, and AVS in packet. Reviewed and confirmed with facility, [address] [place] [address], can manage the patient care needs for the following:     Darcie [place] with (X) only those applicable:    Medication:  [x]  Medications will be available at the facility  []  IV Antibiotics   []  Controlled Substance - hard copy to be sent with patient   []  Weekly Labs   Documents:  [] Hard RX  [] MAR  [] Kardex  [] AVS  []Transfer Summary  [x]Discharge Summary   Equipment:  []  CPAP/BiPAP  []  Wound Vacuum  []  [place] or Urinary Device  []  PICC/Central Line  []  Nebulizer  []  Ventilator   Treatment:  []Isolation (for MRSA, VRE, etc.)  []Surgical Drain Management  []Tracheostomy Care  [x]Wound Care Please see Discharge Summary  []Dialysis with transportation and chair time. [x]PEG Care  [x]Oxygen 3 Liters  []Daily Weights for Heart Failure   Dietary:  []Any diet limitations  [x]Tube Feedings  Please see Discharge Summary  []Total Parenteral Management (TPN)   Eligible for Medicaid Long Term Services and Supports  Yes:  [] Eligible for medical assistance or will become eligible within 180 days and UAI completed.    [] Provider/Patient and/or support system has requested screening. [x] UAI copy already on file at [facility]. [] UAI unavailable at discharge will send once processed to SNF provider. [] UAI unavailable at discharged mailed to patient  No:   [] Private pay and is not financially eligible for Medicaid within the next 180 days. [] Reside out-of-state.   [] A residents of a state owned/operated facility that is licensed  by [facility] or [facility]  [] Enrollment in [facility] hospice services  [] [address] [place] [address] [address]  [] Patient /Family declines to have screening completed or provide financial information for screening     Financial Resources:  Medicaid    [] Initiated and application pending   [x] Full coverage     Advanced Care Plan:  []Surrogate Decision Maker of Care  []POA  [x]Communicated Code Status  Full   Other

## 2022-04-08 NOTE — ROUTINE PROCESS
1900 Bedside shift change report given to Madelin POLO (oncoming nurse) by Radha Valverde RN (offgoing nurse). Report included the following information SBAR, Kardex, Intake/Output, MAR and Cardiac Rhythm NSR.     0700 Bedside shift change report given to Endy Haque (oncoming nurse) by Taya Womack RN (offgoing nurse). Report included the following information SBAR, Kardex and MAR.

## 2022-04-08 NOTE — PROGRESS NOTES
[facility] said [facility] called, and they will be here at 1pm to transport patient to [address] [place] [address]. CM called patient's niece Sun Mcallister [phone], received voicemail, CM left a message letting her know that Medical Transport has been rescheduled to 1pm today to take patient back to [address] [place] [address]. DEEP called and spoke with Conan Babinski with [facility], [address] [place] [address], and let her know that Medical Transport rescheduled patient for 1pm today.              Griselda Turner, RN  Case Management [phone]

## 2022-04-08 NOTE — DISCHARGE SUMMARY
Discharge Summary    Patient: Laurel Dean MRN: [id number]  CSN: [id number]    YOB: 1955  Age: 74 y.o. Sex: female    DOA: 3/24/2022 LOS:  LOS: 15 days   Discharge Date: 4/8/22     Admission Diagnosis: Septic shock ([facility] [A41.9, R65.21]    Discharge Diagnosis:    Hospital Problems  Date Reviewed: 4/5/2022          Codes Class Noted POA    Pharyngeal dysphagia ICD-10-CM: R13.13  ICD-9-CM: 787.23  4/7/2022 Unknown        Pleural effusion ICD-10-CM: J90  ICD-9-CM: 511.9  4/7/2022 Unknown        Encephalopathy chronic ICD-10-CM: G93.49  ICD-9-CM: 348.39  4/7/2022 Unknown        GUIDO (acute kidney injury) ([facility] ICD-10-CM: N17.9  ICD-9-CM: 584.9  4/7/2022 Unknown        Septic shock ([facility] ICD-10-CM: A41.9, R65.21  ICD-9-CM: 038.9, 785.52, 995.92  3/24/2022 Unknown        Aspiration pneumonia ([facility] ICD-10-CM: J69.0  ICD-9-CM: 507.0  10/17/2021 Unknown        UTI (urinary tract infection) ICD-10-CM: N39.0  ICD-9-CM: 599.0  12/8/2017 Yes              Discharge Condition: Stable, at high risk of aspiration    PHYSICAL EXAM  Visit Vitals  /68   Pulse 84   Temp 98.1 °F (36.7 °C)   Resp 20   Ht 4' 11\" (1.499 m)   Wt 62.4 kg (137 lb 9.1 oz)   SpO2 93%   BMI 27.79 kg/m²       General: Alert, cooperative, no acute distress    HEENT: NC, Atraumatic. PERRLA, EOMI. Anicteric sclerae. Lungs:  Non-labored. Clear to ascultation. No wheezes, crackles, rales. On 5L NC. Heart:  Regular  rhythm,  No murmur, No Rubs, No Gallops  Abdomen: Soft, Non distended, Non tender. +Bowel sounds, PEG intact with no leaking. Extremities: Deep tissue injuries to bilateral heels. Heel floats in place. Reaches with right hand and holds hand of writer. Left hand with contracture, limp. Psych:   Nonverbal. Vocalizes discomfort, frustration. Neurologic:  No acute neurological deficits. Hospital Course:    Patient admitted on 3/25/22 to ICU for septic shock secondary to UTI and aspiration pneumonia.  Patient also with history of prior CVA, baseline dementia. Currently with superimposed metabolic encephalopathy. Patient initially required pressors for blood pressure support. Was started on broad spectrum antibiotics initially by Infectious Disease and then transitioned to ceftriaxone. She completed ceftriaxone on 4/1/22. Patient required NG tube initially for nutrition as she is at a significant aspiration risk given history of CVA, dementia and encephalopathy. PEG tube (20 Fr) by Dr. Jessie Kee on 4/56/22. Strict aspiration precautions are required to be continued for patient's safety. Patient had significant thrombocytopenia during hospital stay secondary to her septic shock. As she recovered, her platelets improved. Platelets were as low as 20 but improved to 205 on day of discharge. Hematology was consulted with no further recommendations but watchful waiting. Patient's oxygen continues to be weaned. Patient primarily breathes through her mouth and vocalizes often making NC less effective and requiring more O2. When patient is calm, oxygen sats maintaining above 93% on 3L, when patient is vocalizing may need up to 5L NC to maintain sats above 93%. Patient with finding of mass in medial right hepatic lobe (see MRI abd below). Attempts were made for repeat imaging but patient unable to participate during exam. Biopsy can be considered at later date if desired by family. Consults:   ID  Cardiology   Nephrology   Vascular  Heme Onc     Significant Diagnostic Studies:  CTA chest (3/26):   1. No acute pulmonary embolism.     2. Mild reflux of contrast from the right heart into the IVC and hepatic veins,  and mildly increased right ventricle size, suggests elevated right heart  pressure.     3. Stable loculated left pleural effusion since 2017. Pleural calcifications in  this region may be related to prior asbestos disease. Unable to exclude  superimposed infection by imaging.     4.  Diffuse patchy groundglass opacities bilaterally, not significant changed  from recent prior chest CT, possibly pulmonary edema or infection.     5. Borderline mediastinal lymphadenopathy is likely reactive, also not  significantly changed from very recent prior study. MRI abd w MRCP (3/25)  IMPRESSION     6 x 3 cm mass in the medial right hepatic lobe, not significantly changed from  recent prior CT 3/14/2022, but new from remote prior 2017 study. This lesion  remains indeterminate in the absence of intravenous contrast. Recommend  follow-up with contrast-enhanced liver protocol CT or MRI when patient's renal  function allows. Biopsy could also be considered. There is no drainable fluid  within this lesion to suggest abscess.     Cholelithiasis. No biliary ductal dilatation or choledocholithiasis.     Small volume ascites.     Redemonstration of complex left pleural effusion. Lung bases better evaluated on  recent prior CT. MRI abd (4/5/22)  FINDINGS:  Technically limited due to patient motion during the examination (patient  screaming during the examination; breath-holding was not possible). As result  T2-weighted images, longer sequences, were nondiagnostic.     Mildly increased T2 lesion in the medial right lobe is again noted about 4 x 4  by 6 cm in diameter. No additional lesions are apparent. The biliary tree is not  dilated. The dependent low signal material is present in the gallbladder-sludge  versus calculi.     Adrenals, kidneys, pancreas, spleen appear unremarkable and unchanged. Parenchyma pleural densities at the left lung base are not well demonstrated but  grossly unchanged     IMPRESSION  Increased T2 hepatic lesion as noted previously. No new abnormalities are identified.   The study is severely limited due to patient's mental status and inability to  Cooperate      Discharge Medications:     Current Discharge Medication List      START taking these medications    Details   albuterol-ipratropium (DUO-NEB) 2.5 mg-0.5 mg/3 ml nebu 3 mL by Nebulization route every four (4) hours as needed for Wheezing. Qty: 30 Nebule, Refills: 0  Start date: 4/7/2022      carvediloL (COREG) 12.5 mg tablet 1 Tablet by Per G Tube route every twelve (12) hours. Qty: 60 Tablet, Refills: 0  Start date: 4/7/2022      insulin glargine (LANTUS) 100 unit/mL injection Inject 18 units under skin once daily  Qty: 1 mL, Refills: 0  Start date: 4/8/2022      insulin lispro (HUMALOG) 100 unit/mL injection Inject 4 units under skin every 6 hours  Qty: 1 Each, Refills: 0  Start date: 4/7/2022      melatonin 10 mg tab 10 mg by Per J Tube route nightly. Qty: 30 Tablet, Refills: 0  Start date: 4/7/2022      lisinopriL (PRINIVIL, ZESTRIL) 10 mg tablet 1 Tablet by Per G Tube route daily. Qty: 30 Tablet, Refills: 0  Start date: 4/7/2022      multivitamin-minerals-ferrous gluconate 9 mg iron/15 mL oral liquid 15 mL by Per G Tube route daily. Qty: 60 mL, Refills: 0  Start date: 4/8/2022      nystatin (MYCOSTATIN) powder Apply  to affected area two (2) times a day for 30 days. Qty: 30 g, Refills: 0  Start date: 4/7/2022, End date: 5/7/2022      QUEtiapine (SEROquel) 50 mg tablet 1 Tablet by Per G Tube route nightly. Qty: 30 Tablet, Refills: 0  Start date: 4/7/2022      thiamine HCL (B-1) 100 mg tablet 2 Tablets by Per G Tube route three (3) times daily. Qty: 90 Tablet, Refills: 0  Start date: 4/7/2022         CONTINUE these medications which have CHANGED    Details   acetaminophen (Tylenol Extra Strength) 500 mg tablet 2 Tablets by Per G Tube route every six (6) hours as needed for Pain.   Qty: 20 Tablet, Refills: 0  Start date: 4/7/2022         STOP taking these medications       levoFLOXacin (Levaquin) 750 mg tablet Comments:   Reason for Stopping:               Activity: activity as tolerated    Diet:  tube feeds of Glucerna 1.5 at goal rate of 45 mL/hr with 150 mL q 4 hour water flushes and daily MVI         (goal regimen to provide 1620 kcal, 89 gm protein, 820 mL free water, 100% RDIs). Wound Care: Please turn frequently. Patient with bilateral DTI to heels.      Minutes spent on discharge: >30 minutes spent coordinating this discharge (review instructions/follow-up, prescriptions, preparing report for sign off)    Jackie Patient, DO

## 2022-04-08 NOTE — PROGRESS NOTES
TRANSFER - OUT REPORT:    Verbal report given to Crista LOGAN(name) on Alondra Burgos  being transferred to [facility](unit) for routine progression of care       Report consisted of patients Situation, Background, Assessment and   Recommendations(SBAR). Information from the following report(s) SBAR, Kardex, Intake/Output, MAR and Recent Results was reviewed with the receiving nurse. Opportunity for questions and clarification was provided.       Patient transported with:   O2 @ 5 liters

## 2022-04-09 PROBLEM — I63.9 CVA (CEREBRAL VASCULAR ACCIDENT) (HCC): Status: ACTIVE | Noted: 2022-01-01

## 2022-04-09 NOTE — Clinical Note
Status[de-identified] INPATIENT [101]   Type of Bed: Telemetry [19]   Cardiac Monitoring Required?: Yes   Inpatient Hospitalization Certified Necessary for the Following Reasons: 3.  Patient receiving treatment that can only be provided in an inpatient setting (further clarification in H&P documentation)   Admitting Diagnosis: CVA (cerebral vascular accident) [facility]) [[id number]]   Admitting Physician: Chase Fontana [[id number]]   Attending Physician: Chase Fontana [[id number]]   Estimated Length of Stay: 3-4 Midnights   Discharge Plan[de-identified] Home with Office Follow-up

## 2022-04-09 NOTE — ED TRIAGE NOTES
Pt presents via EMS for AMS from [facility]. Pt is Turkish speaking only. Pt here on 3/24 and admitted for UTI. Pt was discharged yesterday from the hospital. .

## 2022-04-09 NOTE — ED PROVIDER NOTES
EMERGENCY DEPARTMENT HISTORY AND PHYSICAL EXAM      Date: 4/9/2022  Patient Name: Paul Rosas    History of Presenting Illness     Chief Complaint   Patient presents with    Altered mental status       History (Context): Paul Rosas is a 74 y.o. female with a past medical history significant for previous CVA and dementia comes into the ED today due to altered mental status. Patient was recently discharged from the hospital approximately 1 to 2 days ago after being found to have urosepsis. Patient was acutely encephalopathic at that time however had improvement of her mentation during her hospital stay. Upon returning back to the nursing facility for member states that she has had gradual worsening of her mental status and does not appear to be alert. Patient history and review of systems limited secondary to patient's altered mental status at this time. PCP: None    Current Outpatient Medications   Medication Sig Dispense Refill    albuterol-ipratropium (DUO-NEB) 2.5 mg-0.5 mg/3 ml nebu 3 mL by Nebulization route every four (4) hours as needed for Wheezing. 30 Nebule 0    carvediloL (COREG) 12.5 mg tablet 1 Tablet by Per G Tube route every twelve (12) hours. 60 Tablet 0    insulin glargine (LANTUS) 100 unit/mL injection Inject 18 units under skin once daily 1 mL 0    insulin lispro (HUMALOG) 100 unit/mL injection Inject 4 units under skin every 6 hours 1 Each 0    melatonin 10 mg tab 10 mg by Per J Tube route nightly. 30 Tablet 0    acetaminophen (Tylenol Extra Strength) 500 mg tablet 2 Tablets by Per G Tube route every six (6) hours as needed for Pain. 20 Tablet 0    lisinopriL (PRINIVIL, ZESTRIL) 10 mg tablet 1 Tablet by Per G Tube route daily. 30 Tablet 0    multivitamin-minerals-ferrous gluconate 9 mg iron/15 mL oral liquid 15 mL by Per G Tube route daily. 60 mL 0    nystatin (MYCOSTATIN) powder Apply  to affected area two (2) times a day for 30 days.  30 g 0    QUEtiapine (SEROquel) 50 mg tablet 1 Tablet by Per G Tube route nightly. 30 Tablet 0    thiamine HCL (B-1) 100 mg tablet 2 Tablets by Per G Tube route three (3) times daily. 90 Tablet 0       Past History     Past Medical History:   CVA, Dementia    Past Surgical History:  No past surgical history on file. Family History:  No family history on file. Social History:   Social History     Tobacco Use    Smoking status: Former Smoker    Smokeless tobacco: Never Used   Substance Use Topics    Alcohol use: Not on file    Drug use: Not on file       Allergies:  No Known Allergies    PMH, PSH, family history, social history, allergies reviewed with the patient with significant items noted above. Review of Systems   Review of Systems   Unable to perform ROS: Mental status change   Neurological:        AMS       Physical Exam     Vitals:    04/09/22 1713   BP: (!) 146/72   Pulse: 80   Resp: 22   Temp: 98.7 °F (37.1 °C)   SpO2: 97%       Physical Exam  Vitals and nursing note reviewed. Constitutional:       General: She is not in acute distress. Appearance: She is ill-appearing. She is not toxic-appearing. Comments: lethargic   HENT:      Head: Normocephalic and atraumatic. Mouth/Throat:      Mouth: Mucous membranes are moist.   Eyes:      General: No scleral icterus. Conjunctiva/sclera: Conjunctivae normal.   Cardiovascular:      Rate and Rhythm: Normal rate and regular rhythm. Comments: Normal peripheral perfusion  Pulmonary:      Effort: Pulmonary effort is normal. No respiratory distress. Comments: On 5LPM NC  Abdominal:      General: There is no distension. Palpations: Abdomen is soft. Tenderness: There is no abdominal tenderness. There is no guarding or rebound. Musculoskeletal:         General: No deformity. Normal range of motion. Cervical back: Normal range of motion and neck supple. Skin:     General: Skin is warm and dry. Findings: No rash.       Comments: Lips cracked, dry, with dried blood   Neurological:      Comments: Contractures of the right hand and upper extremity  Not following simple commands. Rigidity noted with passive ROM     Psychiatric:         Mood and Affect: Mood normal.         Thought Content: Thought content normal.         Diagnostic Study Results     Labs -     Recent Results (from the past 12 hour(s))   CBC WITH AUTOMATED DIFF    Collection Time: 04/09/22  5:30 PM   Result Value Ref Range    WBC 8.1 4.6 - 13.2 K/uL    RBC 2.64 (L) 4.20 - 5.30 M/uL    HGB 7.6 (L) 12.0 - 16.0 g/dL    HCT 24.5 (L) 35.0 - 45.0 %    MCV 92.8 78.0 - 100.0 FL    MCH 28.8 24.0 - 34.0 PG    MCHC 31.0 31.0 - 37.0 g/dL    RDW 16.6 (H) 11.6 - 14.5 %    PLATELET 180 035 - 868 K/uL    MPV 12.3 (H) 9.2 - 11.8 FL    NRBC 0.2 (H) 0  WBC    ABSOLUTE NRBC 0.02 (H) 0.00 - 0.01 K/uL    NEUTROPHILS 78 (H) 40 - 73 %    LYMPHOCYTES 13 (L) 21 - 52 %    MONOCYTES 7 3 - 10 %    EOSINOPHILS 2 0 - 5 %    BASOPHILS 0 0 - 2 %    IMMATURE GRANULOCYTES 1 (H) 0.0 - 0.5 %    ABS. NEUTROPHILS 6.3 1.8 - 8.0 K/UL    ABS. LYMPHOCYTES 1.1 0.9 - 3.6 K/UL    ABS. MONOCYTES 0.5 0.05 - 1.2 K/UL    ABS. EOSINOPHILS 0.2 0.0 - 0.4 K/UL    ABS. BASOPHILS 0.0 0.0 - 0.1 K/UL    ABS. IMM.  GRANS. 0.1 (H) 0.00 - 0.04 K/UL    DF AUTOMATED     URINALYSIS W/ RFLX MICROSCOPIC    Collection Time: 04/09/22  5:30 PM   Result Value Ref Range    Color YELLOW      Appearance CLEAR      Specific gravity 1.019 1.005 - 1.030      pH (UA) 6.5 5.0 - 8.0      Protein 300 (A) NEG mg/dL    Glucose Negative NEG mg/dL    Ketone Negative NEG mg/dL    Bilirubin Negative NEG      Blood Negative NEG      Urobilinogen 1.0 0.2 - 1.0 EU/dL    Nitrites Negative NEG      Leukocyte Esterase Negative NEG     POC LACTIC ACID    Collection Time: 04/09/22  5:40 PM   Result Value Ref Range    Lactic Acid (POC) 1.26 0.40 - 2.00 mmol/L   EKG, 12 LEAD, INITIAL    Collection Time: 04/09/22  5:46 PM   Result Value Ref Range    Ventricular Rate 75 BPM    Atrial Rate 75 BPM    P-R Interval 166 ms    QRS Duration 78 ms    Q-T Interval 454 ms    QTC Calculation (Bezet) 506 ms    Calculated P Axis 25 degrees    Calculated R Axis -7 degrees    Calculated T Axis 13 degrees    Diagnosis       Normal sinus rhythm  Moderate voltage criteria for LVH, may be normal variant ( R in aVL , Karl   product )  Cannot rule out Anterior infarct , age undetermined  Abnormal ECG  When compared with ECG of 02-APR-2022 20:20,  No significant change was found        Labs Reviewed   CBC WITH AUTOMATED DIFF - Abnormal; Notable for the following components:       Result Value    RBC 2.64 (*)     HGB 7.6 (*)     HCT 24.5 (*)     RDW 16.6 (*)     MPV 12.3 (*)     NRBC 0.2 (*)     ABSOLUTE NRBC 0.02 (*)     NEUTROPHILS 78 (*)     LYMPHOCYTES 13 (*)     IMMATURE GRANULOCYTES 1 (*)     ABS. IMM. GRANS. 0.1 (*)     All other components within normal limits   URINALYSIS W/ RFLX MICROSCOPIC - Abnormal; Notable for the following components:    Protein 300 (*)     All other components within normal limits   CULTURE, BLOOD   CULTURE, BLOOD   METABOLIC PANEL, COMPREHENSIVE   TROPONIN-HIGH SENSITIVITY   MAGNESIUM   URINE MICROSCOPIC ONLY   DRUG SCREEN, URINE   AMMONIA   POC LACTIC ACID       Radiologic Studies -   XR CHEST PORT    (Results Pending)   CT HEAD WO CONT    (Results Pending)     CT Results  (Last 48 hours)    None        CXR Results  (Last 48 hours)    None          The laboratory results, imaging results, and other diagnostic exams were reviewed in the EMR. Medical Decision Making   I am the first provider for this patient. I reviewed the vital signs, available nursing notes, past medical history, past surgical history, family history and social history. Vital Signs-Reviewed the patient's vital signs. ED EKG interpretation:  Rhythm: normal sinus rhythm; and regular . Rate (approx.): 75; Axis: left axis deviation; P wave: normal; QRS interval: normal ; ST/T wave: non-specific changes;  Other findings: abnormal ekg. This EKG was interpreted by Lizeth Wilcox D.O. Records Reviewed: Personally, on initial evaluation    MDM:   Rocio  presents with complaint of altered mental status  DDX includes but is not limited to: Toxic encephalopathy, metabolic encephalopathy, UTI    Patient overall ill-appearing however no acute distress and vital signs grossly within normal limits. Will obtain lab work and imaging for further evaluation of patients complaint. Will continue to monitor and evaluate patient while in the ED. Orders as below:  Orders Placed This Encounter    CULTURE, BLOOD    CULTURE, BLOOD    XR CHEST PORT    CT HEAD WO CONT    CBC WITH AUTOMATED DIFF    COMPREHENSIVE METABOLIC PANEL    TROPONIN-HIGH SENSITIVITY    MAGNESIUM    URINALYSIS W/ RFLX MICROSCOPIC    URINE MICROSCOPIC ONLY    Urine Drug Screen    AMMONIA    POC LACTIC ACID    EKG, 12 LEAD, INITIAL        ED Course:   ED Course as of 04/09/22 2054   Sat Apr 09, 2022 1816 Patient's lab are significant for glucose 220, elevated alk phos, decreased albumin, hemoglobin 7.6, troponin 70, otherwise labs grossly within normal limits. We will continue to monitor patient. [DV]   1907 Patient instruction positive for opiates therefore will provide patient with Narcan to see if there is any improvement, ammonia 56, otherwise labs grossly within normal limits. We will continue to monitor patient. [DV]   2005 Spoke with radiology who states patient was found to have a subacute infarct in the right temporoparietal lobe. Will obtain an MRI and admit for further treatment and evaluation. [DV]   2036 Patient more alert now after receiving Narcan. Will admit patient to the hospital at this point for further treatment evaluation. [DV]      ED Course User Index  [DV] Jacquelyn Her DO           Procedures:  Procedures        Diagnosis and Disposition     CLINICAL IMPRESSION:  No diagnosis found.   Current Discharge Medication List          Disposition: Admit    Patient condition at time of disposition: Stable          Dragon Disclaimer     Please note that this dictation was completed with HashTip, the computer voice recognition software. Quite often unanticipated grammatical, syntax, homophones, and other interpretive errors are inadvertently transcribed by the computer software. Please disregard these errors. Please excuse any errors that have escaped final proofreading. Es CHAVIRA.

## 2022-04-10 PROBLEM — T14.8XXA DEEP TISSUE INJURY: Status: ACTIVE | Noted: 2022-01-01

## 2022-04-10 PROBLEM — J96.01 ACUTE RESPIRATORY FAILURE WITH HYPOXIA (HCC): Status: ACTIVE | Noted: 2022-01-01

## 2022-04-10 PROBLEM — I10 PRIMARY HYPERTENSION: Status: ACTIVE | Noted: 2022-01-01

## 2022-04-10 PROBLEM — S31.000A SACRAL WOUND: Status: ACTIVE | Noted: 2022-01-01

## 2022-04-10 NOTE — PROGRESS NOTES
Received bedside shift report from [facility] cintia area excoriated, right foot drop, DTI on right medial heel, bilateral heel boots on, PEG tube via abdominal area clamped maintianed HOB @ 30, upper and lower lips chapped with open sores noted, left nostril sore noted, right side flaccid with right hand contracted

## 2022-04-10 NOTE — PROGRESS NOTES
Problem: Aspiration - Risk of  Goal: *Absence of aspiration  Outcome: Progressing Towards Goal     Problem: Patient Education: Go to Patient Education Activity  Goal: Patient/Family Education  Outcome: Progressing Towards Goal     Problem: TIA/CVA Stroke: 0-24 hours  Goal: Discharge Planning  Outcome: Progressing Towards Goal  Goal: Medications  Outcome: Progressing Towards Goal     Problem: Pain  Goal: *Control of Pain  Outcome: Progressing Towards Goal     Problem: Patient Education: Go to Patient Education Activity  Goal: Patient/Family Education  Outcome: Progressing Towards Goal

## 2022-04-10 NOTE — PROGRESS NOTES
Unable to accurately assess. Will continue to monitor closely for any changes.        04/10/22 0415   NIH Stroke Scale   Interval Handoff/Transfer   Level of Conciousness (1a) 0   LOC Questions (1b) (!) 2   LOC Commands (1c) (!) 2   Best Gaze (2) 0   Visual (3) 0   Facial Palsy (4) 0   Motor Arm, Left (5a) (!) 1   Motor Arm, Right (5b) (!) 4   Motor Leg, Right (6b) (!) 3   Sensory (8) (!) 1  (RUE)   Best Language (9) (!) 3   Dysarthria (10) (!) 2  (mute)   Extinction and Inattention (11) (!) 2  (Left hemiparesis)

## 2022-04-10 NOTE — PROGRESS NOTES
Progress Note  Hospitalist Service    Patient: Sumeet Etienne MRN: [id number]   SSN: [id number]  YOB: 1955   Age: 74 y.o. Sex: female      Admit Date: 4/9/2022    LOS: 1 day   Chief Complaint   Patient presents with    Altered mental status       Subjective:     Patient readmitted from [facility] secondary to subacute stroke. Writer cared for patient upon last hospitalization. Patient currently appears at baseline mentation and physical exam per last discharge. Review of Systems:  Cannot be achieved, patient currently nonverbal.     Objective:     Vitals:  Visit Vitals  BP (!) 168/77 (BP 1 Location: Right arm, BP Patient Position: At rest)   Pulse 81   Temp 98 °F (36.7 °C)   Resp 18   SpO2 93%       Physical Exam:   General:   Awake. Turns head to voice. Comfortable appearing. Not verbalizing in anyway to signal discomfort. Skin:   DTI to heel. Heel floats in place. HEENT:  Pupils reactive but sluggish. Scabbing to lips from past NG tube  NC in place @ 5 L          Lungs:   non-labored, bilaterally clear to auscultation over anterior lateral fields- no crackles wheezes rales or rhonchi   Heart:  RRR, s1 and s2; no murmurs rubs or gallops, no edema, radial pulses intact bilaterally   Abdomen: soft, non-distended, active bowel sounds, Non-tender to palpation; PEG tube site midline and is clean with no evidence of strikethrough or infection   Genitourinary:  deferred   Extremities:   Patient moving left to extremities spontaneously, no movement observed in the right extremities (baseline). Baseline right hand contracture. Neurologic:   Unable to assess sensory function. Nonverbal at this time. No severe facial droop noted. Patient moving left upper and lower extremity spontaneously. Right hand with contracture and paresis of arm (baseline, present last admission).  Grunts when frustrated.                Intake and Output:  Current Shift: 04/10 0701 - 04/10 1900  In: 335 Out: -   Last three shifts: No intake/output data recorded. Lab/Data Review:  Recent Results (from the past 12 hour(s))   GLUCOSE, POC    Collection Time: 04/10/22  6:20 AM   Result Value Ref Range    Glucose (POC) 146 (H) 70 - 110 mg/dL   CBC WITH AUTOMATED DIFF    Collection Time: 04/10/22  8:11 AM   Result Value Ref Range    WBC 8.3 4.6 - 13.2 K/uL    RBC 2.79 (L) 4.20 - 5.30 M/uL    HGB 7.9 (L) 12.0 - 16.0 g/dL    HCT 26.1 (L) 35.0 - 45.0 %    MCV 93.5 78.0 - 100.0 FL    MCH 28.3 24.0 - 34.0 PG    MCHC 30.3 (L) 31.0 - 37.0 g/dL    RDW 17.2 (H) 11.6 - 14.5 %    PLATELET 503 020 - 779 K/uL    MPV 11.8 9.2 - 11.8 FL    NRBC 0.0 0  WBC    ABSOLUTE NRBC 0.00 0.00 - 0.01 K/uL    NEUTROPHILS 76 (H) 40 - 73 %    LYMPHOCYTES 14 (L) 21 - 52 %    MONOCYTES 6 3 - 10 %    EOSINOPHILS 3 0 - 5 %    BASOPHILS 0 0 - 2 %    IMMATURE GRANULOCYTES 1 (H) 0.0 - 0.5 %    ABS. NEUTROPHILS 6.3 1.8 - 8.0 K/UL    ABS. LYMPHOCYTES 1.2 0.9 - 3.6 K/UL    ABS. MONOCYTES 0.5 0.05 - 1.2 K/UL    ABS. EOSINOPHILS 0.2 0.0 - 0.4 K/UL    ABS. BASOPHILS 0.0 0.0 - 0.1 K/UL    ABS. IMM.  GRANS. 0.1 (H) 0.00 - 0.04 K/UL    DF AUTOMATED     PHOSPHORUS    Collection Time: 04/10/22  8:11 AM   Result Value Ref Range    Phosphorus 4.0 2.5 - 4.9 MG/DL   MAGNESIUM    Collection Time: 04/10/22  8:11 AM   Result Value Ref Range    Magnesium 2.3 1.6 - 2.6 mg/dL   METABOLIC PANEL, BASIC    Collection Time: 04/10/22  8:11 AM   Result Value Ref Range    Sodium 143 136 - 145 mmol/L    Potassium 3.2 (L) 3.5 - 5.5 mmol/L    Chloride 105 100 - 111 mmol/L    CO2 33 (H) 21 - 32 mmol/L    Anion gap 5 3.0 - 18 mmol/L    Glucose 137 (H) 74 - 99 mg/dL    BUN 29 (H) 7.0 - 18 MG/DL    Creatinine 0.81 0.6 - 1.3 MG/DL    BUN/Creatinine ratio 36 (H) 12 - 20      GFR est AA >60 >60 ml/min/1.73m2    GFR est non-AA >60 >60 ml/min/1.73m2    Calcium 8.6 8.5 - 10.1 MG/DL   LIPID PANEL    Collection Time: 04/10/22  8:11 AM   Result Value Ref Range    LIPID PROFILE Cholesterol, total 102 <200 MG/DL    Triglyceride 79 <150 MG/DL    HDL Cholesterol 27 (L) 40 - 60 MG/DL    LDL, calculated 59.2 0 - 100 MG/DL    VLDL, calculated 15.8 MG/DL    CHOL/HDL Ratio 3.8 0 - 5.0     GLUCOSE, POC    Collection Time: 04/10/22 11:38 AM   Result Value Ref Range    Glucose (POC) 230 (H) 70 - 110 mg/dL   GLUCOSE, POC    Collection Time: 04/10/22  5:39 PM   Result Value Ref Range    Glucose (POC) 295 (H) 70 - 110 mg/dL         Key Findings or tests:       Telemetry NONE   Oxygen 5L NC     Assessment and Plan:     1) subacute CVA: Evolving subacute right temporal, parietal and occipital infarct with small local mass-effect. Evidence of cortical necrosis. Small volume of petechial hemorrhage. 2) acute encephalopathy secondary to subacute CVA, past history of strokes, past traumatic brain injury. #1-2. Neurology consulted. No new recommendations currently. Suggestive palliative team become involved. Will hold chemical DVT ppx 2/2 to hemorrhage. 3) Dysphagia now s/p PEG   #3. Feeds at goal. Aspiration precautions  4) Type II DM   #4. SSI, glucose checks ACHS  5) HTN   #5. Restarted home meds. 6) Acute hypoxic respiratory failure   #6. Recently treated for aspiration pneumonia, still requiring supplemental O2  7) Recent hospital admission for septic shock secondary to aspiration pneumonia and urinary tract infection. Discharged April 8. Family states before admission in March, patient was able to communicate and recognize family members. Patient was known to writer upon last discharge, patient unable to speak. Would verbalize frustration with grunting, yelling. Current physical exam is exactly as patient presented on day of discharge April 8. On day of discharge, patient required 2 to 3 L nasal cannula to keep O2 sats above 93% when patient was calm. If patient became agitated, she would breathe through her mouth and would need up to 5 L to maintain oxygen saturation.     4) DTI to heel, stage 2 pressure ulcer - POA   #4. Wound care, maintain heel floats. Diet Glucerna 1.5 continuous at 45 mL an hour, free water flush 150 mL every 4 hours   DVT Prophylax SCDs  Holding chemical ppx 2/2 to petechial hemorrhage   GI Prophylaxis    Code status FULL   Disposition Patient's family do not want her to return to [address] [place] [address],[address]. Patient needs palliative care consult for goals of care discussion. Call Coco Hair, niece [phone]  Medically stable for discharge at any time.          Narinder Padilla DO, hospitalist   April 10, 2022

## 2022-04-10 NOTE — PROGRESS NOTES
Read for MRI brain has resulted. It shows:    1. Evolving subacute right temporal, parietal and occipital infarct with small  with local mass effect. Evidence of cortical necrosis. A small volume of  petechial hemorrhage may be masked by cortical laminar necrosis.     2. Chronic left MCA distribution infarct with associated encephalomalacia. Given that hemorrhagic conversion has not been ruled out, we are going to hold antiplatelets at this time for fear of exacerbating any potential bleeding. Serena Gilmore

## 2022-04-10 NOTE — PROGRESS NOTES
TRANSFER - IN REPORT:    Verbal report received from Klaus (name) on Alondra Burgos  being received from ED (unit) for routine progression of care      Report consisted of patients Situation, Background, Assessment and   Recommendations(SBAR). Information from the following report(s) SBAR and ED Summary was reviewed with the receiving nurse.

## 2022-04-10 NOTE — PROGRESS NOTES
Bedside and Verbal shift change report given to [address] (oncoming nurse) by Maritza Kennedy RN   (offgoing nurse). Report given with SBAR, Kardex, MAR and Recent Results.

## 2022-04-10 NOTE — PROGRESS NOTES
MRI Screening form needs to be filled out and faxed to [phone] MRI can be scheduled. If unable to obtain information from pt, [facility] needs to be contacted.  If pt is claustro or will need pain meds, please have ordered in advance in order to facilitate exam.

## 2022-04-10 NOTE — PROGRESS NOTES
Brief update:    Called Tanya tee and updated her on imaging. Suggestive palliative consult. She agreed.     Full note to follow    Jelena Zavaleta,

## 2022-04-10 NOTE — H&P
History and Physical    Patient: Babak Rizzo               Sex: female          DOA: 4/9/2022       YOB: 1955      Age:  74 y.o.        LOS:  LOS: 0 days        HPI:     Babak Rizzo is a 74 y.o. woman with HFmrEF 45-50%, type 2 diabetes mellitus, hypertension, stroke with right-sided hemiparesis, aphasia, chronic encephalopathy, and pharyngeal dysphagia s/p PEG ([id number]) who was just discharged from the hospital on 08 April 2022 and is now readmitted for findings of subacute stroke in the setting of worsened altered mental status compared to baseline. Of note, patient is non-English speaking when she was able to speak. HPI taken from what ER physician told me during our discussion and based off of medical record. Patient reportedly had worsening levels of encephalopathy after she returned to her [facility] nursing facility. When she arrived to the ER she was not alert. Work-up in the ER revealed a likely right-sided subacute stroke. Patient would not open her eyes to my voice however made grimacing facial features when I open her eyes with my fingers on exam and actively closes them and moved her left arm away. Otherwise, she appears to be resting comfortably. Spoke to Ms. Tunde Koroma (niece) at [phone] to update her on the situation. The family plans to come to the hospital tomorrow morning.     Initial vitals: T 98.7, P 80, /72, RR 22, SPO2 97% on 5 L nasal cannula    ER Course:   -Blood cultures  -EKG  -Portable chest x-ray  -CT head without contrast -right temporoparietal subacute infarct with either laminar necrosis or petechial hemorrhage  -Naloxone 0.4 mg IV x1    Notable initial labs: WBC 8.1, hemoglobin 7.6, platelet 902, N:L 77:30, glucose 220, BUN/creatinine 36/0.87, ALT/AST 24/32, alk phos 154, toxicology screen positive for opiates, arterial blood gas pH 7.5 PCO2 44.3 PO2 76 on 5 L nasal cannula, ammonia 56    Past Medical History:   Diagnosis Date    Aphasia  [facility]     DMII (diabetes mellitus, type 2) [facility]     Dysphagia     Encephalopathy chronic     HFrEF (heart failure with reduced ejection fraction) (HCC)     HTN (hypertension)     Right hemiparesis (HCC)      Past Surgical History:   Procedure Laterality Date    IR GASTROSTOMY TUBE PLACEMENT  [id number]      Family history: Non-contributory     Social History     Tobacco Use    Smoking status: Former Smoker    Smokeless tobacco: Never Used   Substance Use Topics    Alcohol use: Not on file      Prior to Admission medications    Medication Sig Start Date End Date Taking? Authorizing Provider   albuterol-ipratropium (DUO-NEB) 2.5 mg-0.5 mg/3 ml nebu 3 mL by Nebulization route every four (4) hours as needed for Wheezing. 4/7/22   Ena Gomez, DO   carvediloL (COREG) 12.5 mg tablet 1 Tablet by Per G Tube route every twelve (12) hours. 4/7/22   Ena Gomez, DO   insulin glargine (LANTUS) 100 unit/mL injection Inject 18 units under skin once daily 4/8/22   Ena Gomez, DO   insulin lispro (HUMALOG) 100 unit/mL injection Inject 4 units under skin every 6 hours 4/7/22   Ena Gomez, DO   melatonin 10 mg tab 10 mg by Per J Tube route nightly. 4/7/22   [place] Rumderek, DO   acetaminophen (Tylenol Extra Strength) 500 mg tablet 2 Tablets by Per G Tube route every six (6) hours as needed for Pain. 4/7/22   Ena Gomez, DO   lisinopriL (PRINIVIL, ZESTRIL) 10 mg tablet 1 Tablet by Per G Tube route daily. 4/7/22   [place] Homero, DO   multivitamin-minerals-ferrous gluconate 9 mg iron/15 mL oral liquid 15 mL by Per G Tube route daily. 4/8/22   Ena Gomez, DO   nystatin (MYCOSTATIN) powder Apply  to affected area two (2) times a day for 30 days. 4/7/22 5/7/22  Ena Gomez, DO   QUEtiapine (SEROquel) 50 mg tablet 1 Tablet by Per G Tube route nightly.  4/7/22   Ena Gomez, DO   thiamine HCL (B-1) 100 mg tablet 2 Tablets by Per G Tube route three (3) times daily. 22   Edvin Wu, DO        No Known Allergies    Review of Systems:  Unable to be performed due to patient condition    Physical Exam:      Vitals:    22   BP: (!) 168/144 (!) 155/93 (!) 175/95    Pulse: 72 70 76 72   Resp:    Temp:       SpO2: 95% 95% 97% 98%      Temp (24hrs), Av.7 °F (37.1 °C), Min:98.7 °F (37.1 °C), Max:98.7 °F (37.1 °C)      General:   Sleeping, awakens to touch but quickly falls back to sleep, appears to purposely be keeping eyes shut   Skin:   no rashes or skin lesions noted on limited exam   HEENT:  Normocephalic, atraumatic, grossly PERRL when I was able to open her eyelids for short period of time and then she quickly closes them forcefully, unable to assess extraocular movements, no scleral icterus or pallor; no conjunctival hemmohage; multiple healing lower lip lesions potentially from previous NG tube? Dentition poor       Lungs:   non-labored, bilaterally clear to auscultation over anterior lateral fields- no crackles wheezes rales or rhonchi   Heart:  RRR, s1 and s2; no murmurs rubs or gallops, no edema, radial pulses intact bilaterally   Abdomen: soft, non-distended, active bowel sounds, Non-tender to palpation; PEG tube site midline and is clean with no evidence of strikethrough or infection   Genitourinary:  deferred   Extremities:   Patient moving left to extremities spontaneously, no movement observed in the right extremities   Neurologic:   Unable to assess sensory function. Nonverbal at this time. No severe facial droop noted.   Patient moving left upper and lower extremity spontaneously   Psychiatric:   Sleeping     Labs Reviewed:    Recent Results (from the past 24 hour(s))   CBC WITH AUTOMATED DIFF    Collection Time: 22  5:30 PM   Result Value Ref Range    WBC 8.1 4.6 - 13.2 K/uL    RBC 2.64 (L) 4.20 - 5.30 M/uL    HGB 7.6 (L) 12.0 - 16.0 g/dL    HCT 24.5 (L) 35.0 - 45.0 % MCV 92.8 78.0 - 100.0 FL    MCH 28.8 24.0 - 34.0 PG    MCHC 31.0 31.0 - 37.0 g/dL    RDW 16.6 (H) 11.6 - 14.5 %    PLATELET 953 771 - 136 K/uL    MPV 12.3 (H) 9.2 - 11.8 FL    NRBC 0.2 (H) 0  WBC    ABSOLUTE NRBC 0.02 (H) 0.00 - 0.01 K/uL    NEUTROPHILS 78 (H) 40 - 73 %    LYMPHOCYTES 13 (L) 21 - 52 %    MONOCYTES 7 3 - 10 %    EOSINOPHILS 2 0 - 5 %    BASOPHILS 0 0 - 2 %    IMMATURE GRANULOCYTES 1 (H) 0.0 - 0.5 %    ABS. NEUTROPHILS 6.3 1.8 - 8.0 K/UL    ABS. LYMPHOCYTES 1.1 0.9 - 3.6 K/UL    ABS. MONOCYTES 0.5 0.05 - 1.2 K/UL    ABS. EOSINOPHILS 0.2 0.0 - 0.4 K/UL    ABS. BASOPHILS 0.0 0.0 - 0.1 K/UL    ABS. IMM. GRANS. 0.1 (H) 0.00 - 0.04 K/UL    DF AUTOMATED     METABOLIC PANEL, COMPREHENSIVE    Collection Time: 04/09/22  5:30 PM   Result Value Ref Range    Sodium 142 136 - 145 mmol/L    Potassium 3.8 3.5 - 5.5 mmol/L    Chloride 103 100 - 111 mmol/L    CO2 32 21 - 32 mmol/L    Anion gap 7 3.0 - 18 mmol/L    Glucose 220 (H) 74 - 99 mg/dL    BUN 36 (H) 7.0 - 18 MG/DL    Creatinine 0.87 0.6 - 1.3 MG/DL    BUN/Creatinine ratio 41 (H) 12 - 20      GFR est AA >60 >60 ml/min/1.73m2    GFR est non-AA >60 >60 ml/min/1.73m2    Calcium 8.4 (L) 8.5 - 10.1 MG/DL    Bilirubin, total 0.9 0.2 - 1.0 MG/DL    ALT (SGPT) 24 13 - 56 U/L    AST (SGOT) 32 10 - 38 U/L    Alk.  phosphatase 154 (H) 45 - 117 U/L    Protein, total 6.2 (L) 6.4 - 8.2 g/dL    Albumin 2.6 (L) 3.4 - 5.0 g/dL    Globulin 3.6 2.0 - 4.0 g/dL    A-G Ratio 0.7 (L) 0.8 - 1.7     TROPONIN-HIGH SENSITIVITY    Collection Time: 04/09/22  5:30 PM   Result Value Ref Range    Troponin-High Sensitivity 70 (HH) 0 - 54 ng/L   MAGNESIUM    Collection Time: 04/09/22  5:30 PM   Result Value Ref Range    Magnesium 2.3 1.6 - 2.6 mg/dL   URINALYSIS W/ RFLX MICROSCOPIC    Collection Time: 04/09/22  5:30 PM   Result Value Ref Range    Color YELLOW      Appearance CLEAR      Specific gravity 1.019 1.005 - 1.030      pH (UA) 6.5 5.0 - 8.0      Protein 300 (A) NEG mg/dL Glucose Negative NEG mg/dL    Ketone Negative NEG mg/dL    Bilirubin Negative NEG      Blood Negative NEG      Urobilinogen 1.0 0.2 - 1.0 EU/dL    Nitrites Negative NEG      Leukocyte Esterase Negative NEG     URINE MICROSCOPIC ONLY    Collection Time: 04/09/22  5:30 PM   Result Value Ref Range    WBC 4 to 8 0 - 4 /hpf    RBC Negative 0 - 5 /hpf    Epithelial cells 1+ 0 - 5 /lpf   DRUG SCREEN, URINE    Collection Time: 04/09/22  5:30 PM   Result Value Ref Range    BENZODIAZEPINES Negative NEG      BARBITURATES Negative NEG      THC (TH-CANNABINOL) Negative NEG      OPIATES Positive (A) NEG      PCP(PHENCYCLIDINE) Negative NEG      COCAINE Negative NEG      AMPHETAMINES Negative NEG      METHADONE Negative NEG      HDSCOM (NOTE)    POC LACTIC ACID    Collection Time: 04/09/22  5:40 PM   Result Value Ref Range    Lactic Acid (POC) 1.26 0.40 - 2.00 mmol/L   EKG, 12 LEAD, INITIAL    Collection Time: 04/09/22  5:46 PM   Result Value Ref Range    Ventricular Rate 75 BPM    Atrial Rate 75 BPM    P-R Interval 166 ms    QRS Duration 78 ms    Q-T Interval 454 ms    QTC Calculation (Bezet) 506 ms    Calculated P Axis 25 degrees    Calculated R Axis -7 degrees    Calculated T Axis 13 degrees    Diagnosis       Normal sinus rhythm  Moderate voltage criteria for LVH, may be normal variant ( R in aVL , Karl   product )  Cannot rule out Anterior infarct , age undetermined  Abnormal ECG  When compared with ECG of 02-APR-2022 20:20,  No significant change was found     AMMONIA    Collection Time: 04/09/22  6:45 PM   Result Value Ref Range    Ammonia, plasma 56 (H) 11 - 32 UMOL/L   BLOOD GAS, ARTERIAL POC    Collection Time: 04/09/22  8:07 PM   Result Value Ref Range    Device: NASAL CANNULA      FIO2 (POC) 4 %    pH (POC) 7.50 (H) 7.35 - 7.45      pCO2 (POC) 44.3 35.0 - 45.0 MMHG    pO2 (POC) 76 (L) 80 - 100 MMHG    HCO3 (POC) 34.2 (H) 22 - 26 MMOL/L    sO2 (POC) 96.1 92 - 97 %    Base excess (POC) 10.0 mmol/L    Allens test (POC) Positive      Total resp. rate 20      Site RIGHT RADIAL      Specimen type (POC) ARTERIAL      Performed by Ifrah Brown       Imaging:  CT Results  (Last 48 hours)               04/09/22 1927  CT HEAD WO CONT Final result    Impression:  1. Right temporoparietal subacute infarct with either laminar necrosis or   petechial hemorrhage. Laminar necrosis is favored. There is mild mass effect   without evidence of midline shift       2.  Old encephalomalacia in the left supratentorial brain. Findings discussed with ER provider. Narrative:  EXAM: CT of the Head without contrast       INDICATION:  AMS, eval for abnormalities       TECHNIQUE: CT of the head from the vertex to the skull base performed. No IV   contrast administered. All CT scans at this facility are performed using dose optimization technique as   appropriate to a performed exam, to include automated exposure control,   adjustment of the mA and/or kV according to patient size (including appropriate   matching for site specific examination) or use of iterative reconstruction   technique. COMPARISON: 3/24/2022 and 12/26/2020       FINDINGS: No extra-axial hemorrhage identified. The ventricles and sulci are   symmetric but enlarged. There is ex vacuo dilatation in the left lateral   ventricular system. A large area of encephalomalacia is noted in the   frontoparietal and left temporal region. There is blurring of the gray-white   differentiation in the right parietal and temporal lobe with likely petechial   hemorrhage versus laminar necrosis within multiple areas of gray matter in this   region consistent with subacute infarct. The area of infarct in the right   parietal and temporal lobe have mild mass effect. No midline shift identified. The mastoid air cells are well aerated. The paranasal sinuses are unremarkable. The orbits are normal. The scalp and skull are unremarkable.                   CXR Results  (Last 48 hours)               04/09/22 1759  XR CHEST PORT Final result    Impression:  1. Findings concerning for pulmonary edema. Narrative:  EXAM: Chest Radiograph       INDICATION:  AMS, eval for consolidation       TECHNIQUE: AP view of the chest       COMPARISON: 4/2/2022, 3/24/2022, 3/14/2022       FINDINGS: No pneumothorax identified. Bilateral airspace opacities in a batwing   type distribution. No effusions identified. The cardiac silhouette is enlarged. The pulmonary vasculature is obscured. The osseous structures are   unremarkable. [id number] Echocardiogram:  Result status: Final result       Contrast used: Definity.   Technical qualifiers: Echo study was technically difficult due to patient's body habitus.   Patient was confused, and unable to be in left lateral decubitus.   Left Ventricle: Left ventricle size is normal. Moderate septal thickening. Mild posterior thickening. Normal wall motion. Normal left ventricular systolic function with a visually estimated EF of 55 - 60%.   Right Ventricle: Reduced systolic function.   Mitral Valve: Mild transvalvular regurgitation.   Pulmonary Arteries: Mild pulmonary hypertension present. The estimated pulmonary artery systolic pressure is 45 mmHg.       Assessment/Plan     -Subacute CVA -visualized on CT scan, awaiting MRI results    -Type 2 diabetes mellitus - HbA1C was 5.6 in [id number]    -Hypertension - elevated upon presentation, increasing with time, potentially secondary to stroke?    -Chronic encephalopathy/hemiplegia/aphasia/dysphagia s/p PEG -palliative medicine consult was performed during last admission, family wanted patient to remain full CODE STATUS with full interventions    PLAN:    -Recommend consulting neurology in the a.m. pending MRI read  -MRI brain and MRA head and neck as ordered by the ER are pending  -Lipid panel pending   -Restarting previous hospitalization medications  -Restarting antihypertensives tomorrow morning  -Holding off on echocardiogram, patient had one performed in March 2022  -Telemetry  -Family to visit in the a.m. Activity: Bedrest complete  Diet: Glucerna 1.5 continuous at 45 mL an hour, free water flush 150 mL every 4 hours  Antibiotics: None  DVT prophylaxis: SCDs  CODE status: Full    Disposition: Likely discharge back to [facility] tomorrow or the following day    Signed By: Juan Patterson MD   [facility]    April 9, 2022      Dragon voice recognition software was used for parts of this note. Unintended errors may have occurred.

## 2022-04-10 NOTE — PROGRESS NOTES
Pt admitted to unit. Assessment completed, MRI results in, verification received to hold ASA at this time per Dr. Beatty. Pt alert, nonverbal, responds to tactile stimuli & withdraws from pain when removing dressing from both heels. Pt is able to squeeze my hand with her L.hand and wiggles her toes on L.foot when touched.

## 2022-04-10 NOTE — PROGRESS NOTES
Bedside shift change report given to Michelle POLO. Report included the following information SBAR, ED Summary, MAR, Recent Results and Cardiac Rhythm sinus rhythm. Pt laying in bed without any signs of distress, eyes open to sternal rub. Orders for tube feeds noted during change of shift. Awaiting for Glucerna from dietary to begin feeds.   Lab Results   Component Value Date/Time    Glucose 220 (H) 04/09/2022 05:30 PM    Glucose (POC) 146 (H) 04/10/2022 06:20 AM

## 2022-04-10 NOTE — ED NOTES
TRANSFER - OUT REPORT:    Verbal report given to Lazaro on [employer]  being transferred to [address] [place] [address](unit) for routine progression of care       Report consisted of patients Situation, Background, Assessment and   Recommendations(SBAR). Information from the following report(s) SBAR, ED Summary and MAR was reviewed with the receiving nurse. Lines:   Peripheral IV 04/09/22 Left Antecubital (Active)   Site Assessment Clean, dry, & intact 04/09/22 1732   Phlebitis Assessment 0 04/09/22 1732   Infiltration Assessment 0 04/09/22 1732   Dressing Status Clean, dry, & intact 04/09/22 1732   Dressing Type Transparent 04/09/22 1732   Hub Color/Line Status Flushed;Patent 04/09/22 1732        Opportunity for questions and clarification was provided.       Patient transported with:   Registered Nurse        Patient back from

## 2022-04-11 NOTE — PROGRESS NOTES
Veronica Femi with [phone] left CM a voicemail. CM called and spoke with Veronica Kahn at [facility]  [phone], and updated her on patient, said she needed to speak with her DON, and would let CM know if they could accept patient for LTC.              Carole Freeman, RN  Case Management [phone]

## 2022-04-11 NOTE — PROGRESS NOTES
2234-Oxygen level @ 85% on RA and 97% on 5L-NC but is noncomplaint; continues to remove her NC and scratches nursing staff when attempting to place back on. Dr. Jimbo Hansen notified and orders received for ativan 0.5mg IV once for anxiety/agitation. May repeat (x1) if not effective after an hour.        04/10/22 2030   Vital Signs   Temp 97.4 °F (36.3 °C)   Temp Source Axillary   Pulse (Heart Rate) 87   Heart Rate Source Brachial   Resp Rate 18   O2 Sat (%) 94 %   Level of Consciousness Alert (0)   BP (!) 194/69   MAP (Calculated) 111   BP 1 Method Automatic   BP 1 Location Left upper arm   BP Patient Position Semi fowlers   MEWS Score 1   Oxygen Therapy   O2 Device Nasal cannula;Humidifier   O2 Flow Rate (L/min) 5 l/min

## 2022-04-11 NOTE — PROGRESS NOTES
conducted an initial consultation and Spiritual Assessment for [facility], who is a 74 y.o.,female. Patient's Primary Language is: Hebrew. According to the patient's EMR Evangelical Affiliation is: No Buddhist. The reason the Patient came to the hospital is:   Patient Active Problem List    Diagnosis Date Noted    Deep tissue injury 04/10/2022    Sacral wound 04/10/2022    Primary hypertension 04/10/2022    Acute respiratory failure with hypoxia ([facility] 04/10/2022    CVA (cerebral vascular accident) ([facility] 04/09/2022    Pharyngeal dysphagia 04/07/2022    Pleural effusion 04/07/2022    Encephalopathy chronic 04/07/2022    GUIDO (acute kidney injury) ([facility] [facility] 04/07/2022    Septic shock ([facility] [facility] 03/24/2022    Aspiration pneumonia ([facility] [facility] 10/17/2021    UTI (urinary tract infection) 12/08/2017        The  provided the following Interventions:   initiated a relationship of care and support. I listened empathically. Offered prayer and assurance of continued prayers on patient's behalf. The following outcomes where achieved:  Patient was unable to verbalize her concerns at this time. However, I attempted to relay that she was in the hospital and that staff would be in to provide care for her. Assessment:  Patient does not have any Jew/cultural needs that will affect patient's preferences in health care. There are no spiritual or Jew issues which require intervention at this time. Plan:  Chaplains will continue to follow and will provide pastoral care on an as needed/requested basis.  recommends bedside caregivers page  on duty if patient shows signs of acute spiritual or emotional distress.     [address]   [phone]

## 2022-04-11 NOTE — PROGRESS NOTES
Problem: Mobility Impaired (Adult and Pediatric)  Goal: *Acute Goals and Plan of Care (Insert Text)  Description: Pt requires totalA for mobility at this time. Per previous admission notes, pt appears to be at baseline mental status. Recommend d/c back to SNF and defer ROM/positioning to nursing staff. PLOF: Pt resides at [facility] with PMH of CVA's and seizures. Pt is Telugu speaking but appears to be nonverbal even with interpretation. Outcome: Resolved/Met     PHYSICAL THERAPY EVALUATION AND DISCHARGE    Patient: Twan Glover (68 y.o. female)  Date: 4/11/2022  Primary Diagnosis: CVA (cerebral vascular accident) ([facility] [I63.9]        Precautions:  Fall,Skin    ASSESSMENT :  Based on the objective data described below, the patient presents with generalized weakness, no AROM of B Le's, deconditioning, and poor functional mobility tolerance. RN cleared for PT. Pt found semi-reclined in bed, sleeping. Eyes awoken at times with tactile stimuli, however, pt not observed to track eyes. LUE moving spontaneously, no other active movement noted. PROM of B knee flex/ext, ankle PF/DF. Increased tone to R hand. Rolling to R side with totalA. Pt left with call bell and all needs met, bed alarm on, prevalon boots donned. Pt appears to be at baseline mobility. Recommend d/c back to SNF. Patient does not require further skilled intervention at this level of care. PLAN :  Recommendations and Planned Interventions:   No formal PT needs identified at this time.   Discharge Recommendations: Jaren Suárez  Further Equipment Recommendations for Discharge: N/A     SUBJECTIVE:   Patient nonverbal, only grunting out loud at times    OBJECTIVE DATA SUMMARY:     Past Medical History:   Diagnosis Date    Aphasia     CVA (cerebral vascular accident) ([facility]     DMII (diabetes mellitus, type 2) [facility]     Dysphagia     Encephalopathy chronic     HFrEF (heart failure with reduced ejection fraction) ([facility])     HTN (hypertension)     Right hemiparesis ([facility]      Past Surgical History:   Procedure Laterality Date    IR GASTROSTOMY TUBE PLACEMENT  [id number]     Barriers to Learning/Limitations: yes;  language, cognitive, and altered mental status (i.e.Sedation, Confusion)  Compensate with: Visual Cues, Verbal Cues, and Tactile Cues  Home Situation:   Home Situation  Home Environment: [address] [place] [address] Name: [address] [place] [address] Street: [facility]  Patient Expects to be Discharged to[de-identified] Skilled nursing facility  Critical Behavior:  Neurologic State: Eyes open to stimulus  Orientation Level: Unable to verbalize  Cognition: Unable to assess (comment)  Safety/Judgement: Lack of insight into deficits  Psychosocial  Patient Behaviors: Confused; Withdrawn  Purposeful Interaction: No (comment)  Skin Condition/Temp: Warm; Inflamed     Skin Integrity: Cracked; Excoriation  Skin Integumentary  Skin Color: Ecchymosis (comment)  Skin Condition/Temp: Warm; Inflamed  Skin Integrity: Cracked; Excoriation  Turgor: Tenting  Varicosities: Present     Strength:    Strength: Grossly decreased, non-functional        Tone & Sensation:   Tone: Abnormal         Range Of Motion:  AROM: Grossly decreased, non-functional    PROM: Within functional limits       Functional Mobility:  Bed Mobility:  Rolling: Total assistance;Assist x1    Therapeutic Exercises:   Attempted PROM of B LE's  Pain:  Pain level pre-treatment: 0/10   Pain level post-treatment: 0/10  Pain Intervention(s): Medication (see MAR); Rest, Ice, Repositioning   Response to intervention: Nurse notified, See doc flow    Activity Tolerance:   Pt unable to assist with all mobility today, AROM of LUE noted only  Please refer to the flowsheet for vital signs taken during this treatment.   After treatment:   []         Patient left in no apparent distress sitting up in chair  [x]         Patient left in no apparent distress in bed  [x]         Call bell left within reach  [x]         Nursing notified  []         Caregiver present  [x]         Bed alarm activated  []         SCDs applied    COMMUNICATION/EDUCATION:   [x]         Role of Physical Therapy in the acute care setting. [x]         Fall prevention education was provided and the patient/caregiver indicated understanding. [x]         Patient/family have participated as able in goal setting and plan of care. []         Patient/family agree to work toward stated goals and plan of care. []         Patient understands intent and goals of therapy, but is neutral about his/her participation. []         Patient is unable to participate in goal setting/plan of care: ongoing with therapy staff.  []         Other:     Thank you for this referral.  Nathan Parker   Time Calculation: 9 mins      Eval Complexity: History: MEDIUM  Complexity : 1-2 comorbidities / personal factors will impact the outcome/ POC Exam:LOW Complexity : 1-2 Standardized tests and measures addressing body structure, function, activity limitation and / or participation in recreation  Presentation: LOW Complexity : Stable, uncomplicated  Clinical Decision Making:Low Complexity    Overall Complexity:LOW

## 2022-04-11 NOTE — PROGRESS NOTES
Bedside shift report give to [facility]. Jojo POLO from [address] . Report including the following information SBAR, Kardex, recent results, MAR, intake/output and cardiac rhythm NSR.

## 2022-04-11 NOTE — PROGRESS NOTES
Braden Peguero with [facility] [phone] called CM, said that they can accept patient when medically stable for discharge. Braden Peguero said she will need UAI, and a Rapid Covid Test 24-48 hours with discharge date. CM called and spoke with patient's niece Isaac Brown [phone], updated her on [facility] accepting patient. Es Logan asked for phone number to Palliative, number given, to try to contact [facility] for meeting with Palliative Care.                  Grabiel Marroquin, RN  Case Management [phone]

## 2022-04-11 NOTE — PROGRESS NOTES
Progress Note  Hospitalist Service    Patient: Pamela Gómez MRN: [id number]   SSN: [id number]  YOB: 1955   Age: 74 y.o. Sex: female      Admit Date: 4/9/2022    LOS: 2 days   Chief Complaint   Patient presents with    Altered mental status       Subjective:   I saw patient twice today. I saw patient earlier in the day and then when family visited I visited the room again and discussed with family  Patient is laying in bed, remains confused    Objective:     Vitals:  Visit Vitals  BP 99/60   Pulse 88   Temp 98.4 °F (36.9 °C)   Resp 18   SpO2 95%       General:  Awake, confused  Cardiovascular:  S1S2+, RRR  Pulmonary:  CTA b/l  GI:  Soft, BS+, NT, ND, has PEG tube  Extremities:  No edema  Right-sided weakness. Right upper extremity contracture    Intake and Output:  Current Shift: 04/11 0701 - 04/11 1900  In: 480   Out: -   Last three shifts: 04/09 1901 - 04/11 0700  In: 1990   Out: -     Lab/Data Review:  Recent Results (from the past 12 hour(s))   GLUCOSE, POC    Collection Time: 04/11/22 12:10 PM   Result Value Ref Range    Glucose (POC) 286 (H) 70 - 110 mg/dL   GLUCOSE, POC    Collection Time: 04/11/22  5:32 PM   Result Value Ref Range    Glucose (POC) 285 (H) 70 - 110 mg/dL         Key Findings or tests:       Telemetry NONE   Oxygen 5L NC     Assessment and Plan:     1) subacute CVA: Evolving subacute right temporal, parietal and occipital infarct with small local mass-effect. Evidence of cortical necrosis. Small volume of petechial hemorrhage. Neurology is following  2) acute encephalopathy secondary to subacute CVA, past history of strokes, past traumatic brain injury. 3) Dysphagia now s/p PEG   Tube feeding  4) Type II DM   #4. SSI, glucose checks ACHS, Lantus. Add scheduled Humalog.   Diabetic educator is following  5) HTN   On Coreg and lisinopril, monitor  6) Acute hypoxic respiratory failure   Wean oxygen  7) Recent hospital admission for septic shock secondary to aspiration pneumonia and urinary tract infection. Discharged April 8.  4) DTI to heel, stage 2 pressure ulcer - POA   Continue wound care    Palliative care consult. Patient is full code  Discussed with 2 nieces and other family members at bedside      Diet  tube feeding Glucerna 1.5 continuous at 45 mL an hour, free water flush 150 mL every 4 hours   DVT Prophylax SCDs  Holding chemical ppx 2/2 to petechial hemorrhage   GI Prophylaxis    Code status FULL   Disposition  skilled nursing facility.   Discussed with         Dash Woods MD,    April 11, 2022

## 2022-04-11 NOTE — WOUND CARE
Physical Exam   Patient: Arabella Garcia    Room#: 409  Date:  04/11/22    DEVAUGHN: [id number]    Situation: Wound Care Consult    Background:    PMH:    Aphasia      [email or web address]      DMII (diabetes mellitus, type 2) ([email or web address]      Dysphagia      Encephalopathy chronic      HFrEF (heart failure with reduced ejection fraction) ([email or web address]      HTN (hypertension)      Right hemiparesis (HCC)       Current Dx:    -Subacute CVA -visualized on CT scan, awaiting MRI results     -Type 2 diabetes mellitus - HbA1C was 5.6 in [id number]     -Hypertension - elevated upon presentation, increasing with time, potentially secondary to stroke?     -Chronic encephalopathy/hemiplegia/aphasia/dysphagia s/p PEG -palliative medicine consult was performed during last admission, family wanted patient to remain full CODE STATUS with full interventions   Ankit Score: 10/23   BMI: 29.08   Preventive measures in place: Silicone dressing to heels and sacrum and Pressure redistribution mattress    Assessment:   Patient found supine. Patient is Awake and alert, Disoriented and Agitated. Unable to verbalize, moans and states, \"Aye\", does not answer questions appropriately. Wound(s) Description:         Wound #1     Location: Other buttocks  Stage/Etiology: Moisture Associated    Characteristics: shows no evidence of infection, separation, or keloid formation Wound bed is dusky, pale pink and hyperpigmented clean. Eunice-wound skin is clear/intact  Drainage: None   Measurements: 10x5cm  Photo:         Wound #2  Location: right heel  Stage/Etiology: Pressure  Suspected deep tissue injury  Characteristics: shows no evidence of infection, separation, or keloid formation Wound bed is purple/maroon clean, dry.  Eunice-wound skin is clear/intact  Drainage: None   Measurements: 5x3cm  Photo:         Wound #3  Location: Other Left nare and lips  Stage/Etiology: Pressure  Unstageable  Characteristics: shows no evidence of infection, separation, or keloid formation Wound bed is eschar clean. Eunice-wound skin is clear/intact  Drainage: None   Measurements: 0.5x0.5cm   Photo:           Recommendations:    Wound care orders as follows: Continue silicone dressings every 3 days. Other orders: Turn and reposition every two hours. Add heel prevention boots. Supplies Used: Booster pad, Incontinence wipes, Chux and Foam cleanser         Care turned over to nursing staff at this time. Nancy MALDONADON, RN, [place] [address] Dept  [phone]

## 2022-04-11 NOTE — PROGRESS NOTES
Reason for Admission:   CVA (cerebral vascular accident) ([facility] [I63.9]               RUR Score:     22%             Resources/supports as identified by patient/family:       Top Challenges facing patient (as identified by patient/family and CM):     Yes, family wants patient to go to a different [facility] at time of discharge. Finances/Medication cost?     Patient has Medicare and Medicaid. Transportation      Patient has Medicare and Medicaid. Support system or lack thereof? Patient has family support. Living arrangements? Patient resides at [address] [place] [address]. Self-care/ADLs/Cognition? Total Care. Current Advanced Directive/Advance Care Plan:   no.    Healthcare Decision Maker:     Click here to complete [address] including selection of the Healthcare Decision Maker Relationship (ie \"Primary\")                          Plan for utilizing home health:    no, discharge plan is [facility]. Likelihood of readmission:   HIGH    Transition of Care Plan:                    Initial assessment completed with relative(s), patient's niece Jarrett Bermudez. Cognitive status of patient: Unable to Assess at this time. Face sheet information confirmed:  yes. The patient's niece Jarrett Bermudez [phone] agrees to participate in her discharge plan and to receive any needed information. This patient was residing at [address] [place] [address], [facility]. Patient is not able to navigate steps as needed. Prior to hospitalization, patient was considered to be independent with ADLs/IADLS : no . If not independent,  patient needs assist with : dressing, bathing, toileting and grooming    Patient has a current ACP document on file: no.    Medical Transport will need to be available to [facility] upon discharge. The patient already has all needed medical equipment available in the LTC. Patient is not currently active with home health. Patient has stayed in a skilled nursing facility or rehab. This patient is on dialysis :no.          Currently, the discharge plan is LTC. The patient states that she can obtain her medications from the pharmacy, and take her medications as directed. Patient's current insurance is [employer] Medicare and [facility] [employer]. Care Management Interventions  PCP Verified by CM:  Yes  Mode of Transport at Discharge: BLS  Transition of Care Consult (CM Consult): Long Term Care  Discharge Durable Medical Equipment: No  Physical Therapy Consult: Yes  Occupational Therapy Consult: Yes  Speech Therapy Consult: No  Support Systems: [place]  Confirm Follow Up Transport: Other (see comment) (Medical Transport)  The Plan for Transition of Care is Related to the Following Treatment Goals : [address]  The Patient and/or Patient Representative was Provided with a Choice of Provider and Agrees with the Discharge Plan?: Yes  Name of the Patient Representative Who was Provided with a Choice of Provider and Agrees with the Discharge Plan: Norman Wen (Patient's Niece)  [place] of Choice List was Provided with Basic Dialogue that Supports the Patient's Individualized Plan of Care/Goals, Treatment Preferences and Shares the Quality Data Associated with the Providers?: Yes  Discharge Location  Patient Expects to be Discharged to[de-identified] [address] RN  Case Management [phone]        Readmission Assessment  Number of days since last admission?: 1-7 days  Previous disposition: [address]  Who is being interviewed?: Caregiver  What was the patient's/caregiver's perception as to why they think they needed to return back to the hospital?: Other (Comment) (EMS brought patient to ED for Altered Mental Status.)  Did you visit your Primary Care Physician after you left the hospital, before you returned this time?: No  Why weren't you able to visit your PCP?: Other (Comment) (Patient was at a [address] [place] [address].)  Did you see a specialist, such as Cardiac, Pulmonary, Orthopedic Physician, etc. after you left the hospital?: No  Who advised the patient to return to the hospital?: Other (Comment) ([address] [place] [address])  Does the patient report anything that got in the way of taking their medications?: No  In our efforts to provide the best possible care to you and others like you, can you think of anything that we could have done to help you after you left the hospital the first time, so that you might not have needed to return so soon?: Other (Comment) (Nothing.  EMS brought patient to ED for Altered Mental Status.)

## 2022-04-11 NOTE — ACP (ADVANCE CARE PLANNING)
[address] [phone]  [facility] [phone]        Palliative Care Initial Visit:    This writer, along with Nikhil Rodriguez NP, with the Palliative Care team visited with patient today to offer support. There were no family members, at the bedside. Patient was in bed and alert but very confused. The Palliative Care team NP gave patient some mouth care. The Palliative Care team then phoned patient's niece Kit Pooja, #[phone] and RS#[phone]) to offer support and to also discuss goals of care. Patient is well known to the Palliative Care team and the family has historically wanted full aggressive measures. Niece did not answer the phone and the Palliative Care team left a voicemail for a return call. The Palliative Care team would like to arrange a family meeting. At this time, patient will remain a FULL CODE WITH FULL INTERVENTIONS. Recommendations: The Palliative Care team will continue to offer support to patient and her family, at this time. Future goals of care discussions and decisions are warranted. Family meeting needs to be arranged. Yohannes Wynn, [facility]  Palliative Care   NU#[phone]

## 2022-04-11 NOTE — DIABETES MGMT
Diabetes/ Glycemic Control Plan of Care  Recommendations:   1. Suggest initiating scheduled nutritional insulin to cover tube feeding starting with 4 units lispro q 6 hours (for last 5 blood glucose readings 230 to 326 mg/dL). Order obtained/entered. 2. Continue Lantus 18 units/day and corrective lispro, normal insulin sensitivity q 6 hours  3. Monitor    Assessment:   Pt with known history of T2DM (A1C - 5.6%; PTA regimen of Lantus 18 units/day and nutritional lispro 4 units q 6 hours). Pt known from previous admission when she was receiving TF and Lantus 18 units/day, nutritional lispro 4 units q 6 hours and corrective lispro q 6 hours. Noted per chart review pt with DTI to heel, stage 2 pressure ulcer - POA  TF via PEG at goal rate - Glucerna 1.5 at 45 ml/hr and 150 ml water flushes q 4 hours. Current blood glucose readings are elevated (5 out of last 6 POC blood glucose readings > 229 mg/dL). Pt received 10 units corrective lispro yesterday. DX:   1. Cerebrovascular accident (CVA), unspecified mechanism ([address]     2. Encephalopathy acute     3. Anemia, unspecified type     4. Hyperglycemia     5. Goals of care, counseling/discussion     6. Encephalopathy chronic     7.  Debility        Fasting/ Morning blood glucose:   Lab Results   Component Value Date/Time    Glucose 288 (H) 04/11/2022 01:56 AM    Glucose (POC) 286 (H) 04/11/2022 12:10 PM     IV Fluids containing dextrose:  none  Steroids:   none    Blood glucose values:   4/11:  Lab - 288;  POC - 326, 253, 286  4/10:  Lab -137;  POC - 146, 230, 295      Within target range (70-180mg/dL):  NO    Current insulin orders:   Lantus 18 units/day  Corrective lispro, normal insulin sensitivity q 6 hours    Total Daily Dose previous 24 hours = 28 units (18 Lantus, 10 corrective lispro)    Current A1c:   Lab Results   Component Value Date/Time    Hemoglobin A1c 5.6 03/25/2022 12:00 AM      equivalent  to ave Blood Glucose of 108 mg/dl for 2-3 months prior to admission  Adequate glycemic control PTA: YES  Nutrition/Diet:   Active Orders   There are no active orders of the following types: Diet. Meal Intake:  No data found. Supplement Intake:  No data found.   Home diabetes medications:   Key Antihyperglycemic Medications             insulin glargine (LANTUS) 100 unit/mL injection Inject 18 units under skin once daily    insulin lispro (HUMALOG) 100 unit/mL injection Inject 4 units under skin every 6 hours        Plan/Goals:   Blood glucose will be within target of 70 - 180 mg/dl within 72 hours    Education:  [] Refer to Diabetes Education Record                       [x] Education not indicated at this time     Santiago Upton MS, RD, [facility]  Diabetes and Glycemic Control   [employer]

## 2022-04-11 NOTE — CONSULTS
[facility] [place]: [phone]  [facility]: [phone]     Patient Name: Myrtle Kee  YOB: 1955    Date of consult : 4/11/22  Reason for Consult: establish goals of care  Requesting Provider: Urbano Radford MD   Primary Care Physician: None      SUMMARY:   Myrtle Kee is a 74 y.o. female with a past history of CVA multiple, TBI, HTN, DM 2, dementia, pulmonary hypertension PASP 52 mmHg, systolic heart failure EF 45 to 50%, acute on chronic respiratory failure, sepsis, aspiration pneumonia status post G-tube placement, who was admitted on 4/9/2022 from [address] [place] [address] with a diagnosis of altered mental status, lethargy. Current medical issues leading to Palliative Medicine involvement include: Establish goals of care    CHIEF COMPLAINT: Appears distressed    HPI/SUBJECTIVE:    Patient is a 63-year-old  female known to our services from prior hospitalization. She was discharged 2 days ago back to her nursing facility at [facility] [place] after long stay here at the hospital for urosepsis and respiratory failure. Patient returned to her facility after period of recuperation and G-tube placement but was sent back to the hospital after staff noted that her mental status was declining and she was becoming more lethargic. The patient is:   [] Verbal and participatory  [x] Non-participatory due to: Dementia/altered mental status    GOALS OF CARE:  Patient/Health Care Proxy Stated Goals: Prolong life      TREATMENT PREFERENCES:   Code Status: Full Code         PALLIATIVE DIAGNOSES:   1. Goals of care/ACP  2. Altered mental status  3. Right infarct  4. Debility       PLAN:   1. Goals of care/ACP  This NP and Loki Macdonald MSW in to see patient at the bedside. She is nonverbal and unable to participate in her goals of care discussion. Spent considerable time doing mouth care cleaning up patient's lips and providing presents.   Have reached out to the patient's nieces who speak English to set up a family meeting where we will use a  to discuss goals of care with the patient's sister. Message was left on 2 numbers for them to call back to set up a meeting. At this time patient remains full code with full interventions  2. Altered mental status  Likely related to #2 patient at one time was more verbal at this time she is nonverbal patient has history of prior CVAs with dementia and traumatic brain injury. Rule out other possible causes such as sepsis. Patient being followed by medical team  3. Right infarct  CT scan shows a right temporal parietal subacute infarct with laminar necrosis and/or petechial hemorrhage. There is mild mass-effect without evidence of a midline shift patient seen by neurology recommending palliative involvement as there is little chance of any meaningful recovery. 4.  Debility  Patient has a palliative performance score at this time of around 30% she is bedbound unable to do any activities due to severe evidence of disease progression, she is total care, has tube feeding only no protal nutrition and she has drowsy and confusion level of consciousness with periods of agitation. Patient overall has an extremely poor prognosis long-term. 5.  Initial consult note routed to primary continuity provider  6. Communicated plan of care with: Palliative IDT      Advance Care Planning:  [] [facility] Interdisciplinary Team has updated the ACP Navigator with [address] and Patient Capacity    Primary Decision Maker ([address]): CARLOS is the patient's sister    Medical Interventions: Full interventions   Other Instructions:   Artificially Administered Nutrition: Feeding tube long-term, if indicated     As far as possible, the palliative care team has discussed with patient / health care proxy about goals of care / treatment preferences for patient.          HISTORY:     History obtained from: chart review   Active Problems: Pharyngeal dysphagia (4/7/2022)      CVA (cerebral vascular accident) ([facility] (4/9/2022)      Deep tissue injury (4/10/2022)      Sacral wound (4/10/2022)      Primary hypertension (4/10/2022)      Acute respiratory failure with hypoxia ([facility] (4/10/2022)      Past Medical History:   Diagnosis Date    Aphasia     CVA (cerebral vascular accident) ([facility]     DMII (diabetes mellitus, type 2) ([facility]     Dysphagia     Encephalopathy chronic     HFrEF [facility] failure with reduced [facility] fraction) [facility]     HTN (hypertension)     Right hemiparesis ([facility])       Past Surgical History:   Procedure Laterality Date    IR GASTROSTOMY TUBE PLACEMENT  [id number]      Family History   Family history unknown: Yes     History reviewed, no pertinent family history.   Social History     Tobacco Use    Smoking status: Former Smoker    Smokeless tobacco: Never Used   Substance Use Topics    Alcohol use: Not on file     No Known Allergies   Current Facility-Administered Medications   Medication Dose Route Frequency    insulin lispro (HUMALOG) injection   SubCUTAneous Q6H    ondansetron (ZOFRAN ODT) tablet 4 mg  4 mg Oral Q8H PRN    Or    ondansetron (ZOFRAN) injection 4 mg  4 mg IntraVENous Q6H PRN    dextrose 10% infusion 0-250 mL  0-250 mL IntraVENous PRN    albuterol-ipratropium (DUO-NEB) 2.5 MG-0.5 MG/3 ML  3 mL Nebulization Q4H PRN    [Held by provider] heparin (porcine) injection 5,000 Units  5,000 Units SubCUTAneous Q8H    glucagon (GLUCAGEN) injection 1 mg  1 mg IntraMUSCular PRN    nystatin (MYCOSTATIN) 100,000 unit/gram powder   Topical BID    hydrALAZINE (APRESOLINE) tablet 50 mg  50 mg Oral TID    nitroglycerin (NITROBID) 2 % ointment 1 Inch  1 Inch Topical Q6H PRN    [Held by provider] hydrALAZINE (APRESOLINE) 20 mg/mL injection 20 mg  20 mg IntraVENous Q6H PRN    lisinopriL (PRINIVIL, ZESTRIL) tablet 10 mg  10 mg Oral DAILY    insulin glargine (LANTUS) injection 18 Units  18 Units SubCUTAneous DAILY    furosemide (LASIX) injection 20 mg  20 mg IntraVENous DAILY    acetaminophen (TYLENOL) tablet 650 mg  650 mg Per G Tube Q6H PRN    Or    acetaminophen (TYLENOL) suppository 650 mg  650 mg Rectal Q6H PRN    carvediloL (COREG) tablet 12.5 mg  12.5 mg Per G Tube Q12H    glucose chewable tablet 16 g  4 Tablet Per G Tube PRN    melatonin tablet 10 mg  10 mg Per G Tube QHS    polyethylene glycol (MIRALAX) packet 17 g  17 g Per G Tube DAILY PRN    QUEtiapine (SEROquel) tablet 50 mg  50 mg Per G Tube QHS    thiamine HCL (B-1) tablet 200 mg  200 mg Per G Tube TID    multivitamin-minerals-ferrous gluconate oral liquid 15 mL  15 mL Per G Tube DAILY    bacitracin zinc-polymyxin b (POLYSPORIN) 500-10,000 unit/gram ointment   Topical BID          Clinical Pain Assessment (nonverbal scale for nonverbal patients): Activity (Movement): Restless, excessive activity and/or withdrawal reflexes    Duration: for how long has pt been experiencing pain (e.g., 2 days, 1 month, years)  Frequency: how often pain is an issue (e.g., several times per day, once every few days, constant)     FUNCTIONAL ASSESSMENT:     Palliative Performance Scale (PPS):  PPS: 30    ECOG  ECOG Status : Completely disabled     PSYCHOSOCIAL/SPIRITUAL SCREENING:      Any spiritual / Alevism concerns: unable to ascertain   [] Yes /  [] No    Caregiver Burnout:  [] Yes /  [] No /  [x] No Caregiver Present      Anticipatory grief assessment:  Unable to determine at this time  [] Normal  / [] Maladaptive        REVIEW OF SYSTEMS:     Systems: constitutional, ears/nose/mouth/throat, respiratory, gastrointestinal, genitourinary, musculoskeletal, integumentary, neurologic, psychiatric, endocrine. Positive findings noted below. Modified ESAS Completed by: provider                       Dyspnea: 0               Positive and pertinent negative findings in ROS are noted above in HPI.   The following systems were [x] reviewed / [] unable to be reviewed as noted in HPI  Other findings are noted below. PHYSICAL EXAM:     Constitutional: alert but non verbal, appears in some distress   Eyes: pupils equal, anicteric  ENMT: no nasal discharge, dry mucous membranes and lips   Cardiovascular: regular rhythm, distal pulses intact  Respiratory: breathing not labored, symmetric  Gastrointestinal: soft non-tender, +bowel sounds  Musculoskeletal: no deformity, no tenderness to palpation  Skin: warm, dry  Neurologic: not following commands, moving all extremities    Other: Wt Readings from Last 3 Encounters:   04/05/22 62.4 kg (137 lb 9.1 oz)   03/14/22 70.3 kg (155 lb)   10/22/21 70.3 kg (155 lb)     Blood pressure 136/74, pulse 84, temperature 97.8 °F (36.6 °C), resp. rate 18, SpO2 94 %. Pain:  Pain Scale 1: Adult Nonverbal Pain Scale                         LAB AND IMAGING FINDINGS:     Lab Results   Component Value Date/Time    WBC 7.9 04/11/2022 01:56 AM    HGB 7.3 (L) 04/11/2022 01:56 AM    PLATELET 595 96/69/2647 01:56 AM     Lab Results   Component Value Date/Time    Sodium 142 04/11/2022 01:56 AM    Potassium 3.3 (L) 04/11/2022 01:56 AM    Chloride 105 04/11/2022 01:56 AM    CO2 31 04/11/2022 01:56 AM    BUN 35 (H) 04/11/2022 01:56 AM    Creatinine 0.91 04/11/2022 01:56 AM    Calcium 8.4 (L) 04/11/2022 01:56 AM    Magnesium 2.2 04/11/2022 01:56 AM    Phosphorus 3.4 04/11/2022 01:56 AM      Lab Results   Component Value Date/Time    Alk.  phosphatase 154 (H) 04/09/2022 05:30 PM    Protein, total 6.2 (L) 04/09/2022 05:30 PM    Albumin 2.6 (L) 04/09/2022 05:30 PM    Globulin 3.6 04/09/2022 05:30 PM     Lab Results   Component Value Date/Time    INR 1.4 (H) 03/24/2022 07:52 PM    Prothrombin time 16.7 (H) 03/24/2022 07:52 PM    aPTT 32.3 03/25/2022 12:00 AM      No results found for: IRON, FE, TIBC, IBCT, PSAT, FERR   No results found for: PH, PCO2, PO2  No components found for: [place]   Lab Results   Component Value Date/Time    CK 54 03/25/2022 07:00 PM              Total time: 50 minutes   Counseling / coordination time, spent as noted above:   > 50% counseling / coordination:  Time spent in direct consultation with the patient, medical team, and family     Prolonged service was provided for  []30 min   []75 min in face to face time in the presence of the patient, spent as noted above. Time Start:   Time End:     Disclaimer: Sections of this note are dictated using utilizing voice recognition software, which may have resulted in some phonetic based errors in grammar and contents. Even though attempts were made to correct all the mistakes, some may have been missed, and remained in the body of the document. If questions arise, please contact our department.

## 2022-04-11 NOTE — CONSULTS
Neurology   This morning I saw Dora Bishop a 72-year-old woman who is status post a bihemispheric strokes associated with dementia and behavioral disturbance. She is being evaluated by palliative care which I think is appropriate. She does have an old left hemispheric stroke with residual right hemisphere hemiparesis and contracture in the right hand. She moves her left side of the body some. She is nonverbal and grunts and screams.

## 2022-04-11 NOTE — PROGRESS NOTES
0049-Pt still yelling, pulling oxygen off, unable to tolerate IV potassium.  notified and orders received for oral potassium via peg. Second dose of ativan held at this time as pt is maintaining her oxygen on for longer period of times when hand is redirected away from her face. She still does continue to yell continuously for the last 12 hours with incompressible speech other than 2 words which were in Swedish: \"Leela\" and \"Shiraz\". 0815-Bedside shift change report given to [facility]. Report included the following information SBAR, ED Summary, Intake/Output, MAR, Recent Results and Cardiac Rhythm sinus rhythm. Pt resting in bed quietly without any signs of distress, nasal canula placed back on.

## 2022-04-11 NOTE — PROGRESS NOTES
Pt maintaining oxygen saturations above 93% on 5L-NC when not pulling her NC off. Ativan 0.5mg IV administered once, repeat dose at this time d/t decrease in BP and pt is compliant for longer periods of time. No change from baseline in mental status. Increase observations rounds for closer monitoring.        04/11/22 0415   Vital Signs   Temp 98.9 °F (37.2 °C)   Temp Source Axillary   Pulse (Heart Rate) 86   Resp Rate 22   O2 Sat (%) 97 %   Level of Consciousness Alert (0)   /60   MAP (Calculated) 78   BP 1 Method Automatic   BP 1 Location Left upper arm   MEWS Score 2   Oxygen Therapy   O2 Device Humidifier;Nasal cannula   O2 Flow Rate (L/min) 5 l/min

## 2022-04-11 NOTE — PROGRESS NOTES
Problem: Self Care Deficits Care Plan (Adult)  Goal: *Acute Goals and Plan of Care (Insert Text)  Outcome: Resolved/Met   OCCUPATIONAL THERAPY EVALUATION/DISCHARGE    Patient: Ta Jackson (68 y.o. female)  Date: 4/11/2022  Primary Diagnosis: CVA (cerebral vascular accident) ([facility] [I63.9]        Precautions:   Fall,Skin  PLOF: Per pt's family  present in room, Pt has required total care assistance at her nursing facility and is non-verbal.     ASSESSMENT AND RECOMMENDATIONS:  Based on the objective data described below, the patient presents with close to baseline functional independence. Pt family members present at bedside and attempting to reassure pt throughout OT session. Pt semi reclined in bed and appears to be agitated moaning and pulling at covers and gown. Pt does not follow commands to squeeze therapist's hand using R hand and does not take washcloth to wash face with encouragement from this OT and her family member. Pt requires Total A for washing face. Pt pulling herself into side lying on R side while moaning and pillow placed under L side to provide pressure relief on buttocks. Pt R UE elevated on pillow for protection and to reduce edema. Pt with all needs in reach at end of session. Skilled occupational therapy is not indicated at this time.   Discharge Recommendations: long term care  Further Equipment Recommendations for Discharge: N/A      SUBJECTIVE:   Patient is non verbal     OBJECTIVE DATA SUMMARY:     Past Medical History:   Diagnosis Date    Aphasia     [facility]cerebral vascular [facility]) [facility]     DMII (diabetes mellitus, type 2) [facility]     Dysphagia     Encephalopathy chronic     HFrEF (heart failure with reduced ejection fraction) ([facility]     HTN (hypertension)     Right hemiparesis ([facility]      Past Surgical History:   Procedure Laterality Date    IR GASTROSTOMY TUBE PLACEMENT  [id number]     Barriers to Learning/Limitations: yes;  language, cognitive, and altered mental status (i.e.Sedation, Confusion)  Compensate with: visual, verbal, tactile, kinesthetic cues/model    Home Situation:   Home Situation  Home Environment: [address] [place] [address] Name:  ([facility])  Support Systems: Veronica  Patient Expects to be Discharged to[de-identified] [address]  []     Right hand dominant   []     Left hand dominant (unknown)    Cognitive/Behavioral Status:  Neurologic State: Eyes open to stimulus  Orientation Level: Unable to verbalize  Cognition: No command following  Safety/Judgement: Lack of insight into deficits    Skin: appears intact  Edema: RUE moderate edema    Vision/Perceptual:       Unable to assess due to cognition        Coordination: BUE  Coordination: Grossly decreased, non-functional  Fine Motor Skills-Upper: Right Impaired;Left Impaired    Gross Motor Skills-Upper: Right Impaired;Left Impaired  Strength: BUE  Strength: Generally decreased, functional   Tone & Sensation: BUE  Tone: Abnormal (flexion contracture R hand, wrist)   Range of Motion: BUE  AROM: Grossly decreased, non-functional (R UE hand flexion contracture, no movement demonstrated RUE)  PROM: Generally decreased, functional (LUE )   Functional Mobility and Transfers for ADLs:  Bed Mobility:  Rolling: Minimum assistance (Pt rolled to R side with min A)  ADL Assessment:  Feeding: Total assistance (PEG)  Oral Facial Hygiene/Grooming: Total assistance  Bathing: Total assistance (based on clinical judgement)  Upper Body Dressing: Adaptive equipment (based on clincal judgement)  Lower Body Dressing: Total assistance (based on clinical judgement)  Toileting: Total assistance (based on clinical judgement)   ADL Intervention:   Grooming  Washing Face: Total assistance (dependent)  Cognitive Retraining  Safety/Judgement: Lack of insight into deficits  Pain:  Pain level pre-treatment: -/10   Pain level post-treatment: -/10   Pain Intervention(s): Medication (see MAR);  Rest, Repositioning   Response to intervention: Nurse notified, See doc flow    Activity Tolerance:   poor  Please refer to the flowsheet for vital signs taken during this treatment. After treatment:   []  Patient left in no apparent distress sitting up in chair  [x]  Patient left in no apparent distress in bed  [x]  Call bell left within reach  [x]  Nursing notified  []  Caregiver present  [x]  Bed alarm activated    COMMUNICATION/EDUCATION:   [x]      Role of Occupational Therapy in the acute care setting  [x]      Home safety education was provided and the patient/caregiver indicated understanding. [x]      Patient/family have participated as able and agree with findings and recommendations. []      Patient is unable to participate in plan of care at this time. Thank you for this referral.  Aravind Walls OT  Time Calculation: 13 mins      Eval Complexity: History: LOW Complexity : Brief history review ; Examination: HIGH Complexity : 5 or more performance deficits relating to physical, cognitive , or psychosocial skils that result in activity limitations and / or participation restrictions; Decision Making:HIGH Complexity : Patient presents with comorbidities that affect occupational performance.  Signifigant modification of tasks or assistance (eg, physical or verbal) with assessment (s) is necessary to enable patient to complete evaluation

## 2022-04-11 NOTE — PROGRESS NOTES
Notified MD of /80 prior to admin of BP meds. MD ordered to hold BP meds and recheck BP in 1 hr. Will continue to monitor pt.
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
## 2022-04-12 NOTE — PROGRESS NOTES
Acknowledged duplicate PT orders. Patient previously evaluated and discharged. Please refer to PT evaluation for discharge recommendations and plan of care.

## 2022-04-12 NOTE — PROGRESS NOTES
New OT orders received with patient already evaluated and discharged on 4/11/2022 from caseload. No indication for re-evaluation at this time. Acknowledged new orders.           Thank you,   Zi Brennan MS, OTR/L

## 2022-04-12 NOTE — DIABETES MGMT
Diabetes/ Glycemic Control Plan of Care  Recommendations:   1. Suggest increasing basal insulin to 22 units/day   2. Advance corrective lispro to very insulin resistant dosing q 6 hours  3. Continue  scheduled nutritional insulin 4 units q 6 hours and   4. Monitor    0913  Addendum:  Order obtained/entered for 22 units lantus/day    Assessment:   Pt with known history of T2DM (A1C - 5.6%; PTA regimen of Lantus 18 units/day and nutritional lispro 4 units q 6 hours). Pt known from previous admission when she was receiving TF and Lantus 18 units/day, nutritional lispro 4 units q 6 hours and corrective lispro q 6 hours. Noted per chart review pt with DTI to heel, stage 2 pressure ulcer - POA  TF via PEG at goal rate - Glucerna 1.5 at 45 ml/hr   Scheduled nutritional lispro started yesterday at 1704  (4 units lispro q 6 hours). Subsequent blood glucose readings - 231, 245 mg/dL. Fasting/ Morning blood glucose:   Lab Results   Component Value Date/Time    Glucose 224 (H) 04/12/2022 01:14 AM    Glucose (POC) 245 (H) 04/12/2022 06:15 AM     IV Fluids containing dextrose:  none  Steroids:   none    Blood glucose values:   4/11:  Lab - 288;  POC - 326, 253, 286  4/10:  Lab -137;  POC - 146, 230, 295      Within target range (70-180mg/dL):  NO    Current insulin orders:   Lantus 18 units/day  Scheduled nutritional lispro 4 units q 6 hours   Corrective lispro, normal insulin sensitivity q 6 hours    Total Daily Dose previous 24 hours =   4/11:  46 units (18 Lantus, 4 nutritional lispro, 24 corrective lispro)  4/10:  28 units (18 Lantus, 10 corrective lispro)    Current A1c:   Lab Results   Component Value Date/Time    Hemoglobin A1c 5.6 03/25/2022 12:00 AM      equivalent  to ave Blood Glucose of 108 mg/dl for 2-3 months prior to admission  Adequate glycemic control PTA: YES  Nutrition/Diet:   Active Orders   There are no active orders of the following types: Diet. Meal Intake:  No data found.   Supplement Intake:  No data found.   Home diabetes medications:   Key Antihyperglycemic Medications             insulin glargine (LANTUS) 100 unit/mL injection Inject 18 units under skin once daily    insulin lispro (HUMALOG) 100 unit/mL injection Inject 4 units under skin every 6 hours        Plan/Goals:   Blood glucose will be within target of 70 - 180 mg/dl within 72 hours    Education:  [] Refer to Diabetes Education Record                       [x] Education not indicated at this time     Zaheer Martinez MS, RD, [facility]  Diabetes and Glycemic Control   [employer]

## 2022-04-12 NOTE — PROGRESS NOTES
Physician Progress Note      PATIENTOle Host  CSN #:                  [id number]  :                       1955  ADMIT DATE:       2022 5:04 PM  [address] [place] DATE:        2022 3:35 PM  RESPONDING  PROVIDER #:        Qian Bruner MD          QUERY TEXT:    Pt admitted with subacute CVA. Pt noted to have  mass effect, small volume of petechial hemorrhage. If clinically significant, please document in progress notes and discharge summary if you are evaluating/treating any of the following: The medical record reflects the following:  Risk Factors: Subacute CVA  Clinical Indicators: 22  MRI:    Evolving subacute right temporal, parietal and occipital infarct with small with local mass effect. Evidence of cortical necrosis. A small volume of petechial hemorrhage may be masked by cortical laminar necrosis. Treatment: MRI; neuro consult; Will hold chemical DVT ppx  to hemorrhage; VS    Thank you,  Ruthy Velez RN/CCDS  [email or web address]  Options provided:  -- Brain compression  -- Other - I will add my own diagnosis  -- Disagree - Not applicable / Not valid  -- Disagree - Clinically unable to determine / Unknown  -- Refer to Clinical Documentation Reviewer    PROVIDER RESPONSE TEXT:    subacute right temporal, parietal and occipital infarct with small local mass-effect.     Query created by: Ernestine Haq on 2022 3:31 PM      Electronically signed by:  Qian Bruner MD 2022 6:03 PM

## 2022-04-12 NOTE — DISCHARGE SUMMARY
[address], [address]     DISCHARGE SUMMARY    Name: Brian Arevalo MRN: [id number]   Age / Sex: 74 y.o. / female CSN: [id number]   YOB: 1955 Length of Stay: 3 days   Admit Date: 4/9/2022 Discharge Date:        PRIMARY CARE PHYSICIAN: None      DISCHARGE DIAGNOSES:    subacute CVA: Evolving subacute right temporal, parietal and occipital infarct with small local mass-effect. Evidence of cortical necrosis. Small volume of petechial hemorrhage. Acute encephalopathy in the setting of subacute CVA  Prior history of CVA, has right-sided hemiparesis and aphasia  Prior history of traumatic brain injury  Dysphagia, has PEG tube  Type 2 diabetes  Hypoxic respiratory failureon 5 L oxygen  Deep tissue injury to heal, stage II pressure ulcer  Hypertension         CONSULTS CALLED: Neurology      PROCEDURES DONE: None      COURSE IN THE HOSPITAL: This is a 68-year-old female with a history of type 2 diabetes and hypertension and a recent stroke with right-sided hemiparesis and aphasia and dysphagia who was recently discharged from the hospital on April 8. Patient was sent back to the ED with a change in mental status. Patient was evaluated and was admitted. Patient was noted to have worsening encephalopathy. CT scan showed evolving subacute right temporal parietal and occipital infarct with a small local mass-effect and a small volume of petechial hemorrhage. Patient was admitted. Neurology was consulted. Tube feeding was continued. Patient received IV fluids. Palliative care was consulted. Patient was continued on 5 L oxygen. Nutrition services were consulted. Wound care was consulted. Case management was involved. After discussions with palliative care family has decided to make patient DNR and DNI and transition patient to comfort measures. Case management has advised me that patient is transitioning back to care facility today with comfort measures.   Patient is DNR and DNI. MEDICATIONS ON DISCHARGE:    Current Discharge Medication List      START taking these medications    Details   !! QUEtiapine (SEROquel) 25 mg tablet 1 Tablet by Per G Tube route daily (with breakfast). Qty: 15 Tablet, Refills: 0  Start date: 4/12/2022      bacitracin zinc-polymyxin b (POLYSPORIN) 500-10,000 unit/gram ointment Apply  to affected area two (2) times a day for 7 days. To PEG tube site  Qty: 14 g, Refills: 0  Start date: 4/12/2022, End date: 4/19/2022      hydrALAZINE (APRESOLINE) 50 mg tablet 1 Tablet by Per G Tube route three (3) times daily. Qty: 90 Tablet, Refills: 0  Start date: 4/12/2022      LORazepam (INTENSOL) 2 mg/mL concentrated solution Take 0.25 mL by mouth every four (4) hours as needed for Agitation or Anxiety. Max Daily Amount: 3 mg. Qty: 30 mL, Refills: 0  Start date: 4/12/2022    Associated Diagnoses: Comfort measures only status      morphine (ROXANOL) 100 mg/5 mL (20 mg/mL) concentrated solution 0.12 mL by SubLINGual route every four (4) hours as needed for Shortness of Breath for up to 14 days. Max Daily Amount: 14.4 mg.  Qty: 15 mL, Refills: 0  Start date: 4/12/2022, End date: 4/26/2022    Associated Diagnoses: Comfort measures only status      polyethylene glycol (MIRALAX) 17 gram packet 1 Packet by Per G Tube route daily as needed for Constipation. Qty: 15 Packet, Refills: 0  Start date: 4/12/2022       !! - Potential duplicate medications found. Please discuss with provider. CONTINUE these medications which have CHANGED    Details   insulin glargine (LANTUS) 100 unit/mL injection 20 Units by SubCUTAneous route daily. Qty: 1 mL, Refills: 0  Start date: 4/12/2022      insulin lispro (HUMALOG) 100 unit/mL injection 4 Units by SubCUTAneous route every six (6) hours.   Qty: 1 Each, Refills: 0  Start date: 4/12/2022         CONTINUE these medications which have NOT CHANGED    Details   albuterol-ipratropium (DUO-NEB) 2.5 mg-0.5 mg/3 ml nebu 3 mL by Nebulization route every four (4) hours as needed for Wheezing. Qty: 30 Nebule, Refills: 0      carvediloL (COREG) 12.5 mg tablet 1 Tablet by Per G Tube route every twelve (12) hours. Qty: 60 Tablet, Refills: 0      melatonin 10 mg tab 10 mg by Per J Tube route nightly. Qty: 30 Tablet, Refills: 0      acetaminophen (Tylenol Extra Strength) 500 mg tablet 2 Tablets by Per G Tube route every six (6) hours as needed for Pain. Qty: 20 Tablet, Refills: 0      lisinopriL (PRINIVIL, ZESTRIL) 10 mg tablet 1 Tablet by Per G Tube route daily. Qty: 30 Tablet, Refills: 0      multivitamin-minerals-ferrous gluconate 9 mg iron/15 mL oral liquid 15 mL by Per G Tube route daily. Qty: 60 mL, Refills: 0      nystatin (MYCOSTATIN) powder Apply  to affected area two (2) times a day for 30 days. Qty: 30 g, Refills: 0      !! QUEtiapine (SEROquel) 50 mg tablet 1 Tablet by Per G Tube route nightly. Qty: 30 Tablet, Refills: 0       !! - Potential duplicate medications found. Please discuss with provider. STOP taking these medications       thiamine HCL (B-1) 100 mg tablet Comments:   Reason for Stopping:                 DISCHARGE VITAL SIGNS:  Visit Vitals  /89 (BP 1 Location: Left upper arm, BP Patient Position: At rest)   Pulse 92   Temp 97.4 °F (36.3 °C)   Resp 14   Wt 65.3 kg (144 lb)   SpO2 98%   BMI 29.08 kg/m²       DISCHARGE PHYSICAL EXAMINATION:  General:  Awake, confused  Cardiovascular:  S1S2+, RRR  Pulmonary:  CTA b/l  GI:  Soft, BS+, NT, ND, has PEG tube  Extremities:  No edema  Right-sided weakness, right upper extremity contracture    CONDITION ON DISCHARGE: Stable. DISPOSITION: Care facility with comfort measures only.   Patient is DNR and DNI      FOLLOW-UP RECOMMENDATIONS:   Follow-up Information     Follow up With Specialties Details Why Contact Info    None    None ([phone]) Patient stated that they have no PCP            OTHER INSTRUCTIONS:  Diet-strict n.p.o.  Glucerna 1.5 via PEG tube at the rate of 45 mL/h with free water flushes of 150 mL every 4 hours  Routine PEG tube care  Head of bed greater than 30 degrees at all time  Activity - as tolerated  FALL PRECAUTIONS  ASPIRATION PRECAUTIONS  DECUBITUS PRECAUTIONS  Oxygen5 L via nasal cannula continuously  Wound care as directed  Follow-up with PCP in 1 week        TIME SPENT ON DISCHARGE ACTIVITIES: More than 35 minutes. Dragon medical dictation software was used for portions of this report. Unintended errors may occur.       Signed:  Naveed Raines MD      4/12/2022

## 2022-04-12 NOTE — PROGRESS NOTES
Problem: Aspiration - Risk of  Goal: *Absence of aspiration  Outcome: Progressing Towards Goal     Problem: Patient Education: Go to Patient Education Activity  Goal: Patient/Family Education  Outcome: Progressing Towards Goal     Problem: Falls - Risk of  Goal: *Absence of Falls  Description: Document Jarad Walsh Fall Risk and appropriate interventions in the flowsheet.   Outcome: Progressing Towards Goal  Note: Fall Risk Interventions:       Mentation Interventions: Bed/chair exit alarm,More frequent rounding,Update white board,Room close to nurse's station,Toileting rounds,Door open when patient unattended,Adequate sleep, hydration, pain control    Medication Interventions: Bed/chair exit alarm    Elimination Interventions: Bed/chair exit alarm              Problem: Patient Education: Go to Patient Education Activity  Goal: Patient/Family Education  Outcome: Progressing Towards Goal     Problem: Patient Education: Go to Patient Education Activity  Goal: Patient/Family Education  Outcome: Progressing Towards Goal     Problem: TIA/CVA Stroke: 0-24 hours  Goal: Off Pathway (Use only if patient is Off Pathway)  Outcome: Progressing Towards Goal  Goal: Activity/Safety  Outcome: Progressing Towards Goal  Goal: Consults, if ordered  Outcome: Progressing Towards Goal  Goal: Diagnostic Test/Procedures  Outcome: Progressing Towards Goal  Goal: Nutrition/Diet  Outcome: Progressing Towards Goal  Goal: Discharge Planning  Outcome: Progressing Towards Goal  Goal: Medications  Outcome: Progressing Towards Goal  Goal: Respiratory  Outcome: Progressing Towards Goal  Goal: Treatments/Interventions/Procedures  Outcome: Progressing Towards Goal  Goal: Minimize risk of bleeding post-thrombolytic infusion  Outcome: Progressing Towards Goal  Goal: Monitor for complications post-thrombolytic infusion  Outcome: Progressing Towards Goal  Goal: Psychosocial  Outcome: Progressing Towards Goal  Goal: *Hemodynamically stable  Outcome: Progressing Towards Goal  Goal: *Neurologically stable  Description: Absence of additional neurological deficits    Outcome: Progressing Towards Goal  Goal: *Verbalizes anxiety and depression are reduced or absent  Outcome: Progressing Towards Goal  Goal: *Absence of Signs of Aspiration on Current Diet  Outcome: Progressing Towards Goal  Goal: *Absence of deep venous thrombosis signs and symptoms(Stroke Metric)  Outcome: Progressing Towards Goal  Goal: *Ability to perform ADLs and demonstrates progressive mobility and function  Outcome: Progressing Towards Goal  Goal: *Stroke education started(Stroke Metric)  Outcome: Progressing Towards Goal  Goal: *Dysphagia screen performed(Stroke Metric)  Outcome: Progressing Towards Goal  Goal: *Rehab consulted(Stroke Metric)  Outcome: Progressing Towards Goal     Problem: TIA/CVA Stroke: Day 2 Until Discharge  Goal: Off Pathway (Use only if patient is Off Pathway)  Outcome: Progressing Towards Goal  Goal: Activity/Safety  Outcome: Progressing Towards Goal  Goal: Diagnostic Test/Procedures  Outcome: Progressing Towards Goal  Goal: Nutrition/Diet  Outcome: Progressing Towards Goal  Goal: Discharge Planning  Outcome: Progressing Towards Goal  Goal: Medications  Outcome: Progressing Towards Goal  Goal: Respiratory  Outcome: Progressing Towards Goal  Goal: Treatments/Interventions/Procedures  Outcome: Progressing Towards Goal  Goal: Psychosocial  Outcome: Progressing Towards Goal  Goal: *Verbalizes anxiety and depression are reduced or absent  Outcome: Progressing Towards Goal  Goal: *Absence of aspiration  Outcome: Progressing Towards Goal  Goal: *Absence of deep venous thrombosis signs and symptoms(Stroke Metric)  Outcome: Progressing Towards Goal  Goal: *Optimal pain control at patient's stated goal  Outcome: Progressing Towards Goal  Goal: *Tolerating diet  Outcome: Progressing Towards Goal  Goal: *Ability to perform ADLs and demonstrates progressive mobility and function  Outcome: Progressing Towards Goal  Goal: *Stroke education continued(Stroke Metric)  Outcome: Progressing Towards Goal     Problem: Ischemic Stroke: Discharge Outcomes  Goal: *Verbalizes anxiety and depression are reduced or absent  Outcome: Progressing Towards Goal  Goal: *Verbalize understanding of risk factor modification(Stroke Metric)  Outcome: Progressing Towards Goal  Goal: *Hemodynamically stable  Outcome: Progressing Towards Goal  Goal: *Absence of aspiration pneumonia  Outcome: Progressing Towards Goal  Goal: *Aware of needed dietary changes  Outcome: Progressing Towards Goal  Goal: *Verbalize understanding of prescribed medications including anti-coagulants, anti-lipid, and/or anti-platelets(Stroke Metric)  Outcome: Progressing Towards Goal  Goal: *Tolerating diet  Outcome: Progressing Towards Goal  Goal: *Aware of follow-up diagnostics related to anticoagulants  Outcome: Progressing Towards Goal  Goal: *Ability to perform ADLs and demonstrates progressive mobility and function  Outcome: Progressing Towards Goal  Goal: *Absence of DVT(Stroke Metric)  Outcome: Progressing Towards Goal  Goal: *Absence of aspiration  Outcome: Progressing Towards Goal  Goal: *Optimal pain control at patient's stated goal  Outcome: Progressing Towards Goal  Goal: *Home safety concerns addressed  Outcome: Progressing Towards Goal  Goal: *Describes available resources and support systems  Outcome: Progressing Towards Goal  Goal: *Verbalizes understanding of activation of EMS(911) for stroke symptoms(Stroke Metric)  Outcome: Progressing Towards Goal  Goal: *Understands and describes signs and symptoms to report to providers(Stroke Metric)  Outcome: Progressing Towards Goal  Goal: *Neurolgocially stable (absence of additional neurological deficits)  Outcome: Progressing Towards Goal  Goal: *Verbalizes importance of follow-up with primary care physician(Stroke Metric)  Outcome: Progressing Towards Goal  Goal: *Smoking cessation discussed,if applicable(Stroke Metric)  Outcome: Progressing Towards Goal  Goal: *Depression screening completed(Stroke Metric)  Outcome: Progressing Towards Goal     Problem: Injury - Risk of, Adverse Drug Event  Goal: *Absence of adverse drug events  Outcome: Progressing Towards Goal  Goal: *Absence of medication errors  Outcome: Progressing Towards Goal  Goal: *Knowledge of prescribed medications  Outcome: Progressing Towards Goal     Problem: Patient Education: Go to Patient Education Activity  Goal: Patient/Family Education  Outcome: Progressing Towards Goal

## 2022-04-12 NOTE — ACP (ADVANCE CARE PLANNING)
[facility] (Physician Orders for Scope of Treatment)       Date of conversation: 4/12/2022  Location: [facility]                                 Length (minutes): 20    Participants:                [x] Other Surrogate Decision Maker / Next of Kin      Name: Brittani Pappas    Relationship to Patient: Rosalio     Phone Number: [phone]                 Other persons present:                  Name: Juan Rodriguez (Niece)                           Name:  Leslie Angela, NP (Palliative)                           Name: Kayley Bonilla. Romaine Howard. MSW (Palliative)         [facility] ACP Facilitator: Kayley Howard MSW      Conversation Topics   (If Patient does not have decision making capacity, Agent/Surrogate responds based on understanding of how patient would respond if capable)      Understanding of Medical Condition(s) AND Potential Complications:    Healthcare Agent/Other Surrogate: Patient's nieces have full understanding of patient's medical condition(s) and the potential complications. Patient's nieces have been getting regular medical updates, regarding patient's condition(s). Family is interested in just focusing on keeping patient comfortable. Cardiopulmonary Resuscitation      \"What do you understand about CPR? \" Response: Patient's nieces have full understanding of the risks and burdens of CPR. The nieces have been educated on the risks and burdens of CPR. Patient's nieces agree that patient should not be resuscitated, if patient's heart and breathing were to stop. They also agreed to transition patient to comfort measures and hospice upon discharge. They are also okay with a feeding tube long-term if indicated. A POST was completed, with nieces. Ishmael signed the POST. Copies were given to the nieces and the original was placed on the chart to be sent with patient, upon discharge.  A copy was also placed on the chart to be scanned in to the EMR. At this time, patient is now a DNR/DNI with Comfort Measures and is OKAY with feeding tube long-term if indicated. Order Elected for CPR:  []  Attempt Resuscitation [x]  Do Not Attempt Resuscitation      When NOT in Cardiopulmonary Arrest, Order Elected:      [x] Comfort Measures and hospice upon discharge. [] Limited Additional Interventions  [] Full Interventions    Artificially Administered Nutrition, Order Elected:    [] No Feeding Tube   [] Feeding Tube for a defined trial period  [x] Feeding Tube long-term if indicated      The following was provided (check all that apply):      Healthcare Decision Information Cards:   [] Help with Breathing Facts   [] Tube Feeding Facts   [] CPR Facts               [] Review of existing Advance Directive              [] Assistance with Completion of New Advance Directive               [x] Review of [place] POST Form         Meeting Outcomes:     [] ACP discussion completed   [x] [place] form completed              [x] [place] prepared for Provider review and signature   [x] Original placed on Chart, if in facility (form to be sent with patient at discharge)              [x] Copy given to healthcare agent    [] Copy scanned to electronic medical record      If ACP discussion not completed, last interview topic discussed: POST completion and transition to comfort measures. Follow-up plan:       [] Schedule follow-up conversation to continue planning   [] Referred individual to Provider for additional questions/concerns               [x] Advised patient/agent/surrogate to review completed POST form and update if needed with changes in condition, patient preferences or care setting       Recommendations: The Palliative Care team will continue to offer support and comfort, to patient, while she remains hospitalized. [] This note routed to one or more involved healthcare providers            Yoahnnes Martinez MSW  Palliative Medicine Inpatient   Jennifer Sage 76: [phone]

## 2022-04-12 NOTE — HOSPICE
Called and spoke with family member Srikanth Carranza, at [phone]. Discussed [facility] philosophy, services, criteria, and IDT. Discussed caregiver need for round the clock care with Lacie Litten. Primary caregiver identified as Lacie Litten. Caregiver concerns identified as assistance with final arrangements (n/a if no caregiver issues notified)    Answered all questions. Pt returning to [address] [place] [address] this evening. Family will meet admitting RN at the facility to sign consents. No DME needed at this time. Provided with 24/7 contact information. Information to be sent to [facility] for approval of hospice services. Hospice referral received. Chart review in process. Thank you for the referral to [facility].  If we can be of further assistance please contact [address], [address] [facility] [address], [address]  [phone]  Email: [email or web address]

## 2022-04-12 NOTE — PROGRESS NOTES
[facility]: [phone] ([phone])  [facility]: [phone]     Patient Name: Ermias Contreras  YOB: 1955    Date of progress note : 4/12/22  Reason for Consult: establish goals of care  Requesting Provider: Tayler Rodriguez MD   Primary Care Physician: None      SUMMARY:   Ermias Contreras is a 74 y.o. female with a past history of CVA multiple, TBI, HTN, DM 2, dementia, pulmonary hypertension PASP 52 mmHg, systolic heart failure EF 45 to 50%, acute on chronic respiratory failure, sepsis, aspiration pneumonia status post G-tube placement, who was admitted on 4/9/2022 from [address] [place] [address] with a diagnosis of altered mental status, lethargy. Current medical issues leading to Palliative Medicine involvement include: Establish goals of care    CHIEF COMPLAINT: Appears distressed    HPI/SUBJECTIVE:    Patient is a 72-year-old  female known to our services from prior hospitalization. She was discharged 2 days ago back to her nursing facility at [facility] after long stay here at the hospital for urosepsis and respiratory failure. Patient returned to her facility after period of recuperation and G-tube placement but was sent back to the hospital after staff noted that her mental status was declining and she was becoming more lethargic. The patient is:   [] Verbal and participatory  [x] Non-participatory due to: Dementia/altered mental status    GOALS OF CARE:  Patient/Health Care Proxy Stated Goals: Prolong life      TREATMENT PREFERENCES:   Code Status: DNR         PALLIATIVE DIAGNOSES:   1. Goals of care/ACP  2. Altered mental status  3. Right infarct  4. Debility       PLAN:   4/12/22: This NP and Veronika Nelson MSW in to see the patient at the bedside. She is easily agitated to any stimulation with facial grimacing and yelling out. Met with her two nieces who have permission from patient's NOK her sister to authorize goals of care changes for the patient.  Ksenia Wadsworth Loretta and Rosalina Middleton have made the decision to make the patient a DNR/DNI and move her to comfort care with hospice at discharge. Have updated orders and notified all involved on her treatment team. Family are in favor of moving the patient back to the Nursing facility as soon as possible. Goals of care: DNR/DNI COMFORT CARE ok with long term feeding tube. POST completed and on file. 1.  Goals of care/ACP  This NP and Luis A Carters MSW in to see patient at the bedside. She is nonverbal and unable to participate in her goals of care discussion. Spent considerable time doing mouth care cleaning up patient's lips and providing presents. Have reached out to the patient's nieces who speak English to set up a family meeting where we will use a  to discuss goals of care with the patient's sister. Message was left on 2 numbers for them to call back to set up a meeting. At this time patient remains full code with full interventions  2. Altered mental status  Likely related to #2 patient at one time was more verbal at this time she is nonverbal patient has history of prior CVAs with dementia and traumatic brain injury. Rule out other possible causes such as sepsis. Patient being followed by medical team  3. Right infarct  CT scan shows a right temporal parietal subacute infarct with laminar necrosis and/or petechial hemorrhage. There is mild mass-effect without evidence of a midline shift patient seen by neurology recommending palliative involvement as there is little chance of any meaningful recovery. 4.  Debility  Patient has a palliative performance score at this time of around 30% she is bedbound unable to do any activities due to severe evidence of disease progression, she is total care, has tube feeding only no protal nutrition and she has drowsy and confusion level of consciousness with periods of agitation. Patient overall has an extremely poor prognosis long-term.   5.  Initial consult note routed to primary continuity provider  6. Communicated plan of care with: Palliative IDT      Advance Care Planning:  [] [facility] Interdisciplinary Team has updated the ACP Navigator with [address] and Patient Capacity    Primary Decision Maker ([address]): CARLOS is the patient's sister    Medical Interventions: Full interventions   Other Instructions:   Artificially Administered Nutrition: Feeding tube long-term, if indicated     As far as possible, the palliative care team has discussed with patient / health care proxy about goals of care / treatment preferences for patient. HISTORY:     History obtained from: chart review   Active Problems:    Pharyngeal dysphagia (4/7/2022)      CVA (cerebral vascular accident) ([address] (4/9/2022)      Deep tissue injury (4/10/2022)      Sacral wound (4/10/2022)      Primary hypertension (4/10/2022)      Acute respiratory failure with hypoxia ([address] (4/10/2022)      Past Medical History:   Diagnosis Date    Aphasia     CVA (cerebral vascular accident) ([address]     DMII (diabetes mellitus, type 2) ([address]     Dysphagia     Encephalopathy chronic     HFrEF (heart failure with reduced ejection fraction) ([address]     HTN (hypertension)     Right hemiparesis (HCC)       Past Surgical History:   Procedure Laterality Date    IR GASTROSTOMY TUBE PLACEMENT  [id number]      Family History   Family history unknown: Yes     History reviewed, no pertinent family history.   Social History     Tobacco Use    Smoking status: Former Smoker    Smokeless tobacco: Never Used   Substance Use Topics    Alcohol use: Not on file     No Known Allergies   Current Facility-Administered Medications   Medication Dose Route Frequency    [START ON 4/13/2022] insulin glargine (LANTUS) injection 22 Units  22 Units SubCUTAneous DAILY    LORazepam (INTENSOL) 2 mg/mL oral concentrate 0.5 mg  0.5 mg Oral Q4H PRN    acetaminophen (TYLENOL) tablet 650 mg  650 mg Oral Q4H PRN    Or    acetaminophen (TYLENOL) solution 650 mg  650 mg Oral Q4H PRN    Or    acetaminophen (TYLENOL) suppository 650 mg  650 mg Rectal Q4H PRN    hyoscyamine SL (LEVSIN/SL) tablet 0.125 mg  0.125 mg SubLINGual Q4H PRN    morphine (ROXANOL) 100 mg/5 mL (20 mg/mL) concentrated solution 2.4 mg  2.4 mg SubLINGual Q4H PRN    [START ON 4/13/2022] QUEtiapine (SEROquel) tablet 25 mg  25 mg Per G Tube DAILY    insulin lispro (HUMALOG) injection   SubCUTAneous Q6H    insulin lispro (HUMALOG) injection 4 Units  4 Units SubCUTAneous Q6H    ondansetron (ZOFRAN ODT) tablet 4 mg  4 mg Oral Q8H PRN    Or    ondansetron (ZOFRAN) injection 4 mg  4 mg IntraVENous Q6H PRN    dextrose 10% infusion 0-250 mL  0-250 mL IntraVENous PRN    albuterol-ipratropium (DUO-NEB) 2.5 MG-0.5 MG/3 ML  3 mL Nebulization Q4H PRN    [Held by provider] heparin (porcine) injection 5,000 Units  5,000 Units SubCUTAneous Q8H    glucagon (GLUCAGEN) injection 1 mg  1 mg IntraMUSCular PRN    nystatin (MYCOSTATIN) 100,000 unit/gram powder   Topical BID    hydrALAZINE (APRESOLINE) tablet 50 mg  50 mg Oral TID    nitroglycerin (NITROBID) 2 % ointment 1 Inch  1 Inch Topical Q6H PRN    [Held by provider] hydrALAZINE (APRESOLINE) 20 mg/mL injection 20 mg  20 mg IntraVENous Q6H PRN    lisinopriL (PRINIVIL, ZESTRIL) tablet 10 mg  10 mg Oral DAILY    furosemide (LASIX) injection 20 mg  20 mg IntraVENous DAILY    carvediloL (COREG) tablet 12.5 mg  12.5 mg Per G Tube Q12H    glucose chewable tablet 16 g  4 Tablet Per G Tube PRN    melatonin tablet 10 mg  10 mg Per G Tube QHS    polyethylene glycol (MIRALAX) packet 17 g  17 g Per G Tube DAILY PRN    QUEtiapine (SEROquel) tablet 50 mg  50 mg Per G Tube QHS    thiamine HCL (B-1) tablet 200 mg  200 mg Per G Tube TID    multivitamin-minerals-ferrous gluconate oral liquid 15 mL  15 mL Per G Tube DAILY    bacitracin zinc-polymyxin b (POLYSPORIN) 500-10,000 unit/gram ointment   Topical BID     Current Outpatient Medications   Medication Sig    QUEtiapine (SEROquel) 25 mg tablet 1 Tablet by Per G Tube route daily (with breakfast).  insulin glargine (LANTUS) 100 unit/mL injection 20 Units by SubCUTAneous route daily.  insulin lispro (HUMALOG) 100 unit/mL injection 4 Units by SubCUTAneous route every six (6) hours.  bacitracin zinc-polymyxin b (POLYSPORIN) 500-10,000 unit/gram ointment Apply  to affected area two (2) times a day for 7 days. To PEG tube site    hydrALAZINE (APRESOLINE) 50 mg tablet 1 Tablet by Per G Tube route three (3) times daily.  LORazepam (INTENSOL) 2 mg/mL concentrated solution Take 0.25 mL by mouth every four (4) hours as needed for Agitation or Anxiety. Max Daily Amount: 3 mg.  morphine (ROXANOL) 100 mg/5 mL (20 mg/mL) concentrated solution 0.12 mL by SubLINGual route every four (4) hours as needed for Shortness of Breath for up to 14 days. Max Daily Amount: 14.4 mg.  polyethylene glycol (MIRALAX) 17 gram packet 1 Packet by Per G Tube route daily as needed for Constipation.  albuterol-ipratropium (DUO-NEB) 2.5 mg-0.5 mg/3 ml nebu 3 mL by Nebulization route every four (4) hours as needed for Wheezing.  carvediloL (COREG) 12.5 mg tablet 1 Tablet by Per G Tube route every twelve (12) hours.  melatonin 10 mg tab 10 mg by Per J Tube route nightly.  acetaminophen (Tylenol Extra Strength) 500 mg tablet 2 Tablets by Per G Tube route every six (6) hours as needed for Pain.  lisinopriL (PRINIVIL, ZESTRIL) 10 mg tablet 1 Tablet by Per G Tube route daily.  multivitamin-minerals-ferrous gluconate 9 mg iron/15 mL oral liquid 15 mL by Per G Tube route daily.  nystatin (MYCOSTATIN) powder Apply  to affected area two (2) times a day for 30 days.  QUEtiapine (SEROquel) 50 mg tablet 1 Tablet by Per G Tube route nightly. Clinical Pain Assessment (nonverbal scale for nonverbal patients):        Activity (Movement): Seeking attention through movement or slow, cautious movement    Duration: for how long has pt been experiencing pain (e.g., 2 days, 1 month, years)  Frequency: how often pain is an issue (e.g., several times per day, once every few days, constant)     FUNCTIONAL ASSESSMENT:     Palliative Performance Scale (PPS):  PPS: 30    ECOG  ECOG Status : Completely disabled     PSYCHOSOCIAL/SPIRITUAL SCREENING:      Any spiritual / Quaker concerns: unable to ascertain   [] Yes /  [] No    Caregiver Burnout:  [] Yes /  [] No /  [x] No Caregiver Present      Anticipatory grief assessment:  Unable to determine at this time  [] Normal  / [] Maladaptive        REVIEW OF SYSTEMS:     Systems: constitutional, ears/nose/mouth/throat, respiratory, gastrointestinal, genitourinary, musculoskeletal, integumentary, neurologic, psychiatric, endocrine. Positive findings noted below. Modified ESAS Completed by: provider                       Dyspnea: 0           Stool Occurrence(s): 1   Positive and pertinent negative findings in ROS are noted above in HPI. The following systems were [x] reviewed / [] unable to be reviewed as noted in HPI  Other findings are noted below. PHYSICAL EXAM:     Constitutional: alert but non verbal, appears in some distress   Eyes: pupils equal, anicteric  ENMT: no nasal discharge, dry mucous membranes and lips   Cardiovascular: regular rhythm, distal pulses intact  Respiratory: breathing not labored, symmetric  Gastrointestinal: soft non-tender, +bowel sounds  Musculoskeletal: no deformity, no tenderness to palpation  Skin: warm, dry  Neurologic: not following commands, moving all extremities    Other: Wt Readings from Last 3 Encounters:   04/12/22 65.3 kg (144 lb)   04/05/22 62.4 kg (137 lb 9.1 oz)   03/14/22 70.3 kg (155 lb)     Blood pressure 137/89, pulse 92, temperature 97.4 °F (36.3 °C), resp. rate 14, weight 65.3 kg (144 lb), SpO2 98 %.   Pain:  Pain Scale 1: Adult Nonverbal Pain Scale  Pain Intensity 1: 4                      LAB AND IMAGING FINDINGS:     Lab Results   Component Value Date/Time    WBC 7.6 04/12/2022 01:14 AM    HGB 8.2 (L) 04/12/2022 01:14 AM    PLATELET 889 74/20/9263 01:14 AM     Lab Results   Component Value Date/Time    Sodium 142 04/12/2022 01:14 AM    Potassium 4.2 04/12/2022 01:14 AM    Chloride 104 04/12/2022 01:14 AM    CO2 32 04/12/2022 01:14 AM    BUN 34 (H) 04/12/2022 01:14 AM    Creatinine 0.91 04/12/2022 01:14 AM    Calcium 8.7 04/12/2022 01:14 AM    Magnesium 2.4 04/12/2022 01:14 AM    Phosphorus 3.3 04/12/2022 01:14 AM      Lab Results   Component Value Date/Time    Alk. phosphatase 154 (H) 04/09/2022 05:30 PM    Protein, total 6.2 (L) 04/09/2022 05:30 PM    Albumin 2.6 (L) 04/09/2022 05:30 PM    Globulin 3.6 04/09/2022 05:30 PM     Lab Results   Component Value Date/Time    INR 1.4 (H) 03/24/2022 07:52 PM    Prothrombin time 16.7 (H) 03/24/2022 07:52 PM    aPTT 32.3 03/25/2022 12:00 AM      No results found for: IRON, FE, TIBC, IBCT, PSAT, FERR   No results found for: PH, PCO2, PO2  No components found for: [place]   Lab Results   Component Value Date/Time    CK 54 03/25/2022 07:00 PM              Total time: 35 minutes   Counseling / coordination time, spent as noted above:   > 50% counseling / coordination:  Time spent in direct consultation with the patient, medical team, and family     Prolonged service was provided for  []30 min   []75 min in face to face time in the presence of the patient, spent as noted above. Time Start:   Time End:     Disclaimer: Sections of this note are dictated using utilizing voice recognition software, which may have resulted in some phonetic based errors in grammar and contents. Even though attempts were made to correct all the mistakes, some may have been missed, and remained in the body of the document. If questions arise, please contact our department.

## 2022-04-12 NOTE — PROGRESS NOTES
Hospice consult noted. Met with pt's family Carrington Parmar and Veronica at pt's bedside. They now want pt to return to [address] stating [facility] is too far. Called Peggy Huertas of [facility] for [address] and she stated she will call the facility and call CM back  Spoke with Armaan Toro of [facility] and she stated she will contact pt's family and pt to return to [facility]      1315:  Requested Case Management specialist to assist with transportation to [facility] [address] [place] [address] and phone number is [phone]  Patient will require BLS transport. Pt requires Stretcher If stretcher, reason: CVA, acute encephalopathy, oxygen needs, comfort measures  Patient is currently requiring oxygen Yes Yes: Comment: 5L  Height:4'11\"   Weight: 144 Ib  Pt is on isolation: No    Is the pt ready now? yes  Requested time: Next Available  PCS Faxed: no  Insurance verified on face sheet: yes  Auth needed for transport: yes  CM completed PCS/ Envelope and placed on chart. 1412: Transport  time is 3:30pm to [facility] text message to Dr. Salvatore Miller of [facility] for [facility]  Informed pt's nurse Lucy Graham  Informed pt's nihossein Parmar.         Erin Garvey, BSN RN  Care Management  Pager: [phone]

## 2022-04-12 NOTE — PROGRESS NOTES
Call made to [address], spoke with Juanito Lutz, dispatch will call the nurses station with [facility].   Trip # [id number]

## 2022-04-12 NOTE — PROGRESS NOTES
[facility] Jojo POLO to includes SBAR, Kardex, recent results, MAR, intake/output.

## 2022-04-12 NOTE — DISCHARGE INSTRUCTIONS
Patient armband removed and shredded    DISCHARGE SUMMARY from Nurse    PATIENT INSTRUCTIONS:    After general anesthesia or intravenous sedation, for 24 hours or while taking prescription Narcotics:  · Limit your activities  · Do not drive and operate hazardous machinery  · Do not make important personal or business decisions  · Do  not drink alcoholic beverages  · If you have not urinated within 8 hours after discharge, please contact your surgeon on call. Report the following to your surgeon:  · Excessive pain, swelling, redness or odor of or around the surgical area  · Temperature over 100.5  · Nausea and vomiting lasting longer than 4 hours or if unable to take medications  · Any signs of decreased circulation or nerve impairment to extremity: change in color, persistent  numbness, tingling, coldness or increase pain  · Any questions    What to do at Home:  Recommended activity: Activity as tolerated. If you experience any of the following symptoms progressive slurring of speech, increase of facial droop or any untoward effects, please follow up with nearest emergency room or primary care physician. *  Please give a list of your current medications to your Primary Care Provider. *  Please update this list whenever your medications are discontinued, doses are      changed, or new medications (including over-the-counter products) are added. *  Please carry medication information at all times in case of emergency situations. These are general instructions for a healthy lifestyle:    No smoking/ No tobacco products/ Avoid exposure to second hand smoke  Surgeon General's Warning:  Quitting smoking now greatly reduces serious risk to your health.     Obesity, smoking, and sedentary lifestyle greatly increases your risk for illness    A healthy diet, regular physical exercise & weight monitoring are important for maintaining a healthy lifestyle    You may be retaining fluid if you have a history of heart failure or if you experience any of the following symptoms:  Weight gain of 3 pounds or more overnight or 5 pounds in a week, increased swelling in our hands or feet or shortness of breath while lying flat in bed. Please call your doctor as soon as you notice any of these symptoms; do not wait until your next office visit. The discharge information has been reviewed with the patient. The patient verbalized understanding. Discharge medications reviewed with the patient and appropriate educational materials and side effects teaching were provided.   ___________________________________________________________________________________________________________________________________

## 2022-04-13 NOTE — HOSPICE
Hospice Admission Summary    Ms Lucio Warner, 28-year-old, Macedonian speaking only female, admitted to Hospice services for a terminal diagnosis of CVA with dysphagia. Patient has elected hospice services and is no longer seeking aggressive treatment. Co-morbidities related to the terminal diagnosis are respiratory failure and debility. Patient also has a past medical history of HFrEF and insulin-dependent diabetes, liver mass left lobe, pneumonia, acute cystitis, dementia, PTSD, hyperlipidemia, hypothyroidism, HTN and depression. The family and facility staff is present and willing and safely able to provide care and administer medications daily. Nataliya Last, patient's niece, participated in goal setting, care planning, and are agreeable to the care plan. Admission booklet reviewed with Nataliya Last /facility; services provided under hospice benefit, review of rights and responsibilities, disposal of medications, contact information for , Joint Commission, Medicare, QIO, and outside resources for independence. Nataliya Last /facility educated on IDT and their right to attend meetings. Education provided regarding 24-hour availability of hospice services and on-call number provided. Attending physician:  Juliette Ruby MD  Medical Director:  Jerman Reyna MD  Level of Care:  Routine  Advance Directives:  n/a  Allergies:  KNDA  LMAC:  29cm  Height:  4'11  Weight:  144  PPS:  30%  FAST:  unable to truly assess d/t communication barrier  NYHA:  N/A   EF%:  N/A   Tobacco usage: former smoker  Functional status:  dependent for all ADLs  Infection:  N/A   Pain:  unable to assess d/t communication barrier, pt was also uncooperative during assessment  Bowel Regimen:  Bisacodyl 10mg supp: 1 daily prn constipation  Lines, Drains, or Airways present:   20Fr PEG tube; 4.5 line at insertion site  Wounds present:  right heel blister/DTI, intact on assessment;  See wound care plan for intervention orders  Symptoms to monitor to maintain comfort:  agitation/anxiety, fall prevention  Hospice Aide Services Requested: yes  MSW Requested: yes   Requested: yes  Volunteer Services Requested:  no  Bereavement risk/contact: Maximino crook on admission  Patient/family/caregiver specific end of life goal:  for patient to be as comfortable as possible for end of life care  Training and education provided this visit: hospice philosophy, comfort medications and POC for patient, 24 hour call number  Plan for next visit:  assess anxiety/medication effectiveness    Upon arrival to facility and patient's room, patient is sitting up in w/c with staff and family at side, yelling out intermittently. The family states that Lidia Kevin is irritated/agitated because her roommate sits and watches tv and just start laughing and the she (the patient) starts yelling\". Family confirms that this behavior has been her baseline recently. Family leaves after consents are completed. Staff at bedside to help with admission assessment. Patient is alert and continues to yell out intermittently. Patient is alert but is somewhat combative when I try to complete assessment. She tries to push me away, pulls her hands/foot away and is grabbing at my equipment to take vital signs. Patient's skin is warm, dry and intact. Her lips are very dry and have small blisters on them. Lip balm provided for patient from hospice. Lungs are diminished throughout all lung fields. Patient appears minimally short of breath from constantly yelling. O2 sat is 89% on room air. Respirations are even and unlabored in between bouts of yelling. Facility and family both stated that the patient \"continuously pulls the oxygen off her face no matter how many times we put it on\". Patient is in no respiratory distress at this time. Encouraged facility staff to try to get her to wear it if possible.  Abdomen is soft and non-distended with bowel sounds audible in all 4 quadrants. Patient has a 20Fr PEG tube in place with an abdominal binder securing tube as facility staff state that [place] is constantly trying to pull it out\". No s/s of infection at insertion site. Patient's tube feeding with Diabetisource is running without issue at 45ml/hr. Patient is incontinent of bowel and bladder. Diaper is saturated with kina colored urine. Sacral area is red/pink, skin is currently in tact. Skin cleansed with soap and water, barrier cream and new diaper applied to patient. Staff encourage to keep patient as dry as possible and use barrier cream with each diaper change. Patient's upper right extremity and right leg show deficits from a previous stroke. Pt is able to move either extremity very little and the upper right hand is contracted. Right foot has +2 pitting edema and a large bruise on the top of her foot. Patient also has bruising on her right shin. Facility denies knowing cause of bruise stating that \"it was not there when she left for the hospital\". Patient has multiple small bruises on her left arm. Patient is able to move both left arm and leg well with intent and is able to pull herself to the edge of the bed or wheel chair. The patient showed no s/s of pain during this assessment, no facial grimacing or guarding. She was very agitated during the assessment. Encouraged staff to try to medicate her with Lorazepam to help with the anxiety/agitation. Patient continued to yell out intermittently at the completion of this assessment, but was in no acute distress. Diapers, chux, mouth swabs, barrier cream and basin left with facility staff.

## 2022-04-14 NOTE — HOSPICE
Patient presents reclining in bed. No signs of acute distress noted. Patient is nonverbal.  Staff reports no issues at this time.     Medication refills: Medication to be delivered today    Supplies by type and quantity ordered/delivered this visit include: Supplies available in the room    Consulted attending physician regarding: Not needed

## 2022-04-14 NOTE — HOSPICE
PPE: proper hand hygiene, mask, eyewear. Reason for today's visit is to complete assessment. PT is a 74year old female, admitted to hospice on 22 with Dysphasia following CVA. PT resides at [address] [place] [address], and her POA is randal Vidal, whom agrees to meet in pt's room at 12:30. Pt is received resting comfortably. Pt is Sinhala speaking, however, pt is non verbal at this time. SW waits for niece until 1pm and she doesn't show. SW calls nihossein and she completes assessment via phone. Randal would like pt's room moved due to roommate making a lot of noise and neighbor across the fox screaming out. SW hears this screaming while at pt's bedside. Randal states she has spoken to the facility and they will move pt when a room is available. Randal would like a list of our contracted facilities so she can visit them, as she is considering moving pt. Randal states pt had some depression during covid but denies pt would harm herself or others. Randal will send this SW her [facility] paperwork and SW will have this reviewed and signed. Randal states she will call  home in the area to have an understanding of cost and transporting remains to [place], where her family is from. Randal admits she is saddened by pt's prognosis, and feels overwhelmed at times. Randal visits pt often and has a good support system from family. Randal states many family members are coming down this weekend to visit pt. Pt has never had children, but randal states their entire family is very close. Randal agrees for SW to follow each month throughout benefit period, to assess  home choice, desire to move pt to a different facility, and emotional distress. and SW will provide support needed.

## 2022-04-14 NOTE — Clinical Note
Bereavement assessment complete for niece Makayla Mccollum who scores low on bereavement scale. Assessment sent to bereavement coordinator.

## 2022-04-14 NOTE — HOSPICE
The following standard precautions for infection control were utilized:  Mask  Patient was in bed.  and  came to facility to meet with patient and POA (Fito Espino). POA never showed ,  and  will follow up with family.

## 2022-04-17 NOTE — HOSPICE
[facility]  Postmortem care provided to patient. Family actively and willingly participated in care. Patient and families postmortem Methodist and cultural wishes maintained. The following patient belongings: eyeglesses given to family. The following were removed from patient body, (none) and discarded in biohazard bag. Eyes gently closed. Soft rolled towel placed under patient chin. HOB raised to 30 degrees. Encouraged family to spend time with  patient. Provided emotional support. Offered to stay with family until clergy or  home arrive. Family is appropriately grieving at this time.

## 2022-10-22 NOTE — PROGRESS NOTES
[facility]  (A Division of [address] [place] [address])    Follow-up Note      Date of Admission: 3/24/2022       Date of Note:  4/7/2022          Current Antimicrobials:    Prior Antimicrobials:  Ceftriaxone 3/28- present Vancomycin 3/24 to 3/28  Meropenem 3/25- 3/28     Assessment:         Septic shock on multiple pressors with lactic acidosis: Suspect related to  source given abnormal UA vs liver abscess? Vs pleural process  Urinary tract infection: UA with greater than 100 WBCs few bacteria large leukocyte esterase negative nitrite; recent cx + for Klebsiella on 3/15  Aspiration pneumonia with hypoxia  Leukocytosis: improving  Loculated left pleural effusion with calcification and pleural thickening as well as non-specific diffuse opacities -  Noted on CT chest from 3/24. ? Chronic   GUIDO: Creatinine 2.38 upon presentation now improving  Metabolic encephalopathy with baseline dementia/prior CVA/limited verbal interaction  Suspected Right liver abscess vs mass (new since 2017)-3.1 x 5.8 x 4.2 cm, infection vs malignancy. Noted on 3/14 CT scn but not mentioedn in 3/24 CT report. ?? source  Thrombocytopenia-starting to recover    Plan:   Completed Ceftriaxone on 4/1  CBC, BMP in am   4/5 MRI and MRI liver with MRCP reviewed - biopsy at a later date. Heme/Onc following    Discussed with Dr. Justyn Pryor. Jannet Grossman for discharge from ID standpoint. Ongoing aspiration concern for the future. Stephany Abraham DO  [facility]  [facility], [address] [place], [place] [address] [address]  Office: [phone]  Mobile/Text: [phone]     Microbiology:  3/24 blood culture: No growth to date x2  3/25 RSV PCR negative  3/25 MRSA nasal swab negative  3/25 respiratory viral panel: Negative  3/25 C.diff: negative    Lines / Catheters:  A-line left wrist  Peripheral  Right IJ TLC  dove    Subjective:   Patent seen and examined. Awake, non-verbal.  More feisty this morning.  Yelling a little louder than usual and was able to slap my my hand away during exam.   Pulling O2 off- not clear how long it had been off- re-check of sats 94%    Objective:        Visit Vitals  /76 (BP 1 Location: Right arm, BP Patient Position: Semi fowlers)   Pulse 63   Temp 97.4 °F (36.3 °C)   Resp 18   Ht 4' 11\" (1.499 m)   Wt 62.4 kg (137 lb 9.1 oz)   SpO2 98%   BMI 27.79 kg/m²     Temp (24hrs), Av °F (36.7 °C), Min:97.4 °F (36.3 °C), Max:99.1 °F (37.3 °C)         General:   Awake and alert to the people around her,  she remains nonverbal   Skin:   no rashes or skin lesions noted on limited exam, cool to touch   HEENT:  Normocephalic, atraumatic, PERRL, EOMI, no scleral icterus or pallor; no conjunctival hemmohage;  nasal and oral mucous are moist and without evidence of lesions. No thrush. Neck supple, no bruits. NG in place   Lymph Nodes:   no cervical, axillary or inguinal adenopathy   Lungs:   non-labored, bilaterally clear to aspiration- no crackles wheezes rales or rhonchi   Heart:  RRR, s1 and s2; no murmurs rubs or gallops, no edema, + pedal pulses   Abdomen:  soft, non-distended, active bowel sounds, no hepatomegaly, no splenomegaly. yells out with palpation to the lower abdomen.  + PEG   Genitourinary: Fallon   Extremities:   no clubbing, cyanosis; no joint effusions or swelling; Full ROM of all large joints to the upper and lower extremities; muscle mass appropriate for age- left wrist restrained   Neurologic:  No gross focal sensory abnormalities;  awake, tracks, vocalizes discomfort and frustration however is otherwise nonverbal, response to pain nonverbal, right upper extremity appears to be contracted, focal weakness on the right when compared to the left as left side has noted independent movement.    Psychiatric:   Calm        Lab results:    Chemistry  Recent Labs     22  0134 22  0248 22  0700   * 151* 172*    139 141   K 3.3* 3.8 3.7    102 103   CO2 33* 36* 35*   BUN 28* 29* 29*   CREA 0.94 0.79 0.78   CA 8.3* 8.6 8.7   AGAP 5 1* 3   BUCR 30* 37* 37*       CBC w/ Diff  Recent Labs     04/07/22  0134 04/06/22  0248 04/05/22  0700   WBC 9.4 10.4 9.1   RBC 2.56* 2.83* 2.85*   HGB 7.2* 7.9* 8.1*   HCT 23.2* 25.7* 25.8*    151 112*   GRANS 81* 81* 78*   LYMPH 12* 11* 13*   EOS 2 1 2       Microbiology  All Micro Results     Procedure Component Value Units Date/Time    CULTURE, BLOOD [[id number]] Collected: 03/24/22 1945    Order Status: Completed Specimen: Blood Updated: 03/30/22 0639     Special Requests: NO SPECIAL REQUESTS        Culture result: NO GROWTH 6 DAYS       CULTURE, BLOOD [[id number]] Collected: 03/24/22 1952    Order Status: Completed Specimen: Blood Updated: 03/30/22 0639     Special Requests: NO SPECIAL REQUESTS        Culture result: NO GROWTH 6 DAYS       CULTURE, MRSA [[id number]] Collected: 03/25/22 0134    Order Status: Completed Specimen: Nasal from Nares Updated: 03/26/22 1151     Special Requests: NO SPECIAL REQUESTS        Culture result: MRSA NOT PRESENT               Screening of patient nares for MRSA is for surveillance purposes and, if positive, to facilitate isolation considerations in high risk settings. It is not intended for automatic decolonization interventions per se as regimens are not sufficiently effective to warrant routine use.           C. DIFFICILE AG & TOXIN A/B [[id number]] Collected: 03/25/22 1517    Order Status: Completed Specimen: Stool Updated: 03/26/22 1007     GDH ANTIGEN Negative        C. difficile toxin Negative        INTERPRETATION       NEGATIVE FOR TOXIGENIC C. DIFFICILE          RESPIRATORY VIRUS PANEL W/COVID-19, PCR [[id number]] Collected: 03/25/22 0030    Order Status: Completed Specimen: Nasopharyngeal Updated: 03/25/22 0205     Adenovirus Not detected        Coronavirus 229E Not detected        Coronavirus HKU1 Not detected        Coronavirus CVNL63 Not detected        Coronavirus OC43 Not detected SARS-CoV-2, PCR Not detected        Metapneumovirus Not detected        Rhinovirus and Enterovirus Not detected        Influenza A Not detected        Influenza B Not detected        Parainfluenza 1 Not detected        Parainfluenza 2 Not detected        Parainfluenza 3 Not detected        Parainfluenza virus 4 Not detected        RSV by PCR Not detected        B. parapertussis, PCR Not detected        Bordetella pertussis - PCR Not detected        Chlamydophila pneumoniae DNA, QL, PCR Not detected        Mycoplasma pneumoniae DNA, QL, PCR Not detected       CULTURE, RESPIRATORY/SPUTUM/BRONCH Jose G Brown [[id number]] Collected: 03/24/22 2330    Order Status: Canceled Specimen: Sputum            Rosario Barnard DO   4/7/2022 Refused

## 2023-06-20 NOTE — PROGRESS NOTES
Sharon Stevenson, and updated that Medicaid transport will be here within a 3 hour window to transport patient [facility]. CM called and spoke with patient's niece Alka Luu [phone], updated her with the discharge plan for today. Patient's niece is agreeable to the discharge plan for today.              Marie Che RN  Case Management [phone] Acute on chronic anemia

## 2025-01-06 NOTE — PROGRESS NOTES
No issue. Respiratory status within normal limits.  On 1.5 LPM via nasal cannula     03/30/22 0817   Oxygen Therapy   O2 Sat (%) 96 %   Pulse via Oximetry 93 beats per minute   O2 Device Nasal cannula   O2 Flow Rate (L/min) 1.5 l/min Returned call and assisted patient in scheduling appt with Dr Mcneal for April per her requst

## (undated) DEVICE — GAUZE,SPONGE,4"X4",16PLY,STRL,LF,10/TRAY: Brand: MEDLINE

## (undated) DEVICE — TUBE PERC ENDO GASTSTMY 20FR

## (undated) DEVICE — BITE BLOCK ENDOSCP UNIV AD 6 TO 9.4 MM